# Patient Record
Sex: FEMALE | Race: WHITE | NOT HISPANIC OR LATINO | Employment: OTHER | URBAN - METROPOLITAN AREA
[De-identification: names, ages, dates, MRNs, and addresses within clinical notes are randomized per-mention and may not be internally consistent; named-entity substitution may affect disease eponyms.]

---

## 2017-01-09 ENCOUNTER — CONSULT (OUTPATIENT)
Dept: SLEEP MEDICINE | Facility: HOSPITAL | Age: 72
End: 2017-01-09

## 2017-01-09 ENCOUNTER — TELEPHONE (OUTPATIENT)
Dept: ONCOLOGY | Facility: CLINIC | Age: 72
End: 2017-01-09

## 2017-01-09 VITALS
SYSTOLIC BLOOD PRESSURE: 134 MMHG | WEIGHT: 160.2 LBS | HEIGHT: 63 IN | BODY MASS INDEX: 28.39 KG/M2 | HEART RATE: 58 BPM | OXYGEN SATURATION: 93 % | DIASTOLIC BLOOD PRESSURE: 56 MMHG

## 2017-01-09 DIAGNOSIS — F51.04 PSYCHOPHYSIOLOGICAL INSOMNIA: ICD-10-CM

## 2017-01-09 DIAGNOSIS — Z72.821 INADEQUATE SLEEP HYGIENE: ICD-10-CM

## 2017-01-09 DIAGNOSIS — G47.21 CIRCADIAN RHYTHM SLEEP DISORDER, DELAYED SLEEP PHASE TYPE: Primary | ICD-10-CM

## 2017-01-09 PROCEDURE — 99204 OFFICE O/P NEW MOD 45 MIN: CPT | Performed by: INTERNAL MEDICINE

## 2017-01-09 RX ORDER — POTASSIUM CHLORIDE 750 MG/1
TABLET, FILM COATED, EXTENDED RELEASE ORAL
Qty: 90 TABLET | Refills: 0 | Status: SHIPPED | OUTPATIENT
Start: 2017-01-09 | End: 2017-04-08 | Stop reason: SDUPTHER

## 2017-01-09 NOTE — PATIENT INSTRUCTIONS
Insomnia  Insomnia is a sleep disorder that makes it difficult to fall asleep or to stay asleep. Insomnia can cause tiredness (fatigue), low energy, difficulty concentrating, mood swings, and poor performance at work or school.   There are three different ways to classify insomnia:   · Difficulty falling asleep.  · Difficulty staying asleep.  · Waking up too early in the morning.  Any type of insomnia can be long-term (chronic) or short-term (acute). Both are common. Short-term insomnia usually lasts for three months or less. Chronic insomnia occurs at least three times a week for longer than three months.  CAUSES   Insomnia may be caused by another condition, situation, or substance, such as:  · Anxiety.  · Certain medicines.  · Gastroesophageal reflux disease (GERD) or other gastrointestinal conditions.  · Asthma or other breathing conditions.  · Restless legs syndrome, sleep apnea, or other sleep disorders.  · Chronic pain.  · Menopause. This may include hot flashes.  · Stroke.  · Abuse of alcohol, tobacco, or illegal drugs.  · Depression.  · Caffeine.    · Neurological disorders, such as Alzheimer disease.  · An overactive thyroid (hyperthyroidism).  The cause of insomnia may not be known.  RISK FACTORS  Risk factors for insomnia include:  · Gender. Women are more commonly affected than men.  · Age. Insomnia is more common as you get older.  · Stress. This may involve your professional or personal life.  · Income. Insomnia is more common in people with lower income.  · Lack of exercise.    · Irregular work schedule or night shifts.  · Traveling between different time zones.  SIGNS AND SYMPTOMS  If you have insomnia, trouble falling asleep or trouble staying asleep is the main symptom. This may lead to other symptoms, such as:  · Feeling fatigued.  · Feeling nervous about going to sleep.  · Not feeling rested in the morning.  · Having trouble concentrating.  · Feeling irritable, anxious, or depressed.  TREATMENT    Treatment for insomnia depends on the cause. If your insomnia is caused by an underlying condition, treatment will focus on addressing the condition. Treatment may also include:   · Medicines to help you sleep.  · Counseling or therapy.  · Lifestyle adjustments.  HOME CARE INSTRUCTIONS   · Take medicines only as directed by your health care provider.  · Keep regular sleeping and waking hours. Avoid naps.  · Keep a sleep diary to help you and your health care provider figure out what could be causing your insomnia. Include:      When you sleep.    When you wake up during the night.    How well you sleep.      How rested you feel the next day.    Any side effects of medicines you are taking.    What you eat and drink.    · Make your bedroom a comfortable place where it is easy to fall asleep:    Put up shades or special blackout curtains to block light from outside.    Use a white noise machine to block noise.    Keep the temperature cool.    · Exercise regularly as directed by your health care provider. Avoid exercising right before bedtime.  · Use relaxation techniques to manage stress. Ask your health care provider to suggest some techniques that may work well for you. These may include:    Breathing exercises.    Routines to release muscle tension.    Visualizing peaceful scenes.  · Cut back on alcohol, caffeinated beverages, and cigarettes, especially close to bedtime. These can disrupt your sleep.  · Do not overeat or eat spicy foods right before bedtime. This can lead to digestive discomfort that can make it hard for you to sleep.  · Limit screen use before bedtime. This includes:    Watching TV.    Using your smartphone, tablet, and computer.  · Stick to a routine. This can help you fall asleep faster. Try to do a quiet activity, brush your teeth, and go to bed at the same time each night.  · Get out of bed if you are still awake after 15 minutes of trying to sleep. Keep the lights down, but try reading or  doing a quiet activity. When you feel sleepy, go back to bed.  · Make sure that you drive carefully. Avoid driving if you feel very sleepy.  · Keep all follow-up appointments as directed by your health care provider. This is important.  SEEK MEDICAL CARE IF:   · You are tired throughout the day or have trouble in your daily routine due to sleepiness.  · You continue to have sleep problems or your sleep problems get worse.  SEEK IMMEDIATE MEDICAL CARE IF:   · You have serious thoughts about hurting yourself or someone else.     This information is not intended to replace advice given to you by your health care provider. Make sure you discuss any questions you have with your health care provider.     Document Released: 12/15/2001 Document Revised: 09/07/2016 Document Reviewed: 09/18/2015  Elsevier Interactive Patient Education ©2016 [employer]

## 2017-01-09 NOTE — MR AVS SNAPSHOT
Carol WISE Miranda   1/9/2017 11:00 AM   Consult    Dept Phone:  [phone]   Encounter #:  [id number]    Provider:  Edmund Carrera MD   Department:  [facility] SLEEP MEDICINE                Your Full Care Plan              Where to Get Your Medications      These medications were sent to [facility] [address] [place] [address] - [address] [place] [address] - [phone]  - [phone] [place] [address]     Phone:  [phone]     rivaroxaban 20 MG tablet            Your Updated Medication List          This list is accurate as of: 1/9/17 11:58 AM.  Always use your most recent med list.                Azilsartan Medoxomil 40 MG tablet   Commonly known as:  EDARBI   Take 1 tablet by mouth Daily.       chlorthalidone 25 MG tablet   Commonly known as:  HYGROTON   Take 1/2 tab by mouth daily       GLUCOSAMINE PO       metoprolol succinate XL 25 MG 24 hr tablet   Commonly known as:  TOPROL-XL       metoprolol tartrate 25 MG tablet   Commonly known as:  LOPRESSOR       PAIN RELIEF EXTRA STRENGTH 500 MG tablet   Generic drug:  acetaminophen       * pantoprazole 40 MG EC tablet   Commonly known as:  PROTONIX   Take 1 tablet by mouth daily.       * pantoprazole 40 MG EC tablet   Commonly known as:  PROTONIX   Take 1 tablet by mouth Daily.       potassium chloride 10 MEQ CR tablet   Commonly known as:  K-DUR   TAKE ONE TABLET BY MOUTH ONCE DAILY WITH FOOD       rivaroxaban 20 MG tablet   Commonly known as:  XARELTO   Take 1 tablet by mouth Daily.       simvastatin 20 MG tablet   Commonly known as:  ZOCOR   TAKE ONE TABLET BY MOUTH ONCE DAILY       Vortioxetine HBr 10 MG tablet       * Notice:  This list has 2 medication(s) that are the same as other medications prescribed for you. Read the directions carefully, and ask your doctor or other care provider to review them with you.            You Were Diagnosed With        Codes Comments    Circadian rhythm  sleep disorder, delayed sleep phase type    -  Primary ICD-10-CM: G47.21  ICD-9-CM: 327.31     Inadequate sleep hygiene     ICD-10-CM: Z72.821  ICD-9-CM: 307.49     Psychophysiological insomnia     ICD-10-CM: F51.04  ICD-9-CM: 307.42       Instructions    Insomnia  Insomnia is a sleep disorder that makes it difficult to fall asleep or to stay asleep. Insomnia can cause tiredness (fatigue), low energy, difficulty concentrating, mood swings, and poor performance at work or school.   There are three different ways to classify insomnia:   · Difficulty falling asleep.  · Difficulty staying asleep.  · Waking up too early in the morning.  Any type of insomnia can be long-term (chronic) or short-term (acute). Both are common. Short-term insomnia usually lasts for three months or less. Chronic insomnia occurs at least three times a week for longer than three months.  CAUSES   Insomnia may be caused by another condition, situation, or substance, such as:  · Anxiety.  · Certain medicines.  · Gastroesophageal reflux disease (GERD) or other gastrointestinal conditions.  · Asthma or other breathing conditions.  · Restless legs syndrome, sleep apnea, or other sleep disorders.  · Chronic pain.  · Menopause. This may include hot flashes.  · Stroke.  · Abuse of alcohol, tobacco, or illegal drugs.  · Depression.  · Caffeine.    · Neurological disorders, such as Alzheimer disease.  · An overactive thyroid (hyperthyroidism).  The cause of insomnia may not be known.  RISK FACTORS  Risk factors for insomnia include:  · Gender. Women are more commonly affected than men.  · Age. Insomnia is more common as you get older.  · Stress. This may involve your professional or personal life.  · Income. Insomnia is more common in people with lower income.  · Lack of exercise.    · Irregular work schedule or night shifts.  · Traveling between different time zones.  SIGNS AND SYMPTOMS  If you have insomnia, trouble falling asleep or trouble staying  asleep is the main symptom. This may lead to other symptoms, such as:  · Feeling fatigued.  · Feeling nervous about going to sleep.  · Not feeling rested in the morning.  · Having trouble concentrating.  · Feeling irritable, anxious, or depressed.  TREATMENT   Treatment for insomnia depends on the cause. If your insomnia is caused by an underlying condition, treatment will focus on addressing the condition. Treatment may also include:   · Medicines to help you sleep.  · Counseling or therapy.  · Lifestyle adjustments.  HOME CARE INSTRUCTIONS   · Take medicines only as directed by your health care provider.  · Keep regular sleeping and waking hours. Avoid naps.  · Keep a sleep diary to help you and your health care provider figure out what could be causing your insomnia. Include:      When you sleep.    When you wake up during the night.    How well you sleep.      How rested you feel the next day.    Any side effects of medicines you are taking.    What you eat and drink.    · Make your bedroom a comfortable place where it is easy to fall asleep:    Put up shades or special blackout curtains to block light from outside.    Use a white noise machine to block noise.    Keep the temperature cool.    · Exercise regularly as directed by your health care provider. Avoid exercising right before bedtime.  · Use relaxation techniques to manage stress. Ask your health care provider to suggest some techniques that may work well for you. These may include:    Breathing exercises.    Routines to release muscle tension.    Visualizing peaceful scenes.  · Cut back on alcohol, caffeinated beverages, and cigarettes, especially close to bedtime. These can disrupt your sleep.  · Do not overeat or eat spicy foods right before bedtime. This can lead to digestive discomfort that can make it hard for you to sleep.  · Limit screen use before bedtime. This includes:    Watching TV.    Using your smartphone, tablet, and computer.  · Stick to  a routine. This can help you fall asleep faster. Try to do a quiet activity, brush your teeth, and go to bed at the same time each night.  · Get out of bed if you are still awake after 15 minutes of trying to sleep. Keep the lights down, but try reading or doing a quiet activity. When you feel sleepy, go back to bed.  · Make sure that you drive carefully. Avoid driving if you feel very sleepy.  · Keep all follow-up appointments as directed by your health care provider. This is important.  SEEK MEDICAL CARE IF:   · You are tired throughout the day or have trouble in your daily routine due to sleepiness.  · You continue to have sleep problems or your sleep problems get worse.  SEEK IMMEDIATE MEDICAL CARE IF:   · You have serious thoughts about hurting yourself or someone else.     This information is not intended to replace advice given to you by your health care provider. Make sure you discuss any questions you have with your health care provider.     Document Released: 12/15/2001 Document Revised: 09/07/2016 Document Reviewed: 09/18/2015  [employer] Interactive Patient Education ©2016 [employer]       Patient Instructions History      Upcoming Appointments     Visit Type Date Time Department    CONSULT 1/9/2017 11:00 AM [facility]    FOLLOW UP 1 UNIT 1/16/2017 11:00 AM [facility] [place]    FOLLOW UP 4/6/2017 11:30 AM [facility]    FOLLOW UP 12/14/2017 10:45 AM [facility]      MyChart Signup     Our records indicate that you have declined [facility] MyChart signup. If you would like to sign up for [employer], please email [email or web address] or call [phone] to obtain an activation code.             Other Info from Your Visit           Your Appointments     Jan 16, 2017 11:00 AM EST   FOLLOW UP with Amanda Ashby MD   [facility]  [facility] ([place])    [address]  [facility] [address] [phone]             "Apr 06, 2017 11:30 AM EDT   Follow Up with Edmund Carrera MD   [facility] SLEEP MEDICINE (--)    [address]  [facility] [address] [phone]           Please bring completed new patient packets that were mailed to you prior to follow up as well as bringing a copy of insurnace card and photo ID to visit. Please bring downloadable chips/cards out of machines if you are on PAP therapy.            Dec 14, 2017 10:45 AM EST   Follow Up with Flakito Dill MD   [facility] CARDIOLOGY (--)    [address]  [facility] [address] [phone]           Arrive 15 minutes prior to appointment.              Allergies     Codeine Sulfate      Erythromycin  Other (See Comments)    “tongue swelling”.    Meperidine      Morphine And Related      Penicillins  Other (See Comments)    “swelling” at site.    Sulfa Antibiotics      Sulfadiazine        Reason for Visit     Sleeping Problem           Vital Signs     Blood Pressure Pulse Height Weight Oxygen Saturation Body Mass Index    134/56 58 63\" (160 cm) 160 lb 3.2 oz (72.7 kg) 93% 28.38 kg/m2    Smoking Status                   Never Smoker           Problems and Diagnoses Noted     Sleep disorder    Difficulty falling or staying asleep    Inadequate sleep hygiene            "

## 2017-01-09 NOTE — TELEPHONE ENCOUNTER
----- Message from Mitra Gage sent at 1/9/2017  9:51 AM EST -----  Regarding: [facility]  Contact: [phone]  Patient called and want to know if her Xarelto 20 mg can be refilled for a 90 day refill the price is doubled on a 30 day. Patient only has one pill for today please call into [facility] on [address] she will be seeing Dr. Ashby on 1/16/16.

## 2017-01-09 NOTE — PROGRESS NOTES
"Subjective   Carol Jean is a 71 y.o. female is being seen for consultation today at the request of Flakito Dill MD  For the evaluation of difficulty getting to sleep.  Her primary care physician is Dr. Gaspar    History of Present Illness  Patient apparently experienced bilateral pulmonary emboli roughly 2 years ago and was on ventilator for almost 3 weeks.  She has complained of difficulties getting to sleep since then. She says she tosses and turns a lot.  Says she does better in the past 3 weeks.  She has not been on medications to help her sleep.  She denies being sleepy during the day.  She denies any problems while driving.  She didn't denies any history of loud snoring.  She has been told by her  she snores slightly.  She has not been noted to have apneas.  She denies any reflux symptoms she denies hypnagogic hallucinations or sleep paralysis.  She denies kicking or jerking her legs at night.she has gained roughly 70 pounds in the past year.    She states she often doesn't arise until 10 or 11 AM she'll didn't walk her dog for a half mile she'll have breakfast and then may repeat for couple hours and then have lunch around 2 PM.  She has a part-time job however 3 days a week and will be at work by 8:30 when she returns in the afternoon she akes the dog fell she begins fixing dinner which she has about 6 PM.  She walks the dog for another half mile after dinner and then will watch TV until 11.  She says that often takes her 2-3 hours to go to sleep.  She does not arise until 10 or 11 then she gets 8 hours of sleep and feels rested.    She's had a history of hypertension for roughly 7 years.  She has history of asthma.  She denies any history of diabetes or coronary artery disease.    Past medical history surgeries had tonsillectomy said  and she had breast reduction surgery.    Allergies she has multiple drug allergies to penicillin \"mycins\" codeine sulfa and Demerol " "morphine    Habits: Smoking: Without    Alcohol: She has maybe one drink every 2 weeks    Caffeine: She has 2 cups of coffee each day and may have one caffeinated cola    Family history is positive for heart disease hypertension stroke COPD and thyroid problems  The following portions of the patient's history were reviewed and updated as appropriate: allergies, current medications, past family history, past medical history, past social history, past surgical history and problem list.    Review of Systems   Constitutional: Negative.    HENT: Negative.    Eyes: Negative.    Respiratory: Negative.    Cardiovascular: Negative.    Gastrointestinal: Negative.    Endocrine: Negative.    Genitourinary: Negative.    Musculoskeletal: Positive for arthralgias, gait problem, joint swelling and myalgias.   Skin: Negative.    Allergic/Immunologic: Negative.    Hematological: Negative.    Psychiatric/Behavioral: Positive for dysphoric mood.     Wainwright scores 3/24  Objective    Visit Vitals   • /56   • Pulse 58   • Ht 63\" (160 cm)   • Wt 160 lb 3.2 oz (72.7 kg)   • SpO2 93%   • BMI 28.38 kg/m2       Physical Exam   Constitutional: She is oriented to person, place, and time. She appears well-developed and well-nourished.   's overweight.   HENT:   Head: Normocephalic and atraumatic.   She has nasal airway narrowing and Mallampati class II anatomy   Eyes: EOM are normal. Pupils are equal, round, and reactive to light.   Neck: Normal range of motion. Neck supple.   Cardiovascular: Normal rate, regular rhythm and normal heart sounds.    Pulmonary/Chest: Effort normal and breath sounds normal.   Abdominal: Soft. Bowel sounds are normal.   Musculoskeletal: Normal range of motion. She exhibits no edema.   Neurological: She is alert and oriented to person, place, and time.   Skin: Skin is warm and dry.   Psychiatric: She has a normal mood and affect. Her behavior is normal.         Assessment/Plan    was seen today for sleeping " problem.    Diagnoses and all orders for this visit:    Circadian rhythm sleep disorder, delayed sleep phase type    Inadequate sleep hygiene    Psychophysiological insomnia    patient presents with a history as noted above.  She apparently has some minimal snoring history.  Her biggest problems appear to be delayed sleep phase syndrome and also inadequate sleep hygiene.  She switches her bedtime and a rise time throughout the week and then has difficulty on some nights falling asleep.  We have discussed measures to improve sleep hygiene including fixed bedtime and rise time.  We have discussed regular exercise although she's doing fairly well by her history with the multiple walks with her dog.  She needs to get light exposure earlier in the day to help set her circadian rhythm.  She may well also has some psychophysiologic insomnia which is certainly understandable given her prolonged ICU admission 2 years ago.  She is not particularly interested in medications to help her sleep and I do not think she really needs it at this time.  I have given her information on the [facility] online cognitive behavioral therapy course for insomnia.  She is to consider if she wishes to pursue that.  She is to return then in 3 months we'll reassess situation.  She is contact us earlier symptoms worsen.    Edmund Carrera MD [facility]  Sleep Medicine  Pulmonary and Critical Care Medicine

## 2017-01-16 ENCOUNTER — OFFICE VISIT (OUTPATIENT)
Dept: ONCOLOGY | Facility: CLINIC | Age: 72
End: 2017-01-16

## 2017-01-16 ENCOUNTER — LAB (OUTPATIENT)
Dept: LAB | Facility: HOSPITAL | Age: 72
End: 2017-01-16

## 2017-01-16 VITALS
HEART RATE: 66 BPM | DIASTOLIC BLOOD PRESSURE: 55 MMHG | HEIGHT: 63 IN | BODY MASS INDEX: 27.82 KG/M2 | RESPIRATION RATE: 15 BRPM | WEIGHT: 157 LBS | SYSTOLIC BLOOD PRESSURE: 120 MMHG | TEMPERATURE: 98.4 F

## 2017-01-16 DIAGNOSIS — I26.99 OTHER ACUTE PULMONARY EMBOLISM WITHOUT ACUTE COR PULMONALE (HCC): Primary | ICD-10-CM

## 2017-01-16 DIAGNOSIS — Z86.711 HISTORY OF PULMONARY EMBOLISM: ICD-10-CM

## 2017-01-16 LAB
ERYTHROCYTE [DISTWIDTH] IN BLOOD BY AUTOMATED COUNT: 13 % (ref 11.3–14.5)
HCT VFR BLD AUTO: 42 % (ref 34.5–44)
HGB BLD-MCNC: 13.9 G/DL (ref 11.5–15.5)
LYMPHOCYTES # BLD AUTO: 1.7 10*3/MM3 (ref 0.6–4.8)
LYMPHOCYTES NFR BLD AUTO: 27.7 % (ref 24–44)
MCH RBC QN AUTO: 30.4 PG (ref 27–31)
MCHC RBC AUTO-ENTMCNC: 33.1 G/DL (ref 32–36)
MCV RBC AUTO: 91.6 FL (ref 80–99)
MONOCYTES # BLD AUTO: 0.1 10*3/MM3 (ref 0–1)
MONOCYTES NFR BLD AUTO: 2.4 % (ref 0–12)
NEUTROPHILS # BLD AUTO: 4.3 10*3/MM3 (ref 1.5–8.3)
NEUTROPHILS NFR BLD AUTO: 69.9 % (ref 41–71)
PLATELET # BLD AUTO: 325 10*3/MM3 (ref 150–450)
PMV BLD AUTO: 7.6 FL (ref 6–12)
RBC # BLD AUTO: 4.58 10*6/MM3 (ref 3.89–5.14)
WBC NRBC COR # BLD: 6.1 10*3/MM3 (ref 3.5–10.8)

## 2017-01-16 PROCEDURE — 99214 OFFICE O/P EST MOD 30 MIN: CPT | Performed by: INTERNAL MEDICINE

## 2017-01-16 PROCEDURE — 85025 COMPLETE CBC W/AUTO DIFF WBC: CPT

## 2017-01-16 PROCEDURE — 36415 COLL VENOUS BLD VENIPUNCTURE: CPT

## 2017-01-16 NOTE — PROGRESS NOTES
DATE OF VISIT: 1/16/2017    REASON FOR VISIT:   1. DVT while on therapeutic Coumadin 11/23/2012.   2. Pulmonary embolism 09/30/2012.     HISTORY OF PRESENT ILLNESS: The patient is a very pleasant 67-year-old   female with past medical history significant for massive pulmonary  embolism September 2012 requiring cardiac resuscitation. The patient was placed  on chronic anticoagulation with Coumadin since, however, she presented with  acute DVT of the right lower extremity while on Coumadin 11/23/2012. The  patient was switched to Lovenox 100 micrograms subcu daily since. The patient  was switched from Lovenox to Xarelto 20 mg p.o. daily on 03/15/2013 secondary  to this would be more convenient to the patient in avoiding the daily  injections. The patient is here today in scheduled follow-up visit.     SUBJECTIVE: The patient has been doing fairly well. She had some left knee  pain, improved after starting Celebrex as well as steroid shots. She denied any  bleedings. She denied any skin bruises. She denied any fever or chills. She  denied any night sweats.     REVIEW OF SYSTEMS: The other 9 systems reviewed by me and negative except as  mentioned in HPI.     PAST MEDICAL HISTORY/SOCIAL HISTORY/FAMILY HISTORY: Unchanged from my prior  documentation done on 11/24/2012.       Current Outpatient Prescriptions:   •  acetaminophen (PAIN RELIEF EXTRA STRENGTH) 500 MG tablet, Take 500 mg by mouth as needed for mild pain (1-3)., Disp: , Rfl:   •  Azilsartan Medoxomil (EDARBI) 40 MG tablet, Take 1 tablet by mouth Daily., Disp: 30 tablet, Rfl: 3  •  chlorthalidone (HYGROTEN) 25 MG tablet, Take 1/2 tab by mouth daily, Disp: 45 tablet, Rfl: 5  •  Glucosamine HCl (GLUCOSAMINE PO), Take 1 dose by mouth daily. As directed, Disp: , Rfl:   •  metoprolol succinate XL (TOPROL-XL) 25 MG 24 hr tablet, Take 25 mg by mouth 2 (two) times a day., Disp: , Rfl:   •  metoprolol tartrate (LOPRESSOR) 25 MG tablet, , Disp: , Rfl:   •   "pantoprazole (PROTONIX) 40 MG EC tablet, Take 1 tablet by mouth daily., Disp: 30 tablet, Rfl: 3  •  pantoprazole (PROTONIX) 40 MG EC tablet, Take 1 tablet by mouth Daily., Disp: 90 tablet, Rfl: 1  •  potassium chloride (K-DUR) 10 MEQ CR tablet, TAKE ONE TABLET BY MOUTH ONCE DAILY WITH FOOD, Disp: 90 tablet, Rfl: 0  •  rivaroxaban (XARELTO) 20 MG tablet, Take 1 tablet by mouth Daily., Disp: 90 tablet, Rfl: 5  •  simvastatin (ZOCOR) 20 MG tablet, TAKE ONE TABLET BY MOUTH ONCE DAILY, Disp: 90 tablet, Rfl: 0  •  Vortioxetine HBr 10 MG tablet, Take 10 mg by mouth. As directed, Disp: , Rfl:     PHYSICAL EXAMINATION:   Visit Vitals   • /55   • Pulse 66   • Temp 98.4 °F (36.9 °C) (Temporal Artery )   • Resp 15   • Ht 63\" (160 cm)   • Wt 157 lb (71.2 kg)   • BMI 27.81 kg/m2     ECOG1  GENERAL: Age appropriate. No acute distress.   HEENT: Head atraumatic, normocephalic.   NECK: Supple. No JVD. No lymphadenopathy.   LUNGS: Clear to auscultation bilaterally. No wheezing. No rhonchi.   HEART: Regular rate and rhythm. S1, S2, no murmurs.   ABDOMEN: Soft, nontender, nondistended. Bowel sounds positive. No  hepatosplenomegaly.   EXTREMITIES: No clubbing, cyanosis, or edema.   SKIN: No rashes. No purpura.   NEUROLOGIC: Awake and oriented x3. Strength 5 out of 5 in all muscle groups.     No visits with results within 2 Week(s) from this visit.  Latest known visit with results is:    Lab on 07/18/2016   Component Date Value Ref Range Status   • Glucose 07/18/2016 97  70 - 100 mg/dL Final   • BUN 07/18/2016 27* 9 - 23 mg/dL Final   • Creatinine 07/18/2016 1.40* 0.60 - 1.30 mg/dL Final   • Sodium 07/18/2016 134  132 - 146 mmol/L Final   • Potassium 07/18/2016 4.1  3.5 - 5.5 mmol/L Final   • Chloride 07/18/2016 95* 99 - 109 mmol/L Final   • CO2 07/18/2016 31.0  20.0 - 31.0 mmol/L Final   • Calcium 07/18/2016 9.6  8.7 - 10.4 mg/dL Final   • Total Protein 07/18/2016 7.1  5.7 - 8.2 g/dL Final   • Albumin 07/18/2016 4.40  3.20 - 4.80 " g/dL Final   • ALT (SGPT) 07/18/2016 15  7 - 40 U/L Final   • AST (SGOT) 07/18/2016 22  0 - 33 U/L Final   • Alkaline Phosphatase 07/18/2016 67  25 - 100 U/L Final   • Total Bilirubin 07/18/2016 1.1  0.3 - 1.2 mg/dL Final   • eGFR Non African Amer 07/18/2016 37* >60 mL/min/1.73 Final   • Globulin 07/18/2016 2.7  gm/dL Final   • A/G Ratio 07/18/2016 1.6  g/dL Final   • BUN/Creatinine Ratio 07/18/2016 19.3  7.0 - 25.0 Final   • Anion Gap 07/18/2016 8.0  3.0 - 11.0 mmol/L Final   • WBC 07/18/2016 7.20  3.50 - 10.80 10*3/mm3 Final   • RBC 07/18/2016 4.73  3.89 - 5.14 10*6/mm3 Final   • Hemoglobin 07/18/2016 13.9  11.5 - 15.5 g/dL Final   • Hematocrit 07/18/2016 43.1  34.5 - 44.0 % Final   • RDW 07/18/2016 13.0  11.3 - 14.5 % Final   • MCV 07/18/2016 91.2  80.0 - 99.0 fL Final   • MCH 07/18/2016 29.4  27.0 - 31.0 pg Final   • MCHC 07/18/2016 32.2  32.0 - 36.0 g/dL Final   • MPV 07/18/2016 6.9  6.0 - 12.0 fL Final   • Platelets 07/18/2016 315  150 - 450 10*3/mm3 Final   • Neutrophil % 07/18/2016 60.4  41.0 - 71.0 % Final   • Lymphocyte % 07/18/2016 34.9  24.0 - 44.0 % Final   • Monocyte % 07/18/2016 4.7  0.0 - 12.0 % Final   • Neutrophils, Absolute 07/18/2016 4.30  1.50 - 8.30 10*3/mm3 Final   • Lymphocytes, Absolute 07/18/2016 2.50  0.60 - 4.80 10*3/mm3 Final   • Monocytes, Absolute 07/18/2016 0.30  0.00 - 1.00 10*3/mm3 Final        ASSESSMENT: The patient is a pleasant 67-year-old  female with DVT/PE.    PROBLEM LIST:  1. Acute right lower extremity DVT while on therapeutic Coumadin 11/23/2012.   2. Pulmonary embolism with cardiac arrest 09/30/2012.   3.  Hypertension  4.  Hypercholesterolemia  5.  Heartburn  6.  Arthritis    PLAN:   1. We will continue Xarelto 20 mg p.o. daily indefinitely.  I will go ahead and refill it today.  I'll try to see if patient might qualify for copayment assistance.  2. The patient will come back to see me in 12 months with repeat CBC and CMP.   3. The patient was advised to wear  comprssion stackings if she is going to   take a long road trip.   4.  We'll continue Lopressor for hypertension  5.  We'll continue Zocor 20 g daily for hypercholesterolemia  6. We'll continue Protonix 40 mg daily for heartburn    Amanda Ashby MD  1/16/2017

## 2017-01-16 NOTE — LETTER
January 16, 2017     Aristides Gaspar MD  [address]  [address]  [place] [address]    Patient: Carol Jean   YOB: 1945   Date of Visit: 1/16/2017       Dear Dr. Hubert MD:    Carol Jean was in my office today. Below is a copy of my note.    If you have questions, please do not hesitate to call me. I look forward to following  along with you.         Sincerely,        Amanda Ashby MD        CC: No Recipients    DATE OF VISIT: 1/16/2017    REASON FOR VISIT:   1. DVT while on therapeutic Coumadin 11/23/2012.   2. Pulmonary embolism 09/30/2012.     HISTORY OF PRESENT ILLNESS: The patient is a very pleasant 67-year-old   female with past medical history significant for massive pulmonary  embolism September 2012 requiring cardiac resuscitation. The patient was placed  on chronic anticoagulation with Coumadin since, however, she presented with  acute DVT of the right lower extremity while on Coumadin 11/23/2012. The  patient was switched to Lovenox 100 micrograms subcu daily since. The patient  was switched from Lovenox to Xarelto 20 mg p.o. daily on 03/15/2013 secondary  to this would be more convenient to the patient in avoiding the daily  injections. The patient is here today in scheduled follow-up visit.     SUBJECTIVE: The patient has been doing fairly well. She had some left knee  pain, improved after starting Celebrex as well as steroid shots. She denied any  bleedings. She denied any skin bruises. She denied any fever or chills. She  denied any night sweats.     REVIEW OF SYSTEMS: The other 9 systems reviewed by me and negative except as  mentioned in HPI.     PAST MEDICAL HISTORY/SOCIAL HISTORY/FAMILY HISTORY: Unchanged from my prior  documentation done on 11/24/2012.       Current Outpatient Prescriptions:   •  acetaminophen (PAIN RELIEF EXTRA STRENGTH) 500 MG tablet, Take 500 mg by mouth as needed for mild pain (1-3)., Disp: , Rfl:   •   "Azilsartan Medoxomil (EDARBI) 40 MG tablet, Take 1 tablet by mouth Daily., Disp: 30 tablet, Rfl: 3  •  chlorthalidone (HYGROTEN) 25 MG tablet, Take 1/2 tab by mouth daily, Disp: 45 tablet, Rfl: 5  •  Glucosamine HCl (GLUCOSAMINE PO), Take 1 dose by mouth daily. As directed, Disp: , Rfl:   •  metoprolol succinate XL (TOPROL-XL) 25 MG 24 hr tablet, Take 25 mg by mouth 2 (two) times a day., Disp: , Rfl:   •  metoprolol tartrate (LOPRESSOR) 25 MG tablet, , Disp: , Rfl:   •  pantoprazole (PROTONIX) 40 MG EC tablet, Take 1 tablet by mouth daily., Disp: 30 tablet, Rfl: 3  •  pantoprazole (PROTONIX) 40 MG EC tablet, Take 1 tablet by mouth Daily., Disp: 90 tablet, Rfl: 1  •  potassium chloride (K-DUR) 10 MEQ CR tablet, TAKE ONE TABLET BY MOUTH ONCE DAILY WITH FOOD, Disp: 90 tablet, Rfl: 0  •  rivaroxaban (XARELTO) 20 MG tablet, Take 1 tablet by mouth Daily., Disp: 90 tablet, Rfl: 5  •  simvastatin (ZOCOR) 20 MG tablet, TAKE ONE TABLET BY MOUTH ONCE DAILY, Disp: 90 tablet, Rfl: 0  •  Vortioxetine HBr 10 MG tablet, Take 10 mg by mouth. As directed, Disp: , Rfl:     PHYSICAL EXAMINATION:   Visit Vitals   • /55   • Pulse 66   • Temp 98.4 °F (36.9 °C) (Temporal Artery )   • Resp 15   • Ht 63\" (160 cm)   • Wt 157 lb (71.2 kg)   • BMI 27.81 kg/m2     ECOG1  GENERAL: Age appropriate. No acute distress.   HEENT: Head atraumatic, normocephalic.   NECK: Supple. No JVD. No lymphadenopathy.   LUNGS: Clear to auscultation bilaterally. No wheezing. No rhonchi.   HEART: Regular rate and rhythm. S1, S2, no murmurs.   ABDOMEN: Soft, nontender, nondistended. Bowel sounds positive. No  hepatosplenomegaly.   EXTREMITIES: No clubbing, cyanosis, or edema.   SKIN: No rashes. No purpura.   NEUROLOGIC: Awake and oriented x3. Strength 5 out of 5 in all muscle groups.     No visits with results within 2 Week(s) from this visit.  Latest known visit with results is:    Lab on 07/18/2016   Component Date Value Ref Range Status   • Glucose 07/18/2016 " 97  70 - 100 mg/dL Final   • BUN 07/18/2016 27* 9 - 23 mg/dL Final   • Creatinine 07/18/2016 1.40* 0.60 - 1.30 mg/dL Final   • Sodium 07/18/2016 134  132 - 146 mmol/L Final   • Potassium 07/18/2016 4.1  3.5 - 5.5 mmol/L Final   • Chloride 07/18/2016 95* 99 - 109 mmol/L Final   • CO2 07/18/2016 31.0  20.0 - 31.0 mmol/L Final   • Calcium 07/18/2016 9.6  8.7 - 10.4 mg/dL Final   • Total Protein 07/18/2016 7.1  5.7 - 8.2 g/dL Final   • Albumin 07/18/2016 4.40  3.20 - 4.80 g/dL Final   • ALT (SGPT) 07/18/2016 15  7 - 40 U/L Final   • AST (SGOT) 07/18/2016 22  0 - 33 U/L Final   • Alkaline Phosphatase 07/18/2016 67  25 - 100 U/L Final   • Total Bilirubin 07/18/2016 1.1  0.3 - 1.2 mg/dL Final   • eGFR Non African Amer 07/18/2016 37* >60 mL/min/1.73 Final   • Globulin 07/18/2016 2.7  gm/dL Final   • A/G Ratio 07/18/2016 1.6  g/dL Final   • BUN/Creatinine Ratio 07/18/2016 19.3  7.0 - 25.0 Final   • Anion Gap 07/18/2016 8.0  3.0 - 11.0 mmol/L Final   • WBC 07/18/2016 7.20  3.50 - 10.80 10*3/mm3 Final   • RBC 07/18/2016 4.73  3.89 - 5.14 10*6/mm3 Final   • Hemoglobin 07/18/2016 13.9  11.5 - 15.5 g/dL Final   • Hematocrit 07/18/2016 43.1  34.5 - 44.0 % Final   • RDW 07/18/2016 13.0  11.3 - 14.5 % Final   • MCV 07/18/2016 91.2  80.0 - 99.0 fL Final   • MCH 07/18/2016 29.4  27.0 - 31.0 pg Final   • MCHC 07/18/2016 32.2  32.0 - 36.0 g/dL Final   • MPV 07/18/2016 6.9  6.0 - 12.0 fL Final   • Platelets 07/18/2016 315  150 - 450 10*3/mm3 Final   • Neutrophil % 07/18/2016 60.4  41.0 - 71.0 % Final   • Lymphocyte % 07/18/2016 34.9  24.0 - 44.0 % Final   • Monocyte % 07/18/2016 4.7  0.0 - 12.0 % Final   • Neutrophils, Absolute 07/18/2016 4.30  1.50 - 8.30 10*3/mm3 Final   • Lymphocytes, Absolute 07/18/2016 2.50  0.60 - 4.80 10*3/mm3 Final   • Monocytes, Absolute 07/18/2016 0.30  0.00 - 1.00 10*3/mm3 Final        ASSESSMENT: The patient is a pleasant 67-year-old  female with DVT/PE.    PROBLEM LIST:  1. Acute right lower  extremity DVT while on therapeutic Coumadin 11/23/2012.   2. Pulmonary embolism with cardiac arrest 09/30/2012.   3.  Hypertension  4.  Hypercholesterolemia  5.  Heartburn  6.  Arthritis    PLAN:   1. We will continue Xarelto 20 mg p.o. daily indefinitely.   2. The patient will come back to see me in 12 months with repeat CBC and CMP.   3. The patient was advised to wear comprssion stackings if she is going to   take a long road trip.   4.  We'll continue Lopressor for hypertension  5.  We'll continue Zocor 20 g daily for hypercholesterolemia  6. We'll continue Protonix 40 mg daily for heartburn    Amanda Ashby MD  1/16/2017

## 2017-01-16 NOTE — MR AVS SNAPSHOT
Carol Learycornelio   1/16/2017 11:00 AM   Office Visit    Dept Phone:  [phone]   Encounter #:  [id number]    Provider:  Amanda Ashby MD   Department:  [facility] HEMATOLOGY  AND ONCOLOGY                Your Full Care Plan              Your Updated Medication List          This list is accurate as of: 1/16/17 11:33 AM.  Always use your most recent med list.                Azilsartan Medoxomil 40 MG tablet   Commonly known as:  EDARBI   Take 1 tablet by mouth Daily.       chlorthalidone 25 MG tablet   Commonly known as:  HYGROTON   Take 1/2 tab by mouth daily       GLUCOSAMINE PO       metoprolol succinate XL 25 MG 24 hr tablet   Commonly known as:  TOPROL-XL       metoprolol tartrate 25 MG tablet   Commonly known as:  LOPRESSOR       PAIN RELIEF EXTRA STRENGTH 500 MG tablet   Generic drug:  acetaminophen       * pantoprazole 40 MG EC tablet   Commonly known as:  PROTONIX   Take 1 tablet by mouth daily.       * pantoprazole 40 MG EC tablet   Commonly known as:  PROTONIX   Take 1 tablet by mouth Daily.       potassium chloride 10 MEQ CR tablet   Commonly known as:  K-DUR   TAKE ONE TABLET BY MOUTH ONCE DAILY WITH FOOD       rivaroxaban 20 MG tablet   Commonly known as:  XARELTO   Take 1 tablet by mouth Daily.       simvastatin 20 MG tablet   Commonly known as:  ZOCOR   TAKE ONE TABLET BY MOUTH ONCE DAILY       Vortioxetine HBr 10 MG tablet       * Notice:  This list has 2 medication(s) that are the same as other medications prescribed for you. Read the directions carefully, and ask your doctor or other care provider to review them with you.            Instructions     None    Patient Instructions History      MyChart Signup     Our records indicate that you have declined [facility] sign[facility]. If you would like to sign up for [facility], please email [email or web address] or call [phone] to obtain an activation code.             Other Info from Your Visit  "          Your appointments     Date & Time Provider Appointment Department    Apr 06, 2017 11:30 AM CAYLAT Edmund Carrera MD Follow Up [facility] SLEEP MEDICINE    Please bring completed new patient packets that were mailed to you prior to follow up as well as bringing a copy of insurnace card and photo ID to visit. Please bring downloadable chips/cards out of machines if you are on PAP therapy.    Dec 14, 2017 10:45 AM BRADLEY Dill MD Follow Up [facility] CARDIOLOGY    Arrive 15 minutes prior to appointment.    Jan 16, 2018 10:30 AM BRADLEY Ashby MD FOLLOW UP [facility] HEMATOLOGY  AND ONCOLOGY        [facility] HEMATOLOGY  AND ONCOLOGY  [place]  [address]  [place] [id number] [phone] [facility] CARDIOLOGY  [address]  [place] [id number]  [phone] [facility] SLEEP MEDICINE  [address]  [place] [facility] [id number] [phone]              Vital Signs     Blood Pressure Pulse Temperature Respirations Height Weight    120/55 66 98.4 °F (36.9 °C) (Temporal Artery ) 15 63\" (160 cm) 157 lb (71.2 kg)    Body Mass Index Smoking Status                27.81 kg/m2 Never Smoker          Problems and Diagnoses Noted     Blood clot to lung        "

## 2017-01-21 ENCOUNTER — RESULTS ENCOUNTER (OUTPATIENT)
Dept: ONCOLOGY | Facility: CLINIC | Age: 72
End: 2017-01-21

## 2017-01-21 DIAGNOSIS — I26.99 OTHER ACUTE PULMONARY EMBOLISM WITHOUT ACUTE COR PULMONALE (HCC): ICD-10-CM

## 2017-02-27 PROBLEM — E78.5 HYPERLIPIDEMIA: Status: ACTIVE | Noted: 2017-02-27

## 2017-02-28 ENCOUNTER — OFFICE VISIT (OUTPATIENT)
Dept: FAMILY MEDICINE CLINIC | Facility: CLINIC | Age: 72
End: 2017-02-28

## 2017-02-28 VITALS
TEMPERATURE: 97.5 F | WEIGHT: 160 LBS | HEART RATE: 76 BPM | BODY MASS INDEX: 28.34 KG/M2 | RESPIRATION RATE: 16 BRPM | SYSTOLIC BLOOD PRESSURE: 116 MMHG | DIASTOLIC BLOOD PRESSURE: 70 MMHG

## 2017-02-28 DIAGNOSIS — E78.5 HYPERLIPIDEMIA, UNSPECIFIED HYPERLIPIDEMIA TYPE: ICD-10-CM

## 2017-02-28 DIAGNOSIS — I10 ESSENTIAL HYPERTENSION: Primary | ICD-10-CM

## 2017-02-28 LAB
ALBUMIN SERPL-MCNC: 4.3 G/DL (ref 3.2–4.8)
ALBUMIN/GLOB SERPL: 1.9 G/DL (ref 1.5–2.5)
ALP SERPL-CCNC: 65 U/L (ref 25–100)
ALT SERPL W P-5'-P-CCNC: 13 U/L (ref 7–40)
ANION GAP SERPL CALCULATED.3IONS-SCNC: 7 MMOL/L (ref 3–11)
ARTICHOKE IGE QN: 124 MG/DL (ref 0–130)
AST SERPL-CCNC: 20 U/L (ref 0–33)
BILIRUB SERPL-MCNC: 1.1 MG/DL (ref 0.3–1.2)
BUN BLD-MCNC: 20 MG/DL (ref 9–23)
BUN/CREAT SERPL: 16.7 (ref 7–25)
CALCIUM SPEC-SCNC: 10.6 MG/DL (ref 8.7–10.4)
CHLORIDE SERPL-SCNC: 103 MMOL/L (ref 99–109)
CHOLEST SERPL-MCNC: 205 MG/DL (ref 0–200)
CO2 SERPL-SCNC: 30 MMOL/L (ref 20–31)
CREAT BLD-MCNC: 1.2 MG/DL (ref 0.6–1.3)
GFR SERPL CREATININE-BSD FRML MDRD: 44 ML/MIN/1.73
GLOBULIN UR ELPH-MCNC: 2.3 GM/DL
GLUCOSE BLD-MCNC: 91 MG/DL (ref 70–100)
HDLC SERPL-MCNC: 77 MG/DL (ref 40–60)
POTASSIUM BLD-SCNC: 4.8 MMOL/L (ref 3.5–5.5)
PROT SERPL-MCNC: 6.6 G/DL (ref 5.7–8.2)
SODIUM BLD-SCNC: 140 MMOL/L (ref 132–146)
TRIGL SERPL-MCNC: 138 MG/DL (ref 0–150)

## 2017-02-28 PROCEDURE — 80053 COMPREHEN METABOLIC PANEL: CPT | Performed by: FAMILY MEDICINE

## 2017-02-28 PROCEDURE — 99213 OFFICE O/P EST LOW 20 MIN: CPT | Performed by: FAMILY MEDICINE

## 2017-02-28 PROCEDURE — 36415 COLL VENOUS BLD VENIPUNCTURE: CPT | Performed by: FAMILY MEDICINE

## 2017-02-28 PROCEDURE — 80061 LIPID PANEL: CPT | Performed by: FAMILY MEDICINE

## 2017-02-28 RX ORDER — SIMVASTATIN 20 MG
20 TABLET ORAL NIGHTLY
Qty: 90 TABLET | Refills: 3 | Status: SHIPPED | OUTPATIENT
Start: 2017-02-28 | End: 2017-12-14 | Stop reason: SDUPTHER

## 2017-02-28 NOTE — PROGRESS NOTES
Subjective   Carol Jean is a 71 y.o. female.     History of Present Illness   Sleeps days and up doing house activities during days. No swelling. No chest discomfort.   Saw cardiology last month with a good report.  Lab studies satisfactory range.  Evaluated by sleep clinic with no abnormal findings, stays asleep fine.   No headaches or heartburn. Weight steady.  Does not exercise.  Did some sitting with elderly woman but she .   The following portions of the patient's history were reviewed and updated as appropriate: allergies, current medications, past family history, past medical history, past social history, past surgical history and problem list.    Review of Systems   Constitutional: Negative.    HENT: Negative.    Eyes: Negative.    Respiratory: Negative.    Cardiovascular: Negative.    Endocrine: Negative.    Musculoskeletal: Negative.    Skin: Negative.    Neurological: Negative for dizziness, speech difficulty and weakness.       Objective   Physical Exam   Constitutional: She appears well-developed. No distress.   Neck: Normal range of motion.   Pulmonary/Chest: Effort normal.   Musculoskeletal: She exhibits no edema.   Skin: Skin is dry.   Psychiatric: She has a normal mood and affect.   Vitals reviewed.      Assessment/Plan    was seen today for med refill.    Diagnoses and all orders for this visit:    Essential hypertension  -     Comprehensive Metabolic Panel    Hyperlipidemia, unspecified hyperlipidemia type  -     Lipid Panel    Other orders  -     simvastatin (ZOCOR) 20 MG tablet; Take 1 tablet by mouth Every Night.

## 2017-04-10 ENCOUNTER — TELEPHONE (OUTPATIENT)
Dept: FAMILY MEDICINE CLINIC | Facility: CLINIC | Age: 72
End: 2017-04-10

## 2017-04-10 RX ORDER — POTASSIUM CHLORIDE 750 MG/1
TABLET, FILM COATED, EXTENDED RELEASE ORAL
Qty: 90 TABLET | Refills: 0 | Status: SHIPPED | OUTPATIENT
Start: 2017-04-10 | End: 2017-07-09 | Stop reason: SDUPTHER

## 2017-04-10 RX ORDER — PANTOPRAZOLE SODIUM 40 MG/1
TABLET, DELAYED RELEASE ORAL
Qty: 90 TABLET | Refills: 0 | Status: SHIPPED | OUTPATIENT
Start: 2017-04-10 | End: 2017-06-02

## 2017-04-10 NOTE — TELEPHONE ENCOUNTER
----- Message from Marlyn Dill sent at 4/10/2017 10:20 AM EDT -----  Regarding: REFILL  Contact: [phone]  PLEASE SEND REFILLS OF pantoprazole (PROTONIX) 40 MG EC tablet AND metoprolol tartrate (LOPRESSOR) 25 MG tablet    [facility]    Prescriptions have already been sent into pharmacy.  BBmike, CMA

## 2017-06-02 ENCOUNTER — OFFICE VISIT (OUTPATIENT)
Dept: FAMILY MEDICINE CLINIC | Facility: CLINIC | Age: 72
End: 2017-06-02

## 2017-06-02 ENCOUNTER — HOSPITAL ENCOUNTER (OUTPATIENT)
Dept: GENERAL RADIOLOGY | Facility: HOSPITAL | Age: 72
Discharge: HOME | End: 2017-06-02
Admitting: FAMILY MEDICINE

## 2017-06-02 VITALS
TEMPERATURE: 98.4 F | BODY MASS INDEX: 28.7 KG/M2 | WEIGHT: 162 LBS | SYSTOLIC BLOOD PRESSURE: 100 MMHG | DIASTOLIC BLOOD PRESSURE: 60 MMHG | HEART RATE: 76 BPM | RESPIRATION RATE: 16 BRPM

## 2017-06-02 DIAGNOSIS — M48.061 SPINAL STENOSIS OF LUMBAR REGION: Primary | ICD-10-CM

## 2017-06-02 PROCEDURE — 73502 X-RAY EXAM HIP UNI 2-3 VIEWS: CPT

## 2017-06-02 PROCEDURE — 72110 X-RAY EXAM L-2 SPINE 4/>VWS: CPT

## 2017-06-02 PROCEDURE — 99213 OFFICE O/P EST LOW 20 MIN: CPT | Performed by: FAMILY MEDICINE

## 2017-06-02 RX ORDER — MELOXICAM 7.5 MG/1
7.5 TABLET ORAL DAILY
Qty: 30 TABLET | Refills: 0 | Status: SHIPPED | OUTPATIENT
Start: 2017-06-02 | End: 2017-06-30 | Stop reason: ALTCHOICE

## 2017-06-02 NOTE — PROGRESS NOTES
Subjective   Carol Jean is a 71 y.o. female.     History of Present Illness   Two weeks of left hip discomfort not helped by Tylenol. No pain to lay down. Steps have started bothering her. Easier to walk using shopping cart.   Past pulmonary embolus on Xarelto 20 mg  Stopped Trintellix, had difficulty sleeping.  Stopped taking naps. Setter with elderly ladies.   The following portions of the patient's history were reviewed and updated as appropriate: allergies, current medications, past family history, past medical history, past social history, past surgical history and problem list.    Review of Systems   Constitutional: Negative.    HENT: Negative.    Respiratory: Negative.    Cardiovascular: Negative.    Musculoskeletal: Positive for arthralgias.   Skin: Negative.        Objective   Physical Exam   Constitutional: She appears well-developed and well-nourished. No distress.   Neck: Neck supple.   Pulmonary/Chest: Effort normal.   Musculoskeletal: She exhibits no edema.        Lumbar back: Normal.   Negative leg lift bilaterally.  Normal vibratory sensation.   Neurological: No sensory deficit.   Reflex Scores:       Patellar reflexes are 2+ on the right side and 2+ on the left side.  Vitals reviewed.      Assessment/Plan    was seen today for hip pain.    Diagnoses and all orders for this visit:    Spinal stenosis of lumbar region  -     meloxicam (MOBIC) 7.5 MG tablet; Take 1 tablet by mouth Daily.  -     XR Spine Lumbar 4+ View  -     XR Hip With or Without Pelvis 2 - 3 View Left

## 2017-06-30 ENCOUNTER — OFFICE VISIT (OUTPATIENT)
Dept: FAMILY MEDICINE CLINIC | Facility: CLINIC | Age: 72
End: 2017-06-30

## 2017-06-30 VITALS
RESPIRATION RATE: 16 BRPM | HEART RATE: 80 BPM | SYSTOLIC BLOOD PRESSURE: 110 MMHG | DIASTOLIC BLOOD PRESSURE: 70 MMHG | TEMPERATURE: 98.3 F | BODY MASS INDEX: 29.58 KG/M2 | WEIGHT: 167 LBS

## 2017-06-30 DIAGNOSIS — R45.0 NERVOUS: ICD-10-CM

## 2017-06-30 DIAGNOSIS — M48.061 SPINAL STENOSIS OF LUMBAR REGION: Primary | ICD-10-CM

## 2017-06-30 DIAGNOSIS — I10 ESSENTIAL HYPERTENSION: ICD-10-CM

## 2017-06-30 PROCEDURE — 99213 OFFICE O/P EST LOW 20 MIN: CPT | Performed by: FAMILY MEDICINE

## 2017-07-10 RX ORDER — POTASSIUM CHLORIDE 750 MG/1
TABLET, FILM COATED, EXTENDED RELEASE ORAL
Qty: 90 TABLET | Refills: 0 | Status: SHIPPED | OUTPATIENT
Start: 2017-07-10 | End: 2017-10-02 | Stop reason: SDUPTHER

## 2017-07-10 RX ORDER — PANTOPRAZOLE SODIUM 40 MG/1
TABLET, DELAYED RELEASE ORAL
Qty: 90 TABLET | Refills: 0 | Status: SHIPPED | OUTPATIENT
Start: 2017-07-10 | End: 2017-08-28 | Stop reason: SDUPTHER

## 2017-07-27 ENCOUNTER — OFFICE VISIT (OUTPATIENT)
Dept: FAMILY MEDICINE CLINIC | Facility: CLINIC | Age: 72
End: 2017-07-27

## 2017-07-27 VITALS
BODY MASS INDEX: 29.23 KG/M2 | OXYGEN SATURATION: 98 % | RESPIRATION RATE: 16 BRPM | HEIGHT: 63 IN | SYSTOLIC BLOOD PRESSURE: 118 MMHG | HEART RATE: 69 BPM | DIASTOLIC BLOOD PRESSURE: 70 MMHG | WEIGHT: 165 LBS

## 2017-07-27 DIAGNOSIS — I10 ESSENTIAL HYPERTENSION: Primary | ICD-10-CM

## 2017-07-27 DIAGNOSIS — R45.0 NERVOUS: ICD-10-CM

## 2017-07-27 PROCEDURE — 99213 OFFICE O/P EST LOW 20 MIN: CPT | Performed by: FAMILY MEDICINE

## 2017-07-27 NOTE — PROGRESS NOTES
Subjective   Carol Jean is a 71 y.o. female.     History of Present Illness   PHYSICAL therapy has helped back, walking better.  Right knee discomfort using stationary bike.  No night pain. No leg swelling, no hip or ankle pain.   Nerves improved taking Trentillix 5 mg.   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   The following portions of the patient's history were reviewed and updated as appropriate: allergies, current medications, past family history, past medical history, past social history, past surgical history and problem list.    Review of Systems   Constitutional: Negative.    Respiratory: Negative.    Cardiovascular: Negative.    Musculoskeletal: Positive for back pain.   Skin: Negative.        Objective   Physical Exam   Constitutional: She appears well-developed.   Pulmonary/Chest: Effort normal.   Musculoskeletal: She exhibits no edema.        Right knee: Tenderness (diffuse popliteal, no cyst ) found.   No calf swelling ot tenderness.   Neurological: She is alert.   Vitals reviewed.      Assessment/Plan    was seen today for back pain.    Diagnoses and all orders for this visit:    Essential hypertension    Nervous      Continue the current medications. (Samples given)  Hold the bicycle use next 4 weeks.

## 2017-08-28 ENCOUNTER — OFFICE VISIT (OUTPATIENT)
Dept: FAMILY MEDICINE CLINIC | Facility: CLINIC | Age: 72
End: 2017-08-28

## 2017-08-28 VITALS
SYSTOLIC BLOOD PRESSURE: 100 MMHG | BODY MASS INDEX: 28.7 KG/M2 | WEIGHT: 162 LBS | TEMPERATURE: 97.5 F | HEART RATE: 84 BPM | DIASTOLIC BLOOD PRESSURE: 70 MMHG | RESPIRATION RATE: 16 BRPM

## 2017-08-28 DIAGNOSIS — I10 ESSENTIAL HYPERTENSION: Primary | ICD-10-CM

## 2017-08-28 PROCEDURE — 99213 OFFICE O/P EST LOW 20 MIN: CPT | Performed by: FAMILY MEDICINE

## 2017-08-28 NOTE — PROGRESS NOTES
Subjective   Carol Jean is a 71 y.o. female.     History of Present Illness   Weight loss using Evans plan, down 6 lb at home. Sleeping well. Blood pressure staying well managed with Edarbi.  Finished physical therapy and improved back discomfort.  right knee pops with walking injected by orthopedic.  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  The following portions of the patient's history were reviewed and updated as appropriate: allergies, current medications, past family history, past medical history, past social history, past surgical history and problem list.    Review of Systems   Constitutional: Negative.    Respiratory: Negative.    Cardiovascular: Negative.    Musculoskeletal: Positive for arthralgias.       Objective   Physical Exam   Constitutional: She appears well-developed. No distress.   Neck: Neck supple.   Pulmonary/Chest: Effort normal.   Musculoskeletal: She exhibits no edema.        Right knee: No tenderness found.   Right knee grinds to extend.    Skin: Skin is warm.   Vitals reviewed.      Assessment/Plan    was seen today for follow-up.    Diagnoses and all orders for this visit:    Essential hypertension        Consider decreasing BP medication if continued success with weight loss.   Continue same medication.

## 2017-10-02 ENCOUNTER — TELEPHONE (OUTPATIENT)
Dept: FAMILY MEDICINE CLINIC | Facility: CLINIC | Age: 72
End: 2017-10-02

## 2017-10-02 RX ORDER — POTASSIUM CHLORIDE 750 MG/1
10 TABLET, FILM COATED, EXTENDED RELEASE ORAL DAILY
Qty: 90 TABLET | Refills: 0 | Status: SHIPPED | OUTPATIENT
Start: 2017-10-02 | End: 2018-01-02 | Stop reason: SDUPTHER

## 2017-10-10 ENCOUNTER — OFFICE VISIT (OUTPATIENT)
Dept: FAMILY MEDICINE CLINIC | Facility: CLINIC | Age: 72
End: 2017-10-10

## 2017-10-10 VITALS
TEMPERATURE: 98.2 F | BODY MASS INDEX: 28.34 KG/M2 | WEIGHT: 160 LBS | HEART RATE: 76 BPM | DIASTOLIC BLOOD PRESSURE: 58 MMHG | SYSTOLIC BLOOD PRESSURE: 90 MMHG | RESPIRATION RATE: 16 BRPM

## 2017-10-10 DIAGNOSIS — F41.9 ANXIETY: ICD-10-CM

## 2017-10-10 DIAGNOSIS — R30.0 DYSURIA: Primary | ICD-10-CM

## 2017-10-10 DIAGNOSIS — I10 ESSENTIAL HYPERTENSION: ICD-10-CM

## 2017-10-10 DIAGNOSIS — K21.9 GASTROESOPHAGEAL REFLUX DISEASE, ESOPHAGITIS PRESENCE NOT SPECIFIED: ICD-10-CM

## 2017-10-10 LAB
BILIRUB BLD-MCNC: NEGATIVE MG/DL
CLARITY, POC: CLEAR
COLOR UR: YELLOW
GLUCOSE UR STRIP-MCNC: NEGATIVE MG/DL
KETONES UR QL: NEGATIVE
LEUKOCYTE EST, POC: ABNORMAL
NITRITE UR-MCNC: NEGATIVE MG/ML
PH UR: 5 [PH] (ref 5–8)
PROT UR STRIP-MCNC: ABNORMAL MG/DL
RBC # UR STRIP: NEGATIVE /UL
SP GR UR: 1.02 (ref 1–1.03)
UROBILINOGEN UR QL: NORMAL

## 2017-10-10 PROCEDURE — 99213 OFFICE O/P EST LOW 20 MIN: CPT | Performed by: FAMILY MEDICINE

## 2017-10-10 PROCEDURE — 81003 URINALYSIS AUTO W/O SCOPE: CPT | Performed by: FAMILY MEDICINE

## 2017-10-10 RX ORDER — NITROFURANTOIN MACROCRYSTALS 100 MG/1
100 CAPSULE ORAL 2 TIMES DAILY
Qty: 20 CAPSULE | Refills: 0 | Status: SHIPPED | OUTPATIENT
Start: 2017-10-10 | End: 2017-12-14

## 2017-10-10 RX ORDER — PANTOPRAZOLE SODIUM 40 MG/1
40 TABLET, DELAYED RELEASE ORAL DAILY
Qty: 30 TABLET | Refills: 3 | Status: SHIPPED | OUTPATIENT
Start: 2017-10-10 | End: 2017-10-24 | Stop reason: SDUPTHER

## 2017-10-10 NOTE — PROGRESS NOTES
Subjective   Carol Jean is a 71 y.o. female.     History of Present Illness   Left buttock and groin pain. Had been doing well physical therapy. No discomfort to lay. Pain with walking.  No improvement with Celebrex. No cough or dysuria. Easily irritable.   Past DVT has IVC filter. Tried using ice that seemed to help.  The following portions of the patient's history were reviewed and updated as appropriate: allergies, current medications, past family history, past medical history, past social history, past surgical history and problem list.    Review of Systems   Constitutional: Negative.    HENT: Negative.    Respiratory: Negative.    Cardiovascular: Negative.    Gastrointestinal: Negative.    Musculoskeletal: Positive for arthralgias and back pain.   Skin: Negative.        Objective   Physical Exam   Constitutional: She appears well-developed.   Pulmonary/Chest: Effort normal.   Musculoskeletal:        Thoracic back: Normal.        Lumbar back: Normal.   Leg lift negative both sides.    Neurological: She is alert.   Vitals reviewed.      Assessment/Plan    was seen today for back pain.    Diagnoses and all orders for this visit:    Dysuria  -     POCT urinalysis dipstick, automated  -     nitrofurantoin (MACRODANTIN) 100 MG capsule; Take 1 capsule by mouth 2 (Two) Times a Day.    Gastroesophageal reflux disease, esophagitis presence not specified  -     pantoprazole (PROTONIX) 40 MG EC tablet; Take 1 tablet by mouth Daily.    Essential hypertension  -     metoprolol tartrate (LOPRESSOR) 25 MG tablet; Take 1 tablet by mouth 2 (Two) Times a Day.    Anxiety  -     Vortioxetine HBr (TRINTELLIX) 10 MG tablet; Take 10 mg by mouth Daily.

## 2017-10-17 RX ORDER — CHLORTHALIDONE 25 MG/1
TABLET ORAL
Qty: 45 TABLET | Refills: 5 | Status: SHIPPED | OUTPATIENT
Start: 2017-10-17 | End: 2018-11-07 | Stop reason: SDUPTHER

## 2017-10-24 ENCOUNTER — TELEPHONE (OUTPATIENT)
Dept: FAMILY MEDICINE CLINIC | Facility: CLINIC | Age: 72
End: 2017-10-24

## 2017-10-24 DIAGNOSIS — K21.9 GASTROESOPHAGEAL REFLUX DISEASE, ESOPHAGITIS PRESENCE NOT SPECIFIED: ICD-10-CM

## 2017-10-24 RX ORDER — PANTOPRAZOLE SODIUM 40 MG/1
40 TABLET, DELAYED RELEASE ORAL DAILY
Qty: 30 TABLET | Refills: 3 | Status: SHIPPED | OUTPATIENT
Start: 2017-10-24 | End: 2018-03-05 | Stop reason: SDUPTHER

## 2017-10-24 NOTE — TELEPHONE ENCOUNTER
----- Message from Kirstie Tyler sent at 10/24/2017  2:59 PM EDT -----  Regarding: wanting samples  Contact: [phone]  Patient wanting samples of xarelto.  panpoprazole needs to be called out.  [facility]      Samples of Xarelto and Trintellix have been left up front for pt to .  Rx for pantoprazole has been sent in.  BBmike, BILLY

## 2017-11-27 ENCOUNTER — TELEPHONE (OUTPATIENT)
Dept: ONCOLOGY | Facility: CLINIC | Age: 72
End: 2017-11-27

## 2017-11-27 NOTE — TELEPHONE ENCOUNTER
----- Message from Mitra BILLIE Gage sent at 11/27/2017 10:00 AM EST -----  Regarding: VIOLA-WHEN TO STOP BLOOD THINNERS  Contact: [phone]  Patient is having a colonoscopy Thursday and needs to know when to stop Xarelto? Also wanted Dr. Ashby know she fell a week ago has a bruise on her knee, but swelling is gone.

## 2017-11-27 NOTE — TELEPHONE ENCOUNTER
Patient advised she can take Xarelto as usual today, but to stop it tomorrow and Wednesday for scope on Thursday, can resume it on Friday.

## 2017-11-30 ENCOUNTER — OUTSIDE FACILITY SERVICE (OUTPATIENT)
Dept: GASTROENTEROLOGY | Facility: CLINIC | Age: 72
End: 2017-11-30

## 2017-11-30 PROCEDURE — G0105 COLORECTAL SCRN; HI RISK IND: HCPCS | Performed by: INTERNAL MEDICINE

## 2017-12-14 ENCOUNTER — OFFICE VISIT (OUTPATIENT)
Dept: CARDIOLOGY | Facility: CLINIC | Age: 72
End: 2017-12-14

## 2017-12-14 VITALS
HEART RATE: 62 BPM | BODY MASS INDEX: 28 KG/M2 | WEIGHT: 158 LBS | SYSTOLIC BLOOD PRESSURE: 114 MMHG | HEIGHT: 63 IN | DIASTOLIC BLOOD PRESSURE: 54 MMHG

## 2017-12-14 DIAGNOSIS — Z86.711 HISTORY OF PULMONARY EMBOLISM: ICD-10-CM

## 2017-12-14 DIAGNOSIS — I10 ESSENTIAL HYPERTENSION: Primary | ICD-10-CM

## 2017-12-14 DIAGNOSIS — E78.5 DYSLIPIDEMIA: ICD-10-CM

## 2017-12-14 PROCEDURE — 99213 OFFICE O/P EST LOW 20 MIN: CPT | Performed by: INTERNAL MEDICINE

## 2017-12-14 RX ORDER — SIMVASTATIN 40 MG
40 TABLET ORAL NIGHTLY
Qty: 90 TABLET | Refills: 3 | Status: SHIPPED | OUTPATIENT
Start: 2017-12-14 | End: 2018-12-18

## 2017-12-14 RX ORDER — VITAMIN E 268 MG
400 CAPSULE ORAL DAILY
COMMUNITY
End: 2019-12-05

## 2017-12-14 RX ORDER — ASCORBIC ACID 500 MG
1000 TABLET ORAL DAILY
COMMUNITY

## 2017-12-14 NOTE — PROGRESS NOTES
OFFICE FOLLOW UP     Date of Encounter:2017     Name: Carol Jean  : 1945  Address: [address] [place] [address]  Phone: [phone]    PCP: Aristides Gaspar MD  [address]  [place] [address]    Carol Jean is a 72 y.o. female.      Chief Complaint: Follow up of pulmonary embolism, HTN    Problem List:   1. Acute pulmonary embolism, bilateral, 2012.  a. Initiation of CPR per family.  b. EMS transport to [facility] Emergency Room.  c. Restoration of rhythm and blood pressure at [facility] Emergency Room.  d. “Shock” manifest by hypotension and multiorgan failure:  Transient hepatic dysfunction, resolved.  Transient nonoliguric ATN, resolved.  Transient metabolic acidosis, resolved.  Pressor support.  e. Bilateral EKOS catheter placement/treatment:  Vena cava filter placed.  f. “Normal coronary arteries” by angiography, 2012.  g. Recurrent DVT of RLE, 2012:  Hematology workup with subsequent Xarelto dosing.  2. Hypertension.  3. Upper gastrointestinal bleeding/gastritis related to lytic therapy and heparins, resolved:  a. “Hemorrhagic gastritis.”  4. Right “groin” bleeding, 10/12/2012.  a. Probably “venous” related to heparins.  b. Nonsurgical treatment, resolved.  5. History of nephrolithiasis, inactive.  6. Methicillin-resistant Staphylococcus aureus - tracheobronchitis, resolved.  7. Status post operations, remote.  a. Nephrolithiasis with lithotripsy.  b. Bilateral breast reduction.  c.  section x2.    Allergies   Allergen Reactions   • Codeine Sulfate    • Erythromycin Other (See Comments)     “tongue swelling”.   • Meperidine    • Morphine And Related    • Penicillins Other (See Comments)     “swelling” at site.   • Sulfa Antibiotics    • Sulfadiazine        Current Medications:  •  Azilsartan Medoxomil 40 MG by mouth Daily.  •  chlorthalidone 25 MG -ONE-HALF TABLET BY MOUTH ONCE  "DAILY  •  Cholecalciferol 5000 units by mouth Daily.  •  Glucosamine HCl by mouth daily.   •  metoprolol tartrate 25 MG by mouth 2 (Two) Times a Day.  •  Pantoprazole 40 MG EC by mouth Daily.  •  potassium chloride 10 MEQ CR by mouth Daily.  •  rivaroxaban 20 MG by mouth Daily  •  simvastatin 20 MG by mouth Every Night.  •  vitamin C 1,000 mg by mouth Daily.  •  vitamin E 400 Units by mouth Daily.  •  Vortioxetine HBr 10 MG by mouth Daily.    History of Present Illness:   The patient returns for scheduled follow-up.  She just returned from a trip to [facility] and had a minor knee injury on that trip, now resolved.  She has had no angina or heart failure.  She has had no symptoms suggesting recurrent DVT or complications of her IVC filter.  She remains active and completely asymptomatic.  Medications and problem list are as noted above.             The following portions of the patient's history were reviewed and updated as appropriate: allergies, current medications and problem list.    HPI: Pertinent positives as listed in the HPI.  All other systems reviewed and negative.    Objective:    Vitals:    12/14/17 1052 12/14/17 1056   BP: 119/73 114/54   BP Location: Left arm Left arm   Patient Position: Sitting Standing   Pulse: 59 62   Weight: 71.7 kg (158 lb)    Height: 160 cm (63\")        Physical Exam:  GENERAL: Alert, cooperative, in no acute distress.   HEENT: Fundoscopic deferred, otherwise unremarkable.  NECK: No Jugular venous distention, adenopathy, or thyromegaly noted.   HEART: Regular rhythm, normal rate, and no murmurs, gallops, or rubs.   LUNGS: Clear to auscultation bilaterally. No wheezing, rales or ronchi.  ABDOMEN: Flat without evidence of organomegaly, masses, or tenderness.  NEUROLOGIC: No focal abnormalities involving strength or sensation are noted.   EXTREMITIES: No clubbing, cyanosis, or edema noted.     Diagnostic Data:    Lab Results   Component Value Date    GLUCOSE 91 02/28/2017    BUN " 20 02/28/2017    CREATININE 1.20 02/28/2017    EGFRIFNONA 44 (L) 02/28/2017    BCR 16.7 02/28/2017    K 4.8 02/28/2017    CO2 30.0 02/28/2017    CALCIUM 10.6 (H) 02/28/2017    ALBUMIN 4.30 02/28/2017    LABIL2 1.9 02/28/2017    AST 20 02/28/2017    ALT 13 02/28/2017     Lab Results   Component Value Date    CHOL 205 (H) 02/28/2017    TRIG 138 02/28/2017    HDL 77 (H) 02/28/2017    LDLDIRECT 124 02/28/2017       Procedures      Assessment and Plan: I think that the patient is doing quite well.  She needs Xarelto for life and we discussed this again.  Her LDL cholesterol of 124 is noted and I have taken the liberty of increasing her simvastatin from 20-40 mg by mouth daily at bedtime to target an LDL of less than 100.  I will otherwise do no further studies (other than to check a  lipid panel in about 2 months) or make further recommendations.  She worse return to see me in one year, sooner as needed.    I, Flakito Dill MD, personally performed the services described as documented by the above named individual. I have made any necessary edits and it is both accurate and complete 12/14/2017  11:53 AM        Scribed for Flakito Dill MD by Marlyn Graham RN. 12/14/2017 11:34 AM.        EMR Dragon/Transcription Disclaimer:  Much of this encounter note is an electronic transcription/translation of spoken language to printed text.  The electronic translation of spoken language may permit erroneous, or at times, nonsensical words or phrases to be inadvertently transcribed.  Although I have reviewed the note for such errors, some may still exist.

## 2017-12-22 ENCOUNTER — TELEPHONE (OUTPATIENT)
Dept: FAMILY MEDICINE CLINIC | Facility: CLINIC | Age: 72
End: 2017-12-22

## 2017-12-22 RX ORDER — AZITHROMYCIN 250 MG/1
TABLET, FILM COATED ORAL
Qty: 6 TABLET | Refills: 0 | Status: SHIPPED | OUTPATIENT
Start: 2017-12-22 | End: 2018-01-16

## 2017-12-22 NOTE — TELEPHONE ENCOUNTER
----- Message from Joaquín Brady Rep sent at 12/22/2017  1:40 PM EST -----  Regarding: MED REQUEST  Contact: [phone]  PT HAS BEEN AROUND PEOPLE WHO HAVE STREP THROAT AND NOW HAS A SCRATCHY THROAT AND WOULD LIKE A ZPAK SEND IN TO THE PHARMACY.

## 2018-01-03 RX ORDER — POTASSIUM CHLORIDE 750 MG/1
TABLET, FILM COATED, EXTENDED RELEASE ORAL
Qty: 90 TABLET | Refills: 1 | Status: SHIPPED | OUTPATIENT
Start: 2018-01-03 | End: 2018-06-25 | Stop reason: SDUPTHER

## 2018-01-15 DIAGNOSIS — Z86.711 HISTORY OF PULMONARY EMBOLISM: Primary | ICD-10-CM

## 2018-01-16 ENCOUNTER — LAB (OUTPATIENT)
Dept: LAB | Facility: HOSPITAL | Age: 73
End: 2018-01-16

## 2018-01-16 ENCOUNTER — OFFICE VISIT (OUTPATIENT)
Dept: ONCOLOGY | Facility: CLINIC | Age: 73
End: 2018-01-16

## 2018-01-16 VITALS
WEIGHT: 161 LBS | HEIGHT: 63 IN | HEART RATE: 60 BPM | DIASTOLIC BLOOD PRESSURE: 58 MMHG | TEMPERATURE: 97.9 F | BODY MASS INDEX: 28.53 KG/M2 | SYSTOLIC BLOOD PRESSURE: 118 MMHG | RESPIRATION RATE: 16 BRPM

## 2018-01-16 DIAGNOSIS — Z86.711 HISTORY OF PULMONARY EMBOLISM: Primary | ICD-10-CM

## 2018-01-16 DIAGNOSIS — Z86.711 HISTORY OF PULMONARY EMBOLISM: ICD-10-CM

## 2018-01-16 LAB
ALBUMIN SERPL-MCNC: 4.2 G/DL (ref 3.2–4.8)
ALBUMIN/GLOB SERPL: 1.8 G/DL (ref 1.5–2.5)
ALP SERPL-CCNC: 71 U/L (ref 25–100)
ALT SERPL W P-5'-P-CCNC: 16 U/L (ref 7–40)
ANION GAP SERPL CALCULATED.3IONS-SCNC: 7 MMOL/L (ref 3–11)
AST SERPL-CCNC: 21 U/L (ref 0–33)
BILIRUB SERPL-MCNC: 0.9 MG/DL (ref 0.3–1.2)
BUN BLD-MCNC: 25 MG/DL (ref 9–23)
BUN/CREAT SERPL: 20.8 (ref 7–25)
CALCIUM SPEC-SCNC: 9 MG/DL (ref 8.7–10.4)
CHLORIDE SERPL-SCNC: 104 MMOL/L (ref 99–109)
CO2 SERPL-SCNC: 30 MMOL/L (ref 20–31)
CREAT BLD-MCNC: 1.2 MG/DL (ref 0.6–1.3)
ERYTHROCYTE [DISTWIDTH] IN BLOOD BY AUTOMATED COUNT: 12.8 % (ref 11.3–14.5)
GFR SERPL CREATININE-BSD FRML MDRD: 44 ML/MIN/1.73
GLOBULIN UR ELPH-MCNC: 2.4 GM/DL
GLUCOSE BLD-MCNC: 113 MG/DL (ref 70–100)
HCT VFR BLD AUTO: 40.8 % (ref 34.5–44)
HGB BLD-MCNC: 13.1 G/DL (ref 11.5–15.5)
LYMPHOCYTES # BLD AUTO: 1.8 10*3/MM3 (ref 0.6–4.8)
LYMPHOCYTES NFR BLD AUTO: 29.9 % (ref 24–44)
MCH RBC QN AUTO: 29.7 PG (ref 27–31)
MCHC RBC AUTO-ENTMCNC: 32 G/DL (ref 32–36)
MCV RBC AUTO: 92.8 FL (ref 80–99)
MONOCYTES # BLD AUTO: 0.5 10*3/MM3 (ref 0–1)
MONOCYTES NFR BLD AUTO: 8.7 % (ref 0–12)
NEUTROPHILS # BLD AUTO: 3.7 10*3/MM3 (ref 1.5–8.3)
NEUTROPHILS NFR BLD AUTO: 61.4 % (ref 41–71)
PLATELET # BLD AUTO: 300 10*3/MM3 (ref 150–450)
PMV BLD AUTO: 7.1 FL (ref 6–12)
POTASSIUM BLD-SCNC: 4.1 MMOL/L (ref 3.5–5.5)
PROT SERPL-MCNC: 6.6 G/DL (ref 5.7–8.2)
RBC # BLD AUTO: 4.39 10*6/MM3 (ref 3.89–5.14)
SODIUM BLD-SCNC: 141 MMOL/L (ref 132–146)
WBC NRBC COR # BLD: 6 10*3/MM3 (ref 3.5–10.8)

## 2018-01-16 PROCEDURE — 36415 COLL VENOUS BLD VENIPUNCTURE: CPT

## 2018-01-16 PROCEDURE — 85025 COMPLETE CBC W/AUTO DIFF WBC: CPT

## 2018-01-16 PROCEDURE — 99214 OFFICE O/P EST MOD 30 MIN: CPT | Performed by: INTERNAL MEDICINE

## 2018-01-16 PROCEDURE — 80053 COMPREHEN METABOLIC PANEL: CPT

## 2018-01-16 NOTE — PROGRESS NOTES
DATE OF VISIT: 1/16/2018    REASON FOR VISIT:   1. DVT while on therapeutic Coumadin 11/23/2012.   2. Pulmonary embolism 09/30/2012.     HISTORY OF PRESENT ILLNESS: The patient is a very pleasant 67-year-old   female with past medical history significant for massive pulmonary  embolism September 2012 requiring cardiac resuscitation. The patient was placed  on chronic anticoagulation with Coumadin since, however, she presented with  acute DVT of the right lower extremity while on Coumadin 11/23/2012. The  patient was switched to Lovenox 100 micrograms subcu daily since. The patient  was switched from Lovenox to Xarelto 20 mg p.o. daily on 03/15/2013 secondary  to this would be more convenient to the patient in avoiding the daily  injections. The patient is here today in scheduled follow-up visit.     SUBJECTIVE: The patient has been doing fairly well. She has some left knee  pain, improves with Celebrex. She denied any bleedings. She denied any skin bruises. She denied any fever or chills. She  denied any night sweats.     REVIEW OF SYSTEMS: The other 9 systems reviewed by me and negative except as  mentioned in HPI.     PAST MEDICAL HISTORY/SOCIAL HISTORY/FAMILY HISTORY: Unchanged from my prior  documentation done on 11/24/2012.       Current Outpatient Prescriptions:   •  acetaminophen (PAIN RELIEF EXTRA STRENGTH) 500 MG tablet, Take 500 mg by mouth as needed for mild pain (1-3)., Disp: , Rfl:   •  Azilsartan Medoxomil (EDARBI) 40 MG tablet, Take 1 tablet by mouth Daily., Disp: 30 tablet, Rfl: 0  •  azithromycin (ZITHROMAX) 250 MG tablet, Take 2 tablets the first day, then 1 tablet daily for 4 days., Disp: 6 tablet, Rfl: 0  •  chlorthalidone (HYGROTON) 25 MG tablet, TAKE ONE-HALF TABLET BY MOUTH ONCE DAILY, Disp: 45 tablet, Rfl: 5  •  Cholecalciferol (VITAMIN D-3) 5000 units tablet, Take  by mouth Daily., Disp: , Rfl:   •  Glucosamine HCl (GLUCOSAMINE PO), Take 1 dose by mouth daily. As directed, Disp: ,  "Rfl:   •  metoprolol tartrate (LOPRESSOR) 25 MG tablet, Take 1 tablet by mouth 2 (Two) Times a Day., Disp: 180 tablet, Rfl: 2  •  pantoprazole (PROTONIX) 40 MG EC tablet, Take 1 tablet by mouth Daily., Disp: 30 tablet, Rfl: 3  •  potassium chloride (K-DUR) 10 MEQ CR tablet, TAKE ONE TABLET BY MOUTH ONCE DAILY, Disp: 90 tablet, Rfl: 1  •  rivaroxaban (XARELTO) 20 MG tablet, Take 1 tablet by mouth Daily., Disp: 90 tablet, Rfl: 5  •  simvastatin (ZOCOR) 40 MG tablet, Take 1 tablet by mouth Every Night., Disp: 90 tablet, Rfl: 3  •  vitamin C (ASCORBIC ACID) 500 MG tablet, Take 1,000 mg by mouth Daily., Disp: , Rfl:   •  vitamin E 400 UNIT capsule, Take 400 Units by mouth Daily., Disp: , Rfl:   •  Vortioxetine HBr (TRINTELLIX) 10 MG tablet, Take 10 mg by mouth Daily., Disp: 30 tablet, Rfl: 0    PHYSICAL EXAMINATION:   /58  Pulse 60  Temp 97.9 °F (36.6 °C)  Resp 16  Ht 160 cm (63\")  Wt 73 kg (161 lb)  BMI 28.52 kg/m2  ECOG1  GENERAL: Age appropriate. No acute distress.   HEENT: Head atraumatic, normocephalic.   NECK: Supple. No JVD. No lymphadenopathy.   LUNGS: Clear to auscultation bilaterally. No wheezing. No rhonchi.   HEART: Regular rate and rhythm. S1, S2, no murmurs.   ABDOMEN: Soft, nontender, nondistended. Bowel sounds positive. No  hepatosplenomegaly.   EXTREMITIES: No clubbing, cyanosis, or edema.   SKIN: No rashes. No purpura.   NEUROLOGIC: Awake and oriented x3. Strength 5 out of 5 in all muscle groups.     Lab on 01/16/2018   Component Date Value Ref Range Status   • WBC 01/16/2018 6.00  3.50 - 10.80 10*3/mm3 Final   • RBC 01/16/2018 4.39  3.89 - 5.14 10*6/mm3 Final   • Hemoglobin 01/16/2018 13.1  11.5 - 15.5 g/dL Final   • Hematocrit 01/16/2018 40.8  34.5 - 44.0 % Final   • RDW 01/16/2018 12.8  11.3 - 14.5 % Final   • MCV 01/16/2018 92.8  80.0 - 99.0 fL Final   • MCH 01/16/2018 29.7  27.0 - 31.0 pg Final   • MCHC 01/16/2018 32.0  32.0 - 36.0 g/dL Final   • MPV 01/16/2018 7.1  6.0 - 12.0 fL Final "   • Platelets 01/16/2018 300  150 - 450 10*3/mm3 Final   • Neutrophil % 01/16/2018 61.4  41.0 - 71.0 % Final   • Lymphocyte % 01/16/2018 29.9  24.0 - 44.0 % Final   • Monocyte % 01/16/2018 8.7  0.0 - 12.0 % Final   • Neutrophils, Absolute 01/16/2018 3.70  1.50 - 8.30 10*3/mm3 Final   • Lymphocytes, Absolute 01/16/2018 1.80  0.60 - 4.80 10*3/mm3 Final   • Monocytes, Absolute 01/16/2018 0.50  0.00 - 1.00 10*3/mm3 Final        ASSESSMENT: The patient is a pleasant 67-year-old  female with DVT/PE.    PROBLEM LIST:  1. Acute right lower extremity DVT while on therapeutic Coumadin 11/23/2012.   2. Pulmonary embolism with cardiac arrest 09/30/2012.   3.  Hypertension  4.  Hypercholesterolemia  5.  Heartburn  6.  Arthritis    PLAN:   1. We will continue Xarelto 20 mg p.o. daily indefinitely.  I will go ahead and refill it today.  I'll try to see if patient might qualify for copayment assistance, Currently she is paying $400 for 90 days.  2. The patient will come back to see me in 12 months with repeat CBC and CMP.   3. The patient was advised to wear comprssion stackings if she is going to   take a long road trip.   4.  We'll continue Lopressor for hypertension  5.  We'll continue Zocor 20 g daily for hypercholesterolemia  6. We'll continue Protonix 40 mg daily for heartburn  7.  I did go over the CBC results with the patient reassured her that her blood counts are perfectly normal.    Amanda Ashby MD  1/16/2018

## 2018-01-19 ENCOUNTER — TELEPHONE (OUTPATIENT)
Dept: FAMILY MEDICINE CLINIC | Facility: CLINIC | Age: 73
End: 2018-01-19

## 2018-01-19 NOTE — TELEPHONE ENCOUNTER
----- Message from Amber Hernandez sent at 1/18/2018  4:23 PM EST -----  Contact: [phone]   Pt is asking for some samples of Edarbi 40 mg. Please call    Samples were left up front for pt to .  BBmike, CMA

## 2018-01-29 ENCOUNTER — TELEPHONE (OUTPATIENT)
Dept: FAMILY MEDICINE CLINIC | Facility: CLINIC | Age: 73
End: 2018-01-29

## 2018-01-29 NOTE — TELEPHONE ENCOUNTER
----- Message from Joaquín Brady Rep sent at 1/29/2018 12:09 PM EST -----  Regarding: SAMPLES  Contact: [phone]  PT NEEDS SAMPLES OF Azilsartan Medoxomil (EDARBI) 40 MG tablet AND rivaroxaban (XARELTO) 20 MG tablet. POLLY PT WHEN READY TO .    Samples left up front for pt to .  BBmike, CMA

## 2018-02-19 ENCOUNTER — TELEPHONE (OUTPATIENT)
Dept: FAMILY MEDICINE CLINIC | Facility: CLINIC | Age: 73
End: 2018-02-19

## 2018-02-19 NOTE — TELEPHONE ENCOUNTER
----- Message from Joaquín Brady Rep sent at 2/19/2018 10:05 AM EST -----  Regarding: SAMPLE REQUEST  Contact: [phone]  PT WOULD LIKE SAMPLES OF TRINTELLIX 10 MG. CALL PT WHEN READY TO .      Samples left up front for pt to .  BBmike, CMA

## 2018-03-05 DIAGNOSIS — K21.9 GASTROESOPHAGEAL REFLUX DISEASE, ESOPHAGITIS PRESENCE NOT SPECIFIED: ICD-10-CM

## 2018-03-05 RX ORDER — PANTOPRAZOLE SODIUM 40 MG/1
40 TABLET, DELAYED RELEASE ORAL DAILY
Qty: 30 TABLET | Refills: 3 | Status: SHIPPED | OUTPATIENT
Start: 2018-03-05 | End: 2018-12-18

## 2018-03-05 NOTE — TELEPHONE ENCOUNTER
----- Message from Xiomara Thomas sent at 3/5/2018 10:26 AM EST -----  Regarding: NEEDS SAMPLES  Contact: [phone]  NEEDS SAMPLES OF XARELTO 20 MG

## 2018-04-10 ENCOUNTER — TELEPHONE (OUTPATIENT)
Dept: FAMILY MEDICINE CLINIC | Facility: CLINIC | Age: 73
End: 2018-04-10

## 2018-04-10 NOTE — TELEPHONE ENCOUNTER
----- Message from Joaquín Brady sent at 4/10/2018  9:20 AM EDT -----  Regarding: SAMPLES  Contact: [phone]  PT WOULD LIKE SOME SAMPLES OF  Vortioxetine HBr (TRINTELLIX) 10 MG tablet  Azilsartan Medoxomil (EDARBI) 40 MG tablet    PLEASE CALL WHEN READY TO     Samples left up front for pt to . Erick, CMA

## 2018-05-07 ENCOUNTER — TELEPHONE (OUTPATIENT)
Dept: FAMILY MEDICINE CLINIC | Facility: CLINIC | Age: 73
End: 2018-05-07

## 2018-05-07 NOTE — TELEPHONE ENCOUNTER
----- Message from Sergei Arriaza sent at 5/4/2018 11:52 AM EDT -----  Regarding: NEEDS SAMPLES  Contact: [phone]  PATIENT CALLED IN TO RQST SAMPLES OF XARELTO 20 MG AND ADARBY(?) 40 Mg.  THANKS,  SERGEI      Samples of xarelto 20 mg and edarblycor 40/25 mg left up front for pt to .  BBailey, CMA

## 2018-05-17 ENCOUNTER — TELEPHONE (OUTPATIENT)
Dept: FAMILY MEDICINE CLINIC | Facility: CLINIC | Age: 73
End: 2018-05-17

## 2018-05-17 NOTE — TELEPHONE ENCOUNTER
----- Message from Cassie Radford sent at 5/16/2018  1:38 PM EDT -----  Regarding: SAMPLES  Contact: [phone]  PT WOULD LIKE TO KNOW IF SHE CAN P/U SAMPLES OF XARELTO    No samples available, sent in rx.  BBmike, [facility]

## 2018-05-17 NOTE — TELEPHONE ENCOUNTER
----- Message from Joaquín Brady Rep sent at 5/17/2018  2:51 PM EDT -----  Regarding: RESEND RX  Contact: [phone]  PT WOULD LIKE TO KNOW IF SHE CAN GET A 90 DAY SUPPLY OF THE rivaroxaban (XARELTO) 20 MG tablet. THE LAST REFILL WAS ON FOR 30 DAYS AND IT IS LESS EXPENSIVE TO GET A 90 DAY.    [facility] [address]    Resent rx for 90 day supply.  BBmike, CMA

## 2018-06-04 ENCOUNTER — TELEPHONE (OUTPATIENT)
Dept: FAMILY MEDICINE CLINIC | Facility: CLINIC | Age: 73
End: 2018-06-04

## 2018-06-04 NOTE — TELEPHONE ENCOUNTER
----- Message from Emily Mehta MA sent at 6/4/2018 12:54 PM EDT -----      ----- Message -----  From: Andie Rae  Sent: 6/4/2018  12:51 PM  To: [facility]    PATIENT WAS WONDER IF WE HAD SAMPLES OF Azilsartan Medoxomil (EDARBI) 40 MG tablet AND Vortioxetine HBr (TRINTELLIX) 10 MG tablet.     PLEASE ADVISE.     Samples left up for pt to .  BBmike, CMA

## 2018-06-13 ENCOUNTER — TELEPHONE (OUTPATIENT)
Dept: FAMILY MEDICINE CLINIC | Facility: CLINIC | Age: 73
End: 2018-06-13

## 2018-06-13 NOTE — TELEPHONE ENCOUNTER
----- Message from Mary Candelario MA sent at 6/13/2018 10:00 AM EDT -----  Regarding: MEDICATION SAMPLES  Contact: [phone]  PT WOULD LIKE SAMPLES OF XARELTO 20 MG.  PLEASE ADVISE?    Samples left up front for pt to .  BBBILLY mckeon

## 2018-06-25 RX ORDER — POTASSIUM CHLORIDE 750 MG/1
TABLET, FILM COATED, EXTENDED RELEASE ORAL
Qty: 90 TABLET | Refills: 1 | Status: SHIPPED | OUTPATIENT
Start: 2018-06-25 | End: 2018-12-26 | Stop reason: SDUPTHER

## 2018-07-07 ENCOUNTER — APPOINTMENT (OUTPATIENT)
Dept: GENERAL RADIOLOGY | Facility: HOSPITAL | Age: 73
End: 2018-07-07

## 2018-07-07 ENCOUNTER — HOSPITAL ENCOUNTER (EMERGENCY)
Facility: HOSPITAL | Age: 73
Discharge: HOME | End: 2018-07-07
Attending: EMERGENCY MEDICINE | Admitting: EMERGENCY MEDICINE

## 2018-07-07 VITALS
SYSTOLIC BLOOD PRESSURE: 120 MMHG | BODY MASS INDEX: 28.35 KG/M2 | TEMPERATURE: 98.6 F | DIASTOLIC BLOOD PRESSURE: 71 MMHG | WEIGHT: 160 LBS | OXYGEN SATURATION: 99 % | HEIGHT: 63 IN | HEART RATE: 57 BPM | RESPIRATION RATE: 16 BRPM

## 2018-07-07 DIAGNOSIS — S90.32XA CONTUSION OF LEFT FOOT, INITIAL ENCOUNTER: Primary | ICD-10-CM

## 2018-07-07 DIAGNOSIS — M25.552 LEFT HIP PAIN: ICD-10-CM

## 2018-07-07 PROCEDURE — 73630 X-RAY EXAM OF FOOT: CPT

## 2018-07-07 PROCEDURE — 73502 X-RAY EXAM HIP UNI 2-3 VIEWS: CPT

## 2018-07-07 PROCEDURE — 73110 X-RAY EXAM OF WRIST: CPT

## 2018-07-07 PROCEDURE — 99283 EMERGENCY DEPT VISIT LOW MDM: CPT

## 2018-07-07 NOTE — ED PROVIDER NOTES
Subjective   Carol Jean is a 72 y.o.female who presents to the ED with her  by personal vehicle with c/o injuries sustained from s/p fall that occurred just PTA. She was walking her dog when she tripped over the dog causing her to fall. She fell onto her left knee and left ankle. She c/o left foot pain within the arch of her foot and left knee pain. She was ambulatory after the fall. She has a h/o recurring DVTs and is on Xarelto.        History provided by:  Patient  Fall   Mechanism of injury: fall    Injury location:  Leg and foot  Leg injury location:  L foot and L knee  Foot injury location:  L foot  Incident location:  Home  Time since incident:  1 hour  Arrived directly from scene: yes    Fall:     Fall occurred:  Tripped    Point of impact:  Knees and feet  Protective equipment: none    Suspicion of alcohol use: no    Suspicion of drug use: no    Tetanus status:  Unknown  Prior to arrival data:     Bystander interventions:  None    Patient ambulatory at scene: yes      Blood loss:  None    Responsiveness at scene:  Alert    Orientation at scene:  Person, place, situation and time    Loss of consciousness: no      Amnesic to event: no      Airway interventions:  None    Breathing interventions:  None    IV access status:  None    IO access:  None    Fluids administered:  None    Cardiac interventions:  None    Medications administered:  None    Immobilization:  None  Risk factors: anticoagulation therapy        Review of Systems   Musculoskeletal: Positive for arthralgias.   All other systems reviewed and are negative.      Past Medical History:   Diagnosis Date   • Acute deep vein thrombosis of lower extremity (CMS/HCC)    • Acute pulmonary embolism (CMS/HCC) 11/22/2016    Bilateral, 09/30/2012 Initiation of CPR per family. EMS transport to [facility] Emergency Room. Restoration of rhythm and blood pressure at [facility] Emergency Room. “Shock” manifest by hypotension  and multiorgan failure:  Transient hepatic dysfunction, resolved.  Transient nonoliguric ATN, resolved.  Transient metabolic acidosis, resolved.  Pressor support. Bilateral    • Arthritis    • Bladder problem    • Blood clotting disorder (CMS/HCC)    • Bone pain    • Colitis    • Cough    • DVT (deep venous thrombosis) (CMS/HCC)    • Easy bruising    • H/O bone density study 2012   • H/O colonoscopy 2012   • H/O mammogram 2012   • HBP (high blood pressure)    • History of blood transfusion    • Hoarseness    • Hypertension 2016   • Joint pain    • Kidney stones    • Nephrolithiasis    • Numbness and tingling     knee   • Obesity    • Pulmonary embolism (CMS/HCC)    • Synovial cyst of popliteal space    • Upper gastrointestinal bleeding     gastritis related to lytic therapy and heparins, resolved: a. “Hemorrhagic gastritis.”   • Vitamin B12 deficiency        Allergies   Allergen Reactions   • Codeine Sulfate    • Erythromycin Other (See Comments)     “tongue swelling”.   • Meperidine    • Morphine And Related    • Penicillins Other (See Comments)     “swelling” at site.   • Sulfa Antibiotics    • Sulfadiazine        Past Surgical History:   Procedure Laterality Date   • BILATERAL BREAST REDUCTION     •  SECTION     •  SECTION     • COLONOSCOPY     • KIDNEY STONE SURGERY      Nephrolithiasis with lithotripsy.   • REDUCTION MAMMAPLASTY Bilateral    • TONSILLECTOMY         Family History   Problem Relation Age of Onset   • Stroke Mother    • Hypertension Mother    • Heart attack Father    • COPD Father    • Thyroid disease Father    • Stroke Other    • Coronary artery disease Other    • Breast cancer Other    • COPD Other    • Stroke Other    • Breast cancer Other    • COPD Other        Social History     Social History   • Marital status:      Social History Main Topics   • Smoking status: Never Smoker   • Smokeless tobacco: Never Used   • Alcohol use Yes      Comment:  1 per week for 30 years   • Drug use: No   • Sexual activity: Defer     Other Topics Concern   • Not on file         Objective   Physical Exam   Constitutional: She is oriented to person, place, and time. She appears well-developed and well-nourished. No distress.   HENT:   Head: Normocephalic and atraumatic.   Right Ear: External ear normal.   Left Ear: External ear normal.   Nose: Nose normal.   Eyes: Conjunctivae are normal. No scleral icterus.   Neck: Normal range of motion. Neck supple.   Cardiovascular: Normal rate, regular rhythm and normal heart sounds.    No murmur heard.  Pulmonary/Chest: Effort normal and breath sounds normal. No respiratory distress. She has no wheezes. She has no rales.   Abdominal: Soft. Bowel sounds are normal. She exhibits no distension. There is no tenderness.   Musculoskeletal: Normal range of motion. She exhibits tenderness. She exhibits no edema.   Tenderness to the left foot overlying the 5th metatarsal.   Tenderness to the left knee.  Painless ROM of the left knee and hip.   No hip tenderness.    Neurological: She is alert and oriented to person, place, and time.   Skin: Skin is warm. She is not diaphoretic.   Psychiatric: She has a normal mood and affect. Her behavior is normal.   Nursing note and vitals reviewed.      Procedures         ED Course     XRs negative. Able to bear weight. Patient stable on serial rechecks.  Discussed findings, concerns, plan of care, expected course, reasons to return and followup.                  MDM  Number of Diagnoses or Management Options  Contusion of left foot, initial encounter:   Left hip pain:      Amount and/or Complexity of Data Reviewed  Tests in the radiology section of CPT®: ordered and reviewed  Independent visualization of images, tracings, or specimens: yes        Final diagnoses:   Contusion of left foot, initial encounter   Left hip pain       Documentation assistance provided by drake Tate.  Information recorded  by the scribe was done at my direction and has been verified and validated by me.     Rivera Tate  07/07/18 1817       Cameron Fraga MD  07/07/18 2017

## 2018-07-09 ENCOUNTER — TELEPHONE (OUTPATIENT)
Dept: FAMILY MEDICINE CLINIC | Facility: CLINIC | Age: 73
End: 2018-07-09

## 2018-07-09 ENCOUNTER — EPISODE CHANGES (OUTPATIENT)
Dept: CASE MANAGEMENT | Facility: OTHER | Age: 73
End: 2018-07-09

## 2018-07-09 RX ORDER — SIMVASTATIN 20 MG
TABLET ORAL
Qty: 90 TABLET | Refills: 3 | Status: SHIPPED | OUTPATIENT
Start: 2018-07-09 | End: 2018-12-18 | Stop reason: SDUPTHER

## 2018-07-09 NOTE — TELEPHONE ENCOUNTER
----- Message from Beba Miguel sent at 7/9/2018 10:29 AM EDT -----  Regarding: PLEASE CALL  Contact: [phone]  PT IS NEEDING SAMPLES OF Azilsartan Medoxomil (EDARBI) 40 MG tablet, rivaroxaban (XARELTO) 20 MG tablet, AND Vortioxetine HBr (TRINTELLIX) 10 MG tablet.    Samples of Edarbi 40 mg and trintellix 10 mg have been left up front for pt to .  No samples of Xarelto are available, rx has been sent in.  Erick, CMA

## 2018-07-10 DIAGNOSIS — I10 ESSENTIAL HYPERTENSION: ICD-10-CM

## 2018-07-12 ENCOUNTER — PATIENT OUTREACH (OUTPATIENT)
Dept: CASE MANAGEMENT | Facility: OTHER | Age: 73
End: 2018-07-12

## 2018-07-12 NOTE — OUTREACH NOTE
Care Management Plan 7/12/2018   Lifestyle Goals Routine follow-up with doctor(s);Eat a healthy diet   Barriers Other (See Comment)   Barriers Recovering from L ankle contusion since 7/7/18.   Self Management Increase Physical Activities   Annual Wellness Visit:  Patient Will Schedule   Annual Wellness Visit:  Declined assistance, pt will call.    Care Gaps Addressed Pneumonia Vaccine;Other (See Comment)   Care Gaps Addressed Pt said she had the pneumonia vaccine in 2012. Discussed Zoster vaccine.   Specific Disease Process Teaching Hypertension   Does patient have depression diagnosis? No   Ed Visits past 12 months: 1   Ed Visits past 12 months: 7/7/18   Medication Adherence Medications understood   Health Literacy Excellent     The main concerns and/or symptoms the patient would like to address are: See above notes.  Pt states the left ankle swelling has resolved and did not have any bruising.  She has gradually resumed her activities and able to walk on the left foot.     Education/instruction provided by Care Coordinator:  Explained care advising and available for any assistance. Addressed Annual Wellness Visit and Gaps in Care.     Follow Up Outreach Due:  As needed.     Will Dill RN

## 2018-08-02 ENCOUNTER — TELEPHONE (OUTPATIENT)
Dept: FAMILY MEDICINE CLINIC | Facility: CLINIC | Age: 73
End: 2018-08-02

## 2018-08-02 NOTE — TELEPHONE ENCOUNTER
----- Message from Joaquín Brady sent at 8/1/2018  2:26 PM EDT -----  Regarding: SAMPLE REQUEST  Contact: [phone]  PT IS REQUESTING SAMPLES OF Azilsartan Medoxomil (EDARBI) 40 MG tablet. PLEASE CALL HER AND LET HER KNOW WHEN READY YO .      There are no samples available of the Edarbi 40 mg but we do have Edarbi 80 mg.  Left samples of those up front for pt to , with the directions to cut them in half.  BBailey, CMA

## 2018-08-31 ENCOUNTER — OFFICE VISIT (OUTPATIENT)
Dept: FAMILY MEDICINE CLINIC | Facility: CLINIC | Age: 73
End: 2018-08-31

## 2018-08-31 VITALS
WEIGHT: 159 LBS | HEART RATE: 62 BPM | HEIGHT: 63 IN | TEMPERATURE: 98 F | SYSTOLIC BLOOD PRESSURE: 100 MMHG | DIASTOLIC BLOOD PRESSURE: 70 MMHG | OXYGEN SATURATION: 95 % | BODY MASS INDEX: 28.17 KG/M2 | RESPIRATION RATE: 16 BRPM

## 2018-08-31 DIAGNOSIS — Z01.818 PREOP EXAMINATION: Primary | ICD-10-CM

## 2018-08-31 PROCEDURE — 85025 COMPLETE CBC W/AUTO DIFF WBC: CPT | Performed by: FAMILY MEDICINE

## 2018-08-31 PROCEDURE — 80048 BASIC METABOLIC PNL TOTAL CA: CPT | Performed by: FAMILY MEDICINE

## 2018-08-31 PROCEDURE — 99214 OFFICE O/P EST MOD 30 MIN: CPT | Performed by: FAMILY MEDICINE

## 2018-08-31 PROCEDURE — 36415 COLL VENOUS BLD VENIPUNCTURE: CPT | Performed by: FAMILY MEDICINE

## 2018-09-04 ENCOUNTER — TELEPHONE (OUTPATIENT)
Dept: ONCOLOGY | Facility: CLINIC | Age: 73
End: 2018-09-04

## 2018-09-04 NOTE — TELEPHONE ENCOUNTER
----- Message from Mitra Gage sent at 9/4/2018 11:00 AM EDT -----  Regarding: BADIN-BLOOD THINNERS  Contact: [phone]  Patient is having cataract surgery on 9/26/18 and Dr. Worthington wants her to due to Lovenox injections 3 days before 100 mg she takes Xarelto know does she need to stop this with the injections? Also the day surgery should she take the Xarelto? Please call.

## 2018-09-30 DIAGNOSIS — K21.9 GASTROESOPHAGEAL REFLUX DISEASE, ESOPHAGITIS PRESENCE NOT SPECIFIED: ICD-10-CM

## 2018-10-01 RX ORDER — PANTOPRAZOLE SODIUM 40 MG/1
TABLET, DELAYED RELEASE ORAL
Qty: 30 TABLET | Refills: 3 | Status: SHIPPED | OUTPATIENT
Start: 2018-10-01 | End: 2019-02-05 | Stop reason: SDUPTHER

## 2018-10-02 ENCOUNTER — TELEPHONE (OUTPATIENT)
Dept: FAMILY MEDICINE CLINIC | Facility: CLINIC | Age: 73
End: 2018-10-02

## 2018-10-02 RX ORDER — DILTIAZEM HYDROCHLORIDE 240 MG/1
240 CAPSULE, COATED, EXTENDED RELEASE ORAL DAILY
Qty: 30 CAPSULE | Refills: 6 | Status: SHIPPED | OUTPATIENT
Start: 2018-10-02 | End: 2019-04-29 | Stop reason: SDUPTHER

## 2018-10-02 NOTE — TELEPHONE ENCOUNTER
----- Message from Ambre Hernandez sent at 10/1/2018  9:54 AM EDT -----  Contact: [phone]  Pt is asking for samples of Edrbi 40 mg and Trinilix she believes it is 20mg.      Per Dr. Gaspar rx sent in for Diltiazem 240 mg one daily, pt notified.  BBmike, CMA

## 2018-10-09 ENCOUNTER — TELEPHONE (OUTPATIENT)
Dept: FAMILY MEDICINE CLINIC | Facility: CLINIC | Age: 73
End: 2018-10-09

## 2018-10-09 NOTE — TELEPHONE ENCOUNTER
----- Message from Joaquín Cruz sent at 10/8/2018  4:34 PM EDT -----  Regarding: XARELTO SAMPLES  Contact: [phone]  PATIENT WOULD LIKE 20 MG XARELTO SAMPLES IF YOU HAVE ANY. THANKS    Samples left up front for pt to .  Erick, CMA

## 2018-10-15 ENCOUNTER — OFFICE VISIT (OUTPATIENT)
Dept: FAMILY MEDICINE CLINIC | Facility: CLINIC | Age: 73
End: 2018-10-15

## 2018-10-15 VITALS
TEMPERATURE: 98.2 F | WEIGHT: 165 LBS | RESPIRATION RATE: 16 BRPM | SYSTOLIC BLOOD PRESSURE: 90 MMHG | HEART RATE: 51 BPM | OXYGEN SATURATION: 97 % | BODY MASS INDEX: 29.23 KG/M2 | HEIGHT: 63 IN | DIASTOLIC BLOOD PRESSURE: 60 MMHG

## 2018-10-15 DIAGNOSIS — Z01.818 PREOP EXAMINATION: Primary | ICD-10-CM

## 2018-10-15 PROCEDURE — 99212 OFFICE O/P EST SF 10 MIN: CPT | Performed by: FAMILY MEDICINE

## 2018-10-15 NOTE — PROGRESS NOTES
"Subjective   Carol Jean is a 72 y.o. female who presents to the office today for a preoperative consultation at the request of surgeon Dr Ballard who plans on performing right eye cateract surgery on October 17. This consultation is requested for the specific conditions prompting preoperative evaluation (i.e. because of potential affect on operative risk): anticoagulation. Planned anesthesia: local. The patient has the following known anesthesia issues: none. Patients bleeding risk: no recent abnormal bleeding. Patient does not have objections to receiving blood products if needed.    The following portions of the patient's history were reviewed and updated as appropriate: allergies, current medications, past family history, past medical history, past social history, past surgical history and problem list.    Review of Systems  A comprehensive review of systems was negative.    Objective   BP 90/60   Pulse 51   Temp 98.2 °F (36.8 °C)   Resp 16   Ht 160 cm (63\")   Wt 74.8 kg (165 lb)   SpO2 97%   BMI 29.23 kg/m²   Physical Exam   Constitutional: She appears healthy. No distress.   HENT:   Mouth/Throat: Dentition is normal. No dental caries. Oropharynx is clear.   Eyes: Pupils are equal, round, and reactive to light.   Neck: Thyroid normal. Neck supple. No neck adenopathy.   Cardiovascular: Regular rhythm and normal heart sounds.    Pulmonary/Chest: Effort normal and breath sounds normal.   Abdominal: Soft. She exhibits no distension.   Musculoskeletal: She exhibits no edema or deformity.   Neurological: She is alert. Gait normal.   Skin: Skin is warm and dry.     Predictors of intubation difficulty:   Morbid obesity? no   Anatomically abnormal facies? no   Prominent incisors? no   Receding mandible? no   Dentition: No chipped, loose, or missing teeth.    Lab Review   Office Visit on 08/31/2018   Component Date Value   • Glucose 08/31/2018 96    • BUN 08/31/2018 31*   • Creatinine 08/31/2018 1.36*   • " Sodium 08/31/2018 141    • Potassium 08/31/2018 4.2    • Chloride 08/31/2018 106    • CO2 08/31/2018 30.0    • Calcium 08/31/2018 9.4    • eGFR Non African Amer 08/31/2018 38*   • BUN/Creatinine Ratio 08/31/2018 22.8    • Anion Gap 08/31/2018 5.0    • WBC 08/31/2018 9.43    • RBC 08/31/2018 4.62    • Hemoglobin 08/31/2018 13.9    • Hematocrit 08/31/2018 43.5    • MCV 08/31/2018 94.2    • MCH 08/31/2018 30.1    • MCHC 08/31/2018 32.0    • RDW 08/31/2018 13.1    • RDW-SD 08/31/2018 45.4    • MPV 08/31/2018 10.4    • Platelets 08/31/2018 306    • Neutrophil % 08/31/2018 63.9    • Lymphocyte % 08/31/2018 23.8*   • Monocyte % 08/31/2018 9.1    • Eosinophil % 08/31/2018 2.9    • Basophil % 08/31/2018 0.3    • Immature Grans % 08/31/2018 0.2    • Neutrophils, Absolute 08/31/2018 6.03    • Lymphocytes, Absolute 08/31/2018 2.24    • Monocytes, Absolute 08/31/2018 0.86    • Eosinophils, Absolute 08/31/2018 0.27    • Basophils, Absolute 08/31/2018 0.03    • Immature Grans, Absolute 08/31/2018 0.02        Assessment/Plan   72 y.o. female with planned surgery as above.    Known risk factors for perioperative complications: None    Difficulty with intubation is not anticipated.    Cardiac Risk Estimation: 17% risk over five years [facility] Risk Profile    Current medications which may produce withdrawal symptoms if withheld perioperatively: hold Xarelto     1. Preoperative workup as follows none.  2. Change in medication regimen before surgery: none, continue medication regimen including morning of surgery, with sip of water.  3. Prophylaxis for cardiac events with perioperative beta-blockers: not indicated.      Cleared medically for eye surgery and anesthesia.

## 2018-10-24 ENCOUNTER — EPISODE CHANGES (OUTPATIENT)
Dept: CASE MANAGEMENT | Facility: OTHER | Age: 73
End: 2018-10-24

## 2018-11-07 RX ORDER — CHLORTHALIDONE 25 MG/1
TABLET ORAL
Qty: 45 TABLET | Refills: 3 | Status: SHIPPED | OUTPATIENT
Start: 2018-11-07 | End: 2018-12-18 | Stop reason: SDUPTHER

## 2018-12-18 ENCOUNTER — TELEPHONE (OUTPATIENT)
Dept: FAMILY MEDICINE CLINIC | Facility: CLINIC | Age: 73
End: 2018-12-18

## 2018-12-18 ENCOUNTER — OFFICE VISIT (OUTPATIENT)
Dept: CARDIOLOGY | Facility: CLINIC | Age: 73
End: 2018-12-18

## 2018-12-18 VITALS
HEART RATE: 66 BPM | HEIGHT: 63 IN | DIASTOLIC BLOOD PRESSURE: 63 MMHG | BODY MASS INDEX: 28.7 KG/M2 | WEIGHT: 162 LBS | SYSTOLIC BLOOD PRESSURE: 125 MMHG

## 2018-12-18 DIAGNOSIS — Z86.711 HISTORY OF PULMONARY EMBOLISM: Primary | ICD-10-CM

## 2018-12-18 DIAGNOSIS — E78.2 MIXED HYPERLIPIDEMIA: ICD-10-CM

## 2018-12-18 DIAGNOSIS — I10 ESSENTIAL HYPERTENSION: ICD-10-CM

## 2018-12-18 RX ORDER — SIMVASTATIN 40 MG
40 TABLET ORAL NIGHTLY
Qty: 90 TABLET | Refills: 3 | Status: SHIPPED | OUTPATIENT
Start: 2018-12-18 | End: 2019-07-23 | Stop reason: SDUPTHER

## 2018-12-18 RX ORDER — CHLORTHALIDONE 25 MG/1
12.5 TABLET ORAL DAILY
Qty: 45 TABLET | Refills: 3 | Status: SHIPPED | OUTPATIENT
Start: 2018-12-18 | End: 2019-07-23 | Stop reason: SDUPTHER

## 2018-12-18 NOTE — PROGRESS NOTES
OFFICE FOLLOW UP     Date of Encounter:2018     Name: Carol Jean  : 1945  Address: [address] [place] [address]  Home Phone: [phone]    PCP: Aristides Gaspar MD  [address]  [address]  [facility] [address]    Carol Jean is a 73 y.o. female.    Chief Complaint: Follow up of history of pulmonary embolism, HTN    Problem List:   1. Acute pulmonary embolism, bilateral, 2012.  a. Initiation of CPR per family.  b. EMS transport to [facility] Emergency Room.  c. Restoration of rhythm and blood pressure at [facility] Emergency Room.  d. “Shock” manifest by hypotension and multiorgan failure:  Transient hepatic dysfunction, resolved.  Transient nonoliguric ATN, resolved.  Transient metabolic acidosis, resolved.  Pressor support.  e. Bilateral EKOS catheter placement/treatment:  Vena cava filter placed.  f. “Normal coronary arteries” by angiography, 2012.  g. Recurrent DVT of RLE, 2012:  Hematology workup with subsequent Xarelto dosing (followed by Dr. Ashby)  2. Hypertension.  3. Upper gastrointestinal bleeding/gastritis related to lytic therapy and heparins, resolved:  a. “Hemorrhagic gastritis.”  4. Right “groin” bleeding, 10/12/2012.  a. Probably “venous” related to heparins.  b. Nonsurgical treatment, resolved.  5. History of nephrolithiasis, inactive.  6. Methicillin-resistant Staphylococcus aureus - tracheobronchitis, resolved.  7. Status post operations, remote.  a. Nephrolithiasis with lithotripsy.  b. Bilateral breast reduction.  c.  section x2.  Allergies:  Allergies   Allergen Reactions   • Codeine Sulfate    • Erythromycin Other (See Comments)     “tongue swelling”.   • Meperidine    • Morphine And Related    • Penicillins Other (See Comments)     “swelling” at site.   • Sulfa Antibiotics    • Sulfadiazine      Current Medications:  •  chlorthalidone 25 MG, TAKE ONE-HALF TABLET BY MOUTH  "ONCE DAILY  •  Cholecalciferol 5000 units by mouth Daily  •  diltiaZEM  MG by mouth Daily  •  Glucosamine HCl, Take 2 tablets by mouth Daily. As directed     •  metoprolol tartrate 25 MG BY MOUTH TWICE DAILY  •  pantoprazole 40 MG EC BY MOUTH ONCE DAILY  •  potassium chloride 10 MEQ CR BY MOUTH ONCE DAILY  •  rivaroxaban 20 MG by mouth Daily  •  simvastatin 20 MG BY MOUTH VERY NIGHT  •  vitamin C 1,000 mg by mouth Daily.  •  vitamin E 400 Units by mouth Daily.   •  Vortioxetine HBr by mouth Daily.    History of Present Illness:           Mrs. Satterly returns today for yearly follow up. Since her last visit she has remained asymptomatic. She follows with Dr. Ashby and denies recurrent DVT. She is maintained on Xarelto. She reports her Edarbi was switched to Diltiazem in October due to cost reasons, and reports blood pressure has been controlled.     The following portions of the patient's history were reviewed and updated as appropriate: allergies, current medications and problem list.    ROS: Pertinent positives as listed in the HPI.  All other systems reviewed and negative.    Objective:  Vitals:    12/18/18 1313 12/18/18 1314   BP: 115/65 125/63   BP Location: Right arm Right arm   Patient Position: Sitting Standing   Pulse: 61 66   Weight: 73.5 kg (162 lb)    Height: 160 cm (63\")        Physical Exam:  GENERAL: Alert, cooperative, in no acute distress.   HEENT: Normocephalic, no adenopathy, no jugular venous distention  HEART: No discrete PMI is noted. Regular rhythm, normal rate, and no murmurs, gallops, or rubs.   LUNGS: Clear to auscultation bilaterally. No wheezing, rales or ronchi.  ABDOMEN: Soft, bowel sounds present, non-tender   NEUROLOGIC: No focal abnormalities involving strength or sensation are noted.   EXTREMITIES: No clubbing, cyanosis, or edema noted.     Diagnostic Data:   Lab Results   Component Value Date    GLUCOSE 96 08/31/2018    CALCIUM 9.4 08/31/2018     08/31/2018    K 4.2 " 08/31/2018    CO2 30.0 08/31/2018     08/31/2018    BUN 31 (H) 08/31/2018    CREATININE 1.36 (H) 08/31/2018    EGFRIFNONA 38 (L) 08/31/2018    BCR 22.8 08/31/2018    ANIONGAP 5.0 08/31/2018     Lab Results   Component Value Date    WBC 9.43 08/31/2018    HGB 13.9 08/31/2018    HCT 43.5 08/31/2018    MCV 94.2 08/31/2018     08/31/2018     Procedures    Assessment and Plan:     1. History of PE and DVT: She is maintained on lifelong Xarelto and has not had recurrent evidence of DVT or PE. She is followed by Dr. Ashyb.   2. Blood pressure is within current guidelines.   3. We will go ahead and increase Simvastatin to 40mg due to LDL of 124 on last lipid panel, and re-check a lipid panel in 6 weeks.    4. Follow up in 1 year or sooner as needed.     Scribed for Flakito Dill MD by Elba Padilla PA-C. 12/18/2018 1:12 PM.    I, Flakito Dill MD, [facility], [facility], personally performed the services described in this documentation as scribed by the above named individual in my presence, and it is both accurate and complete. At 1:59 PM on 12/18/2018      EMR Dragon/Transcription Disclaimer:  Much of this encounter note is an electronic transcription/translation of spoken language to printed text.  The electronic translation of spoken language may permit erroneous, or at times, nonsensical words or phrases to be inadvertently transcribed.  Although I have reviewed the note for such errors, some may still exist.

## 2018-12-18 NOTE — TELEPHONE ENCOUNTER
----- Message from Joaquín Cruz sent at 12/18/2018 12:02 PM EST -----  Regarding: SAMPLES  Contact: [phone]  PT WOULD LIKE SAMPLES FOR  HITRINOLEX 10 MG  XARELTO 20 MG  PLEASE CALL WHEN COMPLETE    Samples of trintellix 10 mg left up front for pt to , no xarelto samples available.  BBmike, CMA

## 2018-12-27 RX ORDER — POTASSIUM CHLORIDE 750 MG/1
TABLET, FILM COATED, EXTENDED RELEASE ORAL
Qty: 90 TABLET | Refills: 1 | Status: SHIPPED | OUTPATIENT
Start: 2018-12-27 | End: 2019-07-02 | Stop reason: SDUPTHER

## 2019-01-17 DIAGNOSIS — Z86.711 HISTORY OF PULMONARY EMBOLISM: Primary | ICD-10-CM

## 2019-01-18 ENCOUNTER — OFFICE VISIT (OUTPATIENT)
Dept: ONCOLOGY | Facility: CLINIC | Age: 74
End: 2019-01-18

## 2019-01-18 ENCOUNTER — LAB (OUTPATIENT)
Dept: LAB | Facility: HOSPITAL | Age: 74
End: 2019-01-18

## 2019-01-18 VITALS
HEART RATE: 71 BPM | BODY MASS INDEX: 29.06 KG/M2 | OXYGEN SATURATION: 94 % | SYSTOLIC BLOOD PRESSURE: 127 MMHG | DIASTOLIC BLOOD PRESSURE: 58 MMHG | HEIGHT: 63 IN | WEIGHT: 164 LBS | RESPIRATION RATE: 12 BRPM | TEMPERATURE: 97.4 F

## 2019-01-18 DIAGNOSIS — I82.4Y9 DEEP VEIN THROMBOSIS (DVT) OF PROXIMAL LOWER EXTREMITY, UNSPECIFIED CHRONICITY, UNSPECIFIED LATERALITY (HCC): ICD-10-CM

## 2019-01-18 DIAGNOSIS — Z86.711 HISTORY OF PULMONARY EMBOLISM: ICD-10-CM

## 2019-01-18 DIAGNOSIS — Z86.711 HISTORY OF PULMONARY EMBOLISM: Primary | ICD-10-CM

## 2019-01-18 LAB
ALBUMIN SERPL-MCNC: 4.26 G/DL (ref 3.2–4.8)
ALBUMIN/GLOB SERPL: 2.1 G/DL (ref 1.5–2.5)
ALP SERPL-CCNC: 89 U/L (ref 25–100)
ALT SERPL W P-5'-P-CCNC: 17 U/L (ref 7–40)
ANION GAP SERPL CALCULATED.3IONS-SCNC: 8 MMOL/L (ref 3–11)
AST SERPL-CCNC: 21 U/L (ref 0–33)
BILIRUB SERPL-MCNC: 0.9 MG/DL (ref 0.3–1.2)
BUN BLD-MCNC: 24 MG/DL (ref 9–23)
BUN/CREAT SERPL: 18.2 (ref 7–25)
CALCIUM SPEC-SCNC: 9 MG/DL (ref 8.7–10.4)
CHLORIDE SERPL-SCNC: 103 MMOL/L (ref 99–109)
CO2 SERPL-SCNC: 28 MMOL/L (ref 20–31)
CREAT BLD-MCNC: 1.32 MG/DL (ref 0.6–1.3)
ERYTHROCYTE [DISTWIDTH] IN BLOOD BY AUTOMATED COUNT: 12.6 % (ref 11.3–14.5)
GFR SERPL CREATININE-BSD FRML MDRD: 39 ML/MIN/1.73
GLOBULIN UR ELPH-MCNC: 2 GM/DL
GLUCOSE BLD-MCNC: 145 MG/DL (ref 70–100)
HCT VFR BLD AUTO: 40.1 % (ref 34.5–44)
HGB BLD-MCNC: 13.8 G/DL (ref 11.5–15.5)
LYMPHOCYTES # BLD AUTO: 1.9 10*3/MM3 (ref 0.6–4.8)
LYMPHOCYTES NFR BLD AUTO: 30.9 % (ref 24–44)
MCH RBC QN AUTO: 31.5 PG (ref 27–31)
MCHC RBC AUTO-ENTMCNC: 34.4 G/DL (ref 32–36)
MCV RBC AUTO: 91.6 FL (ref 80–99)
MONOCYTES # BLD AUTO: 0.4 10*3/MM3 (ref 0–1)
MONOCYTES NFR BLD AUTO: 6.2 % (ref 0–12)
NEUTROPHILS # BLD AUTO: 4 10*3/MM3 (ref 1.5–8.3)
NEUTROPHILS NFR BLD AUTO: 62.9 % (ref 41–71)
PLATELET # BLD AUTO: 302 10*3/MM3 (ref 150–450)
PMV BLD AUTO: 7.4 FL (ref 6–12)
POTASSIUM BLD-SCNC: 4.4 MMOL/L (ref 3.5–5.5)
PROT SERPL-MCNC: 6.3 G/DL (ref 5.7–8.2)
RBC # BLD AUTO: 4.38 10*6/MM3 (ref 3.89–5.14)
SODIUM BLD-SCNC: 139 MMOL/L (ref 132–146)
WBC NRBC COR # BLD: 6.3 10*3/MM3 (ref 3.5–10.8)

## 2019-01-18 PROCEDURE — 85025 COMPLETE CBC W/AUTO DIFF WBC: CPT

## 2019-01-18 PROCEDURE — 36415 COLL VENOUS BLD VENIPUNCTURE: CPT

## 2019-01-18 PROCEDURE — 99213 OFFICE O/P EST LOW 20 MIN: CPT | Performed by: NURSE PRACTITIONER

## 2019-01-18 PROCEDURE — 80053 COMPREHEN METABOLIC PANEL: CPT

## 2019-01-18 NOTE — PROGRESS NOTES
DATE OF VISIT: 1/18/2019    REASON FOR VISIT:   1. DVT while on therapeutic Coumadin 11/23/2012.   2. Pulmonary embolism 09/30/2012.     HISTORY OF PRESENT ILLNESS: The patient is a very pleasant 73 y.o.   female with past medical history significant for massive pulmonary  embolism September 2012 requiring cardiac resuscitation. The patient was placed  on chronic anticoagulation with Coumadin since, however, she presented with  acute DVT of the right lower extremity while on Coumadin 11/23/2012. The  patient was switched to Lovenox 100 micrograms subcu daily since. The patient  was switched from Lovenox to Xarelto 20 mg p.o. daily on 03/15/2013 secondary  to this would be more convenient to the patient in avoiding the daily  injections. The patient is here today in scheduled follow-up visit.     SUBJECTIVE: The patient is here today with her . She has been doing fairly well. She complains of some weight gain, but otherwise feels good. She had her cataracts removed last fall without complications. Her vision has been great since her procedure. She has had no issues with bleeding. She has been taking her Xarelto as prescribed. She denies swelling in her legs, chest pain, palpitations, or worsening shortness of breath. She is still followed by Dr. Dill for cardiology care.     REVIEW OF SYSTEMS: The other 9 systems reviewed by me and negative except as  mentioned in HPI.     PAST MEDICAL HISTORY/SOCIAL HISTORY/FAMILY HISTORY: Unchanged from my prior  documentation done on 11/24/2012.       Current Outpatient Medications:   •  chlorthalidone (HYGROTON) 25 MG tablet, Take 0.5 tablets by mouth Daily., Disp: 45 tablet, Rfl: 3  •  Cholecalciferol (VITAMIN D-3) 5000 units tablet, Take  by mouth Daily., Disp: , Rfl:   •  diltiaZEM CD (CARTIA XT) 240 MG 24 hr capsule, Take 1 capsule by mouth Daily., Disp: 30 capsule, Rfl: 6  •  Glucosamine HCl (GLUCOSAMINE PO), Take 2 tablets by mouth Daily. As directed   ,  "Disp: , Rfl:   •  metoprolol tartrate (LOPRESSOR) 25 MG tablet, Take 1 tablet by mouth 2 (Two) Times a Day., Disp: 180 tablet, Rfl: 3  •  pantoprazole (PROTONIX) 40 MG EC tablet, TAKE ONE TABLET BY MOUTH ONCE DAILY, Disp: 30 tablet, Rfl: 3  •  potassium chloride (K-DUR) 10 MEQ CR tablet, TAKE 1 TABLET BY MOUTH ONCE DAILY, Disp: 90 tablet, Rfl: 1  •  rivaroxaban (XARELTO) 20 MG tablet, Take 1 tablet by mouth Daily., Disp: 90 tablet, Rfl: 1  •  simvastatin (ZOCOR) 40 MG tablet, Take 1 tablet by mouth Every Night., Disp: 90 tablet, Rfl: 3  •  vitamin C (ASCORBIC ACID) 500 MG tablet, Take 1,000 mg by mouth Daily., Disp: , Rfl:   •  vitamin E 400 UNIT capsule, Take 400 Units by mouth Daily., Disp: , Rfl:   •  Vortioxetine HBr (TRINTELLIX) 10 MG tablet, Take  by mouth Daily., Disp: , Rfl:     PHYSICAL EXAMINATION:   /58   Pulse 71   Temp 97.4 °F (36.3 °C) (Temporal)   Resp 12   Ht 160 cm (63\")   Wt 74.4 kg (164 lb)   SpO2 94%   BMI 29.05 kg/m²     ECOG1  GENERAL: Age appropriate. No acute distress.   HEENT: Head atraumatic, normocephalic.   NECK: Supple. No JVD. No lymphadenopathy.   LUNGS: Clear to auscultation bilaterally. No wheezing. No rhonchi.   HEART: Regular rate and rhythm. S1, S2, no murmurs.   ABDOMEN: Soft, nontender, nondistended. Bowel sounds positive. No  hepatosplenomegaly.   EXTREMITIES: No clubbing, cyanosis, or edema.   SKIN: No rashes. No purpura.   NEUROLOGIC: Awake and oriented x3. Strength 5 out of 5 in all muscle groups.     Lab on 01/18/2019   Component Date Value Ref Range Status   • WBC 01/18/2019 6.30  3.50 - 10.80 10*3/mm3 Final   • RBC 01/18/2019 4.38  3.89 - 5.14 10*6/mm3 Final   • Hemoglobin 01/18/2019 13.8  11.5 - 15.5 g/dL Final   • Hematocrit 01/18/2019 40.1  34.5 - 44.0 % Final   • RDW 01/18/2019 12.6  11.3 - 14.5 % Final   • MCV 01/18/2019 91.6  80.0 - 99.0 fL Final   • MCH 01/18/2019 31.5* 27.0 - 31.0 pg Final   • MCHC 01/18/2019 34.4  32.0 - 36.0 g/dL Final   • MPV " 01/18/2019 7.4  6.0 - 12.0 fL Final   • Platelets 01/18/2019 302  150 - 450 10*3/mm3 Final   • Neutrophil % 01/18/2019 62.9  41.0 - 71.0 % Final   • Lymphocyte % 01/18/2019 30.9  24.0 - 44.0 % Final   • Monocyte % 01/18/2019 6.2  0.0 - 12.0 % Final   • Neutrophils, Absolute 01/18/2019 4.00  1.50 - 8.30 10*3/mm3 Final   • Lymphocytes, Absolute 01/18/2019 1.90  0.60 - 4.80 10*3/mm3 Final   • Monocytes, Absolute 01/18/2019 0.40  0.00 - 1.00 10*3/mm3 Final        ASSESSMENT: The patient is a pleasant 73 y.o. female with DVT/PE.    PROBLEM LIST:  1. Acute right lower extremity DVT while on therapeutic Coumadin 11/23/2012.   2. Pulmonary embolism with cardiac arrest 09/30/2012.   3.  Hypertension  4.  Hypercholesterolemia  5.  Heartburn  6.  Arthritis    PLAN:   1. We will continue Xarelto 20 mg p.o. daily indefinitely.  She did not need refills today, but will call when she does. The patient is also getting samples from her primary care physician.   2. I reviewed the blood work results from today. I told her that her CBC is normal. We will follow up on the CMP results and notify her of any significant findings.   3. We will see the patient back in 12 months with repeat labs including CBC and CMP.   4. She will continue Lopressor for hypertension, as well as follow up with Dr. Dill yearly for cardiology care.   5.  We'll continue Zocor 20 g daily for hypercholesterolemia  6. We'll continue Protonix 40 mg daily for heartburn    Cait Page, APRN  1/18/2019

## 2019-02-01 DIAGNOSIS — J06.9 ACUTE URI: Primary | ICD-10-CM

## 2019-02-01 RX ORDER — AZITHROMYCIN 250 MG/1
TABLET, FILM COATED ORAL
Qty: 6 TABLET | Refills: 0 | Status: SHIPPED | OUTPATIENT
Start: 2019-02-01 | End: 2019-02-22

## 2019-02-05 DIAGNOSIS — K21.9 GASTROESOPHAGEAL REFLUX DISEASE, ESOPHAGITIS PRESENCE NOT SPECIFIED: ICD-10-CM

## 2019-02-06 RX ORDER — PANTOPRAZOLE SODIUM 40 MG/1
40 TABLET, DELAYED RELEASE ORAL DAILY
Qty: 30 TABLET | Refills: 1 | Status: SHIPPED | OUTPATIENT
Start: 2019-02-06 | End: 2019-04-03 | Stop reason: SDUPTHER

## 2019-02-22 ENCOUNTER — OFFICE VISIT (OUTPATIENT)
Dept: FAMILY MEDICINE CLINIC | Facility: CLINIC | Age: 74
End: 2019-02-22

## 2019-02-22 VITALS
HEART RATE: 74 BPM | WEIGHT: 165 LBS | OXYGEN SATURATION: 98 % | DIASTOLIC BLOOD PRESSURE: 82 MMHG | SYSTOLIC BLOOD PRESSURE: 126 MMHG | HEIGHT: 63 IN | RESPIRATION RATE: 16 BRPM | BODY MASS INDEX: 29.23 KG/M2

## 2019-02-22 DIAGNOSIS — E53.8 B12 DEFICIENCY: ICD-10-CM

## 2019-02-22 DIAGNOSIS — F51.01 PRIMARY INSOMNIA: ICD-10-CM

## 2019-02-22 DIAGNOSIS — I10 ESSENTIAL HYPERTENSION: Primary | ICD-10-CM

## 2019-02-22 PROBLEM — N20.0 CALCULUS OF KIDNEY: Status: ACTIVE | Noted: 2019-02-22

## 2019-02-22 PROBLEM — I82.409 ACUTE DEEP VEIN THROMBOSIS OF LOWER EXTREMITY: Status: ACTIVE | Noted: 2019-02-22

## 2019-02-22 PROCEDURE — 99214 OFFICE O/P EST MOD 30 MIN: CPT | Performed by: FAMILY MEDICINE

## 2019-02-22 PROCEDURE — 96372 THER/PROPH/DIAG INJ SC/IM: CPT | Performed by: FAMILY MEDICINE

## 2019-02-22 RX ORDER — TRAZODONE HYDROCHLORIDE 50 MG/1
50 TABLET ORAL NIGHTLY
Qty: 90 TABLET | Refills: 1 | Status: SHIPPED | OUTPATIENT
Start: 2019-02-22 | End: 2019-07-23

## 2019-02-22 RX ORDER — CYANOCOBALAMIN 1000 UG/ML
1000 INJECTION, SOLUTION INTRAMUSCULAR; SUBCUTANEOUS
Status: DISCONTINUED | OUTPATIENT
Start: 2019-02-22 | End: 2019-11-06

## 2019-02-22 RX ADMIN — CYANOCOBALAMIN 1000 MCG: 1000 INJECTION, SOLUTION INTRAMUSCULAR; SUBCUTANEOUS at 12:05

## 2019-02-22 NOTE — PROGRESS NOTES
Subjective   Carol Jean is a 73 y.o. female.   Pt getting ready to go on a month long trip.  Has had some sleep disturbance. Has tried Trintellix and took herself off.  Hypertension   This is a chronic problem. The current episode started more than 1 year ago. The problem is unchanged. The problem is controlled. Pertinent negatives include no anxiety, blurred vision, chest pain, malaise/fatigue, palpitations, peripheral edema or shortness of breath. There are no associated agents to hypertension. Risk factors for coronary artery disease include family history, dyslipidemia and post-menopausal state. Past treatments include calcium channel blockers, beta blockers and diuretics. Current antihypertension treatment includes calcium channel blockers, beta blockers and diuretics. The current treatment provides significant improvement. There are no compliance problems.    Continues following with Cardiology  Continues following with Hematology for recurrent blood clots/pulm embolism. Continues on Xarelto.    The following portions of the patient's history were reviewed and updated as appropriate: allergies, current medications, past family history, past medical history, past social history, past surgical history and problem list.    Review of Systems   Constitutional: Negative.  Negative for activity change, fatigue, fever, malaise/fatigue, unexpected weight gain and unexpected weight loss.   HENT: Negative.  Negative for congestion, sneezing and sore throat.    Eyes: Negative.  Negative for blurred vision, double vision and visual disturbance.   Respiratory: Negative.  Negative for cough, chest tightness, shortness of breath and wheezing.    Cardiovascular: Negative.  Negative for chest pain, palpitations and leg swelling.   Gastrointestinal: Negative.  Negative for abdominal distention, abdominal pain, blood in stool, constipation, diarrhea and nausea.   Endocrine: Negative.  Negative for cold intolerance and heat  intolerance.   Genitourinary: Negative.  Negative for urinary incontinence, dysuria, frequency and urgency.   Musculoskeletal: Negative.  Negative for arthralgias and myalgias.   Skin: Negative.  Negative for rash.   Allergic/Immunologic: Negative.    Neurological: Negative.  Negative for dizziness, syncope, numbness and memory problem.   Hematological: Negative.  Negative for adenopathy.   Psychiatric/Behavioral: Negative.  Negative for suicidal ideas and depressed mood. The patient is not nervous/anxious.    All other systems reviewed and are negative.      Objective   Physical Exam   Constitutional: She is oriented to person, place, and time. She appears well-developed and well-nourished.   HENT:   Head: Normocephalic.   Right Ear: External ear normal.   Left Ear: External ear normal.   Nose: Nose normal.   Mouth/Throat: Oropharynx is clear and moist. No oropharyngeal exudate.   Eyes: Conjunctivae and EOM are normal. Pupils are equal, round, and reactive to light.   Neck: Normal range of motion. Neck supple. No thyromegaly present.   Cardiovascular: Normal rate, regular rhythm, normal heart sounds and intact distal pulses.   No murmur heard.  Pulmonary/Chest: Effort normal and breath sounds normal. No respiratory distress. She exhibits no tenderness.   Abdominal: Soft. Bowel sounds are normal. She exhibits no distension and no mass. There is no tenderness. There is no rebound and no guarding.   Musculoskeletal: Normal range of motion.   Lymphadenopathy:     She has no cervical adenopathy.   Neurological: She is alert and oriented to person, place, and time. She has normal reflexes. She displays normal reflexes. She exhibits normal muscle tone. Coordination normal.   Skin: Skin is warm and dry. No rash noted. She is not diaphoretic. No erythema.   Psychiatric: She has a normal mood and affect. Her behavior is normal. Judgment and thought content normal.   Nursing note and vitals reviewed.        Assessment/Plan     was seen today for hypertension and med refill.    Diagnoses and all orders for this visit:    Essential hypertension    Primary insomnia    B12 deficiency  -     cyanocobalamin injection 1,000 mcg; Inject 1 mL into the appropriate muscle as directed by prescriber Every 28 (Twenty-Eight) Days.    Other orders  -     traZODone (DESYREL) 50 MG tablet; Take 1 tablet by mouth Every Night.

## 2019-03-27 ENCOUNTER — TELEPHONE (OUTPATIENT)
Dept: FAMILY MEDICINE CLINIC | Facility: CLINIC | Age: 74
End: 2019-03-27

## 2019-03-27 NOTE — TELEPHONE ENCOUNTER
Contacted pt to let her know she can come  samples of 20mg xarelto at  per Dr. Marrero.    ----- Message from Beba Miguel sent at 3/27/2019  2:43 PM EDT -----  Regarding: please call   Contact: [phone]  PT IS WONDERING IF WE HAVE ANY SAMPLES OF XARELTO

## 2019-04-03 DIAGNOSIS — K21.9 GASTROESOPHAGEAL REFLUX DISEASE, ESOPHAGITIS PRESENCE NOT SPECIFIED: ICD-10-CM

## 2019-04-03 RX ORDER — PANTOPRAZOLE SODIUM 40 MG/1
40 TABLET, DELAYED RELEASE ORAL DAILY
Qty: 30 TABLET | Refills: 0 | Status: SHIPPED | OUTPATIENT
Start: 2019-04-03 | End: 2019-04-22 | Stop reason: SDUPTHER

## 2019-04-03 NOTE — TELEPHONE ENCOUNTER
Contacted pt to let her know we dont have any trintellix.  ----- Message from Emily Michael sent at 4/3/2019  9:26 AM EDT -----  Regarding: SAMPLE REQUEST  Contact: [phone]  Patient called and said Dr Gaspar used to give her samples of Trintellix and wanted to know if Dr Marrero could give her some.  Please call and advise @ [phone].  Thank you.

## 2019-04-22 DIAGNOSIS — K21.9 GASTROESOPHAGEAL REFLUX DISEASE, ESOPHAGITIS PRESENCE NOT SPECIFIED: ICD-10-CM

## 2019-04-23 RX ORDER — PANTOPRAZOLE SODIUM 40 MG/1
TABLET, DELAYED RELEASE ORAL
Qty: 30 TABLET | Refills: 0 | Status: SHIPPED | OUTPATIENT
Start: 2019-04-23 | End: 2019-04-29 | Stop reason: SDUPTHER

## 2019-04-24 ENCOUNTER — TELEPHONE (OUTPATIENT)
Dept: FAMILY MEDICINE CLINIC | Facility: CLINIC | Age: 74
End: 2019-04-24

## 2019-04-24 NOTE — TELEPHONE ENCOUNTER
Contacted pt and advised I would leave 20mg xarelto samples at ; pt voiced understanding.    ----- Message from Joaquín Alex sent at 4/24/2019  1:47 PM EDT -----  Regarding: samples xarelto  Contact: [phone]  Does dr mcwilliams have samples of xarelto to give to above pt, please let her know

## 2019-04-29 ENCOUNTER — TELEPHONE (OUTPATIENT)
Dept: FAMILY MEDICINE CLINIC | Facility: CLINIC | Age: 74
End: 2019-04-29

## 2019-04-29 DIAGNOSIS — K21.9 GASTROESOPHAGEAL REFLUX DISEASE, ESOPHAGITIS PRESENCE NOT SPECIFIED: ICD-10-CM

## 2019-04-29 RX ORDER — PANTOPRAZOLE SODIUM 40 MG/1
40 TABLET, DELAYED RELEASE ORAL DAILY
Qty: 30 TABLET | Refills: 0 | Status: SHIPPED | OUTPATIENT
Start: 2019-04-29 | End: 2019-07-09 | Stop reason: SDUPTHER

## 2019-04-29 RX ORDER — DILTIAZEM HYDROCHLORIDE 240 MG/1
240 CAPSULE, COATED, EXTENDED RELEASE ORAL DAILY
Qty: 30 CAPSULE | Refills: 6 | Status: SHIPPED | OUTPATIENT
Start: 2019-04-29 | End: 2019-07-23 | Stop reason: SDUPTHER

## 2019-04-29 NOTE — TELEPHONE ENCOUNTER
Contacted pt and advised Janes sent in refill earlier today for pantoprazole. Pt voiced understanding.    ----- Message from Joaquín Alex sent at 4/29/2019  1:38 PM EDT -----  Regarding: MED REFILL PANTOPRAZOLE  Contact: [phone]  PT GOING ON TRIP AND NEEDS THIS REFILLED PLEASE LET HER KNOW.

## 2019-04-29 NOTE — TELEPHONE ENCOUNTER
Contacted pt and advised we just have 1 bottle of xarelto left and it will be waiting for her at .    ----- Message from Beba Miguel sent at 4/29/2019  8:11 AM EDT -----  Regarding: SAMPLES  Contact: [phone]  PT IS [place], AND WANTS TO KNOW IF WE HAVE A COUPLE WEEKS WORTH OF SAMPLES OF XARELTO.

## 2019-06-28 RX ORDER — POTASSIUM CHLORIDE 750 MG/1
10 TABLET, FILM COATED, EXTENDED RELEASE ORAL DAILY
Qty: 90 TABLET | Refills: 1 | OUTPATIENT
Start: 2019-06-28

## 2019-07-02 RX ORDER — POTASSIUM CHLORIDE 750 MG/1
10 TABLET, FILM COATED, EXTENDED RELEASE ORAL DAILY
Qty: 90 TABLET | Refills: 1 | Status: SHIPPED | OUTPATIENT
Start: 2019-07-02 | End: 2019-07-02 | Stop reason: SDUPTHER

## 2019-07-02 RX ORDER — POTASSIUM CHLORIDE 750 MG/1
10 TABLET, FILM COATED, EXTENDED RELEASE ORAL DAILY
Qty: 30 TABLET | Refills: 3 | Status: SHIPPED | OUTPATIENT
Start: 2019-07-02 | End: 2019-12-24

## 2019-07-02 NOTE — TELEPHONE ENCOUNTER
Pt states she is out of potassium chloride and is wanting enough to get through to her appt with oj on 7/23.

## 2019-07-09 DIAGNOSIS — K21.9 GASTROESOPHAGEAL REFLUX DISEASE, ESOPHAGITIS PRESENCE NOT SPECIFIED: ICD-10-CM

## 2019-07-09 RX ORDER — PANTOPRAZOLE SODIUM 40 MG/1
40 TABLET, DELAYED RELEASE ORAL DAILY
Qty: 30 TABLET | Refills: 0 | Status: SHIPPED | OUTPATIENT
Start: 2019-07-09 | End: 2019-07-30 | Stop reason: SDUPTHER

## 2019-07-23 ENCOUNTER — OFFICE VISIT (OUTPATIENT)
Dept: FAMILY MEDICINE CLINIC | Facility: CLINIC | Age: 74
End: 2019-07-23

## 2019-07-23 VITALS
RESPIRATION RATE: 18 BRPM | OXYGEN SATURATION: 99 % | TEMPERATURE: 98.4 F | HEIGHT: 63 IN | BODY MASS INDEX: 27.29 KG/M2 | DIASTOLIC BLOOD PRESSURE: 80 MMHG | SYSTOLIC BLOOD PRESSURE: 128 MMHG | WEIGHT: 154 LBS | HEART RATE: 75 BPM

## 2019-07-23 DIAGNOSIS — Z12.31 ENCOUNTER FOR SCREENING MAMMOGRAM FOR BREAST CANCER: ICD-10-CM

## 2019-07-23 DIAGNOSIS — E78.2 MIXED HYPERLIPIDEMIA: ICD-10-CM

## 2019-07-23 DIAGNOSIS — F41.9 ANXIETY: ICD-10-CM

## 2019-07-23 DIAGNOSIS — I10 ESSENTIAL HYPERTENSION: ICD-10-CM

## 2019-07-23 DIAGNOSIS — Z00.00 MEDICARE ANNUAL WELLNESS VISIT, SUBSEQUENT: Primary | ICD-10-CM

## 2019-07-23 DIAGNOSIS — N28.9 RENAL INSUFFICIENCY: ICD-10-CM

## 2019-07-23 LAB
ALBUMIN SERPL-MCNC: 4.4 G/DL (ref 3.5–5.2)
ALBUMIN/GLOB SERPL: 1.8 G/DL
ALP SERPL-CCNC: 81 U/L (ref 39–117)
ALT SERPL W P-5'-P-CCNC: 9 U/L (ref 1–33)
ANION GAP SERPL CALCULATED.3IONS-SCNC: 11.1 MMOL/L (ref 5–15)
AST SERPL-CCNC: 15 U/L (ref 1–32)
BASOPHILS # BLD AUTO: 0.04 10*3/MM3 (ref 0–0.2)
BASOPHILS NFR BLD AUTO: 0.6 % (ref 0–1.5)
BILIRUB SERPL-MCNC: 0.7 MG/DL (ref 0.2–1.2)
BUN BLD-MCNC: 28 MG/DL (ref 8–23)
BUN/CREAT SERPL: 22.6 (ref 7–25)
CALCIUM SPEC-SCNC: 9.4 MG/DL (ref 8.6–10.5)
CHLORIDE SERPL-SCNC: 99 MMOL/L (ref 98–107)
CHOLEST SERPL-MCNC: 154 MG/DL (ref 0–200)
CO2 SERPL-SCNC: 30.9 MMOL/L (ref 22–29)
CREAT BLD-MCNC: 1.24 MG/DL (ref 0.57–1)
DEPRECATED RDW RBC AUTO: 45.5 FL (ref 37–54)
EOSINOPHIL # BLD AUTO: 0.21 10*3/MM3 (ref 0–0.4)
EOSINOPHIL NFR BLD AUTO: 3.1 % (ref 0.3–6.2)
ERYTHROCYTE [DISTWIDTH] IN BLOOD BY AUTOMATED COUNT: 13.2 % (ref 12.3–15.4)
GFR SERPL CREATININE-BSD FRML MDRD: 42 ML/MIN/1.73
GLOBULIN UR ELPH-MCNC: 2.5 GM/DL
GLUCOSE BLD-MCNC: 95 MG/DL (ref 65–99)
HCT VFR BLD AUTO: 45.5 % (ref 34–46.6)
HDLC SERPL-MCNC: 68 MG/DL (ref 40–60)
HGB BLD-MCNC: 14.7 G/DL (ref 12–15.9)
IMM GRANULOCYTES # BLD AUTO: 0.01 10*3/MM3 (ref 0–0.05)
IMM GRANULOCYTES NFR BLD AUTO: 0.1 % (ref 0–0.5)
LDLC SERPL CALC-MCNC: 74 MG/DL (ref 0–100)
LDLC/HDLC SERPL: 1.09 {RATIO}
LYMPHOCYTES # BLD AUTO: 2.1 10*3/MM3 (ref 0.7–3.1)
LYMPHOCYTES NFR BLD AUTO: 31 % (ref 19.6–45.3)
MCH RBC QN AUTO: 29.8 PG (ref 26.6–33)
MCHC RBC AUTO-ENTMCNC: 32.3 G/DL (ref 31.5–35.7)
MCV RBC AUTO: 92.3 FL (ref 79–97)
MONOCYTES # BLD AUTO: 0.68 10*3/MM3 (ref 0.1–0.9)
MONOCYTES NFR BLD AUTO: 10 % (ref 5–12)
NEUTROPHILS # BLD AUTO: 3.74 10*3/MM3 (ref 1.7–7)
NEUTROPHILS NFR BLD AUTO: 55.2 % (ref 42.7–76)
NRBC BLD AUTO-RTO: 0 /100 WBC (ref 0–0.2)
PLATELET # BLD AUTO: 331 10*3/MM3 (ref 140–450)
PMV BLD AUTO: 10 FL (ref 6–12)
POTASSIUM BLD-SCNC: 3.7 MMOL/L (ref 3.5–5.2)
PROT SERPL-MCNC: 6.9 G/DL (ref 6–8.5)
RBC # BLD AUTO: 4.93 10*6/MM3 (ref 3.77–5.28)
SODIUM BLD-SCNC: 141 MMOL/L (ref 136–145)
TRIGL SERPL-MCNC: 60 MG/DL (ref 0–150)
VLDLC SERPL-MCNC: 12 MG/DL (ref 5–40)
WBC NRBC COR # BLD: 6.78 10*3/MM3 (ref 3.4–10.8)

## 2019-07-23 PROCEDURE — 99213 OFFICE O/P EST LOW 20 MIN: CPT | Performed by: FAMILY MEDICINE

## 2019-07-23 PROCEDURE — 85025 COMPLETE CBC W/AUTO DIFF WBC: CPT | Performed by: FAMILY MEDICINE

## 2019-07-23 PROCEDURE — 80053 COMPREHEN METABOLIC PANEL: CPT | Performed by: FAMILY MEDICINE

## 2019-07-23 PROCEDURE — G0439 PPPS, SUBSEQ VISIT: HCPCS | Performed by: FAMILY MEDICINE

## 2019-07-23 PROCEDURE — 80061 LIPID PANEL: CPT | Performed by: FAMILY MEDICINE

## 2019-07-23 PROCEDURE — 36415 COLL VENOUS BLD VENIPUNCTURE: CPT | Performed by: FAMILY MEDICINE

## 2019-07-23 RX ORDER — DILTIAZEM HYDROCHLORIDE 240 MG/1
240 CAPSULE, COATED, EXTENDED RELEASE ORAL DAILY
Qty: 30 CAPSULE | Refills: 6
Start: 2019-07-23 | End: 2019-10-24 | Stop reason: SDUPTHER

## 2019-07-23 RX ORDER — SIMVASTATIN 40 MG
40 TABLET ORAL NIGHTLY
Qty: 90 TABLET | Refills: 3
Start: 2019-07-23 | End: 2019-12-30

## 2019-07-23 RX ORDER — CITALOPRAM 20 MG/1
TABLET ORAL
Qty: 30 TABLET | Refills: 0 | Status: SHIPPED | OUTPATIENT
Start: 2019-07-23 | End: 2019-09-09 | Stop reason: SDUPTHER

## 2019-07-23 RX ORDER — CHLORTHALIDONE 25 MG/1
12.5 TABLET ORAL DAILY
Qty: 45 TABLET | Refills: 3
Start: 2019-07-23 | End: 2020-01-17

## 2019-07-23 NOTE — PROGRESS NOTES
Subsequent Medicare Wellness Visit   The ABC's of the Annual Wellness Visit    Chief Complaint   Patient presents with   • Medicare Wellness-subsequent       HPI:  Carol Jean, JOSE CRUZ-1945, is a 73 y.o. female who presents for a Subsequent Medicare Wellness Visit.  Would like to be back on something for depression. Has irritability and impatience. Has some difficulty sleeping. Has worsened over the past few months.  Needs rf of meds.  Recent Hospitalizations:  No hospitalization(s) within the last year..    Current Medical Providers:  Patient Care Team:  Taty Marrero DO as PCP - General (Family Medicine)  Valentin Ballard MD as PCP - Claims Attributed   Cardiology-Fuentes Ashby-Oncology    Health Habits and Functional and Cognitive Screening and Depression Screening:  Functional & Cognitive Status 2019   Do you have difficulty preparing food and eating? No   Do you have difficulty bathing yourself, getting dressed or grooming yourself? No   Do you have difficulty using the toilet? No   Do you have difficulty moving around from place to place? No   Do you have trouble with steps or getting out of a bed or a chair? No   Current Diet Well Balanced Diet   Dental Exam Up to date   Eye Exam Up to date   Exercise (times per week) 5 times per week   Current Exercise Activities Include Walking   Do you need help using the phone?  No   Are you deaf or do you have serious difficulty hearing?  No   Do you need help with transportation? No   Do you need help shopping? No   Do you need help preparing meals?  No   Do you need help with housework?  No   Do you need help with laundry? No   Do you need help taking your medications? No   Do you need help managing money? No   Do you ever drive or ride in a car without wearing a seat belt? No   Have you felt unusual stress, anger or loneliness in the last month? No   Who do you live with? Spouse   If you need help, do you have trouble finding someone available to  you? No   Have you been bothered in the last four weeks by sexual problems? No   Do you have difficulty concentrating, remembering or making decisions? No       Compared to one year ago, the patient feels her physical health is the same and her mental health is the same.    Depression Screen:  PHQ-2/PHQ-9 Depression Screening 7/23/2019   Little interest or pleasure in doing things 1   Feeling down, depressed, or hopeless 1   Trouble falling or staying asleep, or sleeping too much 1   Feeling tired or having little energy 0   Poor appetite or overeating 0   Feeling bad about yourself - or that you are a failure or have let yourself or your family down 0   Trouble concentrating on things, such as reading the newspaper or watching television 0   Moving or speaking so slowly that other people could have noticed. Or the opposite - being so fidgety or restless that you have been moving around a lot more than usual 0   Thoughts that you would be better off dead, or of hurting yourself in some way 0   Total Score 3   If you checked off any problems, how difficult have these problems made it for you to do your work, take care of things at home, or get along with other people? Somewhat difficult         Past Medical/Family/Social History:  The following portions of the patient's history were reviewed and updated as appropriate: allergies, current medications, past family history, past medical history, past social history, past surgical history and problem list.    Allergies   Allergen Reactions   • Codeine Sulfate    • Erythromycin Other (See Comments)     “tongue swelling”.   • Meperidine    • Morphine And Related    • Penicillins Other (See Comments)     “swelling” at site.   • Sulfa Antibiotics    • Sulfadiazine          Current Outpatient Medications:   •  chlorthalidone (HYGROTON) 25 MG tablet, Take 0.5 tablets by mouth Daily., Disp: 45 tablet, Rfl: 3  •  Cholecalciferol (VITAMIN D-3) 5000 units tablet, Take  by mouth  Daily., Disp: , Rfl:   •  diltiaZEM CD (CARTIA XT) 240 MG 24 hr capsule, Take 1 capsule by mouth Daily., Disp: 30 capsule, Rfl: 6  •  Glucosamine HCl (GLUCOSAMINE PO), Take 2 tablets by mouth Daily. As directed   , Disp: , Rfl:   •  metoprolol tartrate (LOPRESSOR) 25 MG tablet, Take 1 tablet by mouth 2 (Two) Times a Day., Disp: 180 tablet, Rfl: 3  •  pantoprazole (PROTONIX) 40 MG EC tablet, Take 1 tablet by mouth Daily., Disp: 30 tablet, Rfl: 0  •  potassium chloride (K-DUR) 10 MEQ CR tablet, Take 1 tablet by mouth Daily., Disp: 30 tablet, Rfl: 3  •  rivaroxaban (XARELTO) 20 MG tablet, Take 1 tablet by mouth Daily., Disp: 90 tablet, Rfl: 1  •  simvastatin (ZOCOR) 40 MG tablet, Take 1 tablet by mouth Every Night., Disp: 90 tablet, Rfl: 3  •  vitamin C (ASCORBIC ACID) 500 MG tablet, Take 1,000 mg by mouth Daily., Disp: , Rfl:   •  vitamin E 400 UNIT capsule, Take 400 Units by mouth Daily., Disp: , Rfl:   •  citalopram (CELEXA) 20 MG tablet, Take 1/2 tab qd for 3 weeks then increase to 1 tab qd, Disp: 30 tablet, Rfl: 0    Current Facility-Administered Medications:   •  cyanocobalamin injection 1,000 mcg, 1,000 mcg, Intramuscular, Q28 Days, Taty Marrero DO, 1,000 mcg at 02/22/19 1205    Aspirin use counseling: Does not need ASA (and currently is not on it)    Current medication list contains no high risk medications.  No harmful drug interactions have been identified.     Family History   Problem Relation Age of Onset   • Stroke Mother    • Hypertension Mother    • Heart attack Father    • COPD Father    • Thyroid disease Father    • Stroke Other    • Coronary artery disease Other    • Breast cancer Other    • COPD Other    • Stroke Other    • Breast cancer Other    • COPD Other        Social History     Tobacco Use   • Smoking status: Never Smoker   • Smokeless tobacco: Never Used   Substance Use Topics   • Alcohol use: Yes     Comment: 1 per week for 30 years       Past Surgical History:   Procedure Laterality Date    • BILATERAL BREAST REDUCTION     • CATARACT EXTRACTION, BILATERAL     •  SECTION     •  SECTION     • COLONOSCOPY     • KIDNEY STONE SURGERY      Nephrolithiasis with lithotripsy.   • REDUCTION MAMMAPLASTY Bilateral    • TONSILLECTOMY         Patient Active Problem List   Diagnosis   • History of pulmonary embolism   • Hypertension   • Acute pulmonary embolism (CMS/HCC)   • DVT (deep venous thrombosis) (CMS/HCC)   • Circadian rhythm sleep disorder, delayed sleep phase type   • Psychophysiological insomnia   • Hyperlipidemia   • Calculus of kidney   • Cobalamin deficiency       Review of Systems   Constitutional: Negative.  Negative for activity change, fatigue, fever and unexpected weight change.   HENT: Negative.  Negative for congestion, sneezing and sore throat.    Eyes: Negative.  Negative for visual disturbance.   Respiratory: Negative.  Negative for cough, chest tightness, shortness of breath and wheezing.    Cardiovascular: Negative.  Negative for chest pain, palpitations and leg swelling.   Gastrointestinal: Negative.  Negative for abdominal distention, abdominal pain, blood in stool, constipation, diarrhea and nausea.   Endocrine: Negative.  Negative for cold intolerance and heat intolerance.   Genitourinary: Negative.  Negative for dysuria, frequency and urgency.   Musculoskeletal: Negative.  Negative for arthralgias and myalgias.   Skin: Negative.  Negative for rash.   Allergic/Immunologic: Negative.    Neurological: Negative.  Negative for dizziness, syncope and numbness.   Hematological: Negative.  Negative for adenopathy.   Psychiatric/Behavioral: Negative.  Negative for suicidal ideas. The patient is not nervous/anxious.        Objective     Vitals:    19 1001   BP: 128/80   BP Location: Left arm   Patient Position: Sitting   Cuff Size: Adult   Pulse: 75   Resp: 18   Temp: 98.4 °F (36.9 °C)   TempSrc: Temporal   SpO2: 99%   Weight: 69.9 kg (154 lb)   Height: 160 cm  "(63\")   PainSc: 0-No pain       Patient's Body mass index is 27.28 kg/m². BMI is above normal parameters. Recommendations include: nutrition counseling.      Hearing Screening Comments: Normal hearing  Vision Screening Comments: Per ophth    The patient has no evidence of cognitve impairment.     Physical Exam   Constitutional: She is oriented to person, place, and time. She appears well-developed and well-nourished. No distress.   HENT:   Right Ear: External ear normal.   Left Ear: External ear normal.   Nose: Nose normal.   Mouth/Throat: Oropharynx is clear and moist.   Eyes: Conjunctivae and EOM are normal. Pupils are equal, round, and reactive to light.   Neck: Normal range of motion. Neck supple. No thyromegaly present.   Cardiovascular: Normal rate, regular rhythm and normal heart sounds.   No murmur heard.  Pulmonary/Chest: Effort normal and breath sounds normal. No respiratory distress. She has no wheezes.   Abdominal: Soft. Bowel sounds are normal. She exhibits no distension and no mass. There is no tenderness. There is no rebound and no guarding. No hernia.   Musculoskeletal: Normal range of motion. She exhibits no edema or tenderness.   Lymphadenopathy:     She has no cervical adenopathy.   Neurological: She is alert and oriented to person, place, and time. She has normal reflexes.   Skin: Skin is warm and dry. No rash noted. She is not diaphoretic. No erythema. No pallor.   Psychiatric: She has a normal mood and affect. Her behavior is normal. Judgment and thought content normal.   Nursing note and vitals reviewed.      Recent Lab Results:     Lab Results   Component Value Date    CHOL 205 (H) 02/28/2017    TRIG 138 02/28/2017    HDL 77 (H) 02/28/2017       Assessment/Plan   Age-appropriate Screening Schedule:  Refer to the list below for future screening recommendations based on patient's age, sex and/or medical conditions.      Health Maintenance   Topic Date Due   • TDAP/TD VACCINES (1 - Tdap) " 10/22/1964   • ZOSTER VACCINE (1 of 2) 10/22/1995   • PNEUMOCOCCAL VACCINES (65+ LOW/MEDIUM RISK) (1 of 2 - PCV13) 10/22/2010   • MAMMOGRAM  06/15/2016   • INFLUENZA VACCINE  08/01/2019   • LIPID PANEL  12/01/2019   • COLONOSCOPY  11/30/2027       Medicare Risks and Personalized Health Plan:  Breast Cancer/Mammogram Screening  Cardiovascular risk  Dementia/Memory   Depression/Dysphoria  Diabetic Lab Screening   Fall Risk  Obesity/Overweight   Polypharmacy      CMS-Preventive Services Quick Reference  Medicare Preventive Services Addressed:  Annual Wellness Visit (AWV)  Diabetes Screening-Lab Order for either glucose quantitative blood (except reagent strip), glucose;post glucose dose(includes glucose), or glucose tolerance test-3 specimens(includes glucose)  Screening Mammography     Advance Care Planning:  Patient has an advance directive - a copy has not been provided. Have asked the patient to send this to us to add to record    Diagnoses and all orders for this visit:    1. Medicare annual wellness visit, subsequent (Primary)    2. Anxiety  -     citalopram (CELEXA) 20 MG tablet; Take 1/2 tab qd for 3 weeks then increase to 1 tab qd  Dispense: 30 tablet; Refill: 0    3. Essential hypertension  -     CBC & Differential  -     chlorthalidone (HYGROTON) 25 MG tablet; Take 0.5 tablets by mouth Daily.  Dispense: 45 tablet; Refill: 3  -     diltiaZEM CD (CARTIA XT) 240 MG 24 hr capsule; Take 1 capsule by mouth Daily.  Dispense: 30 capsule; Refill: 6  -     metoprolol tartrate (LOPRESSOR) 25 MG tablet; Take 1 tablet by mouth 2 (Two) Times a Day.  Dispense: 180 tablet; Refill: 3  -     CBC Auto Differential    4. Renal insufficiency  -     Comprehensive Metabolic Panel    5. Mixed hyperlipidemia  -     simvastatin (ZOCOR) 40 MG tablet; Take 1 tablet by mouth Every Night.  Dispense: 90 tablet; Refill: 3  -     Lipid Panel    6. Encounter for screening mammogram for breast cancer  -     Mammo Screening Digital  Tomosynthesis Bilateral With CAD; Future        An After Visit Summary and PPPS with all of these plans were given to the patient.    Pt will start Celexa. She will f/u in 2 -3 months or sooner if symptoms worsen or persist.  Follow Up:  Return in about 3 months (around 10/23/2019).

## 2019-07-24 DIAGNOSIS — N28.9 RENAL INSUFFICIENCY: Primary | ICD-10-CM

## 2019-07-30 DIAGNOSIS — K21.9 GASTROESOPHAGEAL REFLUX DISEASE, ESOPHAGITIS PRESENCE NOT SPECIFIED: ICD-10-CM

## 2019-07-30 RX ORDER — PANTOPRAZOLE SODIUM 40 MG/1
TABLET, DELAYED RELEASE ORAL
Qty: 30 TABLET | Refills: 0 | Status: SHIPPED | OUTPATIENT
Start: 2019-07-30 | End: 2019-08-26 | Stop reason: SDUPTHER

## 2019-08-19 ENCOUNTER — LAB (OUTPATIENT)
Dept: LAB | Facility: HOSPITAL | Age: 74
End: 2019-08-19

## 2019-08-19 ENCOUNTER — TRANSCRIBE ORDERS (OUTPATIENT)
Dept: LAB | Facility: HOSPITAL | Age: 74
End: 2019-08-19

## 2019-08-19 DIAGNOSIS — N39.0 URINARY TRACT INFECTION WITHOUT HEMATURIA, SITE UNSPECIFIED: Primary | ICD-10-CM

## 2019-08-19 DIAGNOSIS — N39.0 URINARY TRACT INFECTION WITHOUT HEMATURIA, SITE UNSPECIFIED: ICD-10-CM

## 2019-08-19 PROCEDURE — 87086 URINE CULTURE/COLONY COUNT: CPT

## 2019-08-20 LAB — BACTERIA SPEC AEROBE CULT: NORMAL

## 2019-08-23 ENCOUNTER — TRANSCRIBE ORDERS (OUTPATIENT)
Dept: ADMINISTRATIVE | Facility: HOSPITAL | Age: 74
End: 2019-08-23

## 2019-08-23 DIAGNOSIS — N18.30 CHRONIC KIDNEY DISEASE, STAGE III (MODERATE) (HCC): Primary | ICD-10-CM

## 2019-08-26 DIAGNOSIS — K21.9 GASTROESOPHAGEAL REFLUX DISEASE, ESOPHAGITIS PRESENCE NOT SPECIFIED: ICD-10-CM

## 2019-08-26 RX ORDER — PANTOPRAZOLE SODIUM 40 MG/1
TABLET, DELAYED RELEASE ORAL
Qty: 30 TABLET | Refills: 0 | Status: SHIPPED | OUTPATIENT
Start: 2019-08-26 | End: 2019-09-28 | Stop reason: SDUPTHER

## 2019-09-06 ENCOUNTER — HOSPITAL ENCOUNTER (OUTPATIENT)
Dept: ULTRASOUND IMAGING | Facility: HOSPITAL | Age: 74
Discharge: HOME | End: 2019-09-06
Admitting: INTERNAL MEDICINE

## 2019-09-06 DIAGNOSIS — N18.30 CHRONIC KIDNEY DISEASE, STAGE III (MODERATE) (HCC): ICD-10-CM

## 2019-09-06 PROCEDURE — 76775 US EXAM ABDO BACK WALL LIM: CPT

## 2019-09-09 DIAGNOSIS — F41.9 ANXIETY: ICD-10-CM

## 2019-09-10 RX ORDER — CITALOPRAM 20 MG/1
TABLET ORAL
Qty: 30 TABLET | Refills: 0 | Status: SHIPPED | OUTPATIENT
Start: 2019-09-10 | End: 2019-11-17 | Stop reason: SDUPTHER

## 2019-09-17 ENCOUNTER — HOSPITAL ENCOUNTER (OUTPATIENT)
Dept: MAMMOGRAPHY | Facility: HOSPITAL | Age: 74
Discharge: HOME | End: 2019-09-17
Admitting: FAMILY MEDICINE

## 2019-09-17 ENCOUNTER — APPOINTMENT (OUTPATIENT)
Dept: OTHER | Facility: HOSPITAL | Age: 74
End: 2019-09-17

## 2019-09-17 DIAGNOSIS — Z12.31 ENCOUNTER FOR SCREENING MAMMOGRAM FOR BREAST CANCER: ICD-10-CM

## 2019-09-17 PROCEDURE — 77063 BREAST TOMOSYNTHESIS BI: CPT | Performed by: RADIOLOGY

## 2019-09-17 PROCEDURE — 77067 SCR MAMMO BI INCL CAD: CPT | Performed by: RADIOLOGY

## 2019-09-17 PROCEDURE — 77063 BREAST TOMOSYNTHESIS BI: CPT

## 2019-09-17 PROCEDURE — 77067 SCR MAMMO BI INCL CAD: CPT

## 2019-09-26 DIAGNOSIS — K21.9 GASTROESOPHAGEAL REFLUX DISEASE, ESOPHAGITIS PRESENCE NOT SPECIFIED: ICD-10-CM

## 2019-09-28 RX ORDER — PANTOPRAZOLE SODIUM 40 MG/1
TABLET, DELAYED RELEASE ORAL
Qty: 30 TABLET | Refills: 0 | Status: SHIPPED | OUTPATIENT
Start: 2019-09-28 | End: 2019-11-04 | Stop reason: SDUPTHER

## 2019-10-24 DIAGNOSIS — I10 ESSENTIAL HYPERTENSION: ICD-10-CM

## 2019-10-25 RX ORDER — DILTIAZEM HYDROCHLORIDE 240 MG/1
240 CAPSULE, COATED, EXTENDED RELEASE ORAL DAILY
Qty: 90 CAPSULE | Refills: 1
Start: 2019-10-25 | End: 2019-10-29 | Stop reason: SDUPTHER

## 2019-10-29 DIAGNOSIS — I10 ESSENTIAL HYPERTENSION: ICD-10-CM

## 2019-10-29 RX ORDER — DILTIAZEM HYDROCHLORIDE 240 MG/1
240 CAPSULE, COATED, EXTENDED RELEASE ORAL DAILY
Qty: 30 CAPSULE | Refills: 0
Start: 2019-10-29 | End: 2019-11-01 | Stop reason: SDUPTHER

## 2019-10-30 ENCOUNTER — TELEPHONE (OUTPATIENT)
Dept: FAMILY MEDICINE CLINIC | Facility: CLINIC | Age: 74
End: 2019-10-30

## 2019-10-30 DIAGNOSIS — I10 ESSENTIAL HYPERTENSION: ICD-10-CM

## 2019-10-30 RX ORDER — DILTIAZEM HYDROCHLORIDE 240 MG/1
240 CAPSULE, COATED, EXTENDED RELEASE ORAL DAILY
Qty: 30 CAPSULE | Refills: 0 | Status: CANCELLED
Start: 2019-10-30

## 2019-11-01 DIAGNOSIS — I10 ESSENTIAL HYPERTENSION: ICD-10-CM

## 2019-11-01 RX ORDER — DILTIAZEM HYDROCHLORIDE 240 MG/1
240 CAPSULE, COATED, EXTENDED RELEASE ORAL DAILY
Qty: 30 CAPSULE | Refills: 3
Start: 2019-11-01 | End: 2019-11-04 | Stop reason: SDUPTHER

## 2019-11-04 ENCOUNTER — TELEPHONE (OUTPATIENT)
Dept: FAMILY MEDICINE CLINIC | Facility: CLINIC | Age: 74
End: 2019-11-04

## 2019-11-04 DIAGNOSIS — K21.9 GASTROESOPHAGEAL REFLUX DISEASE, ESOPHAGITIS PRESENCE NOT SPECIFIED: ICD-10-CM

## 2019-11-04 DIAGNOSIS — I10 ESSENTIAL HYPERTENSION: ICD-10-CM

## 2019-11-04 RX ORDER — PANTOPRAZOLE SODIUM 40 MG/1
40 TABLET, DELAYED RELEASE ORAL DAILY
Qty: 30 TABLET | Refills: 0 | Status: CANCELLED | OUTPATIENT
Start: 2019-11-04

## 2019-11-04 RX ORDER — PANTOPRAZOLE SODIUM 40 MG/1
40 TABLET, DELAYED RELEASE ORAL DAILY
Qty: 90 TABLET | Refills: 1 | Status: SHIPPED | OUTPATIENT
Start: 2019-11-04 | End: 2020-05-04 | Stop reason: SDUPTHER

## 2019-11-04 RX ORDER — DILTIAZEM HYDROCHLORIDE 240 MG/1
240 CAPSULE, COATED, EXTENDED RELEASE ORAL DAILY
Qty: 30 CAPSULE | Refills: 3 | Status: CANCELLED
Start: 2019-11-04

## 2019-11-04 RX ORDER — DILTIAZEM HYDROCHLORIDE 240 MG/1
240 CAPSULE, COATED, EXTENDED RELEASE ORAL DAILY
Qty: 90 CAPSULE | Refills: 1 | Status: SHIPPED | OUTPATIENT
Start: 2019-11-04 | End: 2020-05-04 | Stop reason: SDUPTHER

## 2019-11-04 NOTE — TELEPHONE ENCOUNTER
Patient is requesting CARTIA and PROTONIX to be refilled. While the CARTIA was sent in on 11/01/19 (see chart), the [facility] pharmacy states they have yet to receive the request.    Please advise.

## 2019-11-06 ENCOUNTER — OFFICE VISIT (OUTPATIENT)
Dept: FAMILY MEDICINE CLINIC | Facility: CLINIC | Age: 74
End: 2019-11-06

## 2019-11-06 VITALS
DIASTOLIC BLOOD PRESSURE: 60 MMHG | TEMPERATURE: 98.6 F | RESPIRATION RATE: 18 BRPM | WEIGHT: 152 LBS | OXYGEN SATURATION: 97 % | HEIGHT: 63 IN | BODY MASS INDEX: 26.93 KG/M2 | HEART RATE: 64 BPM | SYSTOLIC BLOOD PRESSURE: 124 MMHG

## 2019-11-06 DIAGNOSIS — N18.9 CHRONIC KIDNEY DISEASE, UNSPECIFIED CKD STAGE: ICD-10-CM

## 2019-11-06 DIAGNOSIS — I10 ESSENTIAL HYPERTENSION: Primary | ICD-10-CM

## 2019-11-06 DIAGNOSIS — E78.2 MIXED HYPERLIPIDEMIA: ICD-10-CM

## 2019-11-06 PROCEDURE — 99213 OFFICE O/P EST LOW 20 MIN: CPT | Performed by: FAMILY MEDICINE

## 2019-11-06 NOTE — PROGRESS NOTES
Subjective   Carol Jean is a 74 y.o. female.   Has recently seen nephrology and had stable kidney disease.  Hypertension   This is a chronic problem. The current episode started more than 1 year ago. The problem is unchanged. The problem is controlled. Pertinent negatives include no anxiety, blurred vision, chest pain, malaise/fatigue, palpitations, peripheral edema or shortness of breath. There are no associated agents to hypertension. Risk factors for coronary artery disease include family history, dyslipidemia and post-menopausal state. Past treatments include calcium channel blockers, beta blockers and diuretics. Current antihypertension treatment includes calcium channel blockers, beta blockers and diuretics. The current treatment provides significant improvement. There are no compliance problems.         The following portions of the patient's history were reviewed and updated as appropriate: allergies, current medications, past family history, past medical history, past social history, past surgical history and problem list.  Vitals:    11/06/19 1145   BP: 124/60   Pulse: 64   Resp: 18   Temp: 98.6 °F (37 °C)   SpO2: 97%     Body mass index is 26.93 kg/m².  Review of Systems   Constitutional: Negative.  Negative for activity change, fatigue, fever, malaise/fatigue, unexpected weight gain and unexpected weight loss.   HENT: Negative.  Negative for congestion, sneezing and sore throat.    Eyes: Negative.  Negative for blurred vision, double vision and visual disturbance.   Respiratory: Negative.  Negative for cough, chest tightness, shortness of breath and wheezing.    Cardiovascular: Negative.  Negative for chest pain, palpitations and leg swelling.   Gastrointestinal: Negative.  Negative for abdominal distention, abdominal pain, blood in stool, constipation, diarrhea and nausea.   Endocrine: Negative.  Negative for cold intolerance and heat intolerance.   Genitourinary: Negative.  Negative for dysuria,  frequency, urgency and urinary incontinence.   Musculoskeletal: Negative.  Negative for arthralgias and myalgias.   Skin: Negative.  Negative for rash.   Allergic/Immunologic: Negative.    Neurological: Negative.  Negative for dizziness, syncope, numbness and memory problem.   Hematological: Negative.  Negative for adenopathy.   Psychiatric/Behavioral: Negative.  Negative for suicidal ideas and depressed mood. The patient is not nervous/anxious.    All other systems reviewed and are negative.      Objective   Physical Exam   Constitutional: She is oriented to person, place, and time. She appears well-developed and well-nourished. No distress.   HENT:   Right Ear: External ear normal.   Left Ear: External ear normal.   Nose: Nose normal.   Mouth/Throat: Oropharynx is clear and moist.   Eyes: Conjunctivae and EOM are normal. Pupils are equal, round, and reactive to light.   Neck: Normal range of motion. Neck supple. No thyromegaly present.   Cardiovascular: Normal rate, regular rhythm and normal heart sounds.   No murmur heard.  Pulmonary/Chest: Effort normal and breath sounds normal. No respiratory distress. She has no wheezes.   Abdominal: Soft. Bowel sounds are normal. She exhibits no distension and no mass. There is no tenderness. There is no rebound and no guarding. No hernia.   Musculoskeletal: Normal range of motion. She exhibits no edema or tenderness.   Lymphadenopathy:     She has no cervical adenopathy.   Neurological: She is alert and oriented to person, place, and time. She has normal reflexes.   Skin: Skin is warm and dry. No rash noted. She is not diaphoretic. No erythema. No pallor.   Psychiatric: She has a normal mood and affect. Her behavior is normal. Judgment and thought content normal.   Nursing note and vitals reviewed.          Assessment/Plan    was seen today for hypertension.    Diagnoses and all orders for this visit:    Essential hypertension    Mixed hyperlipidemia    Chronic kidney  disease, unspecified CKD stage      Continue current medications and follow-up with specialists.

## 2019-11-17 DIAGNOSIS — F41.9 ANXIETY: ICD-10-CM

## 2019-11-18 RX ORDER — CITALOPRAM 20 MG/1
TABLET ORAL
Qty: 30 TABLET | Refills: 0 | Status: SHIPPED | OUTPATIENT
Start: 2019-11-18 | End: 2020-01-17 | Stop reason: SDUPTHER

## 2019-12-05 ENCOUNTER — OFFICE VISIT (OUTPATIENT)
Dept: INTERNAL MEDICINE | Facility: CLINIC | Age: 74
End: 2019-12-05

## 2019-12-05 VITALS
RESPIRATION RATE: 16 BRPM | WEIGHT: 152 LBS | DIASTOLIC BLOOD PRESSURE: 68 MMHG | SYSTOLIC BLOOD PRESSURE: 124 MMHG | HEART RATE: 56 BPM | BODY MASS INDEX: 27.97 KG/M2 | HEIGHT: 62 IN | TEMPERATURE: 98.9 F

## 2019-12-05 DIAGNOSIS — E78.5 HYPERLIPIDEMIA, UNSPECIFIED HYPERLIPIDEMIA TYPE: ICD-10-CM

## 2019-12-05 DIAGNOSIS — R31.9 HEMATURIA, UNSPECIFIED TYPE: ICD-10-CM

## 2019-12-05 DIAGNOSIS — I10 ESSENTIAL HYPERTENSION: ICD-10-CM

## 2019-12-05 DIAGNOSIS — R82.998 LEUKOCYTES IN URINE: ICD-10-CM

## 2019-12-05 DIAGNOSIS — Z23 IMMUNIZATION DUE: ICD-10-CM

## 2019-12-05 DIAGNOSIS — K21.9 GASTROESOPHAGEAL REFLUX DISEASE WITHOUT ESOPHAGITIS: Primary | ICD-10-CM

## 2019-12-05 LAB
BILIRUB BLD-MCNC: NEGATIVE MG/DL
CLARITY, POC: CLEAR
COLOR UR: YELLOW
EXPIRATION DATE: ABNORMAL
GLUCOSE UR STRIP-MCNC: NEGATIVE MG/DL
KETONES UR QL: NEGATIVE
LEUKOCYTE EST, POC: ABNORMAL
Lab: ABNORMAL
NITRITE UR-MCNC: NEGATIVE MG/ML
PH UR: 5 [PH] (ref 5–8)
PROT UR STRIP-MCNC: NEGATIVE MG/DL
RBC # UR STRIP: ABNORMAL /UL
SP GR UR: 1.02 (ref 1–1.03)
UROBILINOGEN UR QL: NORMAL

## 2019-12-05 PROCEDURE — 80053 COMPREHEN METABOLIC PANEL: CPT | Performed by: INTERNAL MEDICINE

## 2019-12-05 PROCEDURE — 90670 PCV13 VACCINE IM: CPT | Performed by: INTERNAL MEDICINE

## 2019-12-05 PROCEDURE — 80061 LIPID PANEL: CPT | Performed by: INTERNAL MEDICINE

## 2019-12-05 PROCEDURE — 81015 MICROSCOPIC EXAM OF URINE: CPT | Performed by: INTERNAL MEDICINE

## 2019-12-05 PROCEDURE — G0009 ADMIN PNEUMOCOCCAL VACCINE: HCPCS | Performed by: INTERNAL MEDICINE

## 2019-12-05 PROCEDURE — 99214 OFFICE O/P EST MOD 30 MIN: CPT | Performed by: INTERNAL MEDICINE

## 2019-12-05 PROCEDURE — 85025 COMPLETE CBC W/AUTO DIFF WBC: CPT | Performed by: INTERNAL MEDICINE

## 2019-12-05 PROCEDURE — 36415 COLL VENOUS BLD VENIPUNCTURE: CPT | Performed by: INTERNAL MEDICINE

## 2019-12-05 PROCEDURE — 84443 ASSAY THYROID STIM HORMONE: CPT | Performed by: INTERNAL MEDICINE

## 2019-12-05 PROCEDURE — 81003 URINALYSIS AUTO W/O SCOPE: CPT | Performed by: INTERNAL MEDICINE

## 2019-12-05 PROCEDURE — 87086 URINE CULTURE/COLONY COUNT: CPT | Performed by: INTERNAL MEDICINE

## 2019-12-06 LAB
ALBUMIN SERPL-MCNC: 4.3 G/DL (ref 3.5–5.2)
ALBUMIN/GLOB SERPL: 1.6 G/DL
ALP SERPL-CCNC: 71 U/L (ref 39–117)
ALT SERPL W P-5'-P-CCNC: 11 U/L (ref 1–33)
ANION GAP SERPL CALCULATED.3IONS-SCNC: 13.3 MMOL/L (ref 5–15)
AST SERPL-CCNC: 17 U/L (ref 1–32)
BACTERIA SPEC AEROBE CULT: NO GROWTH
BACTERIA UR QL AUTO: ABNORMAL /HPF
BASOPHILS # BLD AUTO: 0.04 10*3/MM3 (ref 0–0.2)
BASOPHILS NFR BLD AUTO: 0.4 % (ref 0–1.5)
BILIRUB SERPL-MCNC: 1 MG/DL (ref 0.2–1.2)
BUN BLD-MCNC: 20 MG/DL (ref 8–23)
BUN/CREAT SERPL: 18.3 (ref 7–25)
CALCIUM SPEC-SCNC: 9.3 MG/DL (ref 8.6–10.5)
CHLORIDE SERPL-SCNC: 100 MMOL/L (ref 98–107)
CHOLEST SERPL-MCNC: 146 MG/DL (ref 0–200)
CO2 SERPL-SCNC: 28.7 MMOL/L (ref 22–29)
CREAT BLD-MCNC: 1.09 MG/DL (ref 0.57–1)
DEPRECATED RDW RBC AUTO: 39.6 FL (ref 37–54)
EOSINOPHIL # BLD AUTO: 0.14 10*3/MM3 (ref 0–0.4)
EOSINOPHIL NFR BLD AUTO: 1.5 % (ref 0.3–6.2)
ERYTHROCYTE [DISTWIDTH] IN BLOOD BY AUTOMATED COUNT: 12 % (ref 12.3–15.4)
GFR SERPL CREATININE-BSD FRML MDRD: 49 ML/MIN/1.73
GLOBULIN UR ELPH-MCNC: 2.7 GM/DL
GLUCOSE BLD-MCNC: 95 MG/DL (ref 65–99)
HCT VFR BLD AUTO: 41.2 % (ref 34–46.6)
HDLC SERPL-MCNC: 71 MG/DL (ref 40–60)
HGB BLD-MCNC: 14 G/DL (ref 12–15.9)
HYALINE CASTS UR QL AUTO: ABNORMAL /LPF
IMM GRANULOCYTES # BLD AUTO: 0.03 10*3/MM3 (ref 0–0.05)
IMM GRANULOCYTES NFR BLD AUTO: 0.3 % (ref 0–0.5)
LDLC SERPL CALC-MCNC: 58 MG/DL (ref 0–100)
LDLC/HDLC SERPL: 0.82 {RATIO}
LYMPHOCYTES # BLD AUTO: 2.32 10*3/MM3 (ref 0.7–3.1)
LYMPHOCYTES NFR BLD AUTO: 25 % (ref 19.6–45.3)
MCH RBC QN AUTO: 30.8 PG (ref 26.6–33)
MCHC RBC AUTO-ENTMCNC: 34 G/DL (ref 31.5–35.7)
MCV RBC AUTO: 90.7 FL (ref 79–97)
MONOCYTES # BLD AUTO: 0.84 10*3/MM3 (ref 0.1–0.9)
MONOCYTES NFR BLD AUTO: 9.1 % (ref 5–12)
NEUTROPHILS # BLD AUTO: 5.91 10*3/MM3 (ref 1.7–7)
NEUTROPHILS NFR BLD AUTO: 63.7 % (ref 42.7–76)
NRBC BLD AUTO-RTO: 0 /100 WBC (ref 0–0.2)
PLATELET # BLD AUTO: 306 10*3/MM3 (ref 140–450)
PMV BLD AUTO: 10.3 FL (ref 6–12)
POTASSIUM BLD-SCNC: 3.5 MMOL/L (ref 3.5–5.2)
PROT SERPL-MCNC: 7 G/DL (ref 6–8.5)
RBC # BLD AUTO: 4.54 10*6/MM3 (ref 3.77–5.28)
RBC # UR: ABNORMAL /HPF
REF LAB TEST METHOD: ABNORMAL
SODIUM BLD-SCNC: 142 MMOL/L (ref 136–145)
SQUAMOUS #/AREA URNS HPF: ABNORMAL /HPF
TRIGL SERPL-MCNC: 85 MG/DL (ref 0–150)
TSH SERPL DL<=0.05 MIU/L-ACNC: 2.3 UIU/ML (ref 0.27–4.2)
VLDLC SERPL-MCNC: 17 MG/DL (ref 5–40)
WBC NRBC COR # BLD: 9.28 10*3/MM3 (ref 3.4–10.8)
WBC UR QL AUTO: ABNORMAL /HPF

## 2019-12-08 DIAGNOSIS — R31.9 HEMATURIA, UNSPECIFIED TYPE: Primary | ICD-10-CM

## 2019-12-11 ENCOUNTER — TELEPHONE (OUTPATIENT)
Dept: INTERNAL MEDICINE | Facility: CLINIC | Age: 74
End: 2019-12-11

## 2019-12-11 NOTE — TELEPHONE ENCOUNTER
Pt called regarding test results on her kidneys and wants to know if Dr Chaudhary is aware of the situation. Please advise

## 2019-12-11 NOTE — TELEPHONE ENCOUNTER
See result note for details.  S/W pt.      S/W pt, expl that Dr Cramer states she saw nephrology and had U/S done and that he does want her to have CT Scan and see urology.  Verb understanding and agreement.  States she would like to proceed then.  Expl referral coord. will contact her with appt details.  Verb apprec.

## 2019-12-17 NOTE — PROGRESS NOTES
Chief Complaint   Patient presents with   • New patient, Establish care       History of Present Illness    The patient presents for a follow-up related to GERD. The patient is on pantoprazole for her gastroesophageal reflux. The medication is taken on a regular basis and gives complete relief of the symptoms. She reports no abdominal pain, belching, chest pain, diarrhea, dysphagia, early satiety, heartburn, hoarseness, nausea, odynophagia, rectal bleeding, vomiting or weight loss. The GERD has no known aggravating factors.    The patient presents for a follow-up related to hyperlipidemia. She is following a low fat diet. She reports that she is exercising. She is taking simvastatin. The patient is taking her medication as prescribed. She reports no medication side effects, including muscle cramps, abdominal pain, headaches or weakness. She denies shortness of breath, orthopnea, paroxysmal nocturnal dyspnea, dyspnea on exertion, edema, palpitations or syncope.    The patient presents for a follow-up related to hypertension. The patient reports that she has had no headaches or blurred vision. She states that she is taking her medication as prescribed. She is not having medication side effects.    Review of Systems    CONSTITUTIONAL- Denies Fever, Chills, Sweats, Fatigue, Weakness or Malaise.    NECK- Denies Decreased Range of Motion, Stiffness, Thyroid Nodules, Enlarged Lymph Nodes or Goiter.    EYES- Denies Eye Pain, Eye Drainage, Eye Redness, Eye Discharge, Decreased Vision, Visual Disturbance, Diplopia, Visual Loss or Swollen Eyelid.    HENT- Denies Nasal Discharge, Sore Throat, Ear Pain, Ear Drainage, Sinus Pain, Nasal Congestion, Decreased Hearing or Tinnitus.    PULMONARY- Denies Wheezing, Sputum Production, Cough, Hemoptysis or Pleuritic Chest Pain.    GASTROINTESTINAL- Denies Constipation.    CARDIOVASCULAR- Denies Claudication or Irregular Heart Beat.    GENITOURINARY- Denies Dysuria, Hematuria, Urinary  Frequency, Urinary Leakage, Pelvic Pain, Vaginal Prolapse, Vaginal Discharge, Dyspareunia or Abnormal Menses.    ENDOCRINE- Denies Cold Intolerance, Depression, Hair Loss, Heat Intolerance, Memory Loss, Polydypsia, Polyphagia, Polyuria, Sleep Disturbance or Weight Gain.    NEUROLOGIC- Denies Seizures, Confusion or Excessive Daytime Sleepiness.    MUSCULOSKELETAL- Denies Joint Pain, Joint Stiffness, Decreased Range of Motion, Joint Swelling or Erythema of Joints.    INTEGUMENTARY- Denies Rash, Lumps, Sores, Itching, Dryness, Color Change, Changes in Hair, Brittle Nails, Discolored Nails, Thickened Nails, Growths or Alopecia.    Medications      Current Outpatient Medications:   •  chlorthalidone (HYGROTON) 25 MG tablet, Take 0.5 tablets by mouth Daily., Disp: 45 tablet, Rfl: 3  •  Cholecalciferol (VITAMIN D-3) 5000 units tablet, Take  by mouth Daily., Disp: , Rfl:   •  citalopram (CeleXA) 20 MG tablet, TAKE 1/2 (ONE-HALF) TABLET BY MOUTH ONCE DAILY FOR 21 DAYS AND THEN 1 ONCE DAILY, Disp: 30 tablet, Rfl: 0  •  dilTIAZem CD (CARTIA XT) 240 MG 24 hr capsule, Take 1 capsule by mouth Daily., Disp: 90 capsule, Rfl: 1  •  Glucosamine HCl (GLUCOSAMINE PO), Take 2 tablets by mouth Daily. As directed   , Disp: , Rfl:   •  metoprolol tartrate (LOPRESSOR) 25 MG tablet, Take 1 tablet by mouth 2 (Two) Times a Day., Disp: 180 tablet, Rfl: 3  •  pantoprazole (PROTONIX) 40 MG EC tablet, Take 1 tablet by mouth Daily., Disp: 90 tablet, Rfl: 1  •  potassium chloride (K-DUR) 10 MEQ CR tablet, Take 1 tablet by mouth Daily., Disp: 30 tablet, Rfl: 3  •  rivaroxaban (XARELTO) 20 MG tablet, Take 1 tablet by mouth Daily., Disp: 90 tablet, Rfl: 1  •  simvastatin (ZOCOR) 40 MG tablet, Take 1 tablet by mouth Every Night., Disp: 90 tablet, Rfl: 3  •  vitamin C (ASCORBIC ACID) 500 MG tablet, Take 1,000 mg by mouth Daily., Disp: , Rfl:      Allergies    Allergies   Allergen Reactions   • Codeine Sulfate Swelling   • Erythromycin Swelling      "\"tongue swelling\".   • Meperidine Swelling   • Morphine And Related Swelling   • Penicillins Swelling     \"swelling\" at site.   • Sulfa Antibiotics Swelling   • Sulfadiazine Swelling       Problem List    Patient Active Problem List   Diagnosis   • History of pulmonary embolism   • Hypertension   • Acute pulmonary embolism (CMS/HCC)   • DVT (deep venous thrombosis) (CMS/HCC)   • Circadian rhythm sleep disorder, delayed sleep phase type   • Psychophysiological insomnia   • Hyperlipidemia   • Calculus of kidney   • Cobalamin deficiency       Medications, Allergies, Problems List and Past History were reviewed and updated.    Physical Examination    /68 (BP Location: Right arm, Patient Position: Sitting, Cuff Size: Adult)   Pulse 56   Temp 98.9 °F (37.2 °C) (Temporal)   Resp 16   Ht 156.8 cm (61.75\")   Wt 68.9 kg (152 lb)   LMP  (LMP Unknown)   Breastfeeding No   BMI 28.03 kg/m²     HEENT: Head- Normocephalic Atraumatic. Facies- Within normal limits. Pinnas- Normal texture and shape bilaterally. Canals- Normal bilaterally. TMs- Normal bilaterally. Nares- Patent bilaterally. Nasal Septum- is normal. There is no tenderness to palpation over the frontal or maxillary sinuses. Lids- Normal bilaterally. Conjunctiva- Clear bilaterally. Sclera- Anicteric bilaterally. Oropharynx- Moist with no lesions. Tonsils- No enlargement, erythema or exudate.    Neck: Thyroid- non enlarged, symmetric and has no nodules. No bruits are detected. ROM- Normal Range of Motion with no rigidity.    Lungs: Auscultation- Clear to auscultation bilaterally. There are no retractions, clubbing or cyanosis. The Expiratory to Inspiratory ratio is equal.    Cardiovascular: There are no carotid bruits. Heart- Normal Rate with Regular rhythm and no murmurs. There are no gallops. There are no rubs. In the lower extremities there is no edema. The upper extremities do not have edema.    Abdomen: Soft, benign, non-tender with no masses, hernias, " organomegaly or scars.    Impression and Assessment    Encounter for Immunization Administration.    Gastroesophageal Reflux Disease.    Hyperlipidemia.    Essential Hypertension.    Plan    Gastroesophageal Reflux Disease Plan: The current plan was continued.    Hyperlipidemia Plan: The patient was instructed to exercise daily, eat a low fat diet and continue her medications.    Essential Hypertension Plan: The current plan was continued.    Counseled regarding immunizations and applicable VIS given.    Immunizations Ordered and Administered: Prevnar 13.     was seen today for new patient, establish care.    Diagnoses and all orders for this visit:    Gastroesophageal reflux disease without esophagitis  -     Comprehensive Metabolic Panel    Hyperlipidemia, unspecified hyperlipidemia type  -     Comprehensive Metabolic Panel  -     Lipid Panel    Essential hypertension  -     Comprehensive Metabolic Panel  -     CBC & Differential  -     TSH  -     POC Urinalysis Dipstick, Automated  -     CBC Auto Differential    Immunization due  -     Pneumococcal Conjugate Vaccine 13-Valent All    Leukocytes in urine  -     Urine Culture - Urine, Urine, Clean Catch    Hematuria, unspecified type  -     Urinalysis, Microscopic Only - Urine, Clean Catch          Return to Office    The patient was instructed to return for follow-up in 3 months.    The patient was instructed to return sooner if the condition changes, worsens, or does not resolve.

## 2019-12-19 ENCOUNTER — HOSPITAL ENCOUNTER (OUTPATIENT)
Dept: CT IMAGING | Facility: HOSPITAL | Age: 74
Discharge: HOME | End: 2019-12-19
Admitting: INTERNAL MEDICINE

## 2019-12-19 ENCOUNTER — OFFICE VISIT (OUTPATIENT)
Dept: CARDIOLOGY | Facility: CLINIC | Age: 74
End: 2019-12-19

## 2019-12-19 VITALS
WEIGHT: 149 LBS | DIASTOLIC BLOOD PRESSURE: 55 MMHG | SYSTOLIC BLOOD PRESSURE: 115 MMHG | HEIGHT: 63 IN | BODY MASS INDEX: 26.4 KG/M2 | HEART RATE: 64 BPM

## 2019-12-19 DIAGNOSIS — I82.4Y9 DEEP VEIN THROMBOSIS (DVT) OF PROXIMAL LOWER EXTREMITY, UNSPECIFIED CHRONICITY, UNSPECIFIED LATERALITY (HCC): ICD-10-CM

## 2019-12-19 DIAGNOSIS — E78.2 MIXED HYPERLIPIDEMIA: ICD-10-CM

## 2019-12-19 DIAGNOSIS — I10 ESSENTIAL HYPERTENSION: ICD-10-CM

## 2019-12-19 DIAGNOSIS — Z86.711 HISTORY OF PULMONARY EMBOLISM: Primary | ICD-10-CM

## 2019-12-19 DIAGNOSIS — R31.9 HEMATURIA, UNSPECIFIED TYPE: ICD-10-CM

## 2019-12-19 PROBLEM — Z86.718 HISTORY OF DVT (DEEP VEIN THROMBOSIS): Status: ACTIVE | Noted: 2019-12-19

## 2019-12-19 PROCEDURE — 25010000002 IOPAMIDOL 61 % SOLUTION: Performed by: INTERNAL MEDICINE

## 2019-12-19 PROCEDURE — 99213 OFFICE O/P EST LOW 20 MIN: CPT | Performed by: INTERNAL MEDICINE

## 2019-12-19 PROCEDURE — 74177 CT ABD & PELVIS W/CONTRAST: CPT

## 2019-12-19 RX ADMIN — IOPAMIDOL 85 ML: 612 INJECTION, SOLUTION INTRAVENOUS at 12:01

## 2019-12-19 NOTE — PROGRESS NOTES
"  OFFICE FOLLOW UP     Date of Encounter:2019     Name: Carol Jean  : 1945  Address: [facility] [address] [facility] [address]    PCP: Flakito Chaudhary MD  [address]  [facility] [address]    Carol Jean is a 74 y.o. female.    Chief Complaint: Follow up of HTN, history of PE    Problem List:   1. Acute pulmonary embolism, bilateral, 2012.  a. Initiation of CPR per family.  b. EMS transport to [facility] Emergency Room.  c. Restoration of rhythm and blood pressure at [facility] Emergency Room.  d. “Shock” manifest by hypotension and multiorgan failure:  Transient hepatic dysfunction, resolved.  Transient nonoliguric ATN, resolved.  Transient metabolic acidosis, resolved.  Pressor support.  e. Bilateral EKOS catheter placement/treatment:  Vena cava filter placed.  f. “Normal coronary arteries” by angiography, 2012.  g. Recurrent DVT of RLE, 2012:  Hematology workup with subsequent Xarelto dosing (followed by Dr. Ashby)  2. Hypertension.  3. Upper gastrointestinal bleeding/gastritis related to lytic therapy and heparins, resolved:  a. “Hemorrhagic gastritis.”  4. Right “groin” bleeding, 10/12/2012.  a. Probably “venous” related to heparins.  b. Nonsurgical treatment, resolved.  5. History of nephrolithiasis, inactive.  6. Methicillin-resistant Staphylococcus aureus - tracheobronchitis, resolved.  7. Renal insufficiency, followed by NAL   8. Status post operations, remote.  a. Nephrolithiasis with lithotripsy.  b. Bilateral breast reduction.  c.  section x2.  Allergies:  Allergies   Allergen Reactions   • Erythromycin Swelling     \"tongue swelling\".   • Codeine Sulfate Swelling   • Meperidine Swelling   • Morphine And Related Swelling   • Penicillins Swelling     \"swelling\" at site.   • Sulfa Antibiotics Swelling   • Sulfadiazine Swelling     Current Medications:  •  chlorthalidone (HYGROTON) 25 MG tablet, Take 0.5 " "tablets by mouth Daily.  •  Cholecalciferol (VITAMIN D-3) 5000 units tablet, Take  by mouth Daily  •  citalopram (CeleXA) 20 MG tablet, TAKE 1/2 (ONE-HALF) TABLET BY MOUTH ONCE DAILY FOR 21 DAYS AND THEN 1 ONCE DAILY  •  dilTIAZem CD (CARTIA XT) 240 MG 24 hr capsule, Take 1 capsule by mouth Daily.  •  Glucosamine HCl (GLUCOSAMINE PO), Take 2 tablets by mouth Daily. As directed    •  metoprolol tartrate (LOPRESSOR) 25 MG tablet, Take 1 tablet by mouth 2 (Two) Times a Day.   •  pantoprazole (PROTONIX) 40 MG EC tablet, Take 1 tablet by mouth Daily.  •  potassium chloride (K-DUR) 10 MEQ CR tablet, Take 1 tablet by mouth Daily  •  rivaroxaban (XARELTO) 20 MG tablet, Take 1 tablet by mouth Daily.  •  simvastatin (ZOCOR) 40 MG tablet, Take 1 tablet by mouth Every Night.  •  vitamin C (ASCORBIC ACID) 500 MG tablet, Take 1,000 mg by mouth Daily.    History of Present Illness:        Carol Jean returns for annual follow up today. She reports recent renal insufficiency work-up, and has seen NAL. She had a renal US, and had a CT with contrast today. She denies any symptoms with the exception of low back pain that is intermittent and mild. She denies chest pain, dyspnea, palpitations. She remains on Xarelto 20mg.       The following portions of the patient's history were reviewed and updated as appropriate: allergies, current medications and problem list.    ROS: Pertinent positives as listed in the HPI.  All other systems reviewed and negative.    Objective:  Vitals:    12/19/19 1347 12/19/19 1348   BP: 119/61 115/55   BP Location: Left arm Left arm   Patient Position: Sitting Standing   Pulse: 57 64   Weight: 67.6 kg (149 lb)    Height: 160 cm (63\")      Physical Exam:  GENERAL: Alert, cooperative, in no acute distress.   HEENT: Normocephalic, no adenopathy, no jugular venous distention  HEART: No discrete PMI is noted. Regular rhythm, normal rate, and no murmurs, gallops, or rubs.   LUNGS: Clear to auscultation " bilaterally. No wheezing, rales or ronchi.  ABDOMEN: Soft, bowel sounds present, non-tender   NEUROLOGIC: No focal abnormalities involving strength or sensation are noted.   EXTREMITIES: No clubbing, cyanosis, or edema noted.     Diagnostic Data:    Lab Results   Component Value Date    TSH 2.300 12/05/2019      Lab Results   Component Value Date    GLUCOSE 95 12/05/2019    BUN 20 12/05/2019    CREATININE 1.09 (H) 12/05/2019    EGFRIFNONA 49 (L) 12/05/2019    BCR 18.3 12/05/2019    K 3.5 12/05/2019    CO2 28.7 12/05/2019    CALCIUM 9.3 12/05/2019    ALBUMIN 4.30 12/05/2019    AST 17 12/05/2019    ALT 11 12/05/2019      Lab Results   Component Value Date    CHOL 146 12/05/2019    TRIG 85 12/05/2019    HDL 71 (H) 12/05/2019    LDL 58 12/05/2019     Lab Results   Component Value Date    WBC 9.28 12/05/2019    HGB 14.0 12/05/2019    HCT 41.2 12/05/2019    MCV 90.7 12/05/2019     12/05/2019     Procedures    Assessment and Plan:   1.  History of pulmonary embolus: Continue Xarelto 20mg. She reports recent work up for renal insufficiency with NAL. Her last GFR was 49, and technically her dose should be renally adjusted for CrCl <50, however due to her history of recurrent DVT as well as being just 1 point away from the cut-off, we will have her follow up with Dr. Ashby as previously scheduled in January and this can be further addressed at that time.    2.  HTN: Well controlled on current medications.   3.  HLD:  LDL 58, continue simvastatin.     I will see Carol WISE Satterly back in one year or sooner on an as needed basis.    Scribed for Flakito Dill MD by Elba Padilla PA-C. 12/19/2019 2:24 PM.

## 2019-12-24 RX ORDER — POTASSIUM CHLORIDE 750 MG/1
TABLET, FILM COATED, EXTENDED RELEASE ORAL
Qty: 90 TABLET | Refills: 0 | Status: SHIPPED | OUTPATIENT
Start: 2019-12-24 | End: 2020-03-16 | Stop reason: SDUPTHER

## 2019-12-29 DIAGNOSIS — I10 ESSENTIAL HYPERTENSION: ICD-10-CM

## 2019-12-29 DIAGNOSIS — E78.2 MIXED HYPERLIPIDEMIA: ICD-10-CM

## 2019-12-30 RX ORDER — SIMVASTATIN 40 MG
TABLET ORAL
Qty: 90 TABLET | Refills: 3 | Status: SHIPPED | OUTPATIENT
Start: 2019-12-30 | End: 2020-12-28

## 2020-01-17 ENCOUNTER — TELEPHONE (OUTPATIENT)
Dept: INTERNAL MEDICINE | Facility: CLINIC | Age: 75
End: 2020-01-17

## 2020-01-17 DIAGNOSIS — F41.9 ANXIETY: ICD-10-CM

## 2020-01-17 DIAGNOSIS — I10 ESSENTIAL HYPERTENSION: ICD-10-CM

## 2020-01-17 RX ORDER — CHLORTHALIDONE 25 MG/1
TABLET ORAL
Qty: 45 TABLET | Refills: 3 | Status: SHIPPED | OUTPATIENT
Start: 2020-01-17 | End: 2021-01-05 | Stop reason: SDUPTHER

## 2020-01-17 RX ORDER — CITALOPRAM 20 MG/1
TABLET ORAL
Qty: 30 TABLET | Refills: 0 | Status: SHIPPED | OUTPATIENT
Start: 2020-01-17 | End: 2020-03-12

## 2020-01-17 NOTE — TELEPHONE ENCOUNTER
Pt called to get a refill of the selected Rx. Pt says that she has about a week worth of med left.    [facility] [address]

## 2020-01-21 ENCOUNTER — OFFICE VISIT (OUTPATIENT)
Dept: ONCOLOGY | Facility: CLINIC | Age: 75
End: 2020-01-21

## 2020-01-21 VITALS
WEIGHT: 154 LBS | HEART RATE: 57 BPM | TEMPERATURE: 97.9 F | HEIGHT: 63 IN | DIASTOLIC BLOOD PRESSURE: 59 MMHG | RESPIRATION RATE: 20 BRPM | BODY MASS INDEX: 27.29 KG/M2 | SYSTOLIC BLOOD PRESSURE: 112 MMHG | OXYGEN SATURATION: 95 %

## 2020-01-21 DIAGNOSIS — Z86.711 HISTORY OF PULMONARY EMBOLISM: Primary | ICD-10-CM

## 2020-01-21 PROCEDURE — 99214 OFFICE O/P EST MOD 30 MIN: CPT | Performed by: INTERNAL MEDICINE

## 2020-01-21 NOTE — PROGRESS NOTES
DATE OF VISIT: 1/21/2020    REASON FOR VISIT:   1. DVT while on therapeutic Coumadin 11/23/2012.   2. Pulmonary embolism 09/30/2012.     HISTORY OF PRESENT ILLNESS: The patient is a very pleasant 74 y.o.   female with past medical history significant for massive pulmonary  embolism September 2012 requiring cardiac resuscitation. The patient was placed  on chronic anticoagulation with Coumadin since, however, she presented with  acute DVT of the right lower extremity while on Coumadin 11/23/2012. The  patient was switched to Lovenox 100 micrograms subcu daily since. The patient  was switched from Lovenox to Xarelto 20 mg p.o. daily on 03/15/2013 secondary  to this would be more convenient to the patient in avoiding the daily  injections. The patient is here today in scheduled follow-up visit.     SUBJECTIVE: The patient is here today by herself.  She has been having microscopic hematuria.  She is followed by nephrology as well as urology.  She has been compliant with her Xarelto daily.    REVIEW OF SYSTEMS: The other 9 systems reviewed by me and negative except as  mentioned in HPI.     PAST MEDICAL HISTORY/SOCIAL HISTORY/FAMILY HISTORY: Unchanged from my prior  documentation done on 11/24/2012.       Current Outpatient Medications:   •  chlorthalidone (HYGROTON) 25 MG tablet, TAKE 1/2 (ONE-HALF) TABLET BY MOUTH ONCE DAILY, Disp: 45 tablet, Rfl: 3  •  Cholecalciferol (VITAMIN D-3) 5000 units tablet, Take  by mouth Daily., Disp: , Rfl:   •  citalopram (CeleXA) 20 MG tablet, Take one daily, Disp: 30 tablet, Rfl: 0  •  dilTIAZem CD (CARTIA XT) 240 MG 24 hr capsule, Take 1 capsule by mouth Daily., Disp: 90 capsule, Rfl: 1  •  Glucosamine HCl (GLUCOSAMINE PO), Take 2 tablets by mouth Daily. As directed   , Disp: , Rfl:   •  metoprolol tartrate (LOPRESSOR) 25 MG tablet, TAKE 1 TABLET BY MOUTH TWICE DAILY, Disp: 180 tablet, Rfl: 3  •  pantoprazole (PROTONIX) 40 MG EC tablet, Take 1 tablet by mouth Daily., Disp: 90  "tablet, Rfl: 1  •  potassium chloride (K-DUR) 10 MEQ CR tablet, TAKE 1 TABLET BY MOUTH ONCE DAILY, Disp: 90 tablet, Rfl: 0  •  rivaroxaban (XARELTO) 10 MG tablet, Take 1 tablet by mouth Daily., Disp: 90 tablet, Rfl: 2  •  rivaroxaban (XARELTO) 20 MG tablet, Take 1 tablet by mouth Daily., Disp: 30 tablet, Rfl: 11  •  simvastatin (ZOCOR) 40 MG tablet, TAKE 1 TABLET BY MOUTH AT NIGHT, Disp: 90 tablet, Rfl: 3  •  vitamin C (ASCORBIC ACID) 500 MG tablet, Take 1,000 mg by mouth Daily., Disp: , Rfl:     PHYSICAL EXAMINATION:   /59   Pulse 57   Temp 97.9 °F (36.6 °C) (Oral)   Resp 20   Ht 160 cm (62.99\")   Wt 69.9 kg (154 lb)   LMP  (LMP Unknown)   SpO2 95%   BMI 27.29 kg/m²     ECOG1  GENERAL: Age appropriate. No acute distress.   HEENT: Head atraumatic, normocephalic.   NECK: Supple. No JVD. No lymphadenopathy.   LUNGS: Clear to auscultation bilaterally. No wheezing. No rhonchi.   HEART: Regular rate and rhythm. S1, S2, no murmurs.   ABDOMEN: Soft, nontender, nondistended. Bowel sounds positive. No  hepatosplenomegaly.   EXTREMITIES: No clubbing, cyanosis, or edema.   SKIN: No rashes. No purpura.   NEUROLOGIC: Awake and oriented x3. Strength 5 out of 5 in all muscle groups.     No visits with results within 2 Week(s) from this visit.   Latest known visit with results is:   Office Visit on 12/05/2019   Component Date Value Ref Range Status   • Glucose 12/05/2019 95  65 - 99 mg/dL Final   • BUN 12/05/2019 20  8 - 23 mg/dL Final   • Creatinine 12/05/2019 1.09* 0.57 - 1.00 mg/dL Final   • Sodium 12/05/2019 142  136 - 145 mmol/L Final   • Potassium 12/05/2019 3.5  3.5 - 5.2 mmol/L Final   • Chloride 12/05/2019 100  98 - 107 mmol/L Final   • CO2 12/05/2019 28.7  22.0 - 29.0 mmol/L Final   • Calcium 12/05/2019 9.3  8.6 - 10.5 mg/dL Final   • Total Protein 12/05/2019 7.0  6.0 - 8.5 g/dL Final   • Albumin 12/05/2019 4.30  3.50 - 5.20 g/dL Final   • ALT (SGPT) 12/05/2019 11  1 - 33 U/L Final   • AST (SGOT) 12/05/2019 " 17  1 - 32 U/L Final   • Alkaline Phosphatase 12/05/2019 71  39 - 117 U/L Final   • Total Bilirubin 12/05/2019 1.0  0.2 - 1.2 mg/dL Final   • eGFR Non African Amer 12/05/2019 49* >60 mL/min/1.73 Final   • Globulin 12/05/2019 2.7  gm/dL Final   • A/G Ratio 12/05/2019 1.6  g/dL Final   • BUN/Creatinine Ratio 12/05/2019 18.3  7.0 - 25.0 Final   • Anion Gap 12/05/2019 13.3  5.0 - 15.0 mmol/L Final   • Total Cholesterol 12/05/2019 146  0 - 200 mg/dL Final   • Triglycerides 12/05/2019 85  0 - 150 mg/dL Final   • HDL Cholesterol 12/05/2019 71* 40 - 60 mg/dL Final   • LDL Cholesterol  12/05/2019 58  0 - 100 mg/dL Final   • VLDL Cholesterol 12/05/2019 17  5 - 40 mg/dL Final   • LDL/HDL Ratio 12/05/2019 0.82   Final   • TSH 12/05/2019 2.300  0.270 - 4.200 uIU/mL Final   • Color 12/05/2019 Yellow  Yellow, Straw, Dark Yellow, Rosa Elena Final   • Clarity, UA 12/05/2019 Clear  Clear Final   • Specific Gravity  12/05/2019 1.020  1.005 - 1.030 Final   • pH, Urine 12/05/2019 5.0  5.0 - 8.0 Final   • Leukocytes 12/05/2019 500 Herb/ul* Negative Final   • Nitrite, UA 12/05/2019 Negative  Negative Final   • Protein, POC 12/05/2019 Negative  Negative mg/dL Final   • Glucose, UA 12/05/2019 Negative  Negative, 1000 mg/dL (3+) mg/dL Final   • Ketones, UA 12/05/2019 Negative  Negative Final   • Urobilinogen, UA 12/05/2019 Normal  Normal Final   • Bilirubin 12/05/2019 Negative  Negative Final   • Blood, UA 12/05/2019 50 Paul/ul* Negative Final   • Lot Number 12/05/2019 [id number]   Final   • Expiration Date 12/05/2019 7-31-20   Final   • WBC 12/05/2019 9.28  3.40 - 10.80 10*3/mm3 Final   • RBC 12/05/2019 4.54  3.77 - 5.28 10*6/mm3 Final   • Hemoglobin 12/05/2019 14.0  12.0 - 15.9 g/dL Final   • Hematocrit 12/05/2019 41.2  34.0 - 46.6 % Final   • MCV 12/05/2019 90.7  79.0 - 97.0 fL Final   • MCH 12/05/2019 30.8  26.6 - 33.0 pg Final   • MCHC 12/05/2019 34.0  31.5 - 35.7 g/dL Final   • RDW 12/05/2019 12.0* 12.3 - 15.4 % Final   • RDW-SD 12/05/2019  39.6  37.0 - 54.0 fl Final   • MPV 12/05/2019 10.3  6.0 - 12.0 fL Final   • Platelets 12/05/2019 306  140 - 450 10*3/mm3 Final   • Neutrophil % 12/05/2019 63.7  42.7 - 76.0 % Final   • Lymphocyte % 12/05/2019 25.0  19.6 - 45.3 % Final   • Monocyte % 12/05/2019 9.1  5.0 - 12.0 % Final   • Eosinophil % 12/05/2019 1.5  0.3 - 6.2 % Final   • Basophil % 12/05/2019 0.4  0.0 - 1.5 % Final   • Immature Grans % 12/05/2019 0.3  0.0 - 0.5 % Final   • Neutrophils, Absolute 12/05/2019 5.91  1.70 - 7.00 10*3/mm3 Final   • Lymphocytes, Absolute 12/05/2019 2.32  0.70 - 3.10 10*3/mm3 Final   • Monocytes, Absolute 12/05/2019 0.84  0.10 - 0.90 10*3/mm3 Final   • Eosinophils, Absolute 12/05/2019 0.14  0.00 - 0.40 10*3/mm3 Final   • Basophils, Absolute 12/05/2019 0.04  0.00 - 0.20 10*3/mm3 Final   • Immature Grans, Absolute 12/05/2019 0.03  0.00 - 0.05 10*3/mm3 Final   • nRBC 12/05/2019 0.0  0.0 - 0.2 /100 WBC Final   • RBC, UA 12/05/2019 3-5* None Seen, 0-2 /HPF Final   • WBC, UA 12/05/2019 13-20* None Seen, 0-2 /HPF Final   • Bacteria, UA 12/05/2019 None Seen  None Seen /HPF Final   • Squamous Epithelial Cells, UA 12/05/2019 0-2  None Seen, 0-2 /HPF Final   • Hyaline Casts, UA 12/05/2019 3-6  None Seen /LPF Final   • Methodology 12/05/2019 Automated Microscopy   Final   • Urine Culture 12/05/2019 No growth   Final        ASSESSMENT: The patient is a pleasant 74 y.o. female with DVT/PE.    PROBLEM LIST:  1. Acute right lower extremity DVT while on therapeutic Coumadin 11/23/2012.   2. Pulmonary embolism with cardiac arrest 09/30/2012.   3.  Hypertension  4.  Hypercholesterolemia  5.  Heartburn  6.  Arthritis    PLAN:   1. We will continue Xarelto however I will go down on the dose to 10 mg daily.  Maintenance trial revealed 10 mg was as effective as 20 with less risk of bleeding.  She will stay on this indefinitely secondary to massive PEs that were unprovoked.  I will refill it today.  2. I reviewed the blood work results  from December 5, 2019. I told her that her CBC is normal. We will follow up on the CMP results and notify her of any significant findings.   3. We will see the patient back in 12 months with repeat labs including CBC and CMP.   4. She will continue Lopressor for hypertension, as well as follow up with Dr. Dill yearly for cardiology care.   5.  We'll continue Zocor 20 g daily for hypercholesterolemia  6. We'll continue Protonix 40 mg daily for heartburn    Amanda Ashby MD  1/21/2020

## 2020-03-12 DIAGNOSIS — F41.9 ANXIETY: ICD-10-CM

## 2020-03-12 RX ORDER — CITALOPRAM 20 MG/1
TABLET ORAL
Qty: 30 TABLET | Refills: 5 | Status: SHIPPED | OUTPATIENT
Start: 2020-03-12 | End: 2021-03-14

## 2020-03-16 RX ORDER — POTASSIUM CHLORIDE 750 MG/1
10 TABLET, FILM COATED, EXTENDED RELEASE ORAL DAILY
Qty: 90 TABLET | Refills: 1 | Status: SHIPPED | OUTPATIENT
Start: 2020-03-16 | End: 2020-09-15

## 2020-03-16 NOTE — TELEPHONE ENCOUNTER
Patient requested a refill of potassium chloride (K-DUR) 10 MEQ CR tablet    [facility] in [place] confirmed    Please call and advise. Patient call back [phone]

## 2020-05-04 DIAGNOSIS — K21.9 GASTROESOPHAGEAL REFLUX DISEASE, ESOPHAGITIS PRESENCE NOT SPECIFIED: ICD-10-CM

## 2020-05-04 DIAGNOSIS — I10 ESSENTIAL HYPERTENSION: ICD-10-CM

## 2020-05-04 RX ORDER — DILTIAZEM HYDROCHLORIDE 240 MG/1
240 CAPSULE, COATED, EXTENDED RELEASE ORAL DAILY
Qty: 30 CAPSULE | Refills: 0 | Status: SHIPPED | OUTPATIENT
Start: 2020-05-04 | End: 2020-05-26

## 2020-05-04 RX ORDER — PANTOPRAZOLE SODIUM 40 MG/1
40 TABLET, DELAYED RELEASE ORAL DAILY
Qty: 30 TABLET | Refills: 0 | Status: SHIPPED | OUTPATIENT
Start: 2020-05-04 | End: 2020-06-02

## 2020-05-26 DIAGNOSIS — I10 ESSENTIAL HYPERTENSION: ICD-10-CM

## 2020-05-26 RX ORDER — DILTIAZEM HYDROCHLORIDE 240 MG/1
CAPSULE, EXTENDED RELEASE ORAL
Qty: 30 CAPSULE | Refills: 0 | Status: SHIPPED | OUTPATIENT
Start: 2020-05-26 | End: 2020-06-25

## 2020-06-01 DIAGNOSIS — K21.9 GASTROESOPHAGEAL REFLUX DISEASE, ESOPHAGITIS PRESENCE NOT SPECIFIED: ICD-10-CM

## 2020-06-02 RX ORDER — PANTOPRAZOLE SODIUM 40 MG/1
TABLET, DELAYED RELEASE ORAL
Qty: 30 TABLET | Refills: 0 | Status: SHIPPED | OUTPATIENT
Start: 2020-06-02 | End: 2020-06-25

## 2020-06-04 ENCOUNTER — OFFICE VISIT (OUTPATIENT)
Dept: INTERNAL MEDICINE | Facility: CLINIC | Age: 75
End: 2020-06-04

## 2020-06-04 VITALS
BODY MASS INDEX: 27.82 KG/M2 | DIASTOLIC BLOOD PRESSURE: 68 MMHG | HEART RATE: 68 BPM | TEMPERATURE: 97.3 F | WEIGHT: 157 LBS | RESPIRATION RATE: 16 BRPM | SYSTOLIC BLOOD PRESSURE: 118 MMHG

## 2020-06-04 DIAGNOSIS — I10 ESSENTIAL HYPERTENSION: Primary | ICD-10-CM

## 2020-06-04 DIAGNOSIS — R82.998 LEUKOCYTES IN URINE: ICD-10-CM

## 2020-06-04 DIAGNOSIS — E78.5 HYPERLIPIDEMIA, UNSPECIFIED HYPERLIPIDEMIA TYPE: ICD-10-CM

## 2020-06-04 DIAGNOSIS — R31.9 HEMATURIA, UNSPECIFIED TYPE: ICD-10-CM

## 2020-06-04 DIAGNOSIS — K21.9 GASTROESOPHAGEAL REFLUX DISEASE WITHOUT ESOPHAGITIS: ICD-10-CM

## 2020-06-04 LAB
BILIRUB BLD-MCNC: NEGATIVE MG/DL
CLARITY, POC: ABNORMAL
COLOR UR: YELLOW
EXPIRATION DATE: ABNORMAL
GLUCOSE UR STRIP-MCNC: NEGATIVE MG/DL
KETONES UR QL: NEGATIVE
LEUKOCYTE EST, POC: ABNORMAL
Lab: ABNORMAL
NITRITE UR-MCNC: NEGATIVE MG/ML
PH UR: 6 [PH] (ref 5–8)
PROT UR STRIP-MCNC: NEGATIVE MG/DL
RBC # UR STRIP: ABNORMAL /UL
SP GR UR: 1.02 (ref 1–1.03)
UROBILINOGEN UR QL: ABNORMAL

## 2020-06-04 PROCEDURE — 99214 OFFICE O/P EST MOD 30 MIN: CPT | Performed by: INTERNAL MEDICINE

## 2020-06-04 PROCEDURE — 81003 URINALYSIS AUTO W/O SCOPE: CPT | Performed by: INTERNAL MEDICINE

## 2020-06-04 PROCEDURE — 36415 COLL VENOUS BLD VENIPUNCTURE: CPT | Performed by: INTERNAL MEDICINE

## 2020-06-04 NOTE — PROGRESS NOTES
Chief Complaint   Patient presents with   • Follow-up     6 month follow up chronic medical problems       History of Present Illness    The patient presents for a follow-up related to hypertension. The patient reports that she has had no headaches, chest pain, dyspnea, edema, syncope, blurred vision or palpitations. She states that she is taking her medication as prescribed. She is not having medication side effects.    The patient presents for a follow-up related to hyperlipidemia. She is following a low fat diet. She reports that she is exercising. She is taking simvastatin. The patient is taking her medication as prescribed. She reports no medication side effects, including muscle cramps, abdominal pain, headaches or weakness. She denies orthopnea, paroxysmal nocturnal dyspnea or dyspnea on exertion.    The patient presents for a follow-up related to GERD. The patient is on pantoprazole for her gastroesophageal reflux. The medication is taken on a regular basis and gives complete relief of the symptoms. She reports no abdominal pain, belching, diarrhea, dysphagia, early satiety, heartburn, hoarseness, nausea, odynophagia, rectal bleeding, vomiting or weight loss. The GERD has no known aggravating factors.    Review of Systems    CONSTITUTIONAL- Denies Fever, Chills, Sweats, Fatigue, Weakness or Malaise.    HENT- Denies Nasal Discharge, Sore Throat, Ear Pain, Ear Drainage, Sinus Pain, Nasal Congestion, Decreased Hearing or Tinnitus.    GASTROINTESTINAL- Denies Constipation.    PULMONARY- Denies Wheezing, Sputum Production, Cough, Hemoptysis or Pleuritic Chest Pain.    CARDIOVASCULAR- Denies Claudication or Irregular Heart Beat.    Medications      Current Outpatient Medications:   •  CARTIA  MG 24 hr capsule, Take 1 capsule by mouth once daily, Disp: 30 capsule, Rfl: 0  •  chlorthalidone (HYGROTON) 25 MG tablet, TAKE 1/2 (ONE-HALF) TABLET BY MOUTH ONCE DAILY, Disp: 45 tablet, Rfl: 3  •  Cholecalciferol  "(VITAMIN D-3) 5000 units tablet, Take  by mouth Daily., Disp: , Rfl:   •  citalopram (CeleXA) 20 MG tablet, Take 1 tablet by mouth once daily, Disp: 30 tablet, Rfl: 5  •  Glucosamine HCl (GLUCOSAMINE PO), Take 2 tablets by mouth Daily. As directed   , Disp: , Rfl:   •  metoprolol tartrate (LOPRESSOR) 25 MG tablet, TAKE 1 TABLET BY MOUTH TWICE DAILY, Disp: 180 tablet, Rfl: 3  •  pantoprazole (PROTONIX) 40 MG EC tablet, Take 1 tablet by mouth once daily, Disp: 30 tablet, Rfl: 0  •  potassium chloride (K-DUR) 10 MEQ CR tablet, Take 1 tablet by mouth Daily., Disp: 90 tablet, Rfl: 1  •  rivaroxaban (XARELTO) 10 MG tablet, Take 1 tablet by mouth Daily., Disp: 90 tablet, Rfl: 2  •  simvastatin (ZOCOR) 40 MG tablet, TAKE 1 TABLET BY MOUTH AT NIGHT, Disp: 90 tablet, Rfl: 3  •  vitamin C (ASCORBIC ACID) 500 MG tablet, Take 1,000 mg by mouth Daily., Disp: , Rfl:      Allergies    Allergies   Allergen Reactions   • Erythromycin Swelling     \"tongue swelling\".   • Codeine Sulfate Swelling   • Meperidine Swelling   • Morphine And Related Swelling   • Penicillins Swelling     \"swelling\" at site.   • Sulfa Antibiotics Swelling   • Sulfadiazine Swelling       Problem List    Patient Active Problem List   Diagnosis   • History of pulmonary embolism   • Hypertension   • Acute pulmonary embolism (CMS/HCC)   • DVT (deep venous thrombosis) (CMS/HCC)   • Circadian rhythm sleep disorder, delayed sleep phase type   • Psychophysiological insomnia   • Hyperlipidemia   • Calculus of kidney   • Cobalamin deficiency   • History of DVT (deep vein thrombosis)       Medications, Allergies, Problems List and Past History were reviewed and updated.    Physical Examination    /68 (BP Location: Left arm, Patient Position: Sitting, Cuff Size: Adult)   Pulse 68   Temp 97.3 °F (36.3 °C) (Temporal)   Resp 16   Wt 71.2 kg (157 lb)   LMP  (LMP Unknown)   Breastfeeding No   BMI 27.82 kg/m²     HEENT: Head- Normocephalic Atraumatic. Facies- Within " normal limits. Pinnas- Normal texture and shape bilaterally. Canals- Normal bilaterally. TMs- Normal bilaterally. Nares- Patent bilaterally. Nasal Septum- is normal. There is no tenderness to palpation over the frontal or maxillary sinuses. Lids- Normal bilaterally. Conjunctiva- Clear bilaterally. Sclera- Anicteric bilaterally. Oropharynx- Moist with no lesions. Tonsils- No enlargement, erythema or exudate.    Neck: Thyroid- non enlarged, symmetric and has no nodules. No bruits are detected.    Lungs: Auscultation- Clear to auscultation bilaterally. There are no retractions, clubbing or cyanosis. The Expiratory to Inspiratory ratio is equal.    Cardiovascular: There are no carotid bruits. Heart- Normal Rate with Regular rhythm and no murmurs. There are no gallops. There are no rubs. In the lower extremities there is no edema. The upper extremities do not have edema.    Abdomen: Soft, benign, non-tender with no masses, hernias, organomegaly or scars.    Impression and Assessment    Essential Hypertension.    Hyperlipidemia.    Gastroesophageal Reflux Disease.    Plan    Gastroesophageal Reflux Disease Plan: The current plan was continued.    Essential Hypertension Plan: The current plan was continued.    Hyperlipidemia Plan: The patient was instructed to exercise daily, eat a low fat diet and continue her medications.     was seen today for follow-up.    Diagnoses and all orders for this visit:    Essential hypertension  -     Comprehensive Metabolic Panel  -     POC Urinalysis Dipstick, Automated    Hyperlipidemia, unspecified hyperlipidemia type  -     Comprehensive Metabolic Panel  -     Lipid Panel    Gastroesophageal reflux disease without esophagitis        Return to Office    The patient was instructed to return for follow-up in 6 months. Next Visit: Physical.    The patient was instructed to return sooner if the condition changes, worsens, or does not resolve.

## 2020-06-05 LAB
ALBUMIN SERPL-MCNC: 4.2 G/DL (ref 3.5–5.2)
ALBUMIN/GLOB SERPL: 1.8 G/DL
ALP SERPL-CCNC: 85 U/L (ref 39–117)
ALT SERPL-CCNC: 13 U/L (ref 1–33)
AST SERPL-CCNC: 15 U/L (ref 1–32)
BILIRUB SERPL-MCNC: 0.6 MG/DL (ref 0.2–1.2)
BUN SERPL-MCNC: 21 MG/DL (ref 8–23)
BUN/CREAT SERPL: 16 (ref 7–25)
CALCIUM SERPL-MCNC: 9.2 MG/DL (ref 8.6–10.5)
CHLORIDE SERPL-SCNC: 101 MMOL/L (ref 98–107)
CHOLEST SERPL-MCNC: 156 MG/DL (ref 0–200)
CO2 SERPL-SCNC: 30.1 MMOL/L (ref 22–29)
CREAT SERPL-MCNC: 1.31 MG/DL (ref 0.57–1)
GLOBULIN SER CALC-MCNC: 2.4 GM/DL
GLUCOSE SERPL-MCNC: 106 MG/DL (ref 65–99)
HDLC SERPL-MCNC: 74 MG/DL (ref 40–60)
LDLC SERPL CALC-MCNC: 70 MG/DL (ref 0–100)
POTASSIUM SERPL-SCNC: 4 MMOL/L (ref 3.5–5.2)
PROT SERPL-MCNC: 6.6 G/DL (ref 6–8.5)
SODIUM SERPL-SCNC: 141 MMOL/L (ref 136–145)
TRIGL SERPL-MCNC: 62 MG/DL (ref 0–150)
VLDLC SERPL CALC-MCNC: 12.4 MG/DL

## 2020-06-07 LAB
BACTERIA #/AREA URNS HPF: ABNORMAL /HPF
BACTERIA UR CULT: NORMAL
BACTERIA UR CULT: NORMAL
CASTS URNS MICRO: ABNORMAL
CRYSTALS URNS MICRO: ABNORMAL
EPI CELLS #/AREA URNS HPF: ABNORMAL /HPF
RBC #/AREA URNS HPF: ABNORMAL /HPF
WBC #/AREA URNS HPF: ABNORMAL /HPF

## 2020-06-25 ENCOUNTER — OFFICE VISIT (OUTPATIENT)
Dept: CARDIOLOGY | Facility: CLINIC | Age: 75
End: 2020-06-25

## 2020-06-25 VITALS
DIASTOLIC BLOOD PRESSURE: 64 MMHG | OXYGEN SATURATION: 98 % | HEART RATE: 62 BPM | BODY MASS INDEX: 28.05 KG/M2 | HEIGHT: 63 IN | SYSTOLIC BLOOD PRESSURE: 114 MMHG | WEIGHT: 158.3 LBS | TEMPERATURE: 97.8 F

## 2020-06-25 DIAGNOSIS — I10 ESSENTIAL HYPERTENSION: Primary | ICD-10-CM

## 2020-06-25 DIAGNOSIS — I10 ESSENTIAL HYPERTENSION: ICD-10-CM

## 2020-06-25 DIAGNOSIS — K21.9 GASTROESOPHAGEAL REFLUX DISEASE, ESOPHAGITIS PRESENCE NOT SPECIFIED: ICD-10-CM

## 2020-06-25 DIAGNOSIS — Z86.711 HISTORY OF PULMONARY EMBOLISM: ICD-10-CM

## 2020-06-25 PROCEDURE — 99213 OFFICE O/P EST LOW 20 MIN: CPT | Performed by: INTERNAL MEDICINE

## 2020-06-25 RX ORDER — PANTOPRAZOLE SODIUM 40 MG/1
TABLET, DELAYED RELEASE ORAL
Qty: 30 TABLET | Refills: 0 | Status: SHIPPED | OUTPATIENT
Start: 2020-06-25 | End: 2020-07-23

## 2020-06-25 RX ORDER — DILTIAZEM HYDROCHLORIDE 240 MG/1
CAPSULE, EXTENDED RELEASE ORAL
Qty: 30 CAPSULE | Refills: 0 | Status: SHIPPED | OUTPATIENT
Start: 2020-06-25 | End: 2020-07-27

## 2020-06-25 NOTE — PROGRESS NOTES
"  OFFICE FOLLOW UP     Date of Encounter:2020     Name: Carol Jean  : 1945  Address: [facility] [address] [facility] [address]    PCP: Flakito Chaudhary MD  [address]  [facility] [address]    Carol Jean is a 74 y.o. female.      Chief Complaint: Follow up of History of PE, HTN    Problem List:   1. Acute pulmonary embolism, bilateral, 2012.  a. Initiation of CPR per family.  b. EMS transport to [facility] Emergency Room.  c. Restoration of rhythm and blood pressure at [facility] Emergency Room.  d. “Shock” manifest by hypotension and multiorgan failure:  Transient hepatic dysfunction, resolved.  Transient nonoliguric ATN, resolved.  Transient metabolic acidosis, resolved.  Pressor support.  e. Bilateral EKOS catheter placement/treatment:  Vena cava filter placed.  f. “Normal coronary arteries” by angiography, 2012.  g. Recurrent DVT of RLE, 2012:  Hematology workup with subsequent Xarelto dosing (followed by Dr. Ashby)  2. Hypertension.  3. Upper gastrointestinal bleeding/gastritis related to lytic therapy and heparins, resolved:  a. “Hemorrhagic gastritis.”  4. Right “groin” bleeding, 10/12/2012.  a. Probably “venous” related to heparins.  b. Nonsurgical treatment, resolved.  5. History of nephrolithiasis, inactive.  6. Methicillin-resistant Staphylococcus aureus - tracheobronchitis, resolved.  7. Renal insufficiency, followed by [facility]   8. Microscopic hematuria  a. Cystoscoopy did not reveal source of bleeding, Xarelto was decreased to 10 mg daily (2020)  9. Status post operations, remote.  a. Nephrolithiasis with lithotripsy.  b. Bilateral breast reduction.  c.  section x2.    Allergies:  Allergies   Allergen Reactions   • Erythromycin Swelling     \"tongue swelling\".   • Codeine Sulfate Swelling   • Meperidine Swelling   • Morphine And Related Swelling   • Penicillins Swelling     \"swelling\" at site.   • " "Sulfa Antibiotics Swelling   • Sulfadiazine Swelling       Current Medications:  •  chlorthalidone (HYGROTON) 25 MG tablet, TAKE 1/2 (ONE-HALF) TABLET BY MOUTH ONCE DAILY  •  Cholecalciferol (VITAMIN D-3) 5000 units tablet, Take  by mouth Daily.  •  citalopram (CeleXA) 20 MG tablet, Take 1 tablet by mouth once daily  •  dilTIAZem (TIAZAC) 240 MG 24 hr capsule, Take 1 capsule by mouth once daily  •  Glucosamine HCl (GLUCOSAMINE PO), Take 2 tablets by mouth Daily. As directed    •  metoprolol tartrate (LOPRESSOR) 25 MG tablet, TAKE 1 TABLET BY MOUTH TWICE DAILY  •  pantoprazole (PROTONIX) 40 MG EC tablet, Take 1 tablet by mouth once daily  •  potassium chloride (K-DUR) 10 MEQ CR tablet, Take 1 tablet by mouth Daily.  •  rivaroxaban (XARELTO) 10 MG tablet, Take 1 tablet by mouth Daily  •  simvastatin (ZOCOR) 40 MG tablet, TAKE 1 TABLET BY MOUTH AT NIGHT  •  vitamin C (ASCORBIC ACID) 500 MG tablet, Take 1,000 mg by mouth Daily.    History of Present Illness:       Carol Jean returns for follow up today. She has stayed quarantined most of the spring/summer for COVID.  Dr. Ashby has cut her Xarelto back to 10 mg daily and she is tolerating this dose. No source was found per Dr. Evans for her hematuria. Cystoscopy did not find occult bleeding source. Hematuria has not recurred on lower dose Xarelto. She rides a stationary bike three miles a day without exertional symptoms. She denies chest pain, shortness of breath.     The following portions of the patient's history were reviewed and updated as appropriate: allergies, current medications and problem list.    ROS: Pertinent positives as listed in the HPI.  All other systems reviewed and negative.    Objective:    Vitals:    06/25/20 1354 06/25/20 1355   BP: 137/69 114/64   BP Location: Right arm Right arm   Patient Position: Sitting Standing   Pulse: 59 62   Temp: 97.8 °F (36.6 °C)    SpO2: 98%    Weight: 71.8 kg (158 lb 4.8 oz)    Height: 160 cm (63\")      Physical " Exam:  GENERAL: Alert, cooperative, in no acute distress.   HEENT: Normocephalic, no adenopathy, no jugular venous distention  HEART: No discrete PMI is noted. Regular rhythm, normal rate, and no murmurs, gallops, or rubs.   LUNGS: Clear to auscultation bilaterally. No wheezing, rales or ronchi.  ABDOMEN: Soft, bowel sounds present, non-tender   NEUROLOGIC: No focal abnormalities involving strength or sensation are noted.   EXTREMITIES: No clubbing, cyanosis, or edema noted.      Diagnostic Data:    Lab Results   Component Value Date    CHLPL 156 06/04/2020    TRIG 62 06/04/2020    HDL 74 (H) 06/04/2020    LDL 70 06/04/2020      Lab Results   Component Value Date    GLUCOSE 106 06/04/2020    BUN 21 06/04/2020    CREATININE 1.31 (H) 06/04/2020    EGFRIFNONA 40 (L) 06/04/2020    EGFRIFAFRI 48 (L) 06/04/2020    BCR 16.0 06/04/2020    K 4.0 06/04/2020    CO2 30.1 (H) 06/04/2020    CALCIUM 9.2 06/04/2020    PROTENTOTREF 6.6 06/04/2020    ALBUMIN 4.20 06/04/2020    LABIL2 1.8 06/04/2020    AST 15 06/04/2020    ALT 13 06/04/2020      Lab Results   Component Value Date    TSH 2.300 12/05/2019      Procedures    Assessment and Plan:   1.  History of PE (unprovoked):  Her estimated GFR of 40 should require lower Xarelto dose, however we would prefer her to be on Xarelto 15 mg daily, this was adjusted today. She was given samples.  2.  HTN:  Well controlled on current medications.   3.  CKD: BMP in 6 months.     I will see Carol WISE Satterly back in 6 months or sooner on an as needed basis.    Scribed for Flakito Dill MD by Marlyn Graham RN. 06/25/2020 3:37 PM.       EMR Dragon/Transcription Disclaimer:  Much of this encounter note is an electronic transcription/translation of spoken language to printed text.  The electronic translation of spoken language may permit erroneous, or at times, nonsensical words or phrases to be inadvertently transcribed.  Although I have reviewed the note for such errors, some may still  exist.

## 2020-07-23 DIAGNOSIS — K21.9 GASTROESOPHAGEAL REFLUX DISEASE, ESOPHAGITIS PRESENCE NOT SPECIFIED: ICD-10-CM

## 2020-07-23 RX ORDER — PANTOPRAZOLE SODIUM 40 MG/1
TABLET, DELAYED RELEASE ORAL
Qty: 30 TABLET | Refills: 3 | Status: SHIPPED | OUTPATIENT
Start: 2020-07-23 | End: 2020-11-28

## 2020-07-26 DIAGNOSIS — I10 ESSENTIAL HYPERTENSION: ICD-10-CM

## 2020-07-27 RX ORDER — DILTIAZEM HYDROCHLORIDE 240 MG/1
CAPSULE, EXTENDED RELEASE ORAL
Qty: 30 CAPSULE | Refills: 5 | Status: SHIPPED | OUTPATIENT
Start: 2020-07-27 | End: 2021-01-24

## 2020-08-14 ENCOUNTER — TRANSCRIBE ORDERS (OUTPATIENT)
Dept: INTERNAL MEDICINE | Facility: CLINIC | Age: 75
End: 2020-08-14

## 2020-08-14 DIAGNOSIS — Z12.31 VISIT FOR SCREENING MAMMOGRAM: Primary | ICD-10-CM

## 2020-09-15 RX ORDER — POTASSIUM CHLORIDE 750 MG/1
TABLET, FILM COATED, EXTENDED RELEASE ORAL
Qty: 90 TABLET | Refills: 0 | Status: SHIPPED | OUTPATIENT
Start: 2020-09-15 | End: 2020-12-15

## 2020-11-04 ENCOUNTER — OFFICE VISIT (OUTPATIENT)
Dept: ORTHOPEDIC SURGERY | Facility: CLINIC | Age: 75
End: 2020-11-04

## 2020-11-04 VITALS — HEIGHT: 63 IN | WEIGHT: 160 LBS | OXYGEN SATURATION: 98 % | HEART RATE: 67 BPM | BODY MASS INDEX: 28.35 KG/M2

## 2020-11-04 DIAGNOSIS — M17.11 PRIMARY OSTEOARTHRITIS OF RIGHT KNEE: Primary | ICD-10-CM

## 2020-11-04 PROCEDURE — 20610 DRAIN/INJ JOINT/BURSA W/O US: CPT | Performed by: ORTHOPAEDIC SURGERY

## 2020-11-04 PROCEDURE — 99203 OFFICE O/P NEW LOW 30 MIN: CPT | Performed by: ORTHOPAEDIC SURGERY

## 2020-11-04 RX ORDER — TRIAMCINOLONE ACETONIDE 40 MG/ML
40 INJECTION, SUSPENSION INTRA-ARTICULAR; INTRAMUSCULAR
Status: COMPLETED | OUTPATIENT
Start: 2020-11-04 | End: 2020-11-04

## 2020-11-04 RX ORDER — ROPIVACAINE HYDROCHLORIDE 5 MG/ML
4 INJECTION, SOLUTION EPIDURAL; INFILTRATION; PERINEURAL
Status: COMPLETED | OUTPATIENT
Start: 2020-11-04 | End: 2020-11-04

## 2020-11-04 RX ADMIN — ROPIVACAINE HYDROCHLORIDE 4 ML: 5 INJECTION, SOLUTION EPIDURAL; INFILTRATION; PERINEURAL at 09:12

## 2020-11-04 RX ADMIN — TRIAMCINOLONE ACETONIDE 40 MG: 40 INJECTION, SUSPENSION INTRA-ARTICULAR; INTRAMUSCULAR at 09:12

## 2020-11-04 NOTE — PROGRESS NOTES
Procedure   Large Joint Arthrocentesis: R knee  Date/Time: 11/4/2020 9:12 AM  Consent given by: patient  Site marked: site marked  Timeout: Immediately prior to procedure a time out was called to verify the correct patient, procedure, equipment, support staff and site/side marked as required   Supporting Documentation  Indications: pain   Procedure Details  Location: knee - R knee  Preparation: Patient was prepped and draped in the usual sterile fashion  Needle size: 22 G  Approach: anterolateral  Medications administered: 40 mg triamcinolone acetonide 40 MG/ML; 4 mL ropivacaine 0.5 %  Patient tolerance: patient tolerated the procedure well with no immediate complications

## 2020-11-04 NOTE — PROGRESS NOTES
[facility] [place]    Subjective     Chief Complaint   Patient presents with   • Right Knee - Pain        HPI    Carol Jean is a 75 y.o. female who presents with right knee pain.  Onset: atraumatic and gradual in nature. The issue has been ongoing for 2 week(s). Pain is a 8/10 on the pain scale. Pain is described as aching. Associated symptoms include pain and popping. The pain is worse with walking; resting, ice and pain medication and/or NSAID improve the pain. Previous treatments have included: Tylenol.  Remote history of injection about 5 years ago, with good relief.  She is not necessarily interested in surgical intervention.    I have reviewed the following portions of the patient's history and agree with: History of Present Illness and Review of Systems    Patient Active Problem List   Diagnosis   • History of pulmonary embolism   • Hypertension   • Acute pulmonary embolism (CMS/HCC)   • DVT (deep venous thrombosis) (CMS/HCC)   • Circadian rhythm sleep disorder, delayed sleep phase type   • Psychophysiological insomnia   • Hyperlipidemia   • Calculus of kidney   • Cobalamin deficiency   • History of DVT (deep vein thrombosis)     Past Medical History:   Diagnosis Date   • Acute deep vein thrombosis of lower extremity (CMS/HCC)    • Acute pulmonary embolism (CMS/HCC) 11/22/2016    Bilateral, 09/30/2012 Initiation of CPR per family. EMS transport to [facility]. Restoration of rhythm and blood pressure at [facility]. “Shock” manifest by hypotension and multiorgan failure:  Transient hepatic dysfunction, resolved.  Transient nonoliguric ATN, resolved.  Transient metabolic acidosis, resolved.  Pressor support. Bilateral    • Arthritis    • Bladder problem    • Blood clotting disorder (CMS/HCC)    • Bone pain    • DVT (deep venous thrombosis) (CMS/HCC)    • Easy bruising    • H/O bone density study 08/2012   • H/O colonoscopy  2012   • H/O mammogram 2012   • HBP (high blood pressure)    • History of blood transfusion    • Hypertension 2016   • Kidney stones    • Synovial cyst of popliteal space    • Upper gastrointestinal bleeding     gastritis related to lytic therapy and heparins, resolved: a. “Hemorrhagic gastritis.”   • Vitamin B12 deficiency       Past Surgical History:   Procedure Laterality Date   • BILATERAL BREAST REDUCTION     • CATARACT EXTRACTION, BILATERAL     •  SECTION     •  SECTION     • COLONOSCOPY     • KIDNEY STONE SURGERY      Nephrolithiasis with lithotripsy.   • OTHER SURGICAL HISTORY      barry filter placement in Vena Cava   • REDUCTION MAMMAPLASTY Bilateral    • TONSILLECTOMY        Family History   Problem Relation Age of Onset   • Stroke Mother    • Hypertension Mother    • Arthritis Mother    • Hyperlipidemia Mother    • Heart attack Father    • COPD Father    • Thyroid disease Father    • Heart disease Father    • Stroke Other    • Coronary artery disease Other    • Breast cancer Other    • COPD Other    • Stroke Other    • Breast cancer Other    • COPD Other    • Ovarian cancer Neg Hx      Social History     Socioeconomic History   • Marital status:      Spouse name: Not on file   • Number of children: Not on file   • Years of education: Not on file   • Highest education level: Not on file   Tobacco Use   • Smoking status: Never Smoker   • Smokeless tobacco: Never Used   Substance and Sexual Activity   • Alcohol use: Yes     Frequency: 2-4 times a month     Comment: occas   • Drug use: No   • Sexual activity: Defer      Current Outpatient Medications on File Prior to Visit   Medication Sig Dispense Refill   • chlorthalidone (HYGROTON) 25 MG tablet TAKE 1/2 (ONE-HALF) TABLET BY MOUTH ONCE DAILY 45 tablet 3   • Cholecalciferol (VITAMIN D-3) 5000 units tablet Take  by mouth Daily.     • citalopram (CeleXA) 20 MG tablet Take 1 tablet by mouth once daily 30  "tablet 5   • dilTIAZem (TIAZAC) 240 MG 24 hr capsule Take 1 capsule by mouth once daily 30 capsule 5   • Glucosamine HCl (GLUCOSAMINE PO) Take 2 tablets by mouth Daily. As directed         • metoprolol tartrate (LOPRESSOR) 25 MG tablet TAKE 1 TABLET BY MOUTH TWICE DAILY 180 tablet 3   • pantoprazole (PROTONIX) 40 MG EC tablet Take 1 tablet by mouth once daily 30 tablet 3   • potassium chloride 10 MEQ CR tablet Take 1 tablet by mouth once daily 90 tablet 0   • rivaroxaban (XARELTO) 15 MG tablet Take 1 tablet by mouth Daily. 30 tablet 11   • simvastatin (ZOCOR) 40 MG tablet TAKE 1 TABLET BY MOUTH AT NIGHT 90 tablet 3   • vitamin C (ASCORBIC ACID) 500 MG tablet Take 1,000 mg by mouth Daily.       No current facility-administered medications on file prior to visit.       Allergies   Allergen Reactions   • Erythromycin Swelling     \"tongue swelling\".   • Codeine Sulfate Swelling   • Meperidine Swelling   • Morphine And Related Swelling   • Penicillins Swelling     \"swelling\" at site.   • Sulfa Antibiotics Swelling   • Sulfadiazine Swelling        Review of Systems   Constitutional: Negative.    HENT: Negative.    Eyes: Negative.    Respiratory: Negative.    Cardiovascular: Negative.    Gastrointestinal: Negative.    Endocrine: Negative.    Genitourinary: Negative.    Musculoskeletal: Positive for arthralgias.   Skin: Negative.    Allergic/Immunologic: Negative.    Neurological: Negative.    Hematological: Negative.    Psychiatric/Behavioral: Negative.         Objective      Physical Exam  Pulse 67   Ht 160 cm (62.99\")   Wt 72.6 kg (160 lb)   LMP  (LMP Unknown)   SpO2 98%   BMI 28.35 kg/m²     Body mass index is 28.35 kg/m².    General:   Mental Status:  Alert   Appearance: Cooperative, in no acute distress   Build and Nutrition: Well-nourished well-developed female   Orientation: Alert and oriented to person, place and time   Posture: Normal   Gait: Mild limp    Integument:   Right knee: No skin lesions, no rash, no " ecchymosis    Neurologic:   Sensation:    Right foot: Intact to light touch on the dorsal and plantar aspect   Motor:  Right lower extremity: 5/5 quadriceps, hamstrings, ankle dorsiflexors, and ankle plantar flexors    Vascular:   Right lower extremity: 2+ dorsalis pedis pulse, prompt capillary refill    Lower Extremities:   Right Knee:    Tenderness:  Medial and lateral joint line tenderness    Effusion:  None    Swelling:  None    Crepitus:  Positive    Atrophy:  None    Range of motion:  Extension: 0°       Flexion: 120°  Instability:  No varus laxity, no valgus laxity, negative anterior drawer  Deformities:  Mild valgus      Imaging/Studies      Imaging Results (Last 24 Hours)     Procedure Component Value Units Date/Time    XR Knee 4+ View Right [[id number]] Resulted: 11/04/20 0900     Updated: 11/04/20 0901    Narrative:      Right Knee Radiographs  Indication: right knee pain  Views: Standing AP's and skiers of both knees, with lateral and sunrise   views of the right knee    Comparison: 11/24/2012    Findings:   Advanced arthritic changes, with bone-on-bone contact lateral compartment,   tricompartmental osteophytes, advanced patellofemoral degeneration, with   no acute bony abnormalities.  Worsening compared to the previous films.          Assessment and Plan     Diagnoses and all orders for this visit:    1. Primary osteoarthritis of right knee (Primary)  -     XR Knee 4+ View Right  -     Large Joint Arthrocentesis: R knee        1. Primary osteoarthritis of right knee        Reviewed my findings with patient today.  She has advanced right knee arthritis, we discussed treatment options.  She is not interested in surgical intervention.  Significant history of DVT and pulmonary embolus in the past.  She would like to have an injection, this was provided.  Please see my procedure note for details.  I will see her back in 3 months, but sooner for any problems.    Procedure Note:  The potential benefits of  performing a therapeutic right knee joint injection, as well as potential risks (including, but not limited to infection, swelling, pain, bleeding, bruising, nerve/blood vessel damage, skin color changes, transient elevation in blood glucose levels, and fat atrophy) were discussed with the patient.  After informed consent, timeout procedure was performed, and the skin on the right knee was prepped with chlorhexidine soap and alcohol, after which ethyl chloride was applied to the skin at the injection site. Via the anterolateral approach, 1ml of Kenalog 40mg/ml mixed with 4ml 0.5% ropivacaine plain was injected into the knee joint.  The patient tolerated the procedure well, experiencing 100% improvement a few minutes following the injection. There were no complications.  Band-Aid was applied to the injection site. Post-procedural instructions were given to the patient and/or their caregiver.      Return in about 3 months (around 2/4/2021).    Medical Decision Making  Management Options : prescription/IM medicine  Data/Risk: radiology tests and independent visualization of imaging, lab tests, or EMG/NCV      Flakito Chase MD  11/04/20  14:40 BRADLEY Martin disclaimer:  Much of this encounter note is an electronic transcription/translation of spoken language to printed text. The electronic translation of spoken language may permit erroneous, or at times, nonsensical words or phrases to be inadvertently transcribed; Although I have reviewed the note for such errors, some may still exist.

## 2020-11-27 DIAGNOSIS — K21.9 GASTROESOPHAGEAL REFLUX DISEASE: ICD-10-CM

## 2020-11-28 RX ORDER — PANTOPRAZOLE SODIUM 40 MG/1
TABLET, DELAYED RELEASE ORAL
Qty: 30 TABLET | Refills: 0 | Status: SHIPPED | OUTPATIENT
Start: 2020-11-28 | End: 2020-12-24

## 2020-12-09 ENCOUNTER — HOSPITAL ENCOUNTER (OUTPATIENT)
Dept: MAMMOGRAPHY | Facility: HOSPITAL | Age: 75
Discharge: HOME | End: 2020-12-09
Admitting: INTERNAL MEDICINE

## 2020-12-09 DIAGNOSIS — Z12.31 VISIT FOR SCREENING MAMMOGRAM: ICD-10-CM

## 2020-12-09 PROCEDURE — 77063 BREAST TOMOSYNTHESIS BI: CPT

## 2020-12-09 PROCEDURE — 77067 SCR MAMMO BI INCL CAD: CPT | Performed by: RADIOLOGY

## 2020-12-09 PROCEDURE — 77067 SCR MAMMO BI INCL CAD: CPT

## 2020-12-09 PROCEDURE — 77063 BREAST TOMOSYNTHESIS BI: CPT | Performed by: RADIOLOGY

## 2020-12-15 RX ORDER — POTASSIUM CHLORIDE 750 MG/1
TABLET, FILM COATED, EXTENDED RELEASE ORAL
Qty: 90 TABLET | Refills: 1 | Status: SHIPPED | OUTPATIENT
Start: 2020-12-15 | End: 2021-06-14

## 2020-12-23 DIAGNOSIS — K21.9 GASTROESOPHAGEAL REFLUX DISEASE: ICD-10-CM

## 2020-12-24 DIAGNOSIS — E78.2 MIXED HYPERLIPIDEMIA: ICD-10-CM

## 2020-12-24 RX ORDER — PANTOPRAZOLE SODIUM 40 MG/1
TABLET, DELAYED RELEASE ORAL
Qty: 30 TABLET | Refills: 0 | Status: SHIPPED | OUTPATIENT
Start: 2020-12-24 | End: 2021-01-19 | Stop reason: SDUPTHER

## 2020-12-28 DIAGNOSIS — I10 ESSENTIAL HYPERTENSION: ICD-10-CM

## 2020-12-28 RX ORDER — SIMVASTATIN 40 MG
TABLET ORAL
Qty: 90 TABLET | Refills: 2 | Status: SHIPPED | OUTPATIENT
Start: 2020-12-28 | End: 2021-09-20

## 2020-12-28 NOTE — TELEPHONE ENCOUNTER
Lab Results   Component Value Date    CHOL 146 12/05/2019    CHLPL 156 06/04/2020    TRIG 62 06/04/2020    HDL 74 (H) 06/04/2020    LDL 70 06/04/2020    Next appt 1/2021

## 2020-12-28 NOTE — TELEPHONE ENCOUNTER
Next appt 1/2021, last seen 6/2020  Lab Results   Component Value Date    GLUCOSE 95 12/05/2019    CALCIUM 9.2 06/04/2020     06/04/2020    K 4.0 06/04/2020    CO2 30.1 (H) 06/04/2020     06/04/2020    BUN 21 06/04/2020    CREATININE 1.31 (H) 06/04/2020    EGFRIFAFRI 48 (L) 06/04/2020    EGFRIFNONA 40 (L) 06/04/2020    BCR 16.0 06/04/2020    ANIONGAP 13.3 12/05/2019

## 2020-12-31 ENCOUNTER — TELEPHONE (OUTPATIENT)
Dept: INTERNAL MEDICINE | Facility: CLINIC | Age: 75
End: 2020-12-31

## 2020-12-31 NOTE — TELEPHONE ENCOUNTER
PT STATED THAT SHE DOES NOT WANT THE FLU VACCINATION, SHE GOT REALLY SICK WHEN SHE TOOK IT PREVIOUSLY.    CALL BACK:[phone]

## 2020-12-31 NOTE — TELEPHONE ENCOUNTER
LVM with office # for pt to return call regarding Flu vaccination.     HUB: Please advise pt we are updating their chart /records regarding the Flu vaccination for 2020/2021. Have they received the Flu vaccination or does she plan on receiving vaccination in office?  Please reply to me with response.     Thank you,   Fiona Small CMA

## 2021-01-05 ENCOUNTER — LAB (OUTPATIENT)
Dept: LAB | Facility: HOSPITAL | Age: 76
End: 2021-01-05

## 2021-01-05 ENCOUNTER — OFFICE VISIT (OUTPATIENT)
Dept: CARDIOLOGY | Facility: CLINIC | Age: 76
End: 2021-01-05

## 2021-01-05 VITALS
DIASTOLIC BLOOD PRESSURE: 57 MMHG | SYSTOLIC BLOOD PRESSURE: 108 MMHG | WEIGHT: 162 LBS | OXYGEN SATURATION: 96 % | BODY MASS INDEX: 28.7 KG/M2 | HEIGHT: 63 IN | HEART RATE: 72 BPM

## 2021-01-05 DIAGNOSIS — Z86.711 HISTORY OF PULMONARY EMBOLISM: Primary | ICD-10-CM

## 2021-01-05 DIAGNOSIS — Z86.718 HISTORY OF DVT (DEEP VEIN THROMBOSIS): ICD-10-CM

## 2021-01-05 DIAGNOSIS — I10 ESSENTIAL HYPERTENSION: ICD-10-CM

## 2021-01-05 LAB
ANION GAP SERPL CALCULATED.3IONS-SCNC: 9.7 MMOL/L (ref 5–15)
BUN SERPL-MCNC: 23 MG/DL (ref 8–23)
BUN/CREAT SERPL: 22.1 (ref 7–25)
CALCIUM SPEC-SCNC: 9.4 MG/DL (ref 8.6–10.5)
CHLORIDE SERPL-SCNC: 101 MMOL/L (ref 98–107)
CO2 SERPL-SCNC: 29.3 MMOL/L (ref 22–29)
CREAT SERPL-MCNC: 1.04 MG/DL (ref 0.57–1)
GFR SERPL CREATININE-BSD FRML MDRD: 52 ML/MIN/1.73
GLUCOSE SERPL-MCNC: 75 MG/DL (ref 65–99)
POTASSIUM SERPL-SCNC: 4 MMOL/L (ref 3.5–5.2)
SODIUM SERPL-SCNC: 140 MMOL/L (ref 136–145)

## 2021-01-05 PROCEDURE — 36415 COLL VENOUS BLD VENIPUNCTURE: CPT

## 2021-01-05 PROCEDURE — 80048 BASIC METABOLIC PNL TOTAL CA: CPT

## 2021-01-05 PROCEDURE — 99214 OFFICE O/P EST MOD 30 MIN: CPT | Performed by: INTERNAL MEDICINE

## 2021-01-05 RX ORDER — CHLORTHALIDONE 25 MG/1
12.5 TABLET ORAL DAILY
Qty: 45 TABLET | Refills: 3 | Status: SHIPPED | OUTPATIENT
Start: 2021-01-05 | End: 2022-01-05

## 2021-01-05 NOTE — PROGRESS NOTES
"  OFFICE FOLLOW UP     Date of Encounter:2021     Name: Carol Jean  : 1945  Address: [facility] [address] [facility] [address]    PCP: Flakito Chaudhary MD  [address] [address]    Carol Jean is a 75 y.o. female.    Chief Complaint: Follow up of PE, HTN    Problem List:   1. Acute pulmonary embolism, bilateral, 2012.  a. Initiation of CPR per family.  b. EMS transport to [facility].  c. Restoration of rhythm and blood pressure at [facility].  d. “Shock” manifest by hypotension and multiorgan failure:  Transient hepatic dysfunction, resolved.  Transient nonoliguric ATN, resolved.  Transient metabolic acidosis, resolved.  Pressor support.  e. Bilateral EKOS catheter placement/treatment:  Vena cava filter placed.  f. “Normal coronary arteries” by angiography, 2012.  g. Recurrent DVT of RLE, 2012:  Hematology workup with subsequent Xarelto dosing (followed by Dr. Ashby)  2. Hypertension.  3. Upper gastrointestinal bleeding/gastritis related to lytic therapy and heparins, resolved:  a. “Hemorrhagic gastritis.”  4. Right “groin” bleeding, 10/12/2012.  a. Probably “venous” related to heparins.  b. Nonsurgical treatment, resolved.  5. History of nephrolithiasis, inactive.  6. Methicillin-resistant Staphylococcus aureus - tracheobronchitis, resolved.  7. Renal insufficiency, followed by NAL   8. Microscopic hematuria  a. Cystoscopy did not reveal source of bleeding, Xarelto was decreased to 10 mg daily (2020)  9. Status post operations, remote.  a. Nephrolithiasis with lithotripsy.  b. Bilateral breast reduction.  c.  section x2.       Allergies:  Allergies   Allergen Reactions   • Erythromycin Swelling     \"tongue swelling\".   • Codeine Sulfate Swelling   • Meperidine Swelling   • Morphine And Related Swelling   • Penicillins Swelling     \"swelling\" at site.   • Sulfa " "Antibiotics Swelling   • Sulfadiazine Swelling     Current Medications:  •  chlorthalidone (HYGROTON) 25 MG tablet, TAKE 1/2 (ONE-HALF) TABLET BY MOUTH ONCE DAILY  •  Cholecalciferol (VITAMIN D-3) 5000 units tablet, Take  by mouth Daily.  •  citalopram (CeleXA) 20 MG tablet, Take 1 tablet by mouth once daily (Patient taking differently: 10 mg.)  •  dilTIAZem (TIAZAC) 240 MG 24 hr capsule, Take 1 capsule by mouth once daily  •  Glucosamine HCl (GLUCOSAMINE PO), Take 2 tablets by mouth Daily. As directed    •  metoprolol tartrate (LOPRESSOR) 25 MG tablet, Take 1 tablet by mouth twice daily  •  pantoprazole (PROTONIX) 40 MG EC tablet, Take 1 tablet by mouth once daily  •  potassium chloride 10 MEQ CR tablet, Take 1 tablet by mouth once daily  •  rivaroxaban (XARELTO) 15 MG tablet, Take 1 tablet by mouth Daily.  •  simvastatin (ZOCOR) 40 MG tablet, TAKE 1 TABLET BY MOUTH AT NIGHT  •  vitamin C (ASCORBIC ACID) 500 MG tablet, Take 1,000 mg by mouth Daily    History of Present Illness:           Mrs. Satterly returns today for follow up. She is overall stable from cardiac standpoint. She denies any chest pain or dyspnea, and remains on Xarelto 15mg. She has not had a repeat renal function since last June. She has remained quarantined during the pandemic. 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     The following portions of the patient's history were reviewed and updated as appropriate: allergies, current medications and problem list.    ROS: Pertinent positives as listed in the HPI.  All other systems reviewed and negative.    Objective:  Vitals:    01/05/21 1419 01/05/21 1421   BP: 112/65 108/57   BP Location: Left arm Left arm   Patient Position: Sitting Standing   Pulse: 68 72   SpO2: 96%    Weight: 73.5 kg (162 lb)    Height: 160 cm (63\")      Body mass index is 28.7 kg/m².    Physical " Exam:  GENERAL: Alert, cooperative, in no acute distress.   HEENT: Normocephalic, no adenopathy, no jugular venous distention  HEART: No discrete PMI is noted. Regular rhythm, normal rate, and no murmurs, gallops, or rubs.   LUNGS: Clear to auscultation bilaterally. No wheezing, rales or ronchi.  ABDOMEN: Soft, bowel sounds present, non-tender   NEUROLOGIC: No focal abnormalities involving strength or sensation are noted.   EXTREMITIES: No clubbing, cyanosis, or edema noted.     Diagnostic Data:      Procedures    Assessment and Plan:     1. History of unprovoked DVT/PE: Continue Xarelto 15mg for anticoagulation, however she needs repeat labs today to re-check renal function. We discussed that in terms of her decision to pursue elective knee replacement, her cardiac risk would be low, however her risk in terms of her coagulopathy needs to be discussed with Dr. Ashby who has the final say. She has an appointment to see him later this month and will address this with him.   2. Hypertension: well controlled.  3. CKD: obtain BMP today as above, and we will call her with results.  4. Follow up in 6 months, sooner if needed.       Scribed for Flakito Dill MD by Elba Padilla PA-C. 1/5/2021  15:11 EST

## 2021-01-06 ENCOUNTER — TELEPHONE (OUTPATIENT)
Dept: ONCOLOGY | Facility: CLINIC | Age: 76
End: 2021-01-06

## 2021-01-06 NOTE — TELEPHONE ENCOUNTER
Provider: VIOLA   Caller: PATRICK CROSS  Relationship to Patient:SELF    Phone Number: [phone]  Reason for Call: PT IS WANTING TO HAVE KNEE/HIP SURGERY AND NEEDS TO SPEAK TO DR ROD BECAUSE SHE HAS BLOOD CLOTS, NEEDS HIS OPINION, PT ALSO NEEDS TO SCHEDULE HER 1 YR FU ASAP.   When was the patient last seen: 01/21/20    PLEASE CALL PT BACK ASAP

## 2021-01-11 ENCOUNTER — OFFICE VISIT (OUTPATIENT)
Dept: ORTHOPEDIC SURGERY | Facility: CLINIC | Age: 76
End: 2021-01-11

## 2021-01-11 VITALS — HEIGHT: 63 IN | OXYGEN SATURATION: 98 % | BODY MASS INDEX: 28.7 KG/M2 | WEIGHT: 162 LBS | HEART RATE: 73 BPM

## 2021-01-11 DIAGNOSIS — M25.551 RIGHT HIP PAIN: Primary | ICD-10-CM

## 2021-01-11 PROCEDURE — 99214 OFFICE O/P EST MOD 30 MIN: CPT | Performed by: ORTHOPAEDIC SURGERY

## 2021-01-11 NOTE — PROGRESS NOTES
[facility]    Subjective     Chief Complaint   Patient presents with   • Right Hip - Pain        HPI    Carol Jean is a 75 y.o. female who presents with a new problem of right hip pain.  Onset: atraumatic and gradual in nature. The issue has been ongoing for 2 and 3 week(s). Pain is a 7/10 on the pain scale. Pain is described as aching and burning. Associated symptoms include pain. The pain is worse with standing; resting improve the pain. Previous treatments have included: NSAIDS.  Pain is on the lateral aspect of the hip.  No groin pain.  No history of trauma.    I have reviewed the following portions of the patient's history and agree with: History of Present Illness and Review of Systems    Patient Active Problem List   Diagnosis   • History of pulmonary embolism   • Hypertension   • Acute pulmonary embolism (CMS/HCC)   • DVT (deep venous thrombosis) (CMS/HCC)   • Circadian rhythm sleep disorder, delayed sleep phase type   • Psychophysiological insomnia   • Hyperlipidemia   • Calculus of kidney   • Cobalamin deficiency   • History of DVT (deep vein thrombosis)     Past Medical History:   Diagnosis Date   • Acute deep vein thrombosis of lower extremity (CMS/HCC)    • Acute pulmonary embolism (CMS/HCC) 11/22/2016    Bilateral, 09/30/2012 Initiation of CPR per family. EMS transport to [facility]. Restoration of rhythm and blood pressure at [facility]. “Shock” manifest by hypotension and multiorgan failure:  Transient hepatic dysfunction, resolved.  Transient nonoliguric ATN, resolved.  Transient metabolic acidosis, resolved.  Pressor support. Bilateral    • Arthritis    • Bladder problem    • Blood clotting disorder (CMS/HCC)    • Bone pain    • DVT (deep venous thrombosis) (CMS/HCC)    • Easy bruising    • H/O bone density study 08/2012   • H/O colonoscopy 08/2012   • H/O mammogram 08/2012   • HBP (high blood pressure)     • History of blood transfusion    • Hypertension 2016   • Kidney stones    • Synovial cyst of popliteal space    • Upper gastrointestinal bleeding     gastritis related to lytic therapy and heparins, resolved: a. “Hemorrhagic gastritis.”   • Vitamin B12 deficiency       Past Surgical History:   Procedure Laterality Date   • BILATERAL BREAST REDUCTION     • CATARACT EXTRACTION, BILATERAL     •  SECTION     •  SECTION     • COLONOSCOPY     • KIDNEY STONE SURGERY      Nephrolithiasis with lithotripsy.   • OTHER SURGICAL HISTORY      barry filter placement in Vena Cava   • REDUCTION MAMMAPLASTY Bilateral    • TONSILLECTOMY        Family History   Problem Relation Age of Onset   • Stroke Mother    • Hypertension Mother    • Arthritis Mother    • Hyperlipidemia Mother    • Heart attack Father    • COPD Father    • Thyroid disease Father    • Heart disease Father    • Stroke Other    • Coronary artery disease Other    • COPD Other    • Stroke Other    • COPD Other    • Breast cancer Maternal Aunt    • Ovarian cancer Neg Hx      Social History     Socioeconomic History   • Marital status:      Spouse name: Not on file   • Number of children: Not on file   • Years of education: Not on file   • Highest education level: Not on file   Tobacco Use   • Smoking status: Never Smoker   • Smokeless tobacco: Never Used   Substance and Sexual Activity   • Alcohol use: Yes     Frequency: 2-4 times a month     Comment: occas   • Drug use: No   • Sexual activity: Defer      Current Outpatient Medications on File Prior to Visit   Medication Sig Dispense Refill   • chlorthalidone (HYGROTON) 25 MG tablet Take 0.5 tablets by mouth Daily. 45 tablet 3   • Cholecalciferol (VITAMIN D-3) 5000 units tablet Take  by mouth Daily.     • citalopram (CeleXA) 20 MG tablet Take 1 tablet by mouth once daily (Patient taking differently: 10 mg.) 30 tablet 5   • dilTIAZem (TIAZAC) 240 MG 24 hr capsule Take 1  "capsule by mouth once daily 30 capsule 5   • Glucosamine HCl (GLUCOSAMINE PO) Take 2 tablets by mouth Daily. As directed         • metoprolol tartrate (LOPRESSOR) 25 MG tablet Take 1 tablet by mouth twice daily 180 tablet 1   • pantoprazole (PROTONIX) 40 MG EC tablet Take 1 tablet by mouth once daily 30 tablet 0   • potassium chloride 10 MEQ CR tablet Take 1 tablet by mouth once daily 90 tablet 1   • rivaroxaban (XARELTO) 15 MG tablet Take 1 tablet by mouth Daily. 30 tablet 11   • simvastatin (ZOCOR) 40 MG tablet TAKE 1 TABLET BY MOUTH AT NIGHT 90 tablet 2   • vitamin C (ASCORBIC ACID) 500 MG tablet Take 1,000 mg by mouth Daily.       No current facility-administered medications on file prior to visit.       Allergies   Allergen Reactions   • Erythromycin Swelling     \"tongue swelling\".   • Codeine Sulfate Swelling   • Meperidine Swelling   • Morphine And Related Swelling   • Penicillins Swelling     \"swelling\" at site.   • Sulfa Antibiotics Swelling   • Sulfadiazine Swelling        Review of Systems   Constitutional: Negative.    HENT: Negative.    Eyes: Negative.    Respiratory: Negative.    Cardiovascular: Negative.    Gastrointestinal: Negative.    Endocrine: Negative.    Genitourinary: Negative.    Musculoskeletal: Positive for arthralgias.   Skin: Negative.    Allergic/Immunologic: Negative.    Neurological: Negative.    Hematological: Negative.    Psychiatric/Behavioral: Negative.         Objective      Physical Exam  Pulse 73   Ht 160 cm (63\")   Wt 73.5 kg (162 lb)   LMP  (LMP Unknown)   SpO2 98%   BMI 28.70 kg/m²     Body mass index is 28.7 kg/m².    General:   Mental Status:  Alert   Appearance: Cooperative, in no acute distress   Build and Nutrition: Well-nourished well-developed female   Orientation: Alert and oriented to person, place and time   Posture: Normal   Gait: Normal    Integument:   Right hip: No skin lesions, no rash, no ecchymosis    Neurologic:   Sensation:    Right foot: Intact to " light touch on the dorsal and plantar aspect   Motor:  Right lower extremity: 5/5 quadriceps, hamstrings, ankle dorsiflexors, and ankle plantar flexors    Vascular:   Right lower extremity: 2+ dorsalis pedis pulse, prompt capillary refill    Lower Extremities:   Right Hip:    Tenderness:  None    Swelling: None    Crepitus:  None    Atrophy:  None    Range of motion:  External Rotation: 30°       Internal Rotation: 30°       Flexion:  100°       Extension:  0°   Instability:  None  Deformities:  None  Functional testing: Negative Stinchfield    No leg length discrepancy      Imaging/Studies      Imaging Results (Last 24 Hours)     Procedure Component Value Units Date/Time    XR Hip With or Without Pelvis 2 - 3 View Right [[id number]] Resulted: 01/11/21 0831     Updated: 01/11/21 0832    Narrative:      Right Hip Radiographs  Indication: right hip pain  Views: low AP pelvis and lateral of the right hip    Comparison: AP view only from 7/7/2018    Findings:   Degenerative changes, with osteophytes at the head neck junction, no acute   bony abnormalities.  No unusual bony features.          Assessment and Plan     Diagnoses and all orders for this visit:    1. Right hip pain (Primary)  -     XR Hip With or Without Pelvis 2 - 3 View Right        1. Right hip pain        I reviewed my findings with patient today.  She has lateral hip pain, with likely bursitis.  I recommended a trial of physical therapy, and a referral was provided.  I will see her back in 2 months to ensure improvement, but sooner for any problems.  Injection may be considered if appropriate in the future.    No follow-ups on file.    Medical Decision Making  Management Options : physical/occupational therapy  Data/Risk: radiology tests and independent visualization of imaging, lab tests, or EMG/NCV      Flakito Chase MD  01/11/21  08:38 BRADLEY Martin disclaimer:  Much of this encounter note is an electronic transcription/translation of spoken  language to printed text. The electronic translation of spoken language may permit erroneous, or at times, nonsensical words or phrases to be inadvertently transcribed; Although I have reviewed the note for such errors, some may still exist.

## 2021-01-12 ENCOUNTER — TREATMENT (OUTPATIENT)
Dept: PHYSICAL THERAPY | Facility: CLINIC | Age: 76
End: 2021-01-12

## 2021-01-12 DIAGNOSIS — M25.559 PAIN, HIP: Primary | ICD-10-CM

## 2021-01-12 PROCEDURE — 97110 THERAPEUTIC EXERCISES: CPT | Performed by: PHYSICAL THERAPIST

## 2021-01-12 PROCEDURE — 97161 PT EVAL LOW COMPLEX 20 MIN: CPT | Performed by: PHYSICAL THERAPIST

## 2021-01-12 NOTE — PROGRESS NOTES
Physical Therapy Initial Evaluation and Plan of Care      Patient: Carol Jean   : 1945  Diagnosis/ICD-10 Code:  Pain, hip [M25.559]  Referring practitioner: Flakito Chase MD  Date of Initial Visit: 2021  Today's Date: 2021  Patient seen for 1 sessions           Subjective Questionnaire: pt to complete LEFS at home and return next visit      Subjective Evaluation    History of Present Illness  Mechanism of injury: The pt reported a 6 week history of progressive lateral R hip pain with no NATALIIA. She reports that pain is worse with prolonged standing, walking, and squatting. She gets relief from laying on the R side and sitting. She takes Tylenol as needed with some relief and also gets relief with ice. She is unable to take NSAIDs. She alternates ice and heat and feels both help somewhat. She reports a similar episode of pain on the R side about 3 years ago and was treated in PT with success. She has recently tried to stretch and feels it helps.    Pain  Current pain rating: 3  At best pain ratin  At worst pain ratin  Location: R hip   Quality: dull ache  Relieving factors: change in position, rest and ice  Aggravating factors: prolonged positioning, standing, ambulation and squatting  Progression: worsening    Social Support  Lives in: one-story house    Hand dominance: right    Diagnostic Tests  X-ray: normal    Treatments  Previous treatment: physical therapy  Patient Goals  Patient goals for therapy: decreased pain, increased motion, increased strength, independence with ADLs/IADLs and return to sport/leisure activities             Objective          Palpation   Left   No palpable tenderness to the gluteus rolo, gluteus medius, piriformis and quadratus lumborum.     Right   No palpable tenderness to the gluteus rolo, piriformis and quadratus lumborum. Tenderness of the gluteus medius.     Tenderness     Left Hip   No tenderness in the greater trochanter.     Right Hip    Tenderness in the greater trochanter (minimal).     Neurological Testing     Sensation     Hip   Left Hip   Intact: light touch    Right Hip   Intact: light touch    Passive Range of Motion   Left Hip   Flexion: 120 degrees   External rotation (90/90): 50 degrees   Internal rotation (90/90): 30 degrees     Right Hip   Flexion: 120 degrees   External rotation (90/90): 50 degrees   Internal rotation (90/90): 30 degrees     Strength/Myotome Testing     Left Hip   Planes of Motion   Flexion: 4+  Extension: 4  Abduction: 4-    Right Hip   Planes of Motion   Flexion: 4-  Extension: 4-  Abduction: 3    Ambulation     Comments   No apparent gait deviations          Assessment & Plan     Assessment  Impairments: abnormal gait, abnormal muscle firing, abnormal or restricted ROM, activity intolerance, impaired balance, impaired physical strength, lacks appropriate home exercise program and pain with function  Assessment details: The patient is a 74 yo female who presented to PT with evolving characteristics of acute R hip pain with low complexity. Signs and symptoms are consistent with glute med tendinopathy. She had very minimal TTP over the greater trochanter and but pain was reproduced with activation of the glute med and with SLS. She had significant ipsilateral weakness of the glute med and used the hip flexors to compensate functionally. She is unable to take NSAIDs so she was encouraged to ice to manage inflammation. She was prescribed an HEP for isometric glute strengthening and for stretching of tight hip mm. I expect the patient to make a timely recovery with skilled PT intervention.     Prognosis: good  Functional Limitations: lifting, walking, uncomfortable because of pain, standing, stooping and unable to perform repetitive tasks  Goals  Plan Goals: Short Term Goals (4 weeks):     1. The patient will be independent and compliant with initial HEP.     2. The patient will report pain at rest 1/10 or less and worst  pain 4/10 or less.    3. The patient will display decreased TTP in the glute med and dec mm tension in the surrounding musculature.    4.  R hip AROM will improve to  55 deg ER, 35 deg IR.    5. The patient will demonstrate inc strength evidenced by MMT as follows: hip abd 4/5, hip ext 4/5, hip ER 4+/5, and hip IR 4+/5.    6. LEFS will improve by 9 points or more.     7. The pt will ambulate 200 feet with no AD and gait pattern free of apparent deviations.        Long Term Goals (8 weeks):     1. The patient will be appropriate for independent management and compliant with progressed HEP.     2. The patient will report pain at rest 0/10 or less and worst pain 2/10 or less.    3. The patient will return to work duties and/or ADLs with no limitations due to hip pain or dysfunction.    4. The patient will return to recreational and community activities with no limitations due to hip pain or dysfunction.      Plan  Therapy options: will be seen for skilled physical therapy services  Planned modality interventions: cryotherapy, electrical stimulation/Russian stimulation, iontophoresis, TENS and thermotherapy (hydrocollator packs)  Planned therapy interventions: ADL retraining, body mechanics training, balance/weight-bearing training, flexibility, functional ROM exercises, gait training, home exercise program, joint mobilization, manual therapy, neuromuscular re-education, postural training, soft tissue mobilization, strengthening, stretching, therapeutic activities and transfer training  Frequency: 1x week  Duration in visits: 8  Duration in weeks: 8  Treatment plan discussed with: patient  Plan details: The pt will likely benefit from TE/TA/NMED to improve strength of the hips, quads, and hamstrings, to improve balance, and to restore normal proprioception. MT will be utilized to improve ROM and jt mobility. Modalities will be used as needed for pain modulation. Dry needling as indicated.         Visit Diagnoses:     ICD-10-CM ICD-9-CM   1. Pain, hip  M25.559 719.45       Timed:  Manual Therapy:    0     mins  68789;  Therapeutic Exercise:    10     mins  82127;     Neuromuscular Francisco:    0    mins  65497;    Therapeutic Activity:     0     mins  27407;     Gait Trainin     mins  15451;     Ultrasound:     0     mins  32940;    Electrical Stimulation:    0     mins  01451 ( );    Untimed:  Electrical Stimulation:    0     mins  08560 ( );  Mechanical Traction:    0     mins  01212;     Timed Treatment:   10   mins   Total Treatment:     25   mins    PT SIGNATURE: Bebeto Waite PT   DATE TREATMENT INITIATED: 2021    Initial Certification  Certification Period: 2021  I certify that the therapy services are furnished while this patient is under my care.  The services outlined above are required by this patient, and will be reviewed every 90 days.     PHYSICIAN: Flakito Chase MD      DATE:     Please sign and return via fax to [phone].. Thank you, [facility].

## 2021-01-19 ENCOUNTER — TREATMENT (OUTPATIENT)
Dept: PHYSICAL THERAPY | Facility: CLINIC | Age: 76
End: 2021-01-19

## 2021-01-19 DIAGNOSIS — K21.9 GASTROESOPHAGEAL REFLUX DISEASE: ICD-10-CM

## 2021-01-19 DIAGNOSIS — M25.559 PAIN, HIP: Primary | ICD-10-CM

## 2021-01-19 PROCEDURE — 97110 THERAPEUTIC EXERCISES: CPT | Performed by: PHYSICAL THERAPIST

## 2021-01-19 PROCEDURE — 97112 NEUROMUSCULAR REEDUCATION: CPT | Performed by: PHYSICAL THERAPIST

## 2021-01-19 RX ORDER — PANTOPRAZOLE SODIUM 40 MG/1
40 TABLET, DELAYED RELEASE ORAL DAILY
Qty: 90 TABLET | Refills: 1 | Status: SHIPPED | OUTPATIENT
Start: 2021-01-19 | End: 2021-07-19

## 2021-01-20 NOTE — PROGRESS NOTES
Physical Therapy Daily Progress Note      Patient: Carol Jean   : 1945  Referring practitioner: Flakito Chase MD  Date of Initial Visit: Type: THERAPY  Noted: 2021  Today's Date: 2021  Patient seen for 2 sessions           Subjective Evaluation    History of Present Illness  Mechanism of injury: The pt stated that her hip pain has been largely unchanged this week but she feels that her exercises have been productive nonetheless. She has been stretching frequently and feels confident with all stretches. Her HEP strengthening exercises have been more challenging and she reported that they may be too difficult.     Pain  Current pain ratin  Location: R hip           Objective   See Exercise, Manual, and Modality Logs for complete treatment.       Assessment & Plan     Assessment  Assessment details: The pt displayed improvement in glute med activation in SLS activities today and was able to maintain a neutral pelvis. She was easily fatigued with exercise, however, and reported increased discomfort in the hip with performance of her standing iso hip abd and standing hip hikes. These were regressed to include seated isometric hip abd and ER along with SL clams and were tolerated much better. Eccentric exercises for glute med and max were also introduced and were tolerated well with no complaints of pain and appropriate mm activation with minimal compensation. A bridge was attempted but proved too challenging and was not added to her HEP. She was encouraged to continue icing.     Plan  Plan details: Assess response to regressed exercises and progress as tolerated.         Visit Diagnoses:    ICD-10-CM ICD-9-CM   1. Pain, hip  M25.559 719.45       Progress per Plan of Care           Timed:  Manual Therapy:    0     mins  71663;  Therapeutic Exercise:    29     mins  16888;     Neuromuscular Francisco:    10    mins  57785;    Therapeutic Activity:     0     mins  45369;     Gait Training:       0     mins  05992;     Ultrasound:     0     mins  91112;    Electrical Stimulation:    0     mins  96951 ( );    Untimed:  Electrical Stimulation:    0     mins  14350 ( );  Mechanical Traction:    0     mins  92510;     Timed Treatment:   39   mins   Total Treatment:     39   mins  Bebeto Waite PT  Physical Therapist

## 2021-01-24 DIAGNOSIS — I10 ESSENTIAL HYPERTENSION: ICD-10-CM

## 2021-01-24 RX ORDER — DILTIAZEM HYDROCHLORIDE 240 MG/1
CAPSULE, EXTENDED RELEASE ORAL
Qty: 30 CAPSULE | Refills: 0 | Status: SHIPPED | OUTPATIENT
Start: 2021-01-24 | End: 2021-02-23 | Stop reason: SDUPTHER

## 2021-01-25 ENCOUNTER — IMMUNIZATION (OUTPATIENT)
Dept: VACCINE CLINIC | Facility: HOSPITAL | Age: 76
End: 2021-01-25

## 2021-01-25 DIAGNOSIS — Z86.711 HISTORY OF PULMONARY EMBOLISM: Primary | ICD-10-CM

## 2021-01-25 PROCEDURE — 91300 HC SARSCOV02 VAC 30MCG/0.3ML IM: CPT | Performed by: INTERNAL MEDICINE

## 2021-01-25 PROCEDURE — 0001A: CPT | Performed by: INTERNAL MEDICINE

## 2021-01-25 NOTE — PROGRESS NOTES
DATE OF VISIT: 1/26/2021    REASON FOR VISIT:   1. DVT while on therapeutic Coumadin 11/23/2012.   2. Pulmonary embolism 09/30/2012.     HISTORY OF PRESENT ILLNESS: The patient is a very pleasant 75 y.o.   female with past medical history significant for massive pulmonary  embolism September 2012 requiring cardiac resuscitation. The patient was placed  on chronic anticoagulation with Coumadin since, however, she presented with  acute DVT of the right lower extremity while on Coumadin 11/23/2012. The  patient was switched to Lovenox 100 micrograms subcu daily since. The patient  was switched from Lovenox to Xarelto 20 mg p.o. daily on 03/15/2013 secondary  to this would be more convenient to the patient in avoiding the daily  injections. The patient is here today in scheduled follow-up visit.     SUBJECTIVE: The patient is here today by herself.  She is complaining of right knee pain.  She had an x-ray done that revealed bone-on-bone.  She is considering right knee replacement with Dr. Chase.    REVIEW OF SYSTEMS: The other 9 systems reviewed by me and negative except as  mentioned in HPI.     PAST MEDICAL HISTORY/SOCIAL HISTORY/FAMILY HISTORY: Unchanged from my prior  documentation done on 11/24/2012.       Current Outpatient Medications:   •  chlorthalidone (HYGROTON) 25 MG tablet, Take 0.5 tablets by mouth Daily., Disp: 45 tablet, Rfl: 3  •  Cholecalciferol (VITAMIN D-3) 5000 units tablet, Take  by mouth Daily., Disp: , Rfl:   •  citalopram (CeleXA) 20 MG tablet, Take 1 tablet by mouth once daily (Patient taking differently: 10 mg.), Disp: 30 tablet, Rfl: 5  •  dilTIAZem (TIAZAC) 240 MG 24 hr capsule, Take 1 capsule by mouth once daily, Disp: 30 capsule, Rfl: 0  •  Glucosamine HCl (GLUCOSAMINE PO), Take 2 tablets by mouth Daily. As directed   , Disp: , Rfl:   •  metoprolol tartrate (LOPRESSOR) 25 MG tablet, Take 1 tablet by mouth twice daily, Disp: 180 tablet, Rfl: 1  •  pantoprazole (PROTONIX) 40 MG EC  "tablet, Take 1 tablet by mouth Daily., Disp: 90 tablet, Rfl: 1  •  potassium chloride 10 MEQ CR tablet, Take 1 tablet by mouth once daily, Disp: 90 tablet, Rfl: 1  •  rivaroxaban (XARELTO) 15 MG tablet, Take 1 tablet by mouth Daily., Disp: 30 tablet, Rfl: 11  •  simvastatin (ZOCOR) 40 MG tablet, TAKE 1 TABLET BY MOUTH AT NIGHT, Disp: 90 tablet, Rfl: 2  •  vitamin C (ASCORBIC ACID) 500 MG tablet, Take 1,000 mg by mouth Daily., Disp: , Rfl:     PHYSICAL EXAMINATION:   /62   Pulse 70   Temp 97.3 °F (36.3 °C) (Temporal)   Resp 16   Ht 160 cm (62.99\")   Wt 73 kg (161 lb)   LMP  (LMP Unknown)   SpO2 95%   BMI 28.53 kg/m²     ECOG1  GENERAL: Age appropriate. No acute distress.   HEENT: Head atraumatic, normocephalic.   NECK: Supple. No JVD. No lymphadenopathy.   LUNGS: Clear to auscultation bilaterally. No wheezing. No rhonchi.   HEART: Regular rate and rhythm. S1, S2, no murmurs.   ABDOMEN: Soft, nontender, nondistended. Bowel sounds positive. No  hepatosplenomegaly.   EXTREMITIES: No clubbing, cyanosis, or edema.   SKIN: No rashes. No purpura.   NEUROLOGIC: Awake and oriented x3. Strength 5 out of 5 in all muscle groups.     Lab on 01/26/2021   Component Date Value Ref Range Status   • WBC 01/26/2021 7.00  3.40 - 10.80 10*3/mm3 Final   • RBC 01/26/2021 4.52  3.77 - 5.28 10*6/mm3 Final   • Hemoglobin 01/26/2021 13.8  12.0 - 15.9 g/dL Final   • Hematocrit 01/26/2021 41.6  34.0 - 46.6 % Final   • RDW 01/26/2021 13.3  12.3 - 15.4 % Final   • MCV 01/26/2021 92.1  79.0 - 97.0 fL Final   • MCH 01/26/2021 30.7  26.6 - 33.0 pg Final   • MCHC 01/26/2021 33.3  31.5 - 35.7 g/dL Final   • MPV 01/26/2021 7.1  6.0 - 12.0 fL Final   • Platelets 01/26/2021 334  140 - 450 10*3/mm3 Final   • Neutrophil % 01/26/2021 56.9  42.7 - 76.0 % Final   • Lymphocyte % 01/26/2021 34.5  19.6 - 45.3 % Final   • Monocyte % 01/26/2021 8.6  5.0 - 12.0 % Final   • Neutrophils, Absolute 01/26/2021 4.00  1.70 - 7.00 10*3/mm3 Final   • " Lymphocytes, Absolute 01/26/2021 2.40  0.70 - 3.10 10*3/mm3 Final   • Monocytes, Absolute 01/26/2021 0.60  0.10 - 0.90 10*3/mm3 Final        ASSESSMENT: The patient is a pleasant 75 y.o. female with DVT/PE.    PROBLEM LIST:  1. Acute right lower extremity DVT while on therapeutic Coumadin 11/23/2012.   2. Pulmonary embolism with cardiac arrest 09/30/2012.   3.  Hypertension  4.  Hypercholesterolemia  5.  Heartburn  6.  Arthritis    PLAN:   1. We will continue Xarelto however the patient could not afford Xarelto 10 mg daily.  She has been getting samples from Dr. Dill office 15 mg daily which is an acceptable alternative.  She will stay on this indefinitely secondary to massive PEs that were unprovoked.   2. I reviewed the blood work results from today I reassured the patient CBC is normal.  3. We will see the patient back in 12 months with repeat labs including CBC and CMP.   4. She will continue Lopressor for hypertension, as well as follow up with Dr. Dill yearly for cardiology care.   5.  We'll continue Zocor 20 g daily for hypercholesterolemia  6.  We'll continue Protonix 40 mg daily for heartburn  7.  Regarding her potential right knee replacement I recommend to stop Xarelto 2 days prior to the procedure and resume as soon as she is cleared by her surgeon.  Amanda Ashby MD  1/26/2021

## 2021-01-26 ENCOUNTER — LAB (OUTPATIENT)
Dept: LAB | Facility: HOSPITAL | Age: 76
End: 2021-01-26

## 2021-01-26 ENCOUNTER — OFFICE VISIT (OUTPATIENT)
Dept: ONCOLOGY | Facility: CLINIC | Age: 76
End: 2021-01-26

## 2021-01-26 VITALS
BODY MASS INDEX: 28.53 KG/M2 | RESPIRATION RATE: 16 BRPM | HEIGHT: 63 IN | OXYGEN SATURATION: 95 % | DIASTOLIC BLOOD PRESSURE: 62 MMHG | WEIGHT: 161 LBS | HEART RATE: 70 BPM | SYSTOLIC BLOOD PRESSURE: 105 MMHG | TEMPERATURE: 97.3 F

## 2021-01-26 DIAGNOSIS — Z86.711 HISTORY OF PULMONARY EMBOLISM: ICD-10-CM

## 2021-01-26 DIAGNOSIS — I26.99 OTHER ACUTE PULMONARY EMBOLISM WITHOUT ACUTE COR PULMONALE (HCC): Primary | ICD-10-CM

## 2021-01-26 LAB
ALBUMIN SERPL-MCNC: 3.9 G/DL (ref 3.5–5.2)
ALBUMIN/GLOB SERPL: 1.4 G/DL
ALP SERPL-CCNC: 89 U/L (ref 39–117)
ALT SERPL W P-5'-P-CCNC: 10 U/L (ref 1–33)
ANION GAP SERPL CALCULATED.3IONS-SCNC: 11 MMOL/L (ref 5–15)
AST SERPL-CCNC: 19 U/L (ref 1–32)
BILIRUB SERPL-MCNC: 0.8 MG/DL (ref 0–1.2)
BUN SERPL-MCNC: 17 MG/DL (ref 8–23)
BUN/CREAT SERPL: 14.9 (ref 7–25)
CALCIUM SPEC-SCNC: 9.1 MG/DL (ref 8.6–10.5)
CHLORIDE SERPL-SCNC: 100 MMOL/L (ref 98–107)
CO2 SERPL-SCNC: 30 MMOL/L (ref 22–29)
CREAT SERPL-MCNC: 1.14 MG/DL (ref 0.57–1)
ERYTHROCYTE [DISTWIDTH] IN BLOOD BY AUTOMATED COUNT: 13.3 % (ref 12.3–15.4)
GFR SERPL CREATININE-BSD FRML MDRD: 46 ML/MIN/1.73
GLOBULIN UR ELPH-MCNC: 2.7 GM/DL
GLUCOSE SERPL-MCNC: 106 MG/DL (ref 65–99)
HCT VFR BLD AUTO: 41.6 % (ref 34–46.6)
HGB BLD-MCNC: 13.8 G/DL (ref 12–15.9)
LYMPHOCYTES # BLD AUTO: 2.4 10*3/MM3 (ref 0.7–3.1)
LYMPHOCYTES NFR BLD AUTO: 34.5 % (ref 19.6–45.3)
MCH RBC QN AUTO: 30.7 PG (ref 26.6–33)
MCHC RBC AUTO-ENTMCNC: 33.3 G/DL (ref 31.5–35.7)
MCV RBC AUTO: 92.1 FL (ref 79–97)
MONOCYTES # BLD AUTO: 0.6 10*3/MM3 (ref 0.1–0.9)
MONOCYTES NFR BLD AUTO: 8.6 % (ref 5–12)
NEUTROPHILS NFR BLD AUTO: 4 10*3/MM3 (ref 1.7–7)
NEUTROPHILS NFR BLD AUTO: 56.9 % (ref 42.7–76)
PLATELET # BLD AUTO: 334 10*3/MM3 (ref 140–450)
PMV BLD AUTO: 7.1 FL (ref 6–12)
POTASSIUM SERPL-SCNC: 3.8 MMOL/L (ref 3.5–5.2)
PROT SERPL-MCNC: 6.6 G/DL (ref 6–8.5)
RBC # BLD AUTO: 4.52 10*6/MM3 (ref 3.77–5.28)
SODIUM SERPL-SCNC: 141 MMOL/L (ref 136–145)
WBC # BLD AUTO: 7 10*3/MM3 (ref 3.4–10.8)

## 2021-01-26 PROCEDURE — 99214 OFFICE O/P EST MOD 30 MIN: CPT | Performed by: INTERNAL MEDICINE

## 2021-01-26 PROCEDURE — 36415 COLL VENOUS BLD VENIPUNCTURE: CPT

## 2021-01-26 PROCEDURE — 80053 COMPREHEN METABOLIC PANEL: CPT

## 2021-01-26 PROCEDURE — 85025 COMPLETE CBC W/AUTO DIFF WBC: CPT

## 2021-02-02 ENCOUNTER — TELEPHONE (OUTPATIENT)
Dept: ORTHOPEDICS | Facility: OTHER | Age: 76
End: 2021-02-02

## 2021-02-08 ENCOUNTER — OFFICE VISIT (OUTPATIENT)
Dept: ORTHOPEDIC SURGERY | Facility: CLINIC | Age: 76
End: 2021-02-08

## 2021-02-08 VITALS — HEIGHT: 63 IN | BODY MASS INDEX: 28.53 KG/M2 | HEART RATE: 68 BPM | OXYGEN SATURATION: 98 % | WEIGHT: 161 LBS

## 2021-02-08 DIAGNOSIS — M17.11 PRIMARY OSTEOARTHRITIS OF RIGHT KNEE: Primary | ICD-10-CM

## 2021-02-08 PROCEDURE — 99212 OFFICE O/P EST SF 10 MIN: CPT | Performed by: ORTHOPAEDIC SURGERY

## 2021-02-08 NOTE — PROGRESS NOTES
[facility] [place]    Subjective     Chief Complaint   Patient presents with   • Follow-up     3 month recheck - Primary osteoarthritis of right knee        HPI  Ms. Carol Jean is a 75-year-old female who presents to Orthopedic clinic for follow-up of her right knee pain. At the last visit, on 11/04/2020, she was diagnosed with advanced right knee arthritis. She expressed that she was not interested in surgical intervention. She does has a significant history of DVT and pulmonary embolus in the past. The knee has caused her problems since 2012 with a history of blood clots, and embolism. She added that she has attended physical therapy for her right hip bursitis, and had a good response to therapy. Ms. Jean inquired about a knee arthroplasty, and expressed concern with the surgery because of her history of blood clots. She states that Dr. Ashby thinks she would be fine with the surgery.    Ms. Jean had a cortisone injection 3 months ago. She explains that it took 10 days for her to begin feeling relief, and that she currently still has pain relief. She adds that the knee was popping quite a bit, but this has since resolved.      I have reviewed the following portions of the patient's history and agree with: History of Present Illness and Review of Systems    Patient Active Problem List   Diagnosis   • History of pulmonary embolism   • Hypertension   • Acute pulmonary embolism (CMS/HCC)   • DVT (deep venous thrombosis) (CMS/HCC)   • Circadian rhythm sleep disorder, delayed sleep phase type   • Psychophysiological insomnia   • Hyperlipidemia   • Calculus of kidney   • Cobalamin deficiency   • History of DVT (deep vein thrombosis)     Past Medical History:   Diagnosis Date   • Acute deep vein thrombosis of lower extremity (CMS/HCC)    • Acute pulmonary embolism (CMS/HCC) 11/22/2016    Bilateral, 09/30/2012 Initiation of CPR per family. EMS transport to [facility]  Emergency Room. Restoration of rhythm and blood pressure at [facility] Emergency Room. “Shock” manifest by hypotension and multiorgan failure:  Transient hepatic dysfunction, resolved.  Transient nonoliguric ATN, resolved.  Transient metabolic acidosis, resolved.  Pressor support. Bilateral    • Arthritis    • Bladder problem    • Blood clotting disorder (CMS/[facility])    • Bone pain    • DVT (deep venous thrombosis) (CMS/[facility])    • Easy bruising    • H/O bone density study 2012   • H/O colonoscopy 2012   • H/O mammogram 2012   • HBP (high blood pressure)    • History of blood transfusion    • Hypertension 2016   • Kidney stones    • Synovial cyst of popliteal space    • Upper gastrointestinal bleeding     gastritis related to lytic therapy and heparins, resolved: a. “Hemorrhagic gastritis.”   • Vitamin B12 deficiency       Past Surgical History:   Procedure Laterality Date   • BILATERAL BREAST REDUCTION     • CATARACT EXTRACTION, BILATERAL     •  SECTION     •  SECTION     • COLONOSCOPY     • KIDNEY STONE SURGERY      Nephrolithiasis with lithotripsy.   • OTHER SURGICAL HISTORY      barry filter placement in Vena Cava   • REDUCTION MAMMAPLASTY Bilateral    • TONSILLECTOMY        Family History   Problem Relation Age of Onset   • Stroke Mother    • Hypertension Mother    • Arthritis Mother    • Hyperlipidemia Mother    • Heart attack Father    • COPD Father    • Thyroid disease Father    • Heart disease Father    • Stroke Other    • Coronary artery disease Other    • COPD Other    • Stroke Other    • COPD Other    • Breast cancer Maternal Aunt    • Ovarian cancer Neg Hx      Social History     Socioeconomic History   • Marital status:      Spouse name: Not on file   • Number of children: Not on file   • Years of education: Not on file   • Highest education level: Not on file   Tobacco Use   • Smoking status: Never Smoker   • Smokeless tobacco: Never Used  "  Substance and Sexual Activity   • Alcohol use: Yes     Frequency: 2-4 times a month     Comment: occas   • Drug use: No   • Sexual activity: Defer      Current Outpatient Medications on File Prior to Visit   Medication Sig Dispense Refill   • chlorthalidone (HYGROTON) 25 MG tablet Take 0.5 tablets by mouth Daily. 45 tablet 3   • Cholecalciferol (VITAMIN D-3) 5000 units tablet Take  by mouth Daily.     • citalopram (CeleXA) 20 MG tablet Take 1 tablet by mouth once daily (Patient taking differently: 10 mg.) 30 tablet 5   • dilTIAZem (TIAZAC) 240 MG 24 hr capsule Take 1 capsule by mouth once daily 30 capsule 0   • Glucosamine HCl (GLUCOSAMINE PO) Take 2 tablets by mouth Daily. As directed         • metoprolol tartrate (LOPRESSOR) 25 MG tablet Take 1 tablet by mouth twice daily 180 tablet 1   • pantoprazole (PROTONIX) 40 MG EC tablet Take 1 tablet by mouth Daily. 90 tablet 1   • potassium chloride 10 MEQ CR tablet Take 1 tablet by mouth once daily 90 tablet 1   • rivaroxaban (XARELTO) 15 MG tablet Take 1 tablet by mouth Daily. 30 tablet 11   • simvastatin (ZOCOR) 40 MG tablet TAKE 1 TABLET BY MOUTH AT NIGHT 90 tablet 2   • vitamin C (ASCORBIC ACID) 500 MG tablet Take 1,000 mg by mouth Daily.       No current facility-administered medications on file prior to visit.       Allergies   Allergen Reactions   • Erythromycin Swelling     \"tongue swelling\".   • Codeine Sulfate Swelling   • Meperidine Swelling   • Morphine And Related Swelling   • Penicillins Swelling     \"swelling\" at site.   • Sulfa Antibiotics Swelling   • Sulfadiazine Swelling        Review of Systems   Constitutional: Negative.    HENT: Negative.    Eyes: Negative.    Respiratory: Negative.    Cardiovascular: Negative.    Gastrointestinal: Negative.    Endocrine: Negative.    Genitourinary: Negative.    Musculoskeletal: Positive for arthralgias.   Skin: Negative.    Allergic/Immunologic: Negative.    Neurological: Negative.    Hematological: Negative.  " "  Psychiatric/Behavioral: Negative.         Objective      Physical Exam  Pulse 68   Ht 160 cm (62.99\")   Wt 73 kg (161 lb)   LMP  (LMP Unknown)   SpO2 98%   BMI 28.53 kg/m²     Body mass index is 28.53 kg/m².      General:   • Mental Status:  Alert   • Appearance: Cooperative, in no acute distress   • Build and Nutrition: Well-nourished and well-developed female.  • Orientation: Alert and oriented to person, place and time   • Posture: Normal   • Gait: Normal    Integument  • Right knee: No skin lesions, rash, or ecchymosis.    Lower Extremities  • Right Knee:       • Tenderness: No medial and lateral joint line tenderness.       • Swelling: None       • Effusion: 1+      • Crepitus: Positive      • Atrophy: None      • Range of motion:        • Extension: 5°        • Flexion: 125°       • Instability: No varus or valgus laxity. Negative anterior drawer.      • Deformities: Mild valgus.       Assessment and Plan     Diagnoses and all orders for this visit:    1. Primary osteoarthritis of right knee (Primary)        1. Primary osteoarthritis of right knee         is still experiencing relief from her previous steroid injection. Therefore, we did not perform a repeat injection today. Regarding her question about undergoing a knee arthroplasty, I discussed with Ms. Jean that she will know if and when the time is right. I have advised her that an arthroplasty would be warranted if she is experiencing constant right knee pain, is unable to perform her desired activities, and has tried all other treatment options without success.    The patient will follow up in 6 months or sooner if she has any problems.    Return in about 6 months (around 8/8/2021).      Scribed for Flakito Chase MD by BEBETO IRELAND.  2/8/2021  11:19 BRADLEY Chase MD  02/08/21  11:33 BRADLEY Martin disclaimer:  Much of this encounter note is an electronic transcription/translation of spoken language to printed text. The " electronic translation of spoken language may permit erroneous, or at times, nonsensical words or phrases to be inadvertently transcribed; Although I have reviewed the note for such errors, some may still exist.

## 2021-02-09 ENCOUNTER — DOCUMENTATION (OUTPATIENT)
Dept: PHYSICAL THERAPY | Facility: CLINIC | Age: 76
End: 2021-02-09

## 2021-02-09 DIAGNOSIS — M25.559 PAIN, HIP: Primary | ICD-10-CM

## 2021-02-09 NOTE — PROGRESS NOTES
Discharge Summary  Discharge Summary from Physical Therapy Report      Date of initial PT visit: 1/19/21    Number of Visits: 2     Goals: unknown    Discharge Plan: Continue with current home exercise program as instructed    Comments: The pt was evaluated and treated for one additional visit for R hip pain. She cancelled the remainder of her visits and reported to her orthopedist that her hip was feeling better.     Date of Discharge: 2/9/21        Bebeto Waite PT  Physical Therapist

## 2021-02-15 ENCOUNTER — IMMUNIZATION (OUTPATIENT)
Dept: VACCINE CLINIC | Facility: HOSPITAL | Age: 76
End: 2021-02-15

## 2021-02-15 PROCEDURE — 91300 HC SARSCOV02 VAC 30MCG/0.3ML IM: CPT | Performed by: INTERNAL MEDICINE

## 2021-02-15 PROCEDURE — 0002A: CPT | Performed by: INTERNAL MEDICINE

## 2021-02-23 DIAGNOSIS — I10 ESSENTIAL HYPERTENSION: ICD-10-CM

## 2021-02-23 RX ORDER — DILTIAZEM HYDROCHLORIDE 240 MG/1
240 CAPSULE, EXTENDED RELEASE ORAL DAILY
Qty: 30 CAPSULE | Refills: 5 | Status: SHIPPED | OUTPATIENT
Start: 2021-02-23 | End: 2021-08-25 | Stop reason: SDUPTHER

## 2021-02-23 NOTE — TELEPHONE ENCOUNTER
Caller: Carol Jean    Relationship: Self    Best call back number: [phone]     Medication needed:   Requested Prescriptions     Pending Prescriptions Disp Refills   • dilTIAZem (TIAZAC) 240 MG 24 hr capsule 30 capsule 0     Sig: Take 1 capsule by mouth Daily.         Does the patient have less than a 3 day supply:  [] Yes  [x] No    What is the patient's preferred pharmacy: [address] [phone] [facility] [phone]

## 2021-02-24 ENCOUNTER — OFFICE VISIT (OUTPATIENT)
Dept: INTERNAL MEDICINE | Facility: CLINIC | Age: 76
End: 2021-02-24

## 2021-02-24 VITALS
HEIGHT: 62 IN | TEMPERATURE: 98 F | SYSTOLIC BLOOD PRESSURE: 120 MMHG | DIASTOLIC BLOOD PRESSURE: 60 MMHG | HEART RATE: 64 BPM | BODY MASS INDEX: 29.88 KG/M2 | RESPIRATION RATE: 18 BRPM | WEIGHT: 162.4 LBS

## 2021-02-24 DIAGNOSIS — Z78.0 POSTMENOPAUSAL: ICD-10-CM

## 2021-02-24 DIAGNOSIS — Z00.00 HEALTHCARE MAINTENANCE: ICD-10-CM

## 2021-02-24 DIAGNOSIS — K21.9 GASTROESOPHAGEAL REFLUX DISEASE WITHOUT ESOPHAGITIS: ICD-10-CM

## 2021-02-24 DIAGNOSIS — E78.5 HYPERLIPIDEMIA, UNSPECIFIED HYPERLIPIDEMIA TYPE: ICD-10-CM

## 2021-02-24 DIAGNOSIS — Z00.00 MEDICARE ANNUAL WELLNESS VISIT, SUBSEQUENT: Primary | ICD-10-CM

## 2021-02-24 DIAGNOSIS — R82.998 LEUKOCYTES IN URINE: ICD-10-CM

## 2021-02-24 DIAGNOSIS — I10 ESSENTIAL HYPERTENSION: ICD-10-CM

## 2021-02-24 LAB
ALBUMIN SERPL-MCNC: 4.3 G/DL (ref 3.5–5.2)
ALBUMIN/GLOB SERPL: 1.7 G/DL
ALP SERPL-CCNC: 87 U/L (ref 39–117)
ALT SERPL W P-5'-P-CCNC: 6 U/L (ref 1–33)
ANION GAP SERPL CALCULATED.3IONS-SCNC: 11.7 MMOL/L (ref 5–15)
AST SERPL-CCNC: 17 U/L (ref 1–32)
BILIRUB BLD-MCNC: NEGATIVE MG/DL
BILIRUB SERPL-MCNC: 0.6 MG/DL (ref 0–1.2)
BUN SERPL-MCNC: 22 MG/DL (ref 8–23)
BUN/CREAT SERPL: 16.1 (ref 7–25)
CALCIUM SPEC-SCNC: 9.7 MG/DL (ref 8.6–10.5)
CHLORIDE SERPL-SCNC: 98 MMOL/L (ref 98–107)
CHOLEST SERPL-MCNC: 166 MG/DL (ref 0–200)
CLARITY, POC: ABNORMAL
CO2 SERPL-SCNC: 31.3 MMOL/L (ref 22–29)
COLOR UR: YELLOW
CREAT SERPL-MCNC: 1.37 MG/DL (ref 0.57–1)
EXPIRATION DATE: ABNORMAL
GFR SERPL CREATININE-BSD FRML MDRD: 38 ML/MIN/1.73
GLOBULIN UR ELPH-MCNC: 2.5 GM/DL
GLUCOSE SERPL-MCNC: 96 MG/DL (ref 65–99)
GLUCOSE UR STRIP-MCNC: NEGATIVE MG/DL
HCV AB SER DONR QL: NORMAL
HDLC SERPL-MCNC: 72 MG/DL (ref 40–60)
KETONES UR QL: NEGATIVE
LDLC SERPL CALC-MCNC: 78 MG/DL (ref 0–100)
LDLC/HDLC SERPL: 1.07 {RATIO}
LEUKOCYTE EST, POC: ABNORMAL
Lab: ABNORMAL
NITRITE UR-MCNC: NEGATIVE MG/ML
PH UR: 6 [PH] (ref 5–8)
POTASSIUM SERPL-SCNC: 3.4 MMOL/L (ref 3.5–5.2)
PROT SERPL-MCNC: 6.8 G/DL (ref 6–8.5)
PROT UR STRIP-MCNC: ABNORMAL MG/DL
RBC # UR STRIP: ABNORMAL /UL
SODIUM SERPL-SCNC: 141 MMOL/L (ref 136–145)
SP GR UR: 1.02 (ref 1–1.03)
TRIGL SERPL-MCNC: 85 MG/DL (ref 0–150)
TSH SERPL DL<=0.05 MIU/L-ACNC: 2.17 UIU/ML (ref 0.27–4.2)
UROBILINOGEN UR QL: NORMAL
VLDLC SERPL-MCNC: 16 MG/DL (ref 5–40)

## 2021-02-24 PROCEDURE — G0439 PPPS, SUBSEQ VISIT: HCPCS | Performed by: INTERNAL MEDICINE

## 2021-02-24 PROCEDURE — 87086 URINE CULTURE/COLONY COUNT: CPT | Performed by: INTERNAL MEDICINE

## 2021-02-24 PROCEDURE — 80061 LIPID PANEL: CPT | Performed by: INTERNAL MEDICINE

## 2021-02-24 PROCEDURE — 84443 ASSAY THYROID STIM HORMONE: CPT | Performed by: INTERNAL MEDICINE

## 2021-02-24 PROCEDURE — 36415 COLL VENOUS BLD VENIPUNCTURE: CPT | Performed by: INTERNAL MEDICINE

## 2021-02-24 PROCEDURE — 81003 URINALYSIS AUTO W/O SCOPE: CPT | Performed by: INTERNAL MEDICINE

## 2021-02-24 PROCEDURE — 80053 COMPREHEN METABOLIC PANEL: CPT | Performed by: INTERNAL MEDICINE

## 2021-02-24 PROCEDURE — 86803 HEPATITIS C AB TEST: CPT | Performed by: INTERNAL MEDICINE

## 2021-02-24 NOTE — PROGRESS NOTES
The ABCs of the Annual Wellness Visit  Subsequent Medicare Wellness Visit    Chief Complaint   Patient presents with   • Medicare Wellness-subsequent       Subjective   History of Present Illness:  Carol Jean is a 75 y.o. female who presents for a Subsequent Medicare Wellness Visit.    HEALTH RISK ASSESSMENT    Recent Hospitalizations:  No hospitalization(s) within the last year.    Current Medical Providers:  Patient Care Team:  Flakito Chaudhary MD as PCP - General (Internal Medicine)    Smoking Status:  Social History     Tobacco Use   Smoking Status Never Smoker   Smokeless Tobacco Never Used       Alcohol Consumption:  Social History     Substance and Sexual Activity   Alcohol Use Yes   • Frequency: 2-4 times a month    Comment: occas       Depression Screen:   PHQ-2/PHQ-9 Depression Screening 2/24/2021   Little interest or pleasure in doing things 1   Feeling down, depressed, or hopeless 0   Trouble falling or staying asleep, or sleeping too much -   Feeling tired or having little energy -   Poor appetite or overeating -   Feeling bad about yourself - or that you are a failure or have let yourself or your family down -   Trouble concentrating on things, such as reading the newspaper or watching television -   Moving or speaking so slowly that other people could have noticed. Or the opposite - being so fidgety or restless that you have been moving around a lot more than usual -   Thoughts that you would be better off dead, or of hurting yourself in some way -   Total Score 1   If you checked off any problems, how difficult have these problems made it for you to do your work, take care of things at home, or get along with other people? -       Fall Risk Screen:  [facility].    Health Habits and Functional and Cognitive Screening:  Functional & Cognitive Status 2/24/2021   Do you have difficulty preparing food and eating? No   Do you have difficulty bathing  yourself, getting dressed or grooming yourself? No   Do you have difficulty using the toilet? No   Do you have difficulty moving around from place to place? No   Do you have trouble with steps or getting out of a bed or a chair? Yes   Current Diet Well Balanced Diet   Dental Exam Up to date   Eye Exam Up to date   Exercise (times per week) 0 times per week   Current Exercise Activities Include None   Do you need help using the phone?  No   Are you deaf or do you have serious difficulty hearing?  No   Do you need help with transportation? No   Do you need help shopping? No   Do you need help preparing meals?  No   Do you need help with housework?  No   Do you need help with laundry? No   Do you need help taking your medications? No   Do you need help managing money? No   Do you ever drive or ride in a car without wearing a seat belt? No   Have you felt unusual stress, anger or loneliness in the last month? No   Who do you live with? Spouse   If you need help, do you have trouble finding someone available to you? No   Have you been bothered in the last four weeks by sexual problems? No   Do you have difficulty concentrating, remembering or making decisions? Yes         Does the patient have evidence of cognitive impairment? No    Asprin use counseling:Does not need ASA (and currently is not on it)    Age-appropriate Screening Schedule:  Refer to the list below for future screening recommendations based on patient's age, sex and/or medical conditions. Orders for these recommended tests are listed in the plan section. The patient has been provided with a written plan.    Health Maintenance   Topic Date Due   • DXA SCAN  1945   • TDAP/TD VACCINES (1 - Tdap) 10/22/1964   • ZOSTER VACCINE (1 of 2) 10/22/1995   • INFLUENZA VACCINE  12/31/2021 (Originally 8/1/2020)   • LIPID PANEL  06/04/2021   • COLONOSCOPY  11/30/2022   • MAMMOGRAM  12/09/2022          The following portions of the patient's history were reviewed and  updated as appropriate: allergies, current medications, past family history, past medical history, past social history, past surgical history and problem list.    Outpatient Medications Prior to Visit   Medication Sig Dispense Refill   • chlorthalidone (HYGROTON) 25 MG tablet Take 0.5 tablets by mouth Daily. 45 tablet 3   • Cholecalciferol (VITAMIN D-3) 5000 units tablet Take  by mouth Daily.     • citalopram (CeleXA) 20 MG tablet Take 1 tablet by mouth once daily (Patient taking differently: 10 mg.) 30 tablet 5   • dilTIAZem (TIAZAC) 240 MG 24 hr capsule Take 1 capsule by mouth Daily. 30 capsule 5   • Glucosamine HCl (GLUCOSAMINE PO) Take 2 tablets by mouth Daily. As directed         • metoprolol tartrate (LOPRESSOR) 25 MG tablet Take 1 tablet by mouth twice daily 180 tablet 1   • pantoprazole (PROTONIX) 40 MG EC tablet Take 1 tablet by mouth Daily. 90 tablet 1   • potassium chloride 10 MEQ CR tablet Take 1 tablet by mouth once daily 90 tablet 1   • rivaroxaban (XARELTO) 15 MG tablet Take 1 tablet by mouth Daily. 30 tablet 11   • simvastatin (ZOCOR) 40 MG tablet TAKE 1 TABLET BY MOUTH AT NIGHT 90 tablet 2   • vitamin C (ASCORBIC ACID) 500 MG tablet Take 1,000 mg by mouth Daily.       No facility-administered medications prior to visit.        Patient Active Problem List   Diagnosis   • History of pulmonary embolism   • Hypertension   • Acute pulmonary embolism (CMS/HCC)   • DVT (deep venous thrombosis) (CMS/HCC)   • Circadian rhythm sleep disorder, delayed sleep phase type   • Psychophysiological insomnia   • Hyperlipidemia   • Calculus of kidney   • Cobalamin deficiency   • History of DVT (deep vein thrombosis)       Advanced Care Planning:  ACP discussion was held with the patient during this visit. Patient has an advance directive in EMR which is still valid.     Review of Systems    Compared to one year ago, the patient feels her physical health is the same.  Compared to one year ago, the patient feels her  "mental health is the same.    Reviewed chart for potential of high risk medication in the elderly: yes  Reviewed chart for potential of harmful drug interactions in the elderly:yes    Objective         Vitals:    02/24/21 0806   BP: 120/60   BP Location: Left arm   Patient Position: Sitting   Cuff Size: Adult   Pulse: 64   Resp: 18   Temp: 98 °F (36.7 °C)   TempSrc: Infrared   Weight: 73.7 kg (162 lb 6.4 oz)   Height: 156.2 cm (61.5\")   PainSc:   4   PainLoc: Knee       Body mass index is 30.19 kg/m².  Discussed the patient's BMI with her. The BMI is above average; BMI management plan is completed.    Physical Exam Finger Rub Hearing{Test (right ear):passed  Finger Rub Hearing{Test (left ear):passed              Assessment/Plan   Medicare Risks and Personalized Health Plan  CMS Preventative Services Quick Reference  Immunizations Discussed/Encouraged (specific immunizations; adacel Tdap, Pneumococcal 23 and Shingrix )  Obesity/Overweight   Osteoprorosis Risk    The above risks/problems have been discussed with the patient.  Pertinent information has been shared with the patient in the After Visit Summary.  Follow up plans and orders are seen below in the Assessment/Plan Section.    Diagnoses and all orders for this visit:    1. Medicare annual wellness visit, subsequent (Primary)    2. Essential hypertension  -     Comprehensive Metabolic Panel  -     TSH  -     POC Urinalysis Dipstick, Automated    3. Gastroesophageal reflux disease without esophagitis    4. Hyperlipidemia, unspecified hyperlipidemia type  -     Comprehensive Metabolic Panel  -     Lipid Panel    5. Postmenopausal  -     DEXA Bone Density Axial; Future    6. Healthcare maintenance  -     Hepatitis C Antibody            An After Visit Summary and PPPS were given to the patient.             "

## 2021-02-24 NOTE — PROGRESS NOTES
Chief Complaint   Patient presents with   • Medicare Wellness-subsequent       History of Present Illness      The patient presents for a follow-up related to hypertension. The patient reports that she has had no headaches, chest pain, dyspnea, edema, syncope, blurred vision or palpitations. She states that she is taking her medication as prescribed. She is not having medication side effects.    The patient presents for a follow-up related to hyperlipidemia. She is following a low fat diet. She reports that she is exercising. She is taking simvastatin. The patient is taking her medication as prescribed. She reports no medication side effects, including muscle cramps, abdominal pain, headaches or weakness. She denies orthopnea, paroxysmal nocturnal dyspnea or dyspnea on exertion.    The patient presents for a follow-up related to GERD. The patient is on pantoprazole for her gastroesophageal reflux. The medication is taken on a regular basis and gives complete relief of the symptoms. She reports no abdominal pain, belching, diarrhea, dysphagia, early satiety, heartburn, hoarseness, nausea, odynophagia, rectal bleeding, vomiting or weight loss. The GERD has no known aggravating factors.    Review of Systems    CONSTITUTIONAL- Denies Fever, Chills, Sweats, Fatigue, Weakness or Malaise.    HENT- Denies Nasal Discharge, Sore Throat, Ear Pain, Ear Drainage, Sinus Pain, Nasal Congestion, Decreased Hearing or Tinnitus.    GASTROINTESTINAL- Denies Constipation.    PULMONARY- Denies Wheezing, Sputum Production, Cough, Hemoptysis or Pleuritic Chest Pain.    CARDIOVASCULAR- Denies Claudication or Irregular Heart Beat.    Medications      Current Outpatient Medications:   •  chlorthalidone (HYGROTON) 25 MG tablet, Take 0.5 tablets by mouth Daily., Disp: 45 tablet, Rfl: 3  •  Cholecalciferol (VITAMIN D-3) 5000 units tablet, Take  by mouth Daily., Disp: , Rfl:   •  citalopram (CeleXA) 20 MG tablet, Take 1 tablet by mouth once daily  "(Patient taking differently: 10 mg.), Disp: 30 tablet, Rfl: 5  •  dilTIAZem (TIAZAC) 240 MG 24 hr capsule, Take 1 capsule by mouth Daily., Disp: 30 capsule, Rfl: 5  •  Glucosamine HCl (GLUCOSAMINE PO), Take 2 tablets by mouth Daily. As directed   , Disp: , Rfl:   •  metoprolol tartrate (LOPRESSOR) 25 MG tablet, Take 1 tablet by mouth twice daily, Disp: 180 tablet, Rfl: 1  •  pantoprazole (PROTONIX) 40 MG EC tablet, Take 1 tablet by mouth Daily., Disp: 90 tablet, Rfl: 1  •  potassium chloride 10 MEQ CR tablet, Take 1 tablet by mouth once daily, Disp: 90 tablet, Rfl: 1  •  rivaroxaban (XARELTO) 15 MG tablet, Take 1 tablet by mouth Daily., Disp: 30 tablet, Rfl: 11  •  simvastatin (ZOCOR) 40 MG tablet, TAKE 1 TABLET BY MOUTH AT NIGHT, Disp: 90 tablet, Rfl: 2  •  vitamin C (ASCORBIC ACID) 500 MG tablet, Take 1,000 mg by mouth Daily., Disp: , Rfl:      Allergies    Allergies   Allergen Reactions   • Erythromycin Swelling     \"tongue swelling\".   • Codeine Sulfate Swelling   • Meperidine Swelling   • Morphine And Related Swelling   • Penicillins Swelling     \"swelling\" at site.   • Sulfa Antibiotics Swelling   • Sulfadiazine Swelling       Problem List    Patient Active Problem List   Diagnosis   • History of pulmonary embolism   • Hypertension   • Acute pulmonary embolism (CMS/HCC)   • DVT (deep venous thrombosis) (CMS/HCC)   • Circadian rhythm sleep disorder, delayed sleep phase type   • Psychophysiological insomnia   • Hyperlipidemia   • Calculus of kidney   • Cobalamin deficiency   • History of DVT (deep vein thrombosis)       Medications, Allergies, Problems List and Past History were reviewed and updated.    Physical Examination    /60 (BP Location: Left arm, Patient Position: Sitting, Cuff Size: Adult)   Pulse 64   Temp 98 °F (36.7 °C) (Infrared)   Resp 18   Ht 156.2 cm (61.5\")   Wt 73.7 kg (162 lb 6.4 oz)   LMP  (LMP Unknown)   Breastfeeding No   BMI 30.19 kg/m²     HEENT: Facies- Within normal " limits. Lids- Normal bilaterally. Conjunctiva- Clear bilaterally. Sclera- Anicteric bilaterally.    Neck: Thyroid- non enlarged, symmetric and has no nodules. No bruits are detected. ROM- Normal Range of Motion with no rigidity.    Lungs: Auscultation- Clear to auscultation bilaterally. There are no retractions, clubbing or cyanosis. The Expiratory to Inspiratory ratio is equal.    Cardiovascular: There are no carotid bruits. Heart- Normal Rate with Regular rhythm and no murmurs. There are no gallops. There are no rubs. In the lower extremities there is no edema. The upper extremities do not have edema.    Abdomen: Soft, benign, non-tender with no masses, hernias, organomegaly or scars.    Impression and Assessment    Essential Hypertension.    Hyperlipidemia.    Gastroesophageal Reflux Disease.    Plan    Gastroesophageal Reflux Disease Plan: The patient was instructed to continue the current medications.    Essential Hypertension Plan: The patient was instructed to continue the current medications.    Hyperlipidemia Plan: She was instructed to eat a low fat diet. She was encouraged to exercise daily. The patient was instructed to continue the current medications.    Diagnoses and all orders for this visit:    1. Medicare annual wellness visit, subsequent (Primary)    2. Essential hypertension  -     Comprehensive Metabolic Panel  -     TSH  -     POC Urinalysis Dipstick, Automated    3. Gastroesophageal reflux disease without esophagitis    4. Hyperlipidemia, unspecified hyperlipidemia type  -     Comprehensive Metabolic Panel  -     Lipid Panel    5. Postmenopausal  -     DEXA Bone Density Axial; Future    6. Healthcare maintenance  -     Hepatitis C Antibody        Return to Office    The patient was instructed to return for follow-up in 6 months. The patient was instructed to return sooner if the condition changes, worsens, or does not resolve.

## 2021-02-24 NOTE — PATIENT INSTRUCTIONS
Medicare Wellness  Personal Prevention Plan of Service     Date of Office Visit:  2021  Encounter Provider:  Flakito Chaudhary MD  Place of Service:  [facility]  Patient Name: Carol Jean  :  1945    As part of the Medicare Wellness portion of your visit today, we are providing you with this personalized preventive plan of services (PPPS). This plan is based upon recommendations of the [place] Preventive Services Task Force (USPSTF) and the Advisory Committee on Immunization Practices (ACIP).    This lists the preventive care services that should be considered, and provides dates of when you are due. Items listed as completed are up-to-date and do not require any further intervention.    Health Maintenance   Topic Date Due   • DXA SCAN  1945   • TDAP/TD VACCINES (1 - Tdap) 10/22/1964   • ZOSTER VACCINE (1 of 2) 10/22/1995   • HEPATITIS C SCREENING  06/15/2016   • ANNUAL WELLNESS VISIT  2020   • Pneumococcal Vaccine 65+ (2 of 2 - PPSV23) 2020   • INFLUENZA VACCINE  2021 (Originally 2020)   • LIPID PANEL  2021   • COLONOSCOPY  2022   • MAMMOGRAM  2022   • MENINGOCOCCAL VACCINE  Aged Out       Orders Placed This Encounter   Procedures   • DEXA Bone Density Axial     Standing Status:   Future     Order Specific Question:   Reason for Exam:     Answer:   POSTMENOPAUSAL   • Comprehensive Metabolic Panel   • Hepatitis C Antibody   • Lipid Panel   • TSH   • POC Urinalysis Dipstick, Automated       Return in about 6 months (around 2021) for Follow-up.

## 2021-02-25 LAB — BACTERIA SPEC AEROBE CULT: NORMAL

## 2021-03-12 DIAGNOSIS — F41.9 ANXIETY: ICD-10-CM

## 2021-03-14 RX ORDER — CITALOPRAM 20 MG/1
TABLET ORAL
Qty: 30 TABLET | Refills: 0 | Status: SHIPPED | OUTPATIENT
Start: 2021-03-14 | End: 2021-05-06

## 2021-04-14 ENCOUNTER — OFFICE VISIT (OUTPATIENT)
Dept: ORTHOPEDIC SURGERY | Facility: CLINIC | Age: 76
End: 2021-04-14

## 2021-04-14 VITALS
BODY MASS INDEX: 29.59 KG/M2 | HEART RATE: 69 BPM | SYSTOLIC BLOOD PRESSURE: 133 MMHG | WEIGHT: 160.8 LBS | DIASTOLIC BLOOD PRESSURE: 63 MMHG | HEIGHT: 62 IN

## 2021-04-14 DIAGNOSIS — M17.11 PRIMARY OSTEOARTHRITIS OF RIGHT KNEE: Primary | ICD-10-CM

## 2021-04-14 PROCEDURE — 99212 OFFICE O/P EST SF 10 MIN: CPT | Performed by: ORTHOPAEDIC SURGERY

## 2021-04-14 NOTE — PROGRESS NOTES
[facility] Orthopaedic Surgery Clinic Note    Subjective     Chief Complaint   Patient presents with   • Follow-up     2 month recheck - Primary osteoarthritis of right knee        HPI    Carol Jean is a 75 y.o. female who follows up for right knee arthritis. She states she is doing well with no problems. The injection took a while to work but is providing relief.     Her right knee pain began 9 years ago, but she is having 0 out of 10 pain today. She does have some pain with climbing stairs, but is overall improved.    I have reviewed the following portions of the patient's history and agree with: History of Present Illness and Review of Systems    Patient Active Problem List   Diagnosis   • History of pulmonary embolism   • Hypertension   • Acute pulmonary embolism (CMS/HCC)   • DVT (deep venous thrombosis) (CMS/HCC)   • Circadian rhythm sleep disorder, delayed sleep phase type   • Psychophysiological insomnia   • Hyperlipidemia   • Calculus of kidney   • Cobalamin deficiency   • History of DVT (deep vein thrombosis)     Past Medical History:   Diagnosis Date   • Acute deep vein thrombosis of lower extremity (CMS/HCC)    • Acute pulmonary embolism (CMS/HCC) 11/22/2016    Bilateral, 09/30/2012 Initiation of CPR per family. EMS transport to [facility]. Restoration of rhythm and blood pressure at [facility]. “Shock” manifest by hypotension and multiorgan failure:  Transient hepatic dysfunction, resolved.  Transient nonoliguric ATN, resolved.  Transient metabolic acidosis, resolved.  Pressor support. Bilateral    • Arthritis    • Bladder problem    • Blood clotting disorder (CMS/HCC)    • Bone pain    • DVT (deep venous thrombosis) (CMS/HCC)    • Easy bruising    • H/O bone density study 08/2012   • H/O colonoscopy 08/2012   • H/O mammogram 08/2012   • HBP (high blood pressure)    • History of blood transfusion    • Hypertension 11/22/2016   • Kidney stones     • Synovial cyst of popliteal space    • Upper gastrointestinal bleeding     gastritis related to lytic therapy and heparins, resolved: a. “Hemorrhagic gastritis.”   • Vitamin B12 deficiency       Past Surgical History:   Procedure Laterality Date   • BILATERAL BREAST REDUCTION     • CATARACT EXTRACTION, BILATERAL     •  SECTION     •  SECTION     • COLONOSCOPY     • KIDNEY STONE SURGERY      Nephrolithiasis with lithotripsy.   • OTHER SURGICAL HISTORY      barry filter placement in Vena Cava   • REDUCTION MAMMAPLASTY Bilateral    • TONSILLECTOMY        Family History   Problem Relation Age of Onset   • Stroke Mother    • Hypertension Mother    • Arthritis Mother    • Hyperlipidemia Mother    • Heart attack Father    • COPD Father    • Thyroid disease Father    • Heart disease Father    • Stroke Other    • Coronary artery disease Other    • COPD Other    • Stroke Other    • COPD Other    • Breast cancer Maternal Aunt    • Ovarian cancer Neg Hx      Social History     Socioeconomic History   • Marital status:      Spouse name: Not on file   • Number of children: Not on file   • Years of education: Not on file   • Highest education level: Not on file   Tobacco Use   • Smoking status: Never Smoker   • Smokeless tobacco: Never Used   Substance and Sexual Activity   • Alcohol use: Yes     Comment: occas   • Drug use: No   • Sexual activity: Defer      Current Outpatient Medications on File Prior to Visit   Medication Sig Dispense Refill   • chlorthalidone (HYGROTON) 25 MG tablet Take 0.5 tablets by mouth Daily. 45 tablet 3   • Cholecalciferol (VITAMIN D-3) 5000 units tablet Take  by mouth Daily.     • citalopram (CeleXA) 20 MG tablet Take 1 tablet by mouth once daily 30 tablet 0   • dilTIAZem (TIAZAC) 240 MG 24 hr capsule Take 1 capsule by mouth Daily. 30 capsule 5   • Glucosamine HCl (GLUCOSAMINE PO) Take 2 tablets by mouth Daily. As directed         • metoprolol tartrate  "(LOPRESSOR) 25 MG tablet Take 1 tablet by mouth twice daily 180 tablet 1   • pantoprazole (PROTONIX) 40 MG EC tablet Take 1 tablet by mouth Daily. 90 tablet 1   • potassium chloride 10 MEQ CR tablet Take 1 tablet by mouth once daily 90 tablet 1   • rivaroxaban (XARELTO) 15 MG tablet Take 1 tablet by mouth Daily. 30 tablet 11   • simvastatin (ZOCOR) 40 MG tablet TAKE 1 TABLET BY MOUTH AT NIGHT 90 tablet 2   • vitamin C (ASCORBIC ACID) 500 MG tablet Take 1,000 mg by mouth Daily.       No current facility-administered medications on file prior to visit.      Allergies   Allergen Reactions   • Erythromycin Swelling     \"tongue swelling\".   • Codeine Sulfate Swelling   • Meperidine Swelling   • Morphine And Related Swelling   • Penicillins Swelling     \"swelling\" at site.   • Sulfa Antibiotics Swelling   • Sulfadiazine Swelling        Review of Systems   Constitutional: Negative.    HENT: Negative.    Eyes: Negative.    Respiratory: Negative.    Cardiovascular: Negative.    Gastrointestinal: Negative.    Endocrine: Negative.    Genitourinary: Negative.    Musculoskeletal: Positive for arthralgias.   Skin: Negative.    Allergic/Immunologic: Negative.    Neurological: Negative.    Hematological: Negative.    Psychiatric/Behavioral: Negative.         Objective      Physical Exam  /63   Pulse 69   Ht 156.2 cm (61.5\")   Wt 72.9 kg (160 lb 12.8 oz)   LMP  (LMP Unknown)   BMI 29.89 kg/m²     Body mass index is 29.89 kg/m².    General:   Mental Status:  Alert   Appearance: Cooperative, in no acute distress   Build and Nutrition: Well-nourished well-developed female   Orientation: Alert and oriented to person, place and time   Posture: Normal   Gait: Normal     Integument       • Right knee: No skin lesions, rash, or ecchymosis.    Lower Extremities  • Right Knee:        • Tenderness: No medial or lateral joint line tenderness.       • Swelling: None        • Effusion: 1+       • Crepitus: Positive       • Atrophy: " None       • Range of motion:             • Extension: 0°             • Flexion: 120°        • Instability: No varus or valgus laxity. Negative anterior drawer.       • Deformities: None    Imaging/Studies  No new radiographs.    Assessment and Plan     Diagnoses and all orders for this visit:    1. Primary osteoarthritis of right knee (Primary)        1. Primary osteoarthritis of right knee        As she is doing well with no pain today, we do not need to pursue any further treatment at this time. We will follow up in 6 months, but sooner for any new or worsening problems.     Return in about 6 months (around 10/14/2021).      Scribed for Flakito Chase MD by Julianna Tobar.  04/14/21   10:51 EDT    I have personally performed the services described in this document as scribed by the above individual, and it is both accurate and complete.  Flakito Chase MD  4/14/2021  13:03 EDT

## 2021-05-06 DIAGNOSIS — F41.9 ANXIETY: ICD-10-CM

## 2021-05-10 RX ORDER — CITALOPRAM 20 MG/1
TABLET ORAL
Qty: 90 TABLET | Refills: 1 | Status: SHIPPED | OUTPATIENT
Start: 2021-05-10 | End: 2021-08-25

## 2021-05-13 ENCOUNTER — HOSPITAL ENCOUNTER (OUTPATIENT)
Dept: BONE DENSITY | Facility: HOSPITAL | Age: 76
Discharge: HOME | End: 2021-05-13
Admitting: INTERNAL MEDICINE

## 2021-05-13 DIAGNOSIS — Z78.0 POSTMENOPAUSAL: ICD-10-CM

## 2021-05-13 PROCEDURE — 77080 DXA BONE DENSITY AXIAL: CPT

## 2021-06-14 RX ORDER — POTASSIUM CHLORIDE 750 MG/1
TABLET, FILM COATED, EXTENDED RELEASE ORAL
Qty: 90 TABLET | Refills: 1 | Status: SHIPPED | OUTPATIENT
Start: 2021-06-14 | End: 2021-12-15

## 2021-06-21 ENCOUNTER — OFFICE VISIT (OUTPATIENT)
Dept: ORTHOPEDIC SURGERY | Facility: CLINIC | Age: 76
End: 2021-06-21

## 2021-06-21 VITALS
DIASTOLIC BLOOD PRESSURE: 51 MMHG | SYSTOLIC BLOOD PRESSURE: 134 MMHG | WEIGHT: 154 LBS | HEIGHT: 61 IN | HEART RATE: 66 BPM | BODY MASS INDEX: 29.07 KG/M2

## 2021-06-21 DIAGNOSIS — M17.12 PRIMARY OSTEOARTHRITIS OF LEFT KNEE: Primary | ICD-10-CM

## 2021-06-21 PROCEDURE — 99213 OFFICE O/P EST LOW 20 MIN: CPT | Performed by: ORTHOPAEDIC SURGERY

## 2021-06-21 NOTE — PROGRESS NOTES
[facility] [place]    Subjective     Chief Complaint   Patient presents with   • Left Knee - Pain        HPI    Carol Jean is a 75 y.o. female who presents with new problem of left knee popping. The patient reports that her left knee started popping approximately 3 weeks ago, though this has since improved. She denies any pain or swelling.    The patient reports that her right knee pain is doing well after 1 injection, which was performed some time ago.    I have reviewed the following portions of the patient's history and agree with: History of Present Illness and Review of Systems    Patient Active Problem List   Diagnosis   • History of pulmonary embolism   • Hypertension   • Acute pulmonary embolism (CMS/HCC)   • DVT (deep venous thrombosis) (CMS/HCC)   • Circadian rhythm sleep disorder, delayed sleep phase type   • Psychophysiological insomnia   • Hyperlipidemia   • Calculus of kidney   • Cobalamin deficiency   • History of DVT (deep vein thrombosis)     Past Medical History:   Diagnosis Date   • Acute deep vein thrombosis of lower extremity (CMS/HCC)    • Acute pulmonary embolism (CMS/HCC) 11/22/2016    Bilateral, 09/30/2012 Initiation of CPR per family. EMS transport to [facility]. Restoration of rhythm and blood pressure at [facility]. “Shock” manifest by hypotension and multiorgan failure:  Transient hepatic dysfunction, resolved.  Transient nonoliguric ATN, resolved.  Transient metabolic acidosis, resolved.  Pressor support. Bilateral    • Arthritis    • Bladder problem    • Blood clotting disorder (CMS/HCC)    • Bone pain    • DVT (deep venous thrombosis) (CMS/HCC)    • Easy bruising    • H/O bone density study 08/2012   • H/O colonoscopy 08/2012   • H/O mammogram 08/2012   • HBP (high blood pressure)    • History of blood transfusion    • Hypertension 11/22/2016   • Kidney stones    • Synovial cyst of popliteal space     • Upper gastrointestinal bleeding     gastritis related to lytic therapy and heparins, resolved: a. “Hemorrhagic gastritis.”   • Vitamin B12 deficiency       Past Surgical History:   Procedure Laterality Date   • BILATERAL BREAST REDUCTION     • CATARACT EXTRACTION, BILATERAL     •  SECTION     •  SECTION     • COLONOSCOPY     • KIDNEY STONE SURGERY      Nephrolithiasis with lithotripsy.   • OTHER SURGICAL HISTORY      barry filter placement in Vena Cava   • REDUCTION MAMMAPLASTY Bilateral    • TONSILLECTOMY        Family History   Problem Relation Age of Onset   • Stroke Mother    • Hypertension Mother    • Arthritis Mother    • Hyperlipidemia Mother    • Heart attack Father    • COPD Father    • Thyroid disease Father    • Heart disease Father    • Stroke Other    • Coronary artery disease Other    • COPD Other    • Stroke Other    • COPD Other    • Breast cancer Maternal Aunt    • Ovarian cancer Neg Hx      Social History     Socioeconomic History   • Marital status:      Spouse name: Not on file   • Number of children: Not on file   • Years of education: Not on file   • Highest education level: Not on file   Tobacco Use   • Smoking status: Never Smoker   • Smokeless tobacco: Never Used   Substance and Sexual Activity   • Alcohol use: Yes     Comment: occas   • Drug use: No   • Sexual activity: Defer      Current Outpatient Medications on File Prior to Visit   Medication Sig Dispense Refill   • chlorthalidone (HYGROTON) 25 MG tablet Take 0.5 tablets by mouth Daily. 45 tablet 3   • Cholecalciferol (VITAMIN D-3) 5000 units tablet Take  by mouth Daily.     • citalopram (CeleXA) 20 MG tablet Take 1 tablet by mouth once daily 90 tablet 1   • dilTIAZem (TIAZAC) 240 MG 24 hr capsule Take 1 capsule by mouth Daily. 30 capsule 5   • Glucosamine HCl (GLUCOSAMINE PO) Take 2 tablets by mouth Daily. As directed         • metoprolol tartrate (LOPRESSOR) 25 MG tablet Take 1 tablet by  "mouth twice daily 180 tablet 1   • pantoprazole (PROTONIX) 40 MG EC tablet Take 1 tablet by mouth Daily. 90 tablet 1   • potassium chloride 10 MEQ CR tablet Take 1 tablet by mouth once daily 90 tablet 1   • rivaroxaban (XARELTO) 15 MG tablet Take 1 tablet by mouth Daily. 30 tablet 11   • simvastatin (ZOCOR) 40 MG tablet TAKE 1 TABLET BY MOUTH AT NIGHT 90 tablet 2   • vitamin C (ASCORBIC ACID) 500 MG tablet Take 1,000 mg by mouth Daily.       No current facility-administered medications on file prior to visit.      Allergies   Allergen Reactions   • Erythromycin Swelling     \"tongue swelling\".   • Codeine Sulfate Swelling   • Meperidine Swelling   • Morphine And Related Swelling   • Penicillins Swelling     \"swelling\" at site.   • Sulfa Antibiotics Swelling   • Sulfadiazine Swelling        Review of Systems     Objective      Physical Exam  /51   Pulse 66   Ht 156.2 cm (61.5\")   Wt 69.9 kg (154 lb)   LMP  (LMP Unknown)   BMI 28.63 kg/m²     Body mass index is 28.63 kg/m².    General:   Mental Status:  Alert   Appearance: Cooperative, in no acute distress   Build and Nutrition: Well-nourished well-developed female   Orientation: Alert and oriented to person, place and time   Posture: Normal   Gait: Nonantalgic    Integument  • Left knee: No skin lesions, rash, or ecchymosis.    Neurologic  • Sensation:  • Left foot: Intact to light touch on the dorsal and plantar aspects    Motor  • Left lower extremity: 5/5 quadriceps, hamstrings, ankle dorsiflexors, and ankle plantar flexors    Vascular  • Left lower extremity: 2+ dorsalis pedis pulse. Prompt capillary refill.    Lower Extremities  • Left Knee:  • Tenderness: No medial or lateral joint line tenderness.  • Swelling: None  • Effusion: 1+  • Crepitus: Positive  • Atrophy: None  • Range of motion:  • Extension: 5°  • Flexion: 110°  • Instability: No varus or valgus laxity. Negative anterior drawer.  • Deformities: Valgus " alignment    Imaging/Studies      Imaging Results (Last 24 Hours)     Procedure Component Value Units Date/Time    XR Knee 4+ View Left [[id number]] Resulted: 06/21/21 0905     Updated: 06/21/21 0906    Narrative:      Left Knee Radiographs  Indication: left knee pain  Views: Standing AP's and skiers of both knees, with lateral and sunrise   views of the left knee    Comparison: no prior studies available    Findings:   Advanced arthritis, with tricompartmental degeneration, loose body in the   lateral compartment, advanced patellofemoral arthritis, with complete   obliteration of that joint space, with no acute bony abnormalities.          Assessment and Plan     Diagnoses and all orders for this visit:    1. Primary osteoarthritis of left knee (Primary)  -     XR Knee 4+ View Left        1. Primary osteoarthritis of left knee        We discussed her left knee x-rays, which demonstrated bone-on-bone arthritis, particularly behind the patella. I would not recommend an injection at this point, as she is not experiencing any pain. However, we can try this if she develops pain in the future. We also discussed the possibility of a left total knee arthroplasty, if she were to fail conservative treatment measures.    The patient will follow up as needed.    Scribed for Flakito Chase MD by Andie Jefferson.  06/21/21   09:56 EDT    I have personally performed the services described in this document as scribed by the above individual, and it is both accurate and complete.  Flakito Chase MD  6/22/2021  05:45 EDT

## 2021-06-28 DIAGNOSIS — I10 ESSENTIAL HYPERTENSION: ICD-10-CM

## 2021-06-28 NOTE — TELEPHONE ENCOUNTER
"Next appt 7/8/2021  Lab Results   Component Value Date    GLUCOSE 96 02/24/2021    BUN 22 02/24/2021    CREATININE 1.37 (H) 02/24/2021    EGFRIFNONA 38 (L) 02/24/2021    EGFRIFAFRI 48 (L) 06/04/2020    BCR 16.1 02/24/2021    K 3.4 (L) 02/24/2021    CO2 31.3 (H) 02/24/2021    CALCIUM 9.7 02/24/2021    PROTENTOTREF 6.6 06/04/2020    ALBUMIN 4.30 02/24/2021    LABIL2 1.8 06/04/2020    AST 17 02/24/2021    ALT 6 02/24/2021 01/05/21 1419 01/05/21 1421   BP: 112/65 108/57   BP Location: Left arm Left arm   Patient Position: Sitting Standing   Pulse: 68 72   SpO2: 96%     Weight: 73.5 kg (162 lb)     Height: 160 cm (63\")             "

## 2021-07-07 NOTE — PROGRESS NOTES
"  OFFICE FOLLOW UP     Date of Encounter:2021     Name: Carol Jean  : 1945  Address: [facility] [address] [facility] [address]    PCP: Flakito Chaudhary MD  [address] [address]    Carol Jean is a 75 y.o. female.      Chief Complaint: Follow up of PE, HTN    Problem List:   1. Acute pulmonary embolism, bilateral, 2012.  a. Initiation of CPR per family.  b. EMS transport to [facility] Emergency Room.  c. Restoration of rhythm and blood pressure at [facility] Emergency Room.  d. “Shock” manifest by hypotension and multiorgan failure:  Transient hepatic dysfunction, resolved.  Transient nonoliguric ATN, resolved.  Transient metabolic acidosis, resolved.  Pressor support.  e. Bilateral EKOS catheter placement/treatment:  Vena cava filter placed.  f. “Normal coronary arteries” by angiography, 2012.  g. Recurrent DVT of RLE, 2012:  Hematology workup with subsequent Xarelto dosing (followed by Dr. Ashby)  2. Hypertension.  3. Upper gastrointestinal bleeding/gastritis related to lytic therapy and heparins, resolved:  a. “Hemorrhagic gastritis.”  4. Right “groin” bleeding, 10/12/2012.  a. Probably “venous” related to heparins.  b. Nonsurgical treatment, resolved.  5. History of nephrolithiasis, inactive.  6. Methicillin-resistant Staphylococcus aureus - tracheobronchitis, resolved.  7. Renal insufficiency, followed by [facility]   8. Microscopic hematuria  a. Cystoscopy did not reveal source of bleeding, Xarelto was decreased to 10 mg daily (2020)  9. Status post operations, remote.  a. Nephrolithiasis with lithotripsy.  b. Bilateral breast reduction.  c.  section x2.  Allergies:  Allergies   Allergen Reactions   • Erythromycin Swelling     \"tongue swelling\".   • Codeine Sulfate Swelling   • Meperidine Swelling   • Morphine And Related Swelling   • Penicillins Swelling     \"swelling\" at site.   • Sulfa " "Antibiotics Swelling   • Sulfadiazine Swelling       Current Medications:  Current Outpatient Medications   Medication Instructions   • chlorthalidone (HYGROTON) 12.5 mg, Oral, Daily   • Cholecalciferol (VITAMIN D-3) 5000 units tablet Oral, Daily   • citalopram (CeleXA) 20 MG tablet Take 1 tablet by mouth once daily   • dilTIAZem (TIAZAC) 240 mg, Oral, Daily   • Glucosamine HCl (GLUCOSAMINE PO) 2 tablets, Oral, Daily, As directed   • metoprolol tartrate (LOPRESSOR) 25 MG tablet Take 1 tablet by mouth twice daily   • pantoprazole (PROTONIX) 40 mg, Oral, Daily   • potassium chloride 10 MEQ CR tablet Take 1 tablet by mouth once daily   • rivaroxaban (XARELTO) 15 mg, Oral, Daily   • simvastatin (ZOCOR) 40 MG tablet TAKE 1 TABLET BY MOUTH AT NIGHT   • vitamin C (ASCORBIC ACID) 1,000 mg, Oral, Daily        History of Present Illness:    Mrs. Jean returns for routine scheduled follow-up today.  She has been vaccinated.  She has no symptoms of angina or congestive heart failure.  She has no symptoms of DVT or recurrent PTE.  Interestingly, in a review of labs in her chart, this former patient of [facility] ([facility]) has shown an increase in serum creatinine from 1.04 on January 5-1.37 on February 24.           The following portions of the patient's history were reviewed and updated as appropriate: allergies, current medications and problem list.    ROS: Pertinent positives as listed in the HPI.  All other systems reviewed and negative.    Objective:    Vitals:    07/08/21 1502 07/08/21 1505   BP: 116/59 122/66   BP Location: Right arm Right arm   Patient Position: Sitting Sitting   Pulse: 53 57   SpO2: 96%    Weight: 74.4 kg (164 lb)    Height: 160 cm (63\")      Body mass index is 29.05 kg/m².    Physical Exam: GENERAL: Alert, cooperative, in no acute distress.   HEENT: Normocephalic, no adenopathy, no jugular venous distention  HEART: No discrete PMI is noted. Regular rhythm, normal rate, and no murmurs, gallops, or rubs. " "  LUNGS: Clear to auscultation bilaterally. No wheezing, rales or ronchi.  ABDOMEN: Soft, bowel sounds present, non-tender   NEUROLOGIC: No focal abnormalities involving strength or sensation are noted.   EXTREMITIES: No clubbing, cyanosis, or edema noted.         Diagnostic Data:      Procedures        Assessment and Plan:   1. PE: She had a life-threatening pulmonary embolus related to unprovoked DVT and will be anticoagulated for life.  2. HTN: She is \"at target\" (AHA) on current medications.  I will not make changes.  3. Unspecified renal disease with recent creatinine increase: I do not have detailed records of the patient's prior renal insufficiency for which she was evaluated [facility].  Her creatinine is trending upwards.  I think would be a good time to revisit her nephrologist, Brayan Moncada MD, to assess the situation.  She will go ahead and make the appointment.    She will return to see me in 6 months.            EMR Dragon/Transcription Disclaimer:  Much of this encounter note is an electronic transcription/translation of spoken language to printed text.  The electronic translation of spoken language may permit erroneous, or at times, nonsensical words or phrases to be inadvertently transcribed.  Although I have reviewed the note for such errors, some may still exist.           "

## 2021-07-08 ENCOUNTER — OFFICE VISIT (OUTPATIENT)
Dept: CARDIOLOGY | Facility: CLINIC | Age: 76
End: 2021-07-08

## 2021-07-08 VITALS
DIASTOLIC BLOOD PRESSURE: 66 MMHG | SYSTOLIC BLOOD PRESSURE: 122 MMHG | HEART RATE: 57 BPM | OXYGEN SATURATION: 96 % | BODY MASS INDEX: 29.06 KG/M2 | WEIGHT: 164 LBS | HEIGHT: 63 IN

## 2021-07-08 DIAGNOSIS — Z86.718 HISTORY OF DVT (DEEP VEIN THROMBOSIS): Primary | ICD-10-CM

## 2021-07-08 PROCEDURE — 99214 OFFICE O/P EST MOD 30 MIN: CPT | Performed by: INTERNAL MEDICINE

## 2021-07-19 DIAGNOSIS — K21.9 GASTROESOPHAGEAL REFLUX DISEASE: ICD-10-CM

## 2021-07-21 RX ORDER — PANTOPRAZOLE SODIUM 40 MG/1
TABLET, DELAYED RELEASE ORAL
Qty: 90 TABLET | Refills: 1 | Status: SHIPPED | OUTPATIENT
Start: 2021-07-21 | End: 2022-01-18

## 2021-08-09 ENCOUNTER — OFFICE VISIT (OUTPATIENT)
Dept: ORTHOPEDIC SURGERY | Facility: CLINIC | Age: 76
End: 2021-08-09

## 2021-08-09 VITALS
HEIGHT: 63 IN | BODY MASS INDEX: 28.53 KG/M2 | SYSTOLIC BLOOD PRESSURE: 123 MMHG | DIASTOLIC BLOOD PRESSURE: 64 MMHG | WEIGHT: 161 LBS | HEART RATE: 65 BPM

## 2021-08-09 DIAGNOSIS — M17.11 PRIMARY OSTEOARTHRITIS OF RIGHT KNEE: ICD-10-CM

## 2021-08-09 DIAGNOSIS — M17.12 PRIMARY OSTEOARTHRITIS OF LEFT KNEE: Primary | ICD-10-CM

## 2021-08-09 PROCEDURE — 99212 OFFICE O/P EST SF 10 MIN: CPT | Performed by: ORTHOPAEDIC SURGERY

## 2021-08-09 NOTE — PROGRESS NOTES
[facility] Note    Subjective     Chief Complaint   Patient presents with   • Follow-up     7 WEEKS- Primary osteoarthritis of left knee, 4 MONTHS- Primary osteoarthritis of Right knee        HPI    Carol Jean is a 75 y.o. female who follows up for her bilateral knees. She has a known history of arthritis in both knees. She rates her pain as a 2 out of 10 and states she is doing well.    I have reviewed the following portions of the patient's history and agree with: History of Present Illness and Review of Systems    Patient Active Problem List   Diagnosis   • History of pulmonary embolism   • Hypertension   • Acute pulmonary embolism (CMS/HCC)   • DVT (deep venous thrombosis) (CMS/HCC)   • Circadian rhythm sleep disorder, delayed sleep phase type   • Psychophysiological insomnia   • Hyperlipidemia   • Calculus of kidney   • Cobalamin deficiency   • History of DVT (deep vein thrombosis)     Past Medical History:   Diagnosis Date   • Acute deep vein thrombosis of lower extremity (CMS/HCC)    • Acute pulmonary embolism (CMS/HCC) 11/22/2016    Bilateral, 09/30/2012 Initiation of CPR per family. EMS transport to [facility] Emergency Room. Restoration of rhythm and blood pressure at [facility] Emergency Room. “Shock” manifest by hypotension and multiorgan failure:  Transient hepatic dysfunction, resolved.  Transient nonoliguric ATN, resolved.  Transient metabolic acidosis, resolved.  Pressor support. Bilateral    • Arthritis    • Bladder problem    • Blood clotting disorder (CMS/HCC)    • Bone pain    • DVT (deep venous thrombosis) (CMS/HCC)    • Easy bruising    • H/O bone density study 08/2012   • H/O colonoscopy 08/2012   • H/O mammogram 08/2012   • HBP (high blood pressure)    • History of blood transfusion    • Hypertension 11/22/2016   • Kidney stones    • Synovial cyst of popliteal space    • Upper gastrointestinal bleeding     gastritis related to lytic  therapy and heparins, resolved: a. “Hemorrhagic gastritis.”   • Vitamin B12 deficiency       Past Surgical History:   Procedure Laterality Date   • BILATERAL BREAST REDUCTION     • CATARACT EXTRACTION, BILATERAL     •  SECTION     •  SECTION     • COLONOSCOPY     • KIDNEY STONE SURGERY      Nephrolithiasis with lithotripsy.   • OTHER SURGICAL HISTORY      barry filter placement in Vena Cava   • REDUCTION MAMMAPLASTY Bilateral    • TONSILLECTOMY        Family History   Problem Relation Age of Onset   • Stroke Mother    • Hypertension Mother    • Arthritis Mother    • Hyperlipidemia Mother    • Heart attack Father    • COPD Father    • Thyroid disease Father    • Heart disease Father    • Stroke Other    • Coronary artery disease Other    • COPD Other    • Stroke Other    • COPD Other    • Breast cancer Maternal Aunt    • Ovarian cancer Neg Hx      Social History     Socioeconomic History   • Marital status:      Spouse name: Not on file   • Number of children: Not on file   • Years of education: Not on file   • Highest education level: Not on file   Tobacco Use   • Smoking status: Never Smoker   • Smokeless tobacco: Never Used   Substance and Sexual Activity   • Alcohol use: Yes     Comment: occas   • Drug use: No   • Sexual activity: Defer      Current Outpatient Medications on File Prior to Visit   Medication Sig Dispense Refill   • chlorthalidone (HYGROTON) 25 MG tablet Take 0.5 tablets by mouth Daily. 45 tablet 3   • Cholecalciferol (VITAMIN D-3) 5000 units tablet Take  by mouth Daily.     • citalopram (CeleXA) 20 MG tablet Take 1 tablet by mouth once daily (Patient taking differently: Takes 1/2 tablet daily) 90 tablet 1   • dilTIAZem (TIAZAC) 240 MG 24 hr capsule Take 1 capsule by mouth Daily. 30 capsule 5   • Glucosamine HCl (GLUCOSAMINE PO) Take 2 tablets by mouth Daily. As directed         • metoprolol tartrate (LOPRESSOR) 25 MG tablet Take 1 tablet by mouth twice daily  "180 tablet 3   • pantoprazole (PROTONIX) 40 MG EC tablet Take 1 tablet by mouth once daily 90 tablet 1   • potassium chloride 10 MEQ CR tablet Take 1 tablet by mouth once daily 90 tablet 1   • rivaroxaban (XARELTO) 15 MG tablet Take 1 tablet by mouth Daily. 30 tablet 11   • simvastatin (ZOCOR) 40 MG tablet TAKE 1 TABLET BY MOUTH AT NIGHT 90 tablet 2   • vitamin C (ASCORBIC ACID) 500 MG tablet Take 1,000 mg by mouth Daily.       No current facility-administered medications on file prior to visit.      Allergies   Allergen Reactions   • Erythromycin Swelling     \"tongue swelling\".   • Codeine Sulfate Swelling   • Meperidine Swelling   • Morphine And Related Swelling   • Penicillins Swelling     \"swelling\" at site.   • Sulfa Antibiotics Swelling   • Sulfadiazine Swelling        Review of Systems   Constitutional: Negative for activity change, appetite change, chills, diaphoresis, fatigue, fever and unexpected weight change.   HENT: Negative for congestion, dental problem, drooling, ear discharge, ear pain, facial swelling, hearing loss, mouth sores, nosebleeds, postnasal drip, rhinorrhea, sinus pressure, sneezing, sore throat, tinnitus, trouble swallowing and voice change.    Eyes: Negative for photophobia, pain, discharge, redness, itching and visual disturbance.   Respiratory: Negative for apnea, cough, choking, chest tightness, shortness of breath, wheezing and stridor.    Cardiovascular: Negative for chest pain, palpitations and leg swelling.   Gastrointestinal: Negative for abdominal distention, abdominal pain, anal bleeding, blood in stool, constipation, diarrhea, nausea, rectal pain and vomiting.   Endocrine: Negative for cold intolerance, heat intolerance, polydipsia, polyphagia and polyuria.   Genitourinary: Negative for decreased urine volume, difficulty urinating, dysuria, enuresis, flank pain, frequency, genital sores, hematuria and urgency.   Musculoskeletal: Positive for arthralgias. Negative for back " "pain, gait problem, joint swelling, myalgias, neck pain and neck stiffness.   Skin: Negative for color change, pallor, rash and wound.   Allergic/Immunologic: Negative for environmental allergies, food allergies and immunocompromised state.   Neurological: Negative for dizziness, tremors, seizures, syncope, facial asymmetry, speech difficulty, weakness, light-headedness, numbness and headaches.   Hematological: Negative for adenopathy. Does not bruise/bleed easily.   Psychiatric/Behavioral: Negative for agitation, behavioral problems, confusion, decreased concentration, dysphoric mood, hallucinations, self-injury, sleep disturbance and suicidal ideas. The patient is not nervous/anxious and is not hyperactive.         Objective      Physical Exam  /64   Pulse 65   Ht 160 cm (62.99\")   Wt 73 kg (161 lb)   LMP  (LMP Unknown)   BMI 28.53 kg/m²     Body mass index is 28.53 kg/m².    General:   Mental Status:  Alert   Appearance: Cooperative, in no acute distress   Build and Nutrition: Well-nourished well-developed female   Orientation: Alert and oriented to person, place and time   Posture: Normal   Gait: Normal    Integument  • Right knee: No skin lesions, rash, or ecchymosis.  • Left knee: No skin lesions, rash, or ecchymosis.    Lower Extremities  • Right Knee:  • Tenderness: No medial or lateral joint line tenderness.  • Swelling: None  • Effusion: 1+  • Crepitus: Positive  • Atrophy: None  • Range of motion:  • Extension: 5°  • Flexion: 110°  • Instability: No varus or valgus laxity. Negative anterior drawer.  • Deformities: Mild valgus alignment    • Left Knee:  • Tenderness: No medial or lateral joint line tenderness.  • Swelling: None  • Effusion: Trace  • Crepitus: Positive  • Atrophy: None  • Range of motion:  • Extension: 0°  • Flexion: 120°  • Instability: No varus or valgus laxity. Negative anterior drawer.  • Deformities: Neutral alignment    Imaging/Studies  Imaging Results (Last 24 Hours)     " ** No results found for the last 24 hours. **            Assessment and Plan     Diagnoses and all orders for this visit:    1. Primary osteoarthritis of left knee (Primary)    2. Primary osteoarthritis of right knee        1. Primary osteoarthritis of left knee    2. Primary osteoarthritis of right knee        The patient is doing well today. We will schedule her for a follow-up in 6 months, but she can call and push the appointment out further if she is doing well at that time.    Return in about 6 months (around 2/9/2022).      Transcribed from ambient dictation for Flakito Chase MD by Andie Jefferson.  08/09/21   10:43 EDT    I have personally performed the services described in this document as transcribed by the above individual, and it is both accurate and complete.  Flakito Chase MD  8/9/2021  12:23 EDT

## 2021-08-25 ENCOUNTER — OFFICE VISIT (OUTPATIENT)
Dept: INTERNAL MEDICINE | Facility: CLINIC | Age: 76
End: 2021-08-25

## 2021-08-25 ENCOUNTER — LAB (OUTPATIENT)
Dept: LAB | Facility: HOSPITAL | Age: 76
End: 2021-08-25

## 2021-08-25 VITALS
WEIGHT: 163 LBS | TEMPERATURE: 98.6 F | RESPIRATION RATE: 16 BRPM | DIASTOLIC BLOOD PRESSURE: 64 MMHG | SYSTOLIC BLOOD PRESSURE: 118 MMHG | HEART RATE: 72 BPM | BODY MASS INDEX: 28.88 KG/M2

## 2021-08-25 DIAGNOSIS — N18.32 STAGE 3B CHRONIC KIDNEY DISEASE (HCC): ICD-10-CM

## 2021-08-25 DIAGNOSIS — I10 ESSENTIAL HYPERTENSION: Primary | ICD-10-CM

## 2021-08-25 DIAGNOSIS — E78.5 HYPERLIPIDEMIA, UNSPECIFIED HYPERLIPIDEMIA TYPE: ICD-10-CM

## 2021-08-25 DIAGNOSIS — K21.9 GASTROESOPHAGEAL REFLUX DISEASE WITHOUT ESOPHAGITIS: ICD-10-CM

## 2021-08-25 DIAGNOSIS — F32.9 REACTIVE DEPRESSION: ICD-10-CM

## 2021-08-25 LAB
BASOPHILS # BLD AUTO: 0.04 10*3/MM3 (ref 0–0.2)
BASOPHILS NFR BLD AUTO: 0.6 % (ref 0–1.5)
DEPRECATED RDW RBC AUTO: 39.2 FL (ref 37–54)
EOSINOPHIL # BLD AUTO: 0.12 10*3/MM3 (ref 0–0.4)
EOSINOPHIL NFR BLD AUTO: 1.9 % (ref 0.3–6.2)
ERYTHROCYTE [DISTWIDTH] IN BLOOD BY AUTOMATED COUNT: 12.2 % (ref 12.3–15.4)
HCT VFR BLD AUTO: 42.4 % (ref 34–46.6)
HGB BLD-MCNC: 14.4 G/DL (ref 12–15.9)
IMM GRANULOCYTES # BLD AUTO: 0.02 10*3/MM3 (ref 0–0.05)
IMM GRANULOCYTES NFR BLD AUTO: 0.3 % (ref 0–0.5)
LYMPHOCYTES # BLD AUTO: 1.87 10*3/MM3 (ref 0.7–3.1)
LYMPHOCYTES NFR BLD AUTO: 30.3 % (ref 19.6–45.3)
MCH RBC QN AUTO: 29.9 PG (ref 26.6–33)
MCHC RBC AUTO-ENTMCNC: 34 G/DL (ref 31.5–35.7)
MCV RBC AUTO: 88 FL (ref 79–97)
MONOCYTES # BLD AUTO: 0.59 10*3/MM3 (ref 0.1–0.9)
MONOCYTES NFR BLD AUTO: 9.5 % (ref 5–12)
NEUTROPHILS NFR BLD AUTO: 3.54 10*3/MM3 (ref 1.7–7)
NEUTROPHILS NFR BLD AUTO: 57.4 % (ref 42.7–76)
NRBC BLD AUTO-RTO: 0 /100 WBC (ref 0–0.2)
PLATELET # BLD AUTO: 322 10*3/MM3 (ref 140–450)
PMV BLD AUTO: 9.9 FL (ref 6–12)
RBC # BLD AUTO: 4.82 10*6/MM3 (ref 3.77–5.28)
WBC # BLD AUTO: 6.18 10*3/MM3 (ref 3.4–10.8)

## 2021-08-25 PROCEDURE — 80061 LIPID PANEL: CPT | Performed by: INTERNAL MEDICINE

## 2021-08-25 PROCEDURE — 99214 OFFICE O/P EST MOD 30 MIN: CPT | Performed by: INTERNAL MEDICINE

## 2021-08-25 PROCEDURE — 85025 COMPLETE CBC W/AUTO DIFF WBC: CPT | Performed by: INTERNAL MEDICINE

## 2021-08-25 PROCEDURE — 80053 COMPREHEN METABOLIC PANEL: CPT | Performed by: INTERNAL MEDICINE

## 2021-08-25 PROCEDURE — 36415 COLL VENOUS BLD VENIPUNCTURE: CPT | Performed by: INTERNAL MEDICINE

## 2021-08-25 RX ORDER — DILTIAZEM HYDROCHLORIDE 240 MG/1
240 CAPSULE, EXTENDED RELEASE ORAL DAILY
Qty: 30 CAPSULE | Refills: 5 | Status: SHIPPED | OUTPATIENT
Start: 2021-08-25 | End: 2022-02-22

## 2021-08-25 NOTE — PROGRESS NOTES
Chief Complaint   Patient presents with   • Follow-up     6 MONTH FOLLOW UP CHRONIC MEDICAL PROBLEMS       History of Present Illness      The patient presents for a follow-up related to hypertension. The patient reports that she has had no headaches, chest pain, dyspnea, edema, syncope, blurred vision or palpitations. She states that she is taking her medication as prescribed. She is not having medication side effects.    The patient presents for a follow-up related to GERD. The patient is on pantoprazole for her gastroesophageal reflux. The medication is taken on a regular basis and gives complete relief of the symptoms. She reports no abdominal pain, belching, diarrhea, dysphagia, early satiety, heartburn, hoarseness, nausea, odynophagia, rectal bleeding, vomiting or weight loss. The GERD has no known aggravating factors.    The patient presents for a follow-up related to hyperlipidemia. She is following a low fat diet. She reports that she is exercising. She is taking simvastatin. The patient is taking her medication as prescribed. She reports no medication side effects, including muscle cramps, abdominal pain, headaches or weakness. She denies orthopnea, paroxysmal nocturnal dyspnea or dyspnea on exertion.    The patient presents for a follow-up related to depression. She reports currently having depression symptoms. She denies suicidal ideation. Her energy level is fair. She denies agorophobia. She sleeps well. She is not tearful. She states that her current depression symptoms are worsened. She is currently taking a medication. The current medication regimen consists of citalopram. The patient denies having medication side effects including nausea, headaches, anxiety, increased depression, anorgasmia or fatigue.    The patient presents for a follow-up related to Stage 3 Chronic Kidney Disease. She states that she does monitor her fluid intake. Her Sodium intake is controlled. She is taking her medications as  prescribed.       The patient reports that her energy level is decreased. She denies shortness of breath. She does not have edema, and she denies abdominal swelling. She also denies pruritis, bruising, neuropathy, fevers, cough or epistaxis.    Review of Systems    CONSTITUTIONAL- Denies Chills, Sweats, Weakness or Malaise.    HENT- Denies Nasal Discharge, Sore Throat, Ear Pain, Ear Drainage, Sinus Pain, Nasal Congestion, Decreased Hearing or Tinnitus.    PULMONARY- Denies Wheezing, Sputum Production, Hemoptysis or Pleuritic Chest Pain.    GASTROINTESTINAL- Denies Constipation.    CARDIOVASCULAR- Denies Claudication or Irregular Heart Beat.    Medications      Current Outpatient Medications:   •  chlorthalidone (HYGROTON) 25 MG tablet, Take 0.5 tablets by mouth Daily., Disp: 45 tablet, Rfl: 3  •  Cholecalciferol (VITAMIN D-3) 5000 units tablet, Take  by mouth Daily., Disp: , Rfl:   •  citalopram (CeleXA) 20 MG tablet, Take 1 tablet by mouth once daily (Patient taking differently: Takes 1/2 tablet daily), Disp: 90 tablet, Rfl: 1  •  dilTIAZem (TIAZAC) 240 MG 24 hr capsule, Take 1 capsule by mouth Daily., Disp: 30 capsule, Rfl: 5  •  Glucosamine HCl (GLUCOSAMINE PO), Take 2 tablets by mouth Daily. As directed   , Disp: , Rfl:   •  metoprolol tartrate (LOPRESSOR) 25 MG tablet, Take 1 tablet by mouth twice daily, Disp: 180 tablet, Rfl: 3  •  pantoprazole (PROTONIX) 40 MG EC tablet, Take 1 tablet by mouth once daily, Disp: 90 tablet, Rfl: 1  •  potassium chloride 10 MEQ CR tablet, Take 1 tablet by mouth once daily, Disp: 90 tablet, Rfl: 1  •  rivaroxaban (XARELTO) 15 MG tablet, Take 1 tablet by mouth Daily., Disp: 30 tablet, Rfl: 11  •  simvastatin (ZOCOR) 40 MG tablet, TAKE 1 TABLET BY MOUTH AT NIGHT, Disp: 90 tablet, Rfl: 2  •  vitamin C (ASCORBIC ACID) 500 MG tablet, Take 1,000 mg by mouth Daily., Disp: , Rfl:   •  Zinc 40 MG tablet, Take  by mouth Daily., Disp: , Rfl:      Allergies    Allergies   Allergen Reactions  "  • Erythromycin Swelling     \"tongue swelling\".   • Codeine Sulfate Swelling   • Meperidine Swelling   • Morphine And Related Swelling   • Penicillins Swelling     \"swelling\" at site.   • Sulfa Antibiotics Swelling   • Sulfadiazine Swelling       Problem List    Patient Active Problem List   Diagnosis   • History of pulmonary embolism   • Hypertension   • Acute pulmonary embolism (CMS/HCC)   • DVT (deep venous thrombosis) (CMS/HCC)   • Circadian rhythm sleep disorder, delayed sleep phase type   • Psychophysiological insomnia   • Hyperlipidemia   • Calculus of kidney   • Cobalamin deficiency   • History of DVT (deep vein thrombosis)       Medications, Allergies, Problems List and Past History were reviewed and updated.    Physical Examination    /64 (BP Location: Right arm, Patient Position: Sitting, Cuff Size: Adult)   Pulse 72   Temp 98.6 °F (37 °C) (Infrared)   Resp 16   Wt 73.9 kg (163 lb)   LMP  (LMP Unknown)   Breastfeeding No   BMI 28.88 kg/m²     HEENT: Head- Normocephalic Atraumatic. Facies- Within normal limits. Pinnas- Normal texture and shape bilaterally. Canals- Normal bilaterally. TMs- Normal bilaterally. Nares- Patent bilaterally. Nasal Septum- is normal. There is no tenderness to palpation over the frontal or maxillary sinuses. Lids- Normal bilaterally. Conjunctiva- Clear bilaterally. Sclera- Anicteric bilaterally. Oropharynx- Moist with no lesions. Tonsils- No enlargement, erythema or exudate.    Neck: Thyroid- non enlarged, symmetric and has no nodules. No bruits are detected. ROM- Normal Range of Motion with no rigidity.    Lungs: Auscultation- Clear to auscultation bilaterally. There are no retractions, clubbing or cyanosis. The Expiratory to Inspiratory ratio is equal.    Cardiovascular: There are no carotid bruits. Heart- Normal Rate with Regular rhythm and no murmurs. There are no gallops. There are no rubs. In the lower extremities there is no edema. The upper extremities do not " have edema.    Abdomen: Soft, benign, non-tender with no masses, hernias, organomegaly or scars.    Impression and Assessment    Essential Hypertension.    Gastroesophageal Reflux Disease.    Hyperlipidemia.    Major Depressive Disorder Single Episode Unspecified. (F33.40)    Chronic Kidney Disease Stage 3 (GFR 30-59).    Plan    Gastroesophageal Reflux Disease Plan: The patient was instructed to continue the current medications.    Essential Hypertension Plan: The patient was instructed to continue the current medications.    Hyperlipidemia Plan: The patient was instructed to exercise daily, eat a low fat diet and continue her medications.    Chronic Kidney Disease Stage 3 (GFR 30-59) Plan: A referral was made to nephrology.    Major Depressive Disorder Single Episode Unspecified Plan: Medication will be added as noted.    Diagnoses and all orders for this visit:    1. Essential hypertension (Primary)  -     dilTIAZem (TIAZAC) 240 MG 24 hr capsule; Take 1 capsule by mouth Daily.  Dispense: 30 capsule; Refill: 5  -     CBC & Differential  -     Comprehensive Metabolic Panel    2. Gastroesophageal reflux disease without esophagitis    3. Hyperlipidemia, unspecified hyperlipidemia type  -     Comprehensive Metabolic Panel  -     Lipid Panel    4. Reactive depression  -     sertraline (Zoloft) 50 MG tablet; Take 1 tablet by mouth Daily.  Dispense: 30 tablet; Refill: 5    5. Stage 3b chronic kidney disease (CMS/[facility])  -     Ambulatory Referral to Nephrology        Return to Office    The patient was instructed to return for follow-up in 3 months. The patient was instructed to return sooner if the condition changes, worsens, or does not resolve.

## 2021-08-26 LAB
ALBUMIN SERPL-MCNC: 4.3 G/DL (ref 3.5–5.2)
ALBUMIN/GLOB SERPL: 1.8 G/DL
ALP SERPL-CCNC: 94 U/L (ref 39–117)
ALT SERPL W P-5'-P-CCNC: 9 U/L (ref 1–33)
ANION GAP SERPL CALCULATED.3IONS-SCNC: 10.3 MMOL/L (ref 5–15)
AST SERPL-CCNC: 18 U/L (ref 1–32)
BILIRUB SERPL-MCNC: 0.9 MG/DL (ref 0–1.2)
BUN SERPL-MCNC: 18 MG/DL (ref 8–23)
BUN/CREAT SERPL: 14.4 (ref 7–25)
CALCIUM SPEC-SCNC: 9.3 MG/DL (ref 8.6–10.5)
CHLORIDE SERPL-SCNC: 100 MMOL/L (ref 98–107)
CHOLEST SERPL-MCNC: 139 MG/DL (ref 0–200)
CO2 SERPL-SCNC: 31.7 MMOL/L (ref 22–29)
CREAT SERPL-MCNC: 1.25 MG/DL (ref 0.57–1)
GFR SERPL CREATININE-BSD FRML MDRD: 42 ML/MIN/1.73
GLOBULIN UR ELPH-MCNC: 2.4 GM/DL
GLUCOSE SERPL-MCNC: 92 MG/DL (ref 65–99)
HDLC SERPL-MCNC: 64 MG/DL (ref 40–60)
LDLC SERPL CALC-MCNC: 62 MG/DL (ref 0–100)
LDLC/HDLC SERPL: 0.98 {RATIO}
POTASSIUM SERPL-SCNC: 3.6 MMOL/L (ref 3.5–5.2)
PROT SERPL-MCNC: 6.7 G/DL (ref 6–8.5)
SODIUM SERPL-SCNC: 142 MMOL/L (ref 136–145)
TRIGL SERPL-MCNC: 63 MG/DL (ref 0–150)
VLDLC SERPL-MCNC: 13 MG/DL (ref 5–40)

## 2021-08-31 ENCOUNTER — TELEPHONE (OUTPATIENT)
Dept: INTERNAL MEDICINE | Facility: CLINIC | Age: 76
End: 2021-08-31

## 2021-08-31 NOTE — TELEPHONE ENCOUNTER
Informed the patient that we do not give the COVID antibody infusions here but advised her of where they still do.

## 2021-08-31 NOTE — TELEPHONE ENCOUNTER
Caller: Carol Jean    Relationship: Self    Best call back number: [phone]    What is the best time to reach you: ANYTIME     Who are you requesting to speak with (clinical staff, provider,  specific staff member): CLINICAL STAFF     What was the call regarding: PATIENT WOULD LIKE TO KNOW IF THIS OFFICE GIVES THE COVID ANTIBODY SHOT.     Do you require a callback: YES

## 2021-09-05 ENCOUNTER — HOSPITAL ENCOUNTER (INPATIENT)
Facility: HOSPITAL | Age: 76
LOS: 4 days | Discharge: HOME | End: 2021-09-09
Attending: EMERGENCY MEDICINE | Admitting: INTERNAL MEDICINE

## 2021-09-05 ENCOUNTER — APPOINTMENT (OUTPATIENT)
Dept: GENERAL RADIOLOGY | Facility: HOSPITAL | Age: 76
End: 2021-09-05

## 2021-09-05 ENCOUNTER — APPOINTMENT (OUTPATIENT)
Dept: CT IMAGING | Facility: HOSPITAL | Age: 76
End: 2021-09-05

## 2021-09-05 DIAGNOSIS — E87.6 HYPOKALEMIA: ICD-10-CM

## 2021-09-05 DIAGNOSIS — Z86.718 HISTORY OF DVT (DEEP VEIN THROMBOSIS): ICD-10-CM

## 2021-09-05 DIAGNOSIS — R09.02 HYPOXIA: ICD-10-CM

## 2021-09-05 DIAGNOSIS — Z86.711 HISTORY OF PULMONARY EMBOLISM: ICD-10-CM

## 2021-09-05 DIAGNOSIS — I26.99 OTHER ACUTE PULMONARY EMBOLISM WITHOUT ACUTE COR PULMONALE (HCC): ICD-10-CM

## 2021-09-05 DIAGNOSIS — U07.1 COVID-19: Primary | ICD-10-CM

## 2021-09-05 LAB
ALBUMIN SERPL-MCNC: 3.8 G/DL (ref 3.5–5.2)
ALBUMIN SERPL-MCNC: 3.8 G/DL (ref 3.5–5.2)
ALBUMIN/GLOB SERPL: 1.2 G/DL
ALP SERPL-CCNC: 81 U/L (ref 39–117)
ALP SERPL-CCNC: 82 U/L (ref 39–117)
ALT SERPL W P-5'-P-CCNC: 10 U/L (ref 1–33)
ALT SERPL W P-5'-P-CCNC: 9 U/L (ref 1–33)
ANION GAP SERPL CALCULATED.3IONS-SCNC: 14 MMOL/L (ref 5–15)
AST SERPL-CCNC: 21 U/L (ref 1–32)
AST SERPL-CCNC: 22 U/L (ref 1–32)
BACTERIA UR QL AUTO: ABNORMAL /HPF
BASOPHILS # BLD AUTO: 0.01 10*3/MM3 (ref 0–0.2)
BASOPHILS NFR BLD AUTO: 0.1 % (ref 0–1.5)
BILIRUB CONJ SERPL-MCNC: <0.2 MG/DL (ref 0–0.3)
BILIRUB INDIRECT SERPL-MCNC: NORMAL MG/DL
BILIRUB SERPL-MCNC: 0.4 MG/DL (ref 0–1.2)
BILIRUB SERPL-MCNC: 0.4 MG/DL (ref 0–1.2)
BILIRUB UR QL STRIP: NEGATIVE
BUN SERPL-MCNC: 19 MG/DL (ref 8–23)
BUN/CREAT SERPL: 16.7 (ref 7–25)
CALCIUM SPEC-SCNC: 8.8 MG/DL (ref 8.6–10.5)
CHLORIDE SERPL-SCNC: 97 MMOL/L (ref 98–107)
CLARITY UR: CLEAR
CO2 SERPL-SCNC: 29 MMOL/L (ref 22–29)
COLOR UR: YELLOW
CREAT SERPL-MCNC: 1.14 MG/DL (ref 0.57–1)
CRP SERPL-MCNC: 10.92 MG/DL (ref 0–0.5)
D DIMER PPP FEU-MCNC: <0.27 MCGFEU/ML (ref 0–0.56)
D-LACTATE SERPL-SCNC: 1.3 MMOL/L (ref 0.5–2)
DEPRECATED RDW RBC AUTO: 40.7 FL (ref 37–54)
EOSINOPHIL # BLD AUTO: 0 10*3/MM3 (ref 0–0.4)
EOSINOPHIL NFR BLD AUTO: 0 % (ref 0.3–6.2)
ERYTHROCYTE [DISTWIDTH] IN BLOOD BY AUTOMATED COUNT: 12.6 % (ref 12.3–15.4)
ERYTHROCYTE [SEDIMENTATION RATE] IN BLOOD: 52 MM/HR (ref 0–30)
GFR SERPL CREATININE-BSD FRML MDRD: 46 ML/MIN/1.73
GLOBULIN UR ELPH-MCNC: 3.2 GM/DL
GLUCOSE SERPL-MCNC: 148 MG/DL (ref 65–99)
GLUCOSE UR STRIP-MCNC: NEGATIVE MG/DL
HCT VFR BLD AUTO: 44.3 % (ref 34–46.6)
HGB BLD-MCNC: 15.1 G/DL (ref 12–15.9)
HGB UR QL STRIP.AUTO: ABNORMAL
HYALINE CASTS UR QL AUTO: ABNORMAL /LPF
IMM GRANULOCYTES # BLD AUTO: 0.02 10*3/MM3 (ref 0–0.05)
IMM GRANULOCYTES NFR BLD AUTO: 0.3 % (ref 0–0.5)
KETONES UR QL STRIP: ABNORMAL
LDH SERPL-CCNC: 235 U/L (ref 135–214)
LEUKOCYTE ESTERASE UR QL STRIP.AUTO: ABNORMAL
LYMPHOCYTES # BLD AUTO: 0.99 10*3/MM3 (ref 0.7–3.1)
LYMPHOCYTES NFR BLD AUTO: 13.8 % (ref 19.6–45.3)
MAGNESIUM SERPL-MCNC: 1.6 MG/DL (ref 1.6–2.4)
MCH RBC QN AUTO: 30.1 PG (ref 26.6–33)
MCHC RBC AUTO-ENTMCNC: 34.1 G/DL (ref 31.5–35.7)
MCV RBC AUTO: 88.2 FL (ref 79–97)
MONOCYTES # BLD AUTO: 0.46 10*3/MM3 (ref 0.1–0.9)
MONOCYTES NFR BLD AUTO: 6.4 % (ref 5–12)
NEUTROPHILS NFR BLD AUTO: 5.71 10*3/MM3 (ref 1.7–7)
NEUTROPHILS NFR BLD AUTO: 79.4 % (ref 42.7–76)
NITRITE UR QL STRIP: NEGATIVE
NRBC BLD AUTO-RTO: 0 /100 WBC (ref 0–0.2)
NT-PROBNP SERPL-MCNC: 196.6 PG/ML (ref 0–1800)
PH UR STRIP.AUTO: 5.5 [PH] (ref 5–8)
PLATELET # BLD AUTO: 246 10*3/MM3 (ref 140–450)
PMV BLD AUTO: 9.7 FL (ref 6–12)
POTASSIUM SERPL-SCNC: 2.9 MMOL/L (ref 3.5–5.2)
PROCALCITONIN SERPL-MCNC: 0.15 NG/ML (ref 0–0.25)
PROT SERPL-MCNC: 7 G/DL (ref 6–8.5)
PROT SERPL-MCNC: 7 G/DL (ref 6–8.5)
PROT UR QL STRIP: ABNORMAL
RBC # BLD AUTO: 5.02 10*6/MM3 (ref 3.77–5.28)
RBC # UR: ABNORMAL /HPF
REF LAB TEST METHOD: ABNORMAL
SODIUM SERPL-SCNC: 140 MMOL/L (ref 136–145)
SP GR UR STRIP: 1.02 (ref 1–1.03)
SQUAMOUS #/AREA URNS HPF: ABNORMAL /HPF
TROPONIN T SERPL-MCNC: <0.01 NG/ML (ref 0–0.03)
UROBILINOGEN UR QL STRIP: ABNORMAL
WBC # BLD AUTO: 7.19 10*3/MM3 (ref 3.4–10.8)
WBC UR QL AUTO: ABNORMAL /HPF

## 2021-09-05 PROCEDURE — 81001 URINALYSIS AUTO W/SCOPE: CPT | Performed by: EMERGENCY MEDICINE

## 2021-09-05 PROCEDURE — 80053 COMPREHEN METABOLIC PANEL: CPT | Performed by: EMERGENCY MEDICINE

## 2021-09-05 PROCEDURE — 93005 ELECTROCARDIOGRAM TRACING: CPT | Performed by: EMERGENCY MEDICINE

## 2021-09-05 PROCEDURE — 86140 C-REACTIVE PROTEIN: CPT | Performed by: EMERGENCY MEDICINE

## 2021-09-05 PROCEDURE — 85379 FIBRIN DEGRADATION QUANT: CPT | Performed by: EMERGENCY MEDICINE

## 2021-09-05 PROCEDURE — 99223 1ST HOSP IP/OBS HIGH 75: CPT | Performed by: INTERNAL MEDICINE

## 2021-09-05 PROCEDURE — 84145 PROCALCITONIN (PCT): CPT | Performed by: EMERGENCY MEDICINE

## 2021-09-05 PROCEDURE — 25010000002 DEXAMETHASONE PER 1 MG: Performed by: EMERGENCY MEDICINE

## 2021-09-05 PROCEDURE — 83615 LACTATE (LD) (LDH) ENZYME: CPT | Performed by: EMERGENCY MEDICINE

## 2021-09-05 PROCEDURE — 80076 HEPATIC FUNCTION PANEL: CPT | Performed by: INTERNAL MEDICINE

## 2021-09-05 PROCEDURE — 83605 ASSAY OF LACTIC ACID: CPT | Performed by: EMERGENCY MEDICINE

## 2021-09-05 PROCEDURE — 71250 CT THORAX DX C-: CPT

## 2021-09-05 PROCEDURE — 71045 X-RAY EXAM CHEST 1 VIEW: CPT

## 2021-09-05 PROCEDURE — 85652 RBC SED RATE AUTOMATED: CPT | Performed by: EMERGENCY MEDICINE

## 2021-09-05 PROCEDURE — 99284 EMERGENCY DEPT VISIT MOD MDM: CPT

## 2021-09-05 PROCEDURE — 83880 ASSAY OF NATRIURETIC PEPTIDE: CPT | Performed by: EMERGENCY MEDICINE

## 2021-09-05 PROCEDURE — XW033E5 INTRODUCTION OF REMDESIVIR ANTI-INFECTIVE INTO PERIPHERAL VEIN, PERCUTANEOUS APPROACH, NEW TECHNOLOGY GROUP 5: ICD-10-PCS | Performed by: INTERNAL MEDICINE

## 2021-09-05 PROCEDURE — 87040 BLOOD CULTURE FOR BACTERIA: CPT | Performed by: EMERGENCY MEDICINE

## 2021-09-05 PROCEDURE — 85025 COMPLETE CBC W/AUTO DIFF WBC: CPT | Performed by: EMERGENCY MEDICINE

## 2021-09-05 PROCEDURE — 84484 ASSAY OF TROPONIN QUANT: CPT | Performed by: EMERGENCY MEDICINE

## 2021-09-05 PROCEDURE — 83735 ASSAY OF MAGNESIUM: CPT | Performed by: EMERGENCY MEDICINE

## 2021-09-05 RX ORDER — ATORVASTATIN CALCIUM 20 MG/1
20 TABLET, FILM COATED ORAL DAILY
Refills: 2 | Status: DISCONTINUED | OUTPATIENT
Start: 2021-09-05 | End: 2021-09-09 | Stop reason: HOSPADM

## 2021-09-05 RX ORDER — POTASSIUM CHLORIDE 750 MG/1
40 CAPSULE, EXTENDED RELEASE ORAL AS NEEDED
Status: DISCONTINUED | OUTPATIENT
Start: 2021-09-05 | End: 2021-09-09 | Stop reason: HOSPADM

## 2021-09-05 RX ORDER — AMOXICILLIN 250 MG
2 CAPSULE ORAL 2 TIMES DAILY
Status: DISCONTINUED | OUTPATIENT
Start: 2021-09-05 | End: 2021-09-08

## 2021-09-05 RX ORDER — DIPHENHYDRAMINE HCL 25 MG
25 CAPSULE ORAL NIGHTLY PRN
Status: DISCONTINUED | OUTPATIENT
Start: 2021-09-05 | End: 2021-09-09 | Stop reason: HOSPADM

## 2021-09-05 RX ORDER — DILTIAZEM HYDROCHLORIDE 240 MG/1
240 CAPSULE, COATED, EXTENDED RELEASE ORAL
Refills: 5 | Status: DISCONTINUED | OUTPATIENT
Start: 2021-09-05 | End: 2021-09-09 | Stop reason: HOSPADM

## 2021-09-05 RX ORDER — SODIUM CHLORIDE 0.9 % (FLUSH) 0.9 %
10 SYRINGE (ML) INJECTION AS NEEDED
Status: DISCONTINUED | OUTPATIENT
Start: 2021-09-05 | End: 2021-09-09 | Stop reason: HOSPADM

## 2021-09-05 RX ORDER — POLYETHYLENE GLYCOL 3350 17 G/17G
17 POWDER, FOR SOLUTION ORAL DAILY PRN
Status: DISCONTINUED | OUTPATIENT
Start: 2021-09-05 | End: 2021-09-09 | Stop reason: HOSPADM

## 2021-09-05 RX ORDER — POTASSIUM CHLORIDE 750 MG/1
20 CAPSULE, EXTENDED RELEASE ORAL ONCE
Status: COMPLETED | OUTPATIENT
Start: 2021-09-05 | End: 2021-09-05

## 2021-09-05 RX ORDER — SODIUM CHLORIDE 0.9 % (FLUSH) 0.9 %
10 SYRINGE (ML) INJECTION EVERY 12 HOURS SCHEDULED
Status: DISCONTINUED | OUTPATIENT
Start: 2021-09-05 | End: 2021-09-09 | Stop reason: HOSPADM

## 2021-09-05 RX ORDER — MAGNESIUM SULFATE HEPTAHYDRATE 40 MG/ML
2 INJECTION, SOLUTION INTRAVENOUS AS NEEDED
Status: DISCONTINUED | OUTPATIENT
Start: 2021-09-05 | End: 2021-09-09 | Stop reason: HOSPADM

## 2021-09-05 RX ORDER — BISACODYL 5 MG/1
5 TABLET, DELAYED RELEASE ORAL DAILY PRN
Status: DISCONTINUED | OUTPATIENT
Start: 2021-09-05 | End: 2021-09-09 | Stop reason: HOSPADM

## 2021-09-05 RX ORDER — POTASSIUM CHLORIDE 1.5 G/1.77G
40 POWDER, FOR SOLUTION ORAL AS NEEDED
Status: DISCONTINUED | OUTPATIENT
Start: 2021-09-05 | End: 2021-09-09 | Stop reason: HOSPADM

## 2021-09-05 RX ORDER — ACETAMINOPHEN 325 MG/1
650 TABLET ORAL EVERY 4 HOURS PRN
Status: DISCONTINUED | OUTPATIENT
Start: 2021-09-05 | End: 2021-09-09 | Stop reason: HOSPADM

## 2021-09-05 RX ORDER — POTASSIUM CHLORIDE 7.45 MG/ML
10 INJECTION INTRAVENOUS
Status: DISCONTINUED | OUTPATIENT
Start: 2021-09-05 | End: 2021-09-09 | Stop reason: HOSPADM

## 2021-09-05 RX ORDER — MAGNESIUM SULFATE HEPTAHYDRATE 40 MG/ML
4 INJECTION, SOLUTION INTRAVENOUS AS NEEDED
Status: DISCONTINUED | OUTPATIENT
Start: 2021-09-05 | End: 2021-09-09 | Stop reason: HOSPADM

## 2021-09-05 RX ORDER — DEXAMETHASONE SODIUM PHOSPHATE 4 MG/ML
6 INJECTION, SOLUTION INTRA-ARTICULAR; INTRALESIONAL; INTRAMUSCULAR; INTRAVENOUS; SOFT TISSUE ONCE
Status: COMPLETED | OUTPATIENT
Start: 2021-09-05 | End: 2021-09-05

## 2021-09-05 RX ORDER — ONDANSETRON 2 MG/ML
4 INJECTION INTRAMUSCULAR; INTRAVENOUS EVERY 6 HOURS PRN
Status: DISCONTINUED | OUTPATIENT
Start: 2021-09-05 | End: 2021-09-09 | Stop reason: HOSPADM

## 2021-09-05 RX ORDER — PANTOPRAZOLE SODIUM 40 MG/1
40 TABLET, DELAYED RELEASE ORAL DAILY
Status: DISCONTINUED | OUTPATIENT
Start: 2021-09-05 | End: 2021-09-09 | Stop reason: HOSPADM

## 2021-09-05 RX ORDER — LOPERAMIDE HYDROCHLORIDE 2 MG/1
2 CAPSULE ORAL 4 TIMES DAILY PRN
Status: DISCONTINUED | OUTPATIENT
Start: 2021-09-05 | End: 2021-09-09 | Stop reason: HOSPADM

## 2021-09-05 RX ORDER — BISACODYL 10 MG
10 SUPPOSITORY, RECTAL RECTAL DAILY PRN
Status: DISCONTINUED | OUTPATIENT
Start: 2021-09-05 | End: 2021-09-09 | Stop reason: HOSPADM

## 2021-09-05 RX ADMIN — POTASSIUM CHLORIDE 20 MEQ: 750 CAPSULE, EXTENDED RELEASE ORAL at 14:50

## 2021-09-05 RX ADMIN — POTASSIUM CHLORIDE 40 MEQ: 750 CAPSULE, EXTENDED RELEASE ORAL at 23:36

## 2021-09-05 RX ADMIN — REMDESIVIR 200 MG: 100 INJECTION, POWDER, LYOPHILIZED, FOR SOLUTION INTRAVENOUS at 21:07

## 2021-09-05 RX ADMIN — ATORVASTATIN CALCIUM 20 MG: 20 TABLET, FILM COATED ORAL at 21:07

## 2021-09-05 RX ADMIN — RIVAROXABAN 15 MG: 15 TABLET, FILM COATED ORAL at 21:22

## 2021-09-05 RX ADMIN — SODIUM CHLORIDE 1000 ML: 9 INJECTION, SOLUTION INTRAVENOUS at 13:51

## 2021-09-05 RX ADMIN — DEXAMETHASONE SODIUM PHOSPHATE 6 MG: 4 INJECTION, SOLUTION INTRA-ARTICULAR; INTRALESIONAL; INTRAMUSCULAR; INTRAVENOUS; SOFT TISSUE at 13:52

## 2021-09-05 RX ADMIN — POTASSIUM CHLORIDE 40 MEQ: 750 CAPSULE, EXTENDED RELEASE ORAL at 21:07

## 2021-09-05 RX ADMIN — SERTRALINE HYDROCHLORIDE 50 MG: 50 TABLET ORAL at 21:07

## 2021-09-05 RX ADMIN — POTASSIUM CHLORIDE 40 MEQ: 750 CAPSULE, EXTENDED RELEASE ORAL at 18:21

## 2021-09-05 RX ADMIN — SODIUM CHLORIDE, PRESERVATIVE FREE 10 ML: 5 INJECTION INTRAVENOUS at 21:14

## 2021-09-05 RX ADMIN — LIDOCAINE HYDROCHLORIDE: 20 SOLUTION ORAL; TOPICAL at 18:21

## 2021-09-05 NOTE — PLAN OF CARE
Goal Outcome Evaluation:  Plan of Care Reviewed With: patient        Progress: no change  Outcome Summary: Pt admit from ED with N/V, COVID+. 02 @ 3L per NC. No c/o pain. Patient to get dose 1 of Remdesivir tonight. Currently replacing K+ of 2.9. 20 MEQ given in ER. Spoke to Dr. Couch. She ordered 40 mEq to be given at 1820, 2100 and 2300. K+ to be rechecked with AM labs. Pt did have somewhat of an appetite this evening, no nausea.

## 2021-09-05 NOTE — ED PROVIDER NOTES
Subjective   75-year-old female presents for evaluation of symptoms related to COVID-19.  Of note, the patient is fully vaccinated.  She states that she became symptomatic with COVID-19 symptoms 1 week ago at which time she tested positive.  Today, she called her physician when her oxygen saturations were hovering around 90% and was advised to call EMS and come to the emergency department to be evaluated.  Per EMS, the patient is not oxygen dependent and her room air oxygen saturations were 90%.  She was given supplemental oxygen and brought to our facility.  The patient endorses cough, fever, fatigue, myalgias, shortness of breath, and nausea/diarrhea.          Review of Systems   Constitutional: Positive for appetite change, fatigue and fever.   Respiratory: Positive for cough and shortness of breath.    Gastrointestinal: Positive for diarrhea and nausea.   Neurological: Positive for weakness.   All other systems reviewed and are negative.      Past Medical History:   Diagnosis Date   • Acute deep vein thrombosis of lower extremity (CMS/HCC)    • Acute pulmonary embolism (CMS/HCC) 11/22/2016    Bilateral, 09/30/2012 Initiation of CPR per family. EMS transport to [facility]. Restoration of rhythm and blood pressure at [facility]. “Shock” manifest by hypotension and multiorgan failure:  Transient hepatic dysfunction, resolved.  Transient nonoliguric ATN, resolved.  Transient metabolic acidosis, resolved.  Pressor support. Bilateral    • Arthritis    • Bladder problem    • Blood clotting disorder (CMS/HCC)    • Bone pain    • DVT (deep venous thrombosis) (CMS/HCC)    • Easy bruising    • H/O bone density study 08/2012   • H/O colonoscopy 08/2012   • H/O mammogram 08/2012   • HBP (high blood pressure)    • History of blood transfusion    • Hypertension 11/22/2016   • Kidney stones    • Synovial cyst of popliteal space    • Upper gastrointestinal bleeding      "gastritis related to lytic therapy and heparins, resolved: a. “Hemorrhagic gastritis.”   • Vitamin B12 deficiency        Allergies   Allergen Reactions   • Erythromycin Swelling     \"tongue swelling\".   • Codeine Sulfate Swelling   • Meperidine Swelling   • Morphine And Related Swelling   • Penicillins Swelling     \"swelling\" at site.   • Sulfa Antibiotics Swelling   • Sulfadiazine Swelling       Past Surgical History:   Procedure Laterality Date   • BILATERAL BREAST REDUCTION     • CATARACT EXTRACTION, BILATERAL     •  SECTION     •  SECTION     • COLONOSCOPY     • KIDNEY STONE SURGERY      Nephrolithiasis with lithotripsy.   • OTHER SURGICAL HISTORY      barry filter placement in Vena Cava   • REDUCTION MAMMAPLASTY Bilateral    • TONSILLECTOMY         Family History   Problem Relation Age of Onset   • Stroke Mother    • Hypertension Mother    • Arthritis Mother    • Hyperlipidemia Mother    • Heart attack Father    • COPD Father    • Thyroid disease Father    • Heart disease Father    • Stroke Other    • Coronary artery disease Other    • COPD Other    • Stroke Other    • COPD Other    • Breast cancer Maternal Aunt    • Ovarian cancer Neg Hx        Social History     Socioeconomic History   • Marital status:      Spouse name: Not on file   • Number of children: Not on file   • Years of education: Not on file   • Highest education level: Not on file   Tobacco Use   • Smoking status: Never Smoker   • Smokeless tobacco: Never Used   Substance and Sexual Activity   • Alcohol use: Yes     Comment: occas   • Drug use: No   • Sexual activity: Defer           Objective   Physical Exam  Vitals and nursing note reviewed.   Constitutional:       General: She is not in acute distress.     Appearance: She is well-developed. She is not diaphoretic.      Comments: Nontoxic-appearing elderly female   HENT:      Head: Normocephalic and atraumatic.   Eyes:      Pupils: Pupils are equal, " round, and reactive to light.   Neck:      Comments: No meningeal signs or nuchal rigidity  Cardiovascular:      Rate and Rhythm: Normal rate and regular rhythm.      Heart sounds: Normal heart sounds. No murmur heard.   No friction rub. No gallop.    Pulmonary:      Effort: Pulmonary effort is normal. No respiratory distress.      Breath sounds: Normal breath sounds. No wheezing or rales.   Abdominal:      General: Bowel sounds are normal. There is no distension.      Palpations: Abdomen is soft. There is no mass.      Tenderness: There is no abdominal tenderness. There is no guarding or rebound.      Comments: No focal abdominal tenderness, no peritoneal signs, no pain out of proportion to exam   Musculoskeletal:         General: Normal range of motion.      Cervical back: Normal range of motion and neck supple. No rigidity.      Right lower leg: No edema.      Left lower leg: No edema.   Skin:     General: Skin is warm and dry.      Findings: No erythema or rash.   Neurological:      General: No focal deficit present.      Mental Status: She is alert and oriented to person, place, and time.   Psychiatric:         Mood and Affect: Mood normal.         Thought Content: Thought content normal.         Judgment: Judgment normal.         Procedures           ED Course  ED Course as of Sep 05 1529   Sun Sep 05, 2021   1342 75-year-old female presents for evaluation of symptoms related to COVID-19.  Of note, the patient is fully vaccinated.  She states that she became symptomatic 1 week ago and tested positive for the virus at that time.  She has been monitoring her oxygen saturations at home and today they were consistently hovering at 90% so her primary care physician advised her to call EMS and come to the emergency department.  On arrival, the patient is nontoxic-appearing.  She is hypoxic on room air at 90% and supplemental oxygen given via nasal cannula.  Decadron given.  We will obtain labs and a chest x-ray, and  we will seek admission to the hospitalist for further evaluation and treatment.    [DD]   1402 I discussed the patient's case with Dr. Arevalo, and the patient will be admitted under her care for further evaluation and treatment.  She is aware/agreeable with the plan at this time.    [DD]   1403 Potassium replaced.    [DD]   1405 I personally viewed the patient's x-ray images myself, and I am in agreement with the radiologist's reading for final interpretation.        [DD]   1514 I discussed the patient's case with Dr. Arevalo, and the patient will be admitted under her care for further evaluation and treatment.  The patient is aware/agreeable with the plan at this time.    [DD]      ED Course User Index  [DD] Lopez Garcia MD                                   Recent Results (from the past 24 hour(s))   Comprehensive Metabolic Panel    Collection Time: 09/05/21  1:26 PM    Specimen: Blood   Result Value Ref Range    Glucose 148 (H) 65 - 99 mg/dL    BUN 19 8 - 23 mg/dL    Creatinine 1.14 (H) 0.57 - 1.00 mg/dL    Sodium 140 136 - 145 mmol/L    Potassium 2.9 (L) 3.5 - 5.2 mmol/L    Chloride 97 (L) 98 - 107 mmol/L    CO2 29.0 22.0 - 29.0 mmol/L    Calcium 8.8 8.6 - 10.5 mg/dL    Total Protein 7.0 6.0 - 8.5 g/dL    Albumin 3.80 3.50 - 5.20 g/dL    ALT (SGPT) 10 1 - 33 U/L    AST (SGOT) 22 1 - 32 U/L    Alkaline Phosphatase 82 39 - 117 U/L    Total Bilirubin 0.4 0.0 - 1.2 mg/dL    eGFR Non African Amer 46 (L) >60 mL/min/1.73    Globulin 3.2 gm/dL    A/G Ratio 1.2 g/dL    BUN/Creatinine Ratio 16.7 7.0 - 25.0    Anion Gap 14.0 5.0 - 15.0 mmol/L   BNP    Collection Time: 09/05/21  1:26 PM    Specimen: Blood   Result Value Ref Range    proBNP 196.6 0.0-1,800.0 pg/mL   D-dimer, Quantitative    Collection Time: 09/05/21  1:26 PM    Specimen: Blood   Result Value Ref Range    D-Dimer, Quantitative <0.27 0.00 - 0.56 MCGFEU/mL   Troponin    Collection Time: 09/05/21  1:26 PM    Specimen: Blood   Result Value Ref Range     Troponin T <0.010 0.000 - 0.030 ng/mL   Lactic Acid, Plasma    Collection Time: 09/05/21  1:26 PM    Specimen: Blood   Result Value Ref Range    Lactate 1.3 0.5 - 2.0 mmol/L   Procalcitonin    Collection Time: 09/05/21  1:26 PM    Specimen: Blood   Result Value Ref Range    Procalcitonin 0.15 0.00 - 0.25 ng/mL   Magnesium    Collection Time: 09/05/21  1:26 PM    Specimen: Blood   Result Value Ref Range    Magnesium 1.6 1.6 - 2.4 mg/dL   Sedimentation Rate    Collection Time: 09/05/21  1:26 PM    Specimen: Blood   Result Value Ref Range    Sed Rate 52 (H) 0 - 30 mm/hr   C-reactive Protein    Collection Time: 09/05/21  1:26 PM    Specimen: Blood   Result Value Ref Range    C-Reactive Protein 10.92 (H) 0.00 - 0.50 mg/dL   Lactate Dehydrogenase    Collection Time: 09/05/21  1:26 PM    Specimen: Blood   Result Value Ref Range     (H) 135 - 214 U/L   CBC Auto Differential    Collection Time: 09/05/21  1:26 PM    Specimen: Blood   Result Value Ref Range    WBC 7.19 3.40 - 10.80 10*3/mm3    RBC 5.02 3.77 - 5.28 10*6/mm3    Hemoglobin 15.1 12.0 - 15.9 g/dL    Hematocrit 44.3 34.0 - 46.6 %    MCV 88.2 79.0 - 97.0 fL    MCH 30.1 26.6 - 33.0 pg    MCHC 34.1 31.5 - 35.7 g/dL    RDW 12.6 12.3 - 15.4 %    RDW-SD 40.7 37.0 - 54.0 fl    MPV 9.7 6.0 - 12.0 fL    Platelets 246 140 - 450 10*3/mm3    Neutrophil % 79.4 (H) 42.7 - 76.0 %    Lymphocyte % 13.8 (L) 19.6 - 45.3 %    Monocyte % 6.4 5.0 - 12.0 %    Eosinophil % 0.0 (L) 0.3 - 6.2 %    Basophil % 0.1 0.0 - 1.5 %    Immature Grans % 0.3 0.0 - 0.5 %    Neutrophils, Absolute 5.71 1.70 - 7.00 10*3/mm3    Lymphocytes, Absolute 0.99 0.70 - 3.10 10*3/mm3    Monocytes, Absolute 0.46 0.10 - 0.90 10*3/mm3    Eosinophils, Absolute 0.00 0.00 - 0.40 10*3/mm3    Basophils, Absolute 0.01 0.00 - 0.20 10*3/mm3    Immature Grans, Absolute 0.02 0.00 - 0.05 10*3/mm3    nRBC 0.0 0.0 - 0.2 /100 WBC   Hepatic Function Panel    Collection Time: 09/05/21  1:26 PM    Specimen: Blood   Result Value  Ref Range    Total Protein 7.0 6.0 - 8.5 g/dL    Albumin 3.80 3.50 - 5.20 g/dL    ALT (SGPT) 9 1 - 33 U/L    AST (SGOT) 21 1 - 32 U/L    Alkaline Phosphatase 81 39 - 117 U/L    Total Bilirubin 0.4 0.0 - 1.2 mg/dL    Bilirubin, Direct <0.2 0.0 - 0.3 mg/dL    Bilirubin, Indirect     Urinalysis With Culture If Indicated - Urine, Clean Catch    Collection Time: 09/05/21  1:50 PM    Specimen: Urine, Clean Catch   Result Value Ref Range    Color, UA Yellow Yellow, Straw    Appearance, UA Clear Clear    pH, UA 5.5 5.0 - 8.0    Specific Gravity, UA 1.023 1.001 - 1.030    Glucose, UA Negative Negative    Ketones, UA 15 mg/dL (1+) (A) Negative    Bilirubin, UA Negative Negative    Blood, UA Trace (A) Negative    Protein,  mg/dL (2+) (A) Negative    Leuk Esterase, UA Trace (A) Negative    Nitrite, UA Negative Negative    Urobilinogen, UA 1.0 E.U./dL 0.2 - 1.0 E.U./dL   Urinalysis, Microscopic Only - Urine, Clean Catch    Collection Time: 09/05/21  1:50 PM    Specimen: Urine, Clean Catch   Result Value Ref Range    RBC, UA 3-6 (A) None Seen, 0-2 /HPF    WBC, UA 3-5 (A) None Seen, 0-2 /HPF    Bacteria, UA None Seen None Seen, Trace /HPF    Squamous Epithelial Cells, UA 3-6 (A) None Seen, 0-2 /HPF    Hyaline Casts, UA 7-12 0 - 6 /LPF    Methodology Automated Microscopy      Note: In addition to lab results from this visit, the labs listed above may include labs taken at another facility or during a different encounter within the last 24 hours. Please correlate lab times with ED admission and discharge times for further clarification of the services performed during this visit.    XR Chest 1 View   Final Result   Cardiac enlargement without evidence of additional acute   cardiopulmonary abnormality.       This report was finalized on 9/5/2021 2:11 PM by Edgardo Moreno.            Vitals:    09/05/21 1316 09/05/21 1330   BP: 118/50 124/90   BP Location: Right arm    Patient Position: Lying    Pulse: 77 79   Resp: 14   "  Temp: 97.9 °F (36.6 °C)    TempSrc: Oral    SpO2: 90% 96%   Weight: 73 kg (161 lb)    Height: 160 cm (63\")      Medications   sodium chloride 0.9 % flush 10 mL (has no administration in time range)   sodium chloride 0.9 % flush 10 mL (has no administration in time range)   sodium chloride 0.9 % flush 10 mL (has no administration in time range)   acetaminophen (TYLENOL) tablet 650 mg (has no administration in time range)   diphenhydrAMINE (BENADRYL) capsule 25 mg (has no administration in time range)   sennosides-docusate (PERICOLACE) 8.6-50 MG per tablet 2 tablet (has no administration in time range)     And   polyethylene glycol (MIRALAX) packet 17 g (has no administration in time range)     And   bisacodyl (DULCOLAX) EC tablet 5 mg (has no administration in time range)     And   bisacodyl (DULCOLAX) suppository 10 mg (has no administration in time range)   ondansetron (ZOFRAN) injection 4 mg (has no administration in time range)   remdesivir 200 mg in sodium chloride 0.9 % 290 mL IVPB (powder vial) (has no administration in time range)     Followed by   remdesivir 100 mg in sodium chloride 0.9 % 270 mL IVPB (powder vial) (has no administration in time range)   Pharmacy Consult - Remdesivir (has no administration in time range)   dexamethasone (DECADRON) tablet 6 mg (has no administration in time range)   dexamethasone (DECADRON) injection 6 mg (6 mg Intravenous Given 9/5/21 1352)   sodium chloride 0.9 % bolus 1,000 mL (1,000 mL Intravenous New Bag 9/5/21 1351)   potassium chloride (MICRO-K) CR capsule 20 mEq (20 mEq Oral Given 9/5/21 1450)     ECG/EMG Results (last 24 hours)     Procedure Component Value Units Date/Time    ECG 12 Lead [[id number]] Collected: 09/05/21 1319     Updated: 09/05/21 1318        ECG 12 Lead                     MDM    Final diagnoses:   COVID-19   Hypoxia   Hypokalemia       ED Disposition  ED Disposition     ED Disposition Condition Comment    Decision to Admit  Level of Care: Telemetry " [5]   Diagnosis: COVID-19 [[id number]]   Certification: I Certify That Inpatient Hospital Services Are Medically Necessary For Greater Than 2 Midnights            No follow-up provider specified.       Medication List      No changes were made to your prescriptions during this visit.          Lopez Garcia MD  09/05/21 1536

## 2021-09-05 NOTE — H&P
[facility] Medicine Services  HISTORY AND PHYSICAL    Patient Name: Carol Jean  : 1945  MRN: [id number]  Primary Care Physician: Flakito Chaudhary MD  Date of admission: 2021      Subjective   Subjective     Chief Complaint:  Diarrhea    HPI:  Carol Jean is a 75 y.o. female with history of hypertension, CAD, recurrent DVT first diagnosed in  as well as PE on Xarelto admitted after testing positive for Covid 1 week ago today on .  Since then, she has had significant diarrhea and not able to tolerate oral intake secondary to nausea as well as vomiting.  She presented on room air but subsequently oxygen levels required her to be on 3 L nasal cannula.  She is already on Xarelto for history of DVT and PE and had been worked up by Dr. Ashby for hypercoagulable work-up.   Her  also tested positive for Covid on the same day last week but has been asymptomatic    He did receive Pfizer vaccine she thinks in March      COVID Details:    Symptoms:    [] NONE [] Fever []  Cough [] Shortness of breath [] Change in taste/smell      The patient has a COVID HM Topic on their chart, and they are fully vaccinated.      Review of Systems   Constitutional: Positive for appetite change and fatigue. Negative for fever.   Eyes: Negative for visual disturbance.   Respiratory: Positive for shortness of breath. Negative for cough.    Cardiovascular: Negative for chest pain.   Gastrointestinal: Positive for diarrhea, nausea and vomiting. Negative for abdominal pain.   Genitourinary: Negative for dysuria.   Musculoskeletal: Positive for arthralgias and myalgias.   Skin: Negative for rash and wound.   Neurological: Positive for weakness.        All other systems reviewed and are negative.     Personal History     Past Medical History:   Diagnosis Date   • Acute deep vein thrombosis of lower extremity (CMS/HCC)    • Acute pulmonary embolism (CMS/HCC) 2016     Bilateral, 2012 Initiation of CPR per family. EMS transport to [facility]. Restoration of rhythm and blood pressure at [facility]. “Shock” manifest by hypotension and multiorgan failure:  Transient hepatic dysfunction, resolved.  Transient nonoliguric ATN, resolved.  Transient metabolic acidosis, resolved.  Pressor support. Bilateral    • Arthritis    • Bladder problem    • Blood clotting disorder (CMS/HCC)    • Bone pain    • DVT (deep venous thrombosis) (CMS/[facility])    • Easy bruising    • H/O bone density study 2012   • H/O colonoscopy 2012   • H/O mammogram 2012   • HBP (high blood pressure)    • History of blood transfusion    • Hypertension 2016   • Kidney stones    • Synovial cyst of popliteal space    • Upper gastrointestinal bleeding     gastritis related to lytic therapy and heparins, resolved: a. “Hemorrhagic gastritis.”   • Vitamin B12 deficiency        Past Surgical History:   Procedure Laterality Date   • BILATERAL BREAST REDUCTION     • CATARACT EXTRACTION, BILATERAL     •  SECTION     •  SECTION     • COLONOSCOPY     • KIDNEY STONE SURGERY      Nephrolithiasis with lithotripsy.   • OTHER SURGICAL HISTORY      barry filter placement in Vena Cava   • REDUCTION MAMMAPLASTY Bilateral    • TONSILLECTOMY         Family History: family history includes Arthritis in her mother; Breast cancer in her maternal aunt; COPD in her father and other family members; Coronary artery disease in an other family member; Heart attack in her father; Heart disease in her father; Hyperlipidemia in her mother; Hypertension in her mother; Stroke in her mother and other family members; Thyroid disease in her father. Otherwise pertinent FHx was reviewed and unremarkable.     Social History:  reports that she has never smoked. She has never used smokeless tobacco. She reports current alcohol use. She reports that she does  "not use drugs.  Social History     Social History Narrative   • Not on file       Medications:  Available home medication information reviewed.  (Not in a hospital admission)      Allergies   Allergen Reactions   • Erythromycin Swelling     \"tongue swelling\".   • Codeine Sulfate Swelling   • Meperidine Swelling   • Morphine And Related Swelling   • Penicillins Swelling     \"swelling\" at site.   • Sulfa Antibiotics Swelling   • Sulfadiazine Swelling       Objective   Objective     Vital Signs:   Temp:  [97.9 °F (36.6 °C)] 97.9 °F (36.6 °C)  Heart Rate:  [77-79] 79  Resp:  [14] 14  BP: (118-124)/(50-90) 124/90  Flow (L/min):  [3] 3       Physical Exam  Constitutional:       General: She is not in acute distress.  HENT:      Head: Normocephalic and atraumatic.      Nose:      Comments: Wearing mask  Cardiovascular:      Rate and Rhythm: Normal rate and regular rhythm.      Heart sounds: No murmur heard.     Pulmonary:      Effort: Pulmonary effort is normal. No respiratory distress.   Abdominal:      General: There is no distension.      Palpations: Abdomen is soft.      Tenderness: There is abdominal tenderness.   Musculoskeletal:         General: No swelling.      Right lower leg: No edema.      Left lower leg: No edema.   Skin:     General: Skin is warm and dry.   Neurological:      Mental Status: She is alert and oriented to person, place, and time.   Psychiatric:         Mood and Affect: Mood normal.         Behavior: Behavior normal.            Result Review:  I have personally reviewed the results from the time of this admission to 9/5/2021 15:10 EDT and agree with these findings:  [x]  Laboratory  [x]  Microbiology  []  Radiology  []  EKG/Telemetry   []  Cardiology/Vascular   []  Pathology  [x]  Old records  []  Other:  Most notable findings include:       LAB RESULTS:      Lab 09/05/21  1326   WBC 7.19   HEMOGLOBIN 15.1   HEMATOCRIT 44.3   PLATELETS 246   NEUTROS ABS 5.71   IMMATURE GRANS (ABS) 0.02   LYMPHS " ABS 0.99   MONOS ABS 0.46   EOS ABS 0.00   MCV 88.2   SED RATE 52*   CRP 10.92*   PROCALCITONIN 0.15   LACTATE 1.3   *   D DIMER QUANT <0.27         Lab 09/05/21  1326   SODIUM 140   POTASSIUM 2.9*   CHLORIDE 97*   CO2 29.0   ANION GAP 14.0   BUN 19   CREATININE 1.14*   GLUCOSE 148*   CALCIUM 8.8   MAGNESIUM 1.6         Lab 09/05/21  1326   TOTAL PROTEIN 7.0   ALBUMIN 3.80   GLOBULIN 3.2   ALT (SGPT) 10   AST (SGOT) 22   BILIRUBIN 0.4   ALK PHOS 82         Lab 09/05/21  1326   PROBNP 196.6   TROPONIN T <0.010                 UA    Urinalysis 2/24/21 9/5/21 9/5/21     1350 1350   Squamous Epithelial Cells, UA   3-6 (A)   Specific Gravity, UA  1.023    Ketones, UA Negative 15 mg/dL (1+) (A)    Blood, UA  Trace (A)    Leukocytes,  Herb/ul (A) Trace (A)    Nitrite, UA  Negative    RBC, UA   3-6 (A)   WBC, UA   3-5 (A)   Bacteria, UA   None Seen   (A) Abnormal value              Microbiology Results (last 10 days)     ** No results found for the last 240 hours. **          XR Chest 1 View    Result Date: 9/5/2021  EXAMINATION: XR CHEST 1 VW-  INDICATION: sob  COMPARISON: 10/7/2012  FINDINGS: There is mild cardiac enlargement. There is no focal consolidation. No pleural effusion or pneumothorax.      Impression: Cardiac enlargement without evidence of additional acute cardiopulmonary abnormality.  This report was finalized on 9/5/2021 2:11 PM by Edgardo Moreno.            Assessment/Plan   Assessment & Plan     Active Hospital Problems    Diagnosis  POA   • COVID-19 [U07.1]  Yes       Diarrhea, nausea, vomiting secondary to COVID-19 infection  Now with hypoxia after being admitted to the ER on 3 L    -Recent Z-Joseph but patient states her stool is essentially water  -Add Imodium  -Check chest CT without contrast, patient already anticoagulated  -Start remdesivir  -Decadron first dose 9/5, continue for total of 10 days      Hypokalemia secondary to above  -Replace per protocol  -Hold  HCTZ    Hypertension  -Continue diltiazem with hold parameters  -Metoprolol held, okay to restart but did not want double rate control if she is going to get remdesivir    History of previous DVT and PE  -Continue Xarelto    Reflux/dysphagia  -Secondary to vomiting  -GI cocktail x1        DVT prophylaxis: Xarelto      CODE STATUS: DNR, has living will and verified by patient  Code Status and Medical Interventions:   Ordered at: 09/05/21 1508     Code Status:    No CPR     Medical Interventions (Level of Support Prior to Arrest):    Full     Comments:    also on living will       Admission Status:  I believe this patient meets INPATIENT status due to hypoxia.  I feel patient’s risk for adverse outcomes and need for care warrant INPATIENT evaluation and I predict the patient’s care encounter to likely last beyond 2 midnights.      Suly Couch, DO  09/05/21

## 2021-09-06 LAB
ALBUMIN SERPL-MCNC: 3.4 G/DL (ref 3.5–5.2)
ALBUMIN/GLOB SERPL: 1.2 G/DL
ALP SERPL-CCNC: 72 U/L (ref 39–117)
ALT SERPL W P-5'-P-CCNC: 10 U/L (ref 1–33)
ANION GAP SERPL CALCULATED.3IONS-SCNC: 10 MMOL/L (ref 5–15)
AST SERPL-CCNC: 21 U/L (ref 1–32)
BASOPHILS # BLD AUTO: 0 10*3/MM3 (ref 0–0.2)
BASOPHILS NFR BLD AUTO: 0 % (ref 0–1.5)
BILIRUB SERPL-MCNC: 0.2 MG/DL (ref 0–1.2)
BUN SERPL-MCNC: 20 MG/DL (ref 8–23)
BUN/CREAT SERPL: 21.1 (ref 7–25)
CALCIUM SPEC-SCNC: 8.6 MG/DL (ref 8.6–10.5)
CHLORIDE SERPL-SCNC: 106 MMOL/L (ref 98–107)
CO2 SERPL-SCNC: 26 MMOL/L (ref 22–29)
CREAT SERPL-MCNC: 0.95 MG/DL (ref 0.57–1)
CRP SERPL-MCNC: 11.68 MG/DL (ref 0–0.5)
D DIMER PPP FEU-MCNC: <0.27 MCGFEU/ML (ref 0–0.56)
DEPRECATED RDW RBC AUTO: 40.3 FL (ref 37–54)
EOSINOPHIL # BLD AUTO: 0 10*3/MM3 (ref 0–0.4)
EOSINOPHIL NFR BLD AUTO: 0 % (ref 0.3–6.2)
ERYTHROCYTE [DISTWIDTH] IN BLOOD BY AUTOMATED COUNT: 12.4 % (ref 12.3–15.4)
ERYTHROCYTE [SEDIMENTATION RATE] IN BLOOD: 33 MM/HR (ref 0–30)
FIBRINOGEN PPP-MCNC: 644 MG/DL (ref 220–470)
FLUAV RNA RESP QL NAA+PROBE: NOT DETECTED
FLUBV RNA RESP QL NAA+PROBE: NOT DETECTED
GFR SERPL CREATININE-BSD FRML MDRD: 57 ML/MIN/1.73
GLOBULIN UR ELPH-MCNC: 2.9 GM/DL
GLUCOSE SERPL-MCNC: 154 MG/DL (ref 65–99)
HCT VFR BLD AUTO: 41.5 % (ref 34–46.6)
HGB BLD-MCNC: 13.8 G/DL (ref 12–15.9)
IMM GRANULOCYTES # BLD AUTO: 0.02 10*3/MM3 (ref 0–0.05)
IMM GRANULOCYTES NFR BLD AUTO: 0.4 % (ref 0–0.5)
LDH SERPL-CCNC: 238 U/L (ref 135–214)
LYMPHOCYTES # BLD AUTO: 0.78 10*3/MM3 (ref 0.7–3.1)
LYMPHOCYTES NFR BLD AUTO: 15.3 % (ref 19.6–45.3)
MAGNESIUM SERPL-MCNC: 1.8 MG/DL (ref 1.6–2.4)
MCH RBC QN AUTO: 29.3 PG (ref 26.6–33)
MCHC RBC AUTO-ENTMCNC: 33.3 G/DL (ref 31.5–35.7)
MCV RBC AUTO: 88.1 FL (ref 79–97)
MONOCYTES # BLD AUTO: 0.51 10*3/MM3 (ref 0.1–0.9)
MONOCYTES NFR BLD AUTO: 10 % (ref 5–12)
NEUTROPHILS NFR BLD AUTO: 3.8 10*3/MM3 (ref 1.7–7)
NEUTROPHILS NFR BLD AUTO: 74.3 % (ref 42.7–76)
NRBC BLD AUTO-RTO: 0 /100 WBC (ref 0–0.2)
PLATELET # BLD AUTO: 249 10*3/MM3 (ref 140–450)
PMV BLD AUTO: 9.9 FL (ref 6–12)
POTASSIUM SERPL-SCNC: 4.9 MMOL/L (ref 3.5–5.2)
PROCALCITONIN SERPL-MCNC: 0.12 NG/ML (ref 0–0.25)
PROT SERPL-MCNC: 6.3 G/DL (ref 6–8.5)
QT INTERVAL: 384 MS
QTC INTERVAL: 442 MS
RBC # BLD AUTO: 4.71 10*6/MM3 (ref 3.77–5.28)
SARS-COV-2 RNA RESP QL NAA+PROBE: DETECTED
SODIUM SERPL-SCNC: 142 MMOL/L (ref 136–145)
WBC # BLD AUTO: 5.11 10*3/MM3 (ref 3.4–10.8)

## 2021-09-06 PROCEDURE — 80053 COMPREHEN METABOLIC PANEL: CPT | Performed by: INTERNAL MEDICINE

## 2021-09-06 PROCEDURE — 85652 RBC SED RATE AUTOMATED: CPT | Performed by: INTERNAL MEDICINE

## 2021-09-06 PROCEDURE — 83735 ASSAY OF MAGNESIUM: CPT | Performed by: INTERNAL MEDICINE

## 2021-09-06 PROCEDURE — 85379 FIBRIN DEGRADATION QUANT: CPT | Performed by: INTERNAL MEDICINE

## 2021-09-06 PROCEDURE — 87636 SARSCOV2 & INF A&B AMP PRB: CPT | Performed by: INTERNAL MEDICINE

## 2021-09-06 PROCEDURE — 85025 COMPLETE CBC W/AUTO DIFF WBC: CPT | Performed by: INTERNAL MEDICINE

## 2021-09-06 PROCEDURE — 83615 LACTATE (LD) (LDH) ENZYME: CPT | Performed by: INTERNAL MEDICINE

## 2021-09-06 PROCEDURE — 25010000003 MAGNESIUM SULFATE 4 GM/100ML SOLUTION: Performed by: INTERNAL MEDICINE

## 2021-09-06 PROCEDURE — 84145 PROCALCITONIN (PCT): CPT | Performed by: INTERNAL MEDICINE

## 2021-09-06 PROCEDURE — 63710000001 DEXAMETHASONE PER 0.25 MG: Performed by: INTERNAL MEDICINE

## 2021-09-06 PROCEDURE — 86140 C-REACTIVE PROTEIN: CPT | Performed by: INTERNAL MEDICINE

## 2021-09-06 PROCEDURE — 99232 SBSQ HOSP IP/OBS MODERATE 35: CPT | Performed by: INTERNAL MEDICINE

## 2021-09-06 PROCEDURE — 85384 FIBRINOGEN ACTIVITY: CPT | Performed by: INTERNAL MEDICINE

## 2021-09-06 RX ADMIN — SODIUM CHLORIDE, PRESERVATIVE FREE 10 ML: 5 INJECTION INTRAVENOUS at 09:46

## 2021-09-06 RX ADMIN — RIVAROXABAN 15 MG: 15 TABLET, FILM COATED ORAL at 18:16

## 2021-09-06 RX ADMIN — DEXAMETHASONE 6 MG: 2 TABLET ORAL at 09:46

## 2021-09-06 RX ADMIN — SERTRALINE HYDROCHLORIDE 50 MG: 50 TABLET ORAL at 20:16

## 2021-09-06 RX ADMIN — DILTIAZEM HYDROCHLORIDE 240 MG: 240 CAPSULE, COATED, EXTENDED RELEASE ORAL at 09:46

## 2021-09-06 RX ADMIN — PANTOPRAZOLE SODIUM 40 MG: 40 TABLET, DELAYED RELEASE ORAL at 09:46

## 2021-09-06 RX ADMIN — MAGNESIUM SULFATE HEPTAHYDRATE 4 G: 40 INJECTION, SOLUTION INTRAVENOUS at 06:05

## 2021-09-06 RX ADMIN — REMDESIVIR 100 MG: 100 INJECTION, POWDER, LYOPHILIZED, FOR SOLUTION INTRAVENOUS at 16:17

## 2021-09-06 RX ADMIN — SODIUM CHLORIDE, PRESERVATIVE FREE 10 ML: 5 INJECTION INTRAVENOUS at 20:17

## 2021-09-06 RX ADMIN — ATORVASTATIN CALCIUM 20 MG: 20 TABLET, FILM COATED ORAL at 20:16

## 2021-09-06 NOTE — PROGRESS NOTES
[facility] Medicine Services  PROGRESS NOTE    Patient Name: Carol Jean  : 1945  MRN: [id number]    Date of Admission: 2021  Primary Care Physician: Flakito Chaudhary MD    Subjective   Subjective     CC:  covid 19 pneumonia    HPI:  Feeling better today, diarrhea persists    ROS:  Gen- No fevers, chills  CV- No chest pain, palpitations  Resp- No cough, dyspnea  GI- + loose stool          Objective   Objective     Vital Signs:   Temp:  [97.7 °F (36.5 °C)-99.5 °F (37.5 °C)] 98.1 °F (36.7 °C)  Heart Rate:  [55-71] 65  Resp:  [16-19] 18  BP: (112-132)/(51-65) 121/60  Flow (L/min):  [1-3] 1     Physical Exam:  With patient's consent, physical exam was conducted via visual telemedicine encounter in the interest of PPE conservation and to limit provider exposure to COVID-19.     Constitutional - no acute distress, nontoxic, in bed  HEENT-NCAT, mucous membranes moist  CV-RRR by telemetry  Resp-non labored, speaks in full sentences  Ext-No lower extremity cyanosis, clubbing or edema bilaterally  Neuro-alert and oriented, speech clear, moves all extremities   Psych-normal affect   Skin- No rash on exposed UE or LE bilaterally      Results Reviewed:  LAB RESULTS:      Lab 21  1326   WBC 5.11 7.19   HEMOGLOBIN 13.8 15.1   HEMATOCRIT 41.5 44.3   PLATELETS 249 246   NEUTROS ABS 3.80 5.71   IMMATURE GRANS (ABS) 0.02 0.02   LYMPHS ABS 0.78 0.99   MONOS ABS 0.51 0.46   EOS ABS 0.00 0.00   MCV 88.1 88.2   SED RATE 33* 52*   CRP 11.68* 10.92*   PROCALCITONIN 0.12 0.15   LACTATE  --  1.3   * 235*   D DIMER QUANT <0.27 <0.27         Lab 21  0253 21  1326   SODIUM 142 140   POTASSIUM 4.9 2.9*   CHLORIDE 106 97*   CO2 26.0 29.0   ANION GAP 10.0 14.0   BUN 20 19   CREATININE 0.95 1.14*   GLUCOSE 154* 148*   CALCIUM 8.6 8.8   MAGNESIUM 1.8 1.6         Lab 21  0253 21  1326   TOTAL PROTEIN 6.3 7.0  7.0   ALBUMIN 3.40* 3.80  3.80    GLOBULIN 2.9 3.2   ALT (SGPT) 10 9  10   AST (SGOT) 21 21  22   BILIRUBIN 0.2 0.4  0.4   BILIRUBIN DIRECT  --  <0.2   ALK PHOS 72 81  82         Lab 09/05/21  1326   PROBNP 196.6   TROPONIN T <0.010                 Brief Urine Lab Results  (Last result in the past 365 days)      Color   Clarity   Blood   Leuk Est   Nitrite   Protein   CREAT   Urine HCG        09/05/21 1350 Yellow Clear Trace Trace Negative 100 mg/dL (2+)               Microbiology Results Abnormal     Procedure Component Value - Date/Time    Blood Culture - Blood, Arm, Left [[id number]] Collected: 09/05/21 1340    Lab Status: Preliminary result Specimen: Blood from Arm, Left Updated: 09/06/21 1402     Blood Culture No growth at 24 hours    Blood Culture - Blood, Arm, Right [[id number]] Collected: 09/05/21 1340    Lab Status: Preliminary result Specimen: Blood from Arm, Right Updated: 09/06/21 1402     Blood Culture No growth at 24 hours          CT Chest Without Contrast Diagnostic    Result Date: 9/5/2021  EXAMINATION: CT CHEST WO CONTRAST DIAGNOSTIC-  INDICATION: covid; U07.1-COVID-19; R09.02-Hypoxemia; E87.6-Hypokalemia  TECHNIQUE: Axial noncontrast CT of the chest with multiplanar reconstruction  The radiation dose reduction device was turned on for each scan per the ALARA (As Low as Reasonably Achievable) protocol.  COMPARISON: 11/24/2012  FINDINGS: No pathologic axillary adenopathy or other worrisome body wall soft tissue finding in the chest. No acute findings in the partially imaged upper abdomen, with redemonstrated cholelithiasis. IVC filter noted in place. No pleural or pericardial effusion. No pathologic mediastinal or hilar lymphadenopathy. Nonaneurysmal mildly atherosclerotic thoracic aorta. Evaluation of the osseous structures demonstrates no evidence of acute fracture or aggressive osseous lesion. Evaluation of the lung parenchyma demonstrates scattered multifocal bilateral areas of groundglass opacity, typical in appearance for  Covid 19 pneumonia.      Impression: Multifocal pneumonia typical of Covid 19 related pneumonia.   This report was finalized on 9/5/2021 4:19 PM by Edgardo Moreno.      XR Chest 1 View    Result Date: 9/5/2021  EXAMINATION: XR CHEST 1 VW-  INDICATION: sob  COMPARISON: 10/7/2012  FINDINGS: There is mild cardiac enlargement. There is no focal consolidation. No pleural effusion or pneumothorax.      Impression: Cardiac enlargement without evidence of additional acute cardiopulmonary abnormality.  This report was finalized on 9/5/2021 2:11 PM by Edgardo Moreno.            I have reviewed the medications:  Scheduled Meds:atorvastatin, 20 mg, Oral, Daily  dexamethasone, 6 mg, Oral, Daily With Breakfast  dilTIAZem CD, 240 mg, Oral, Q24H  pantoprazole, 40 mg, Oral, Daily  remdesivir, 100 mg, Intravenous, Daily With Lunch  rivaroxaban, 15 mg, Oral, Daily With Dinner  senna-docusate sodium, 2 tablet, Oral, BID  sertraline, 50 mg, Oral, Daily  sodium chloride, 10 mL, Intravenous, Q12H      Continuous Infusions:Pharmacy Consult - Remdesivir,       PRN Meds:.•  acetaminophen  •  senna-docusate sodium **AND** polyethylene glycol **AND** bisacodyl **AND** bisacodyl  •  diphenhydrAMINE  •  loperamide  •  magnesium sulfate **OR** magnesium sulfate **OR** magnesium sulfate  •  ondansetron  •  Pharmacy Consult - Remdesivir  •  potassium & sodium phosphates **OR** potassium & sodium phosphates  •  potassium chloride **OR** potassium chloride **OR** potassium chloride  •  [COMPLETED] Insert peripheral IV **AND** sodium chloride  •  sodium chloride    Assessment/Plan   Assessment & Plan     Active Hospital Problems    Diagnosis  POA   • COVID-19 [U07.1]  Yes      Resolved Hospital Problems   No resolved problems to display.        Brief Hospital Course to date:  Carol Jean is a 75 y.o. female  with history of hypertension, CAD, recurrent DVT first diagnosed in 2012 as well as PE on Xarelto admitted after testing positive for  Covid 1 week ago today on 8/29.  Since then, she has had significant diarrhea and not able to tolerate oral intake secondary to nausea as well as vomiting.  She presented on room air but subsequently oxygen levels required her to be on 3 L nasal cannula.  She is already on Xarelto for history of DVT and PE and had been worked up by Dr. Ashby for hypercoagulable state    COVID 19 pneumonia  - still some loose stool, but only one bm today  - bilateral infiltrates on CT chest  - remdesivir day 2, dex day 2    Hypokalemia and hypomagnesemia  -Replace per protocol  -Hold HCTZ    Hypertension  -Continue dilt, metoprolol and HCTZ held    History of PE  -xarelto    GERD      DVT prophylaxis: xarelto  Medical DVT prophylaxis orders are present.       AM-PAC 6 Clicks Score (PT): 24 (09/06/21 0815)    Disposition: I expect the patient to be discharged home TBD    CODE STATUS:   Code Status and Medical Interventions:   Ordered at: 09/05/21 1507     Code Status:    No CPR     Medical Interventions (Level of Support Prior to Arrest):    Full     Comments:    also on living will       Stef Hewitt MD  09/06/21

## 2021-09-06 NOTE — PLAN OF CARE
Pt VSS on 3L nasal cannula. Pt finished potassium replacement and potassium with am draw was 4.9. Pt has rested well this shift with no complaints.      Problem: Adult Inpatient Plan of Care  Goal: Plan of Care Review  Outcome: Ongoing, Progressing  Flowsheets (Taken 9/6/2021 0509)  Progress: no change  Plan of Care Reviewed With: patient  Goal: Patient-Specific Goal (Individualized)  Outcome: Ongoing, Progressing  Goal: Absence of Hospital-Acquired Illness or Injury  Outcome: Ongoing, Progressing  Intervention: Identify and Manage Fall Risk  Recent Flowsheet Documentation  Taken 9/6/2021 0427 by Shabnam Felix, RN  Safety Promotion/Fall Prevention:   activity supervised   assistive device/personal items within reach   clutter free environment maintained   fall prevention program maintained   lighting adjusted   nonskid shoes/slippers when out of bed   room organization consistent   safety round/check completed  Taken 9/6/2021 0237 by Shabnam Felix RN  Safety Promotion/Fall Prevention:   activity supervised   assistive device/personal items within reach   clutter free environment maintained   fall prevention program maintained   lighting adjusted   nonskid shoes/slippers when out of bed   room organization consistent   safety round/check completed  Taken 9/6/2021 0023 by hSabnam Felix, RN  Safety Promotion/Fall Prevention:   activity supervised   assistive device/personal items within reach   clutter free environment maintained   fall prevention program maintained   lighting adjusted   nonskid shoes/slippers when out of bed   room organization consistent   safety round/check completed  Taken 9/5/2021 2219 by Shabnam Felix, RN  Safety Promotion/Fall Prevention:   activity supervised   assistive device/personal items within reach   clutter free environment maintained   fall prevention program maintained   lighting adjusted   nonskid shoes/slippers when out of bed   room organization consistent   safety  round/check completed  Taken 9/5/2021 2028 by Shabnam Felix RN  Safety Promotion/Fall Prevention:   activity supervised   assistive device/personal items within reach   clutter free environment maintained   fall prevention program maintained   lighting adjusted   nonskid shoes/slippers when out of bed   room organization consistent   safety round/check completed  Intervention: Prevent Skin Injury  Recent Flowsheet Documentation  Taken 9/6/2021 0427 by Shabnam Felix RN  Body Position: position changed independently  Taken 9/6/2021 0237 by Shabnam Felix RN  Body Position: position changed independently  Taken 9/6/2021 0023 by Shabnam Felix RN  Body Position: position changed independently  Taken 9/5/2021 2219 by Shabnam Felix RN  Body Position: position changed independently  Taken 9/5/2021 2028 by Shabnam Felix RN  Body Position: position changed independently  Intervention: Prevent and Manage VTE (venous thromboembolism) Risk  Recent Flowsheet Documentation  Taken 9/5/2021 2028 by Shabnam Felix RN  VTE Prevention/Management:   bilateral   dorsiflexion/plantar flexion performed  Intervention: Prevent Infection  Recent Flowsheet Documentation  Taken 9/6/2021 0427 by Shabnam Felix RN  Infection Prevention:   visitors restricted/screened   single patient room provided   rest/sleep promoted   personal protective equipment utilized   hand hygiene promoted   equipment surfaces disinfected   environmental surveillance performed  Taken 9/6/2021 0237 by Shabnam Felix RN  Infection Prevention:   visitors restricted/screened   single patient room provided   rest/sleep promoted   personal protective equipment utilized   hand hygiene promoted   equipment surfaces disinfected   environmental surveillance performed  Taken 9/6/2021 0023 by Shabnam Felix RN  Infection Prevention:   visitors restricted/screened   single patient room provided   rest/sleep promoted   personal protective  equipment utilized   hand hygiene promoted   equipment surfaces disinfected   environmental surveillance performed  Taken 9/5/2021 2219 by Shabnam Felix, RN  Infection Prevention:   visitors restricted/screened   single patient room provided   rest/sleep promoted   personal protective equipment utilized   hand hygiene promoted   equipment surfaces disinfected   environmental surveillance performed  Taken 9/5/2021 2028 by Shabnam Felix, RN  Infection Prevention:   visitors restricted/screened   single patient room provided   rest/sleep promoted   personal protective equipment utilized   hand hygiene promoted   equipment surfaces disinfected   environmental surveillance performed  Goal: Optimal Comfort and Wellbeing  Outcome: Ongoing, Progressing  Intervention: Provide Person-Centered Care  Recent Flowsheet Documentation  Taken 9/5/2021 2028 by Shabnam Felix, RN  Trust Relationship/Rapport:   care explained   choices provided   questions answered   questions encouraged  Goal: Readiness for Transition of Care  Outcome: Ongoing, Progressing     Problem: Electrolyte Imbalance  Goal: Electrolyte Balance  Outcome: Ongoing, Progressing   Goal Outcome Evaluation:  Plan of Care Reviewed With: patient        Progress: no change

## 2021-09-06 NOTE — PLAN OF CARE
Goal Outcome Evaluation:  Plan of Care Reviewed With: patient        Progress: improving  Outcome Summary: Attempted to wean patient down to RA, but ended up having to place her on 1 LPM NC. She appears well and in NAD, breathing even and unlabored and SpO2 is stable at 92%. Pt is hopeful to go home soon. VSS, will continue to monitor.

## 2021-09-07 LAB
ALBUMIN SERPL-MCNC: 3.4 G/DL (ref 3.5–5.2)
ALBUMIN/GLOB SERPL: 1.1 G/DL
ALP SERPL-CCNC: 73 U/L (ref 39–117)
ALT SERPL W P-5'-P-CCNC: 16 U/L (ref 1–33)
ANION GAP SERPL CALCULATED.3IONS-SCNC: 14 MMOL/L (ref 5–15)
AST SERPL-CCNC: 28 U/L (ref 1–32)
BASOPHILS # BLD AUTO: 0.01 10*3/MM3 (ref 0–0.2)
BASOPHILS NFR BLD AUTO: 0.1 % (ref 0–1.5)
BILIRUB SERPL-MCNC: 0.2 MG/DL (ref 0–1.2)
BUN SERPL-MCNC: 29 MG/DL (ref 8–23)
BUN/CREAT SERPL: 32.2 (ref 7–25)
CALCIUM SPEC-SCNC: 9 MG/DL (ref 8.6–10.5)
CHLORIDE SERPL-SCNC: 104 MMOL/L (ref 98–107)
CO2 SERPL-SCNC: 23 MMOL/L (ref 22–29)
CREAT SERPL-MCNC: 0.9 MG/DL (ref 0.57–1)
CRP SERPL-MCNC: 5.62 MG/DL (ref 0–0.5)
DEPRECATED RDW RBC AUTO: 40.5 FL (ref 37–54)
EOSINOPHIL # BLD AUTO: 0 10*3/MM3 (ref 0–0.4)
EOSINOPHIL NFR BLD AUTO: 0 % (ref 0.3–6.2)
ERYTHROCYTE [DISTWIDTH] IN BLOOD BY AUTOMATED COUNT: 12.4 % (ref 12.3–15.4)
ERYTHROCYTE [SEDIMENTATION RATE] IN BLOOD: 33 MM/HR (ref 0–30)
GFR SERPL CREATININE-BSD FRML MDRD: 61 ML/MIN/1.73
GLOBULIN UR ELPH-MCNC: 3.1 GM/DL
GLUCOSE SERPL-MCNC: 161 MG/DL (ref 65–99)
HCT VFR BLD AUTO: 42.1 % (ref 34–46.6)
HGB BLD-MCNC: 14.1 G/DL (ref 12–15.9)
IMM GRANULOCYTES # BLD AUTO: 0.1 10*3/MM3 (ref 0–0.05)
IMM GRANULOCYTES NFR BLD AUTO: 0.7 % (ref 0–0.5)
LYMPHOCYTES # BLD AUTO: 1.09 10*3/MM3 (ref 0.7–3.1)
LYMPHOCYTES NFR BLD AUTO: 8 % (ref 19.6–45.3)
MAGNESIUM SERPL-MCNC: 2.1 MG/DL (ref 1.6–2.4)
MCH RBC QN AUTO: 29.6 PG (ref 26.6–33)
MCHC RBC AUTO-ENTMCNC: 33.5 G/DL (ref 31.5–35.7)
MCV RBC AUTO: 88.4 FL (ref 79–97)
MONOCYTES # BLD AUTO: 0.93 10*3/MM3 (ref 0.1–0.9)
MONOCYTES NFR BLD AUTO: 6.8 % (ref 5–12)
NEUTROPHILS NFR BLD AUTO: 11.54 10*3/MM3 (ref 1.7–7)
NEUTROPHILS NFR BLD AUTO: 84.4 % (ref 42.7–76)
NRBC BLD AUTO-RTO: 0 /100 WBC (ref 0–0.2)
PLATELET # BLD AUTO: 280 10*3/MM3 (ref 140–450)
PMV BLD AUTO: 9.7 FL (ref 6–12)
POTASSIUM SERPL-SCNC: 3.8 MMOL/L (ref 3.5–5.2)
PROT SERPL-MCNC: 6.5 G/DL (ref 6–8.5)
RBC # BLD AUTO: 4.76 10*6/MM3 (ref 3.77–5.28)
SODIUM SERPL-SCNC: 141 MMOL/L (ref 136–145)
WBC # BLD AUTO: 13.67 10*3/MM3 (ref 3.4–10.8)

## 2021-09-07 PROCEDURE — 86140 C-REACTIVE PROTEIN: CPT | Performed by: INTERNAL MEDICINE

## 2021-09-07 PROCEDURE — 97161 PT EVAL LOW COMPLEX 20 MIN: CPT | Performed by: PHYSICAL THERAPIST

## 2021-09-07 PROCEDURE — 85025 COMPLETE CBC W/AUTO DIFF WBC: CPT | Performed by: INTERNAL MEDICINE

## 2021-09-07 PROCEDURE — 80053 COMPREHEN METABOLIC PANEL: CPT | Performed by: INTERNAL MEDICINE

## 2021-09-07 PROCEDURE — 85652 RBC SED RATE AUTOMATED: CPT | Performed by: INTERNAL MEDICINE

## 2021-09-07 PROCEDURE — 63710000001 DEXAMETHASONE PER 0.25 MG: Performed by: INTERNAL MEDICINE

## 2021-09-07 PROCEDURE — 83735 ASSAY OF MAGNESIUM: CPT | Performed by: INTERNAL MEDICINE

## 2021-09-07 PROCEDURE — 99232 SBSQ HOSP IP/OBS MODERATE 35: CPT | Performed by: NURSE PRACTITIONER

## 2021-09-07 RX ORDER — BENZONATATE 100 MG/1
100 CAPSULE ORAL 3 TIMES DAILY PRN
Status: DISCONTINUED | OUTPATIENT
Start: 2021-09-07 | End: 2021-09-09 | Stop reason: HOSPADM

## 2021-09-07 RX ORDER — SIMETHICONE 80 MG
80 TABLET,CHEWABLE ORAL 4 TIMES DAILY PRN
Status: DISCONTINUED | OUTPATIENT
Start: 2021-09-07 | End: 2021-09-09 | Stop reason: HOSPADM

## 2021-09-07 RX ADMIN — SIMETHICONE 80 MG: 80 TABLET, CHEWABLE ORAL at 17:58

## 2021-09-07 RX ADMIN — DEXAMETHASONE 6 MG: 2 TABLET ORAL at 08:55

## 2021-09-07 RX ADMIN — RIVAROXABAN 15 MG: 15 TABLET, FILM COATED ORAL at 17:58

## 2021-09-07 RX ADMIN — SODIUM CHLORIDE, PRESERVATIVE FREE 10 ML: 5 INJECTION INTRAVENOUS at 20:30

## 2021-09-07 RX ADMIN — PANTOPRAZOLE SODIUM 40 MG: 40 TABLET, DELAYED RELEASE ORAL at 08:55

## 2021-09-07 RX ADMIN — ACETAMINOPHEN 650 MG: 325 TABLET, FILM COATED ORAL at 13:13

## 2021-09-07 RX ADMIN — SODIUM CHLORIDE, PRESERVATIVE FREE 10 ML: 5 INJECTION INTRAVENOUS at 08:55

## 2021-09-07 RX ADMIN — BENZONATATE 100 MG: 100 CAPSULE ORAL at 22:43

## 2021-09-07 RX ADMIN — ATORVASTATIN CALCIUM 20 MG: 20 TABLET, FILM COATED ORAL at 20:30

## 2021-09-07 RX ADMIN — REMDESIVIR 100 MG: 100 INJECTION, POWDER, LYOPHILIZED, FOR SOLUTION INTRAVENOUS at 13:13

## 2021-09-07 RX ADMIN — SERTRALINE HYDROCHLORIDE 50 MG: 50 TABLET ORAL at 20:30

## 2021-09-07 RX ADMIN — DILTIAZEM HYDROCHLORIDE 240 MG: 240 CAPSULE, COATED, EXTENDED RELEASE ORAL at 08:55

## 2021-09-07 NOTE — CASE MANAGEMENT/SOCIAL WORK
Discharge Planning Assessment  [facility]     Patient Name: Carol Jean  MRN: [id number]  Today's Date: 9/7/2021    Admit Date: 9/5/2021    Discharge Needs Assessment     Row Name 09/07/21 0955       Living Environment    Lives With  spouse    Name(s) of Who Lives With Patient   Edgardo Jean [phone]    Current Living Arrangements  home/apartment/condo    Primary Care Provided by  self    Provides Primary Care For  no one    Family Caregiver if Needed  spouse    Family Caregiver Names   Edgardo Jean [phone]    Quality of Family Relationships  helpful;involved;supportive    Able to Return to Prior Arrangements  yes       Transition Planning    Transportation Anticipated  family or friend will provide       Discharge Needs Assessment    Readmission Within the Last 30 Days  no previous admission in last 30 days    Equipment Currently Used at Home  none        Discharge Plan     Row Name 09/07/21 0956       Plan    Plan  Home with transport needed if  not able.    Plan Comments  SW met with patient at bedside to discuss discharge planning. Lives in [facility] with  Edgardo Jean [phone]. SW spoke with patient’s  to obtain IDP information due to patients COVID status.  reports that he tested positive Saturday with no symptom. Reports patient is independent of ADL’s, no DME or HH. Patient has Medicare and [employer] and can afford medications. Plan is home with transport needed if  not able.    Final Discharge Disposition Code  01 - home or self-care    Row Name 09/07/21 0816       Plan    Final Discharge Disposition Code  01 - home or self-care        Continued Care and Services - Admitted Since 9/5/2021    Coordination has not been started for this encounter.       Expected Discharge Date and Time     Expected Discharge Date Expected Discharge Time    Sep 10, 2021         Demographic Summary     Row Name 09/07/21 0955       General  Information    Admission Type  inpatient    Arrived From  emergency department    Referral Source  admission list    Reason for Consult  discharge planning        Functional Status     Row Name 09/07/21 0955       Functional Status    Usual Activity Tolerance  good    Current Activity Tolerance  good       Functional Status, IADL    Medications  independent    Meal Preparation  independent    Housekeeping  independent    Laundry  independent    Shopping  independent    IADL Comments  no DME       Employment/    Employment/ Comments  Patient has Medicare and [employer] and can afford medications.        Psychosocial    No documentation.       Abuse/Neglect    No documentation.       Legal    No documentation.       Substance Abuse    No documentation.       Patient Forms    No documentation.           TRAVIS Carlos (Kay)

## 2021-09-07 NOTE — PLAN OF CARE
Goal Outcome Evaluation:  Plan of Care Reviewed With: patient           Outcome Summary: PT evaluation completed.  Pt transferred supine<-->sit modified independently, stood independently, and ambulated 140 feet without AD with SBAx1.  O2 on RA noted to be 85% post gait with 2 minutes of PLB needed to increase to 90%.  Skilled PT services warranted to improve endurance.  Recommend home at d/c.

## 2021-09-07 NOTE — PROGRESS NOTES
[facility] Medicine Services  PROGRESS NOTE    Patient Name: Carol Jean  : 1945  MRN: [id number]    Date of Admission: 2021  Primary Care Physician: Flakito Chaudhary MD    Subjective   Subjective     CC:  covid 19 pneumonia    HPI:  Patient sitting up in bed eating lunch. States much better today and got food requested. No n/v. Diarrhea resolved  Breathing better. Still on oxygen. No dyspnea, lightheadedness. Mild non productive cough    ROS:  Gen- No fevers, chills  CV- No chest pain, palpitations  Resp- No cough, dyspnea  GI- no n/v/d or abd pain          Objective   Objective     Vital Signs:   Temp:  [97.9 °F (36.6 °C)-98.3 °F (36.8 °C)] 98.3 °F (36.8 °C)  Heart Rate:  [63-75] 63  Resp:  [16-18] 18  BP: (118-136)/(59-68) 128/65  Flow (L/min):  [1-2] 2     Physical Exam:  With patient's consent, physical exam was conducted via visual telemedicine encounter in the interest of PPE conservation and to limit provider exposure to COVID-19.     Constitutional: No acute distress, awake, alert  HENT: NCAT, mucous membranes moist  Respiratory: on 2L respiratory effort normal   Cardiovascular: NSR on tele  Musculoskeletal: No bilateral ankle edema  Psychiatric: Appropriate affect, cooperative  Neurologic: Oriented x 3, HAMILTON freely, speech clear  Skin: No rashes        Results Reviewed:  LAB RESULTS:      Lab 21  0354 21  0253 21  1326   WBC 13.67* 5.11 7.19   HEMOGLOBIN 14.1 13.8 15.1   HEMATOCRIT 42.1 41.5 44.3   PLATELETS 280 249 246   NEUTROS ABS 11.54* 3.80 5.71   IMMATURE GRANS (ABS) 0.10* 0.02 0.02   LYMPHS ABS 1.09 0.78 0.99   MONOS ABS 0.93* 0.51 0.46   EOS ABS 0.00 0.00 0.00   MCV 88.4 88.1 88.2   SED RATE 33* 33* 52*   CRP 5.62* 11.68* 10.92*   PROCALCITONIN  --  0.12 0.15   LACTATE  --   --  1.3   LDH  --  238* 235*   D DIMER QUANT  --  <0.27 <0.27         Lab 21  0354 21  0253 21  1326   SODIUM 141 142 140   POTASSIUM 3.8 4.9  2.9*   CHLORIDE 104 106 97*   CO2 23.0 26.0 29.0   ANION GAP 14.0 10.0 14.0   BUN 29* 20 19   CREATININE 0.90 0.95 1.14*   GLUCOSE 161* 154* 148*   CALCIUM 9.0 8.6 8.8   MAGNESIUM 2.1 1.8 1.6         Lab 09/07/21  0354 09/06/21  0253 09/05/21  1326   TOTAL PROTEIN 6.5 6.3 7.0  7.0   ALBUMIN 3.40* 3.40* 3.80  3.80   GLOBULIN 3.1 2.9 3.2   ALT (SGPT) 16 10 9  10   AST (SGOT) 28 21 21  22   BILIRUBIN 0.2 0.2 0.4  0.4   BILIRUBIN DIRECT  --   --  <0.2   ALK PHOS 73 72 81  82         Lab 09/05/21  1326   PROBNP 196.6   TROPONIN T <0.010                 Brief Urine Lab Results  (Last result in the past 365 days)      Color   Clarity   Blood   Leuk Est   Nitrite   Protein   CREAT   Urine HCG        09/05/21 1350 Yellow Clear Trace Trace Negative 100 mg/dL (2+)               Microbiology Results Abnormal     Procedure Component Value - Date/Time    Blood Culture - Blood, Arm, Left [[id number]] Collected: 09/05/21 1340    Lab Status: Preliminary result Specimen: Blood from Arm, Left Updated: 09/06/21 1402     Blood Culture No growth at 24 hours    Blood Culture - Blood, Arm, Right [[id number]] Collected: 09/05/21 1340    Lab Status: Preliminary result Specimen: Blood from Arm, Right Updated: 09/06/21 1402     Blood Culture No growth at 24 hours          CT Chest Without Contrast Diagnostic    Result Date: 9/5/2021  EXAMINATION: CT CHEST WO CONTRAST DIAGNOSTIC-  INDICATION: covid; U07.1-COVID-19; R09.02-Hypoxemia; E87.6-Hypokalemia  TECHNIQUE: Axial noncontrast CT of the chest with multiplanar reconstruction  The radiation dose reduction device was turned on for each scan per the ALARA (As Low as Reasonably Achievable) protocol.  COMPARISON: 11/24/2012  FINDINGS: No pathologic axillary adenopathy or other worrisome body wall soft tissue finding in the chest. No acute findings in the partially imaged upper abdomen, with redemonstrated cholelithiasis. IVC filter noted in place. No pleural or pericardial effusion. No  pathologic mediastinal or hilar lymphadenopathy. Nonaneurysmal mildly atherosclerotic thoracic aorta. Evaluation of the osseous structures demonstrates no evidence of acute fracture or aggressive osseous lesion. Evaluation of the lung parenchyma demonstrates scattered multifocal bilateral areas of groundglass opacity, typical in appearance for Covid 19 pneumonia.      Impression: Multifocal pneumonia typical of Covid 19 related pneumonia.   This report was finalized on 9/5/2021 4:19 PM by Edgardo Moreno.            I have reviewed the medications:  Scheduled Meds:atorvastatin, 20 mg, Oral, Daily  dexamethasone, 6 mg, Oral, Daily With Breakfast  dilTIAZem CD, 240 mg, Oral, Q24H  pantoprazole, 40 mg, Oral, Daily  remdesivir, 100 mg, Intravenous, Daily With Lunch  rivaroxaban, 15 mg, Oral, Daily With Dinner  senna-docusate sodium, 2 tablet, Oral, BID  sertraline, 50 mg, Oral, Daily  sodium chloride, 10 mL, Intravenous, Q12H      Continuous Infusions:Pharmacy Consult - Remdesivir,       PRN Meds:.•  acetaminophen  •  senna-docusate sodium **AND** polyethylene glycol **AND** bisacodyl **AND** bisacodyl  •  diphenhydrAMINE  •  loperamide  •  magnesium sulfate **OR** magnesium sulfate **OR** magnesium sulfate  •  ondansetron  •  Pharmacy Consult - Remdesivir  •  potassium & sodium phosphates **OR** potassium & sodium phosphates  •  potassium chloride **OR** potassium chloride **OR** potassium chloride  •  [COMPLETED] Insert peripheral IV **AND** sodium chloride  •  sodium chloride    Assessment/Plan   Assessment & Plan     Active Hospital Problems    Diagnosis  POA   • COVID-19 [U07.1]  Yes      Resolved Hospital Problems   No resolved problems to display.        Brief Hospital Course to date:  Carol Jean is a 75 y.o. female  with history of hypertension, CAD, recurrent DVT first diagnosed in 2012 as well as PE on Xarelto admitted after testing positive for Covid 1 week ago today on 8/29.  Since then, she has had  significant diarrhea and not able to tolerate oral intake secondary to nausea as well as vomiting.  She presented on room air but subsequently oxygen levels required her to be on 3 L nasal cannula.  She is already on Xarelto for history of DVT and PE and had been worked up by Dr. Ashby for hypercoagulable state:    This patient's problems and plans were partially entered by my partner and updated as appropriate by me 09/07/21.      COVID 19 pneumonia  - diarrhea resolved. Tolerating diet  - bilateral infiltrates on CT chest  - remdesivir day 3  - Dexamethasone day 3  -isolation through 9/19  - crp trending down, procal wnl  -tylenol as needed for headache    Hypokalemia and hypomagnesemia  -Replace per protocol  -Holding HCTZ    Hypertension  -Continue dilt, metoprolol and HCTZ held    History of PE  -xarelto    GERD  - ppi    DVT prophylaxis: xarelto  Medical DVT prophylaxis orders are present.       AM-PAC 6 Clicks Score (PT): 24 (09/07/21 0800)    Disposition: I expect the patient to be discharged home when remdesivir completed    CODE STATUS:   Code Status and Medical Interventions:   Ordered at: 09/05/21 1507     Code Status:    No CPR     Medical Interventions (Level of Support Prior to Arrest):    Full     Comments:    also on living will       Mira Patton, APRN  09/07/21

## 2021-09-07 NOTE — THERAPY EVALUATION
Patient Name: Carol Jean  : 1945    MRN: [id number]                              Today's Date: 2021       Admit Date: 2021    Visit Dx:     ICD-10-CM ICD-9-CM   1. COVID-19  U07.1 079.89   2. Hypoxia  R09.02 799.02   3. Hypokalemia  E87.6 276.8     Patient Active Problem List   Diagnosis   • History of pulmonary embolism   • Hypertension   • Acute pulmonary embolism (CMS/HCC)   • DVT (deep venous thrombosis) (CMS/HCC)   • Circadian rhythm sleep disorder, delayed sleep phase type   • Psychophysiological insomnia   • Hyperlipidemia   • Calculus of kidney   • Cobalamin deficiency   • History of DVT (deep vein thrombosis)   • COVID-19     Past Medical History:   Diagnosis Date   • Acute deep vein thrombosis of lower extremity (CMS/HCC)    • Acute pulmonary embolism (CMS/HCC) 2016    Bilateral, 2012 Initiation of CPR per family. EMS transport to [facility]. Restoration of rhythm and blood pressure at [facility]. “Shock” manifest by hypotension and multiorgan failure:  Transient hepatic dysfunction, resolved.  Transient nonoliguric ATN, resolved.  Transient metabolic acidosis, resolved.  Pressor support. Bilateral    • Arthritis    • Bladder problem    • Blood clotting disorder (CMS/HCC)    • Bone pain    • DVT (deep venous thrombosis) (CMS/HCC)    • Easy bruising    • H/O bone density study 2012   • H/O colonoscopy 2012   • H/O mammogram 2012   • HBP (high blood pressure)    • History of blood transfusion    • Hypertension 2016   • Kidney stones    • Synovial cyst of popliteal space    • Upper gastrointestinal bleeding     gastritis related to lytic therapy and heparins, resolved: a. “Hemorrhagic gastritis.”   • Vitamin B12 deficiency      Past Surgical History:   Procedure Laterality Date   • BILATERAL BREAST REDUCTION     • CATARACT EXTRACTION, BILATERAL     •  SECTION     •  SECTION      • COLONOSCOPY     • KIDNEY STONE SURGERY      Nephrolithiasis with lithotripsy.   • OTHER SURGICAL HISTORY      barry filter placement in Vena Cava   • REDUCTION MAMMAPLASTY Bilateral 1990   • TONSILLECTOMY       General Information     Row Name 09/07/21 1450          Physical Therapy Time and Intention    Document Type  evaluation  -LM     Mode of Treatment  individual therapy;physical therapy  -     Row Name 09/07/21 1450          General Information    Patient Profile Reviewed  yes  -LM     Prior Level of Function  independent:;all household mobility;gait;ADL's  -LM     Existing Precautions/Restrictions  oxygen therapy device and L/min  -LM     Barriers to Rehab  none identified  -LM     Row Name 09/07/21 1450          Living Environment    Lives With  spouse  -LM     Row Name 09/07/21 1450          Home Main Entrance    Number of Stairs, Main Entrance  none  -LM     Row Name 09/07/21 1450          Stairs Within Home, Primary    Number of Stairs, Within Home, Primary  none  -LM     Row Name 09/07/21 1450          Cognition    Orientation Status (Cognition)  oriented x 4  -LM     Row Name 09/07/21 1450          Safety Issues, Functional Mobility    Impairments Affecting Function (Mobility)  endurance/activity tolerance;shortness of breath  -LM       User Key  (r) = Recorded By, (t) = Taken By, (c) = Cosigned By    Initials Name Provider Type    LM Naina Young PT Physical Therapist        Mobility     Row Name 09/07/21 1450          Bed Mobility    Bed Mobility  supine-sit;sit-supine  -LM     Supine-Sit [place] (Bed Mobility)  modified independence  -LM     Sit-Supine [place] (Bed Mobility)  modified independence  -LM     Assistive Device (Bed Mobility)  head of bed elevated  -LM     Row Name 09/07/21 1450          Sit-Stand Transfer    Sit-Stand [place] (Transfers)  independent  -LM     Assistive Device (Sit-Stand Transfers)  -- No AD  -LM     Row Name 09/07/21 1450          Gait/Stairs  (Locomotion)    [place] Level (Gait)  standby assist  -LM     Assistive Device (Gait)  -- No AD  -LM     Distance in Feet (Gait)  140  -LM     Comment (Gait/Stairs)  Ambulated on RA - O2 noted to be 85% post gait.  Educated on PLB.  Pt able to increase to 90% within 2 minutes.  O2 then redonned.  -LM       User Key  (r) = Recorded By, (t) = Taken By, (c) = Cosigned By    Initials Name Provider Type    Naina Chan PT Physical Therapist        Obj/Interventions     Row Name 09/07/21 1450          Range of Motion Comprehensive    General Range of Motion  bilateral lower extremity ROM WFL  -LM     Row Name 09/07/21 1450          Strength Comprehensive (MMT)    General Manual Muscle Testing (MMT) Assessment  no strength deficits identified BLEs  -LM     Row Name 09/07/21 1450          Balance    Balance Assessment  sitting static balance;standing static balance;standing dynamic balance  -LM     Static Sitting Balance  WFL;unsupported;sitting, edge of bed  -LM     Static Standing Balance  WFL;unsupported  -LM     Dynamic Standing Balance  WFL;unsupported  -LM     Row Name 09/07/21 1450          Sensory Assessment (Somatosensory)    Sensory Assessment (Somatosensory)  LE sensation intact  -LM       User Key  (r) = Recorded By, (t) = Taken By, (c) = Cosigned By    Initials Name Provider Type    Naina Chan PT Physical Therapist        Goals/Plan     Row Name 09/07/21 1450          Gait Training Goal 1 (PT)    Activity/Assistive Device (Gait Training Goal 1, PT)  gait (walking locomotion)  -LM     [place] Level (Gait Training Goal 1, PT)  independent  -LM     Distance (Gait Training Goal 1, PT)  200 feet  -LM     Time Frame (Gait Training Goal 1, PT)  long term goal (LTG);10 days  -LM       User Key  (r) = Recorded By, (t) = Taken By, (c) = Cosigned By    Initials Name Provider Type    Naina Chan PT Physical Therapist        Clinical Impression     Row Name 09/07/21 1450          Pain     Additional Documentation  Pain Scale: Numbers Pre/Post-Treatment (Group)  -LM     Row Name 09/07/21 1450          Pain Scale: Numbers Pre/Post-Treatment    Pretreatment Pain Rating  0/10 - no pain  -LM     Posttreatment Pain Rating  0/10 - no pain  -LM     Row Name 09/07/21 1450          Plan of Care Review    Plan of Care Reviewed With  patient  -LM     Outcome Summary  PT evaluation completed.  Pt transferred supine<-->sit modified independently, stood independently, and ambulated 140 feet without AD with SBAx1.  O2 on RA noted to be 85% post gait with 2 minutes of PLB needed to increase to 90%.  Skilled PT services warranted to improve endurance.  Recommend home at d/c.  -LM     Row Name 09/07/21 1450          Therapy Assessment/Plan (PT)    Rehab Potential (PT)  good, to achieve stated therapy goals  -LM     Criteria for Skilled Interventions Met (PT)  yes;meets criteria;skilled treatment is necessary  -LM     Row Name 09/07/21 1450          Vital Signs    Pre Systolic BP Rehab  128  -LM     Pre Treatment Diastolic BP  66  -LM     Pretreatment Heart Rate (beats/min)  69  -LM     Pre SpO2 (%)  94  -LM     O2 Delivery Pre Treatment  supplemental O2  -LM     Intra SpO2 (%)  85  -LM     O2 Delivery Intra Treatment  room air  -LM     Post SpO2 (%)  92  -LM     O2 Delivery Post Treatment  supplemental O2  -LM     Pre Patient Position  Supine  -LM     Intra Patient Position  Sitting  -LM     Post Patient Position  Supine  -LM     Row Name 09/07/21 1450          Positioning and Restraints    Pre-Treatment Position  in bed  -LM     Post Treatment Position  bed  -LM     In Bed  supine;call light within reach;encouraged to call for assist;notified nsg  -LM       User Key  (r) = Recorded By, (t) = Taken By, (c) = Cosigned By    Initials Name Provider Type    LM Naina Young, PT Physical Therapist        Outcome Measures     Row Name 09/07/21 1450 09/07/21 0800       How much help from another person do you currently  need...    Turning from your back to your side while in flat bed without using bedrails?  4  -LM  4  -CG    Moving from lying on back to sitting on the side of a flat bed without bedrails?  4  -LM  4  -CG    Moving to and from a bed to a chair (including a wheelchair)?  4  -LM  4  -CG    Standing up from a chair using your arms (e.g., wheelchair, bedside chair)?  4  -LM  4  -CG    Climbing 3-5 steps with a railing?  3  -LM  4  -CG    To walk in hospital room?  3  -LM  4  -CG    AM-PAC 6 Clicks Score (PT)  22  -LM  24  -CG    Row Name 09/07/21 1450          Functional Assessment    Outcome Measure Options  AM-PAC 6 Clicks Basic Mobility (PT)  -       User Key  (r) = Recorded By, (t) = Taken By, (c) = Cosigned By    Initials Name Provider Type    LM Naina Young, PT Physical Therapist    CG Jamie Mujica RN Registered Nurse                       Physical Therapy Education                 Title: PT OT SLP Therapies (Done)     Topic: Physical Therapy (Done)     Point: Mobility training (Done)     Learning Progress Summary           Patient Acceptance, E, VU,DU by  at 9/7/2021 1523                   Point: Precautions (Done)     Learning Progress Summary           Patient Acceptance, E, VU,DU by  at 9/7/2021 1523                               User Key     Initials Effective Dates Name Provider Type Discipline     06/16/21 -  Naina Young, PT Physical Therapist PT              PT Recommendation and Plan  Planned Therapy Interventions (PT): balance training, bed mobility training, gait training, home exercise program, motor coordination training, neuromuscular re-education, patient/family education, postural re-education, ROM (range of motion), strengthening, stretching, transfer training  Plan of Care Reviewed With: patient  Outcome Summary: PT evaluation completed.  Pt transferred supine<-->sit modified independently, stood independently, and ambulated 140 feet without AD with SBAx1.  O2 on RA noted to be  85% post gait with 2 minutes of PLB needed to increase to 90%.  Skilled PT services warranted to improve endurance.  Recommend home at d/c.     Time Calculation:   PT Charges     Row Name 09/07/21 1450             Time Calculation    Start Time  1450  -LM      PT Received On  09/07/21  -LM      PT Goal Re-Cert Due Date  09/17/21  -LM         Untimed Charges    PT Eval/Re-eval Minutes  48  -LM         Total Minutes    Untimed Charges Total Minutes  48  -LM       Total Minutes  48  -LM        User Key  (r) = Recorded By, (t) = Taken By, (c) = Cosigned By    Initials Name Provider Type    LM Naina Young, PT Physical Therapist        Therapy Charges for Today     Code Description Service Date Service Provider Modifiers Qty    85092758290 HC PT EVAL LOW COMPLEXITY 4 9/7/2021 Naina Young, PT GP 1          PT G-Codes  Outcome Measure Options: AM-PAC 6 Clicks Basic Mobility (PT)  AM-PAC 6 Clicks Score (PT): 22    Naina Young PT  9/7/2021

## 2021-09-07 NOTE — PLAN OF CARE
Goal Outcome Evaluation:  Plan of Care Reviewed With: patient        Progress: improving  Outcome Summary: VSS on 1LNC. Complaints of a headache relieved with PRN meds. Ambulating independently. Anitcipate DC home. Discussed plan of care with patient verbalizes understanding.

## 2021-09-08 LAB
ALBUMIN SERPL-MCNC: 3 G/DL (ref 3.5–5.2)
ALBUMIN/GLOB SERPL: 1 G/DL
ALP SERPL-CCNC: 67 U/L (ref 39–117)
ALT SERPL W P-5'-P-CCNC: 15 U/L (ref 1–33)
ANION GAP SERPL CALCULATED.3IONS-SCNC: 10 MMOL/L (ref 5–15)
AST SERPL-CCNC: 21 U/L (ref 1–32)
BASOPHILS # BLD AUTO: 0.01 10*3/MM3 (ref 0–0.2)
BASOPHILS NFR BLD AUTO: 0.1 % (ref 0–1.5)
BILIRUB SERPL-MCNC: 0.3 MG/DL (ref 0–1.2)
BUN SERPL-MCNC: 30 MG/DL (ref 8–23)
BUN/CREAT SERPL: 33.3 (ref 7–25)
CALCIUM SPEC-SCNC: 8.5 MG/DL (ref 8.6–10.5)
CHLORIDE SERPL-SCNC: 103 MMOL/L (ref 98–107)
CO2 SERPL-SCNC: 25 MMOL/L (ref 22–29)
CREAT SERPL-MCNC: 0.9 MG/DL (ref 0.57–1)
CRP SERPL-MCNC: 2.91 MG/DL (ref 0–0.5)
D DIMER PPP FEU-MCNC: <0.27 MCGFEU/ML (ref 0–0.56)
DEPRECATED RDW RBC AUTO: 41.9 FL (ref 37–54)
EOSINOPHIL # BLD AUTO: 0 10*3/MM3 (ref 0–0.4)
EOSINOPHIL NFR BLD AUTO: 0 % (ref 0.3–6.2)
ERYTHROCYTE [DISTWIDTH] IN BLOOD BY AUTOMATED COUNT: 12.6 % (ref 12.3–15.4)
ERYTHROCYTE [SEDIMENTATION RATE] IN BLOOD: 32 MM/HR (ref 0–30)
FIBRINOGEN PPP-MCNC: 503 MG/DL (ref 220–470)
GFR SERPL CREATININE-BSD FRML MDRD: 61 ML/MIN/1.73
GLOBULIN UR ELPH-MCNC: 2.9 GM/DL
GLUCOSE SERPL-MCNC: 166 MG/DL (ref 65–99)
HCT VFR BLD AUTO: 41.3 % (ref 34–46.6)
HGB BLD-MCNC: 13.5 G/DL (ref 12–15.9)
IMM GRANULOCYTES # BLD AUTO: 0.09 10*3/MM3 (ref 0–0.05)
IMM GRANULOCYTES NFR BLD AUTO: 0.7 % (ref 0–0.5)
LDH SERPL-CCNC: 258 U/L (ref 135–214)
LYMPHOCYTES # BLD AUTO: 0.83 10*3/MM3 (ref 0.7–3.1)
LYMPHOCYTES NFR BLD AUTO: 6.9 % (ref 19.6–45.3)
MCH RBC QN AUTO: 29.7 PG (ref 26.6–33)
MCHC RBC AUTO-ENTMCNC: 32.7 G/DL (ref 31.5–35.7)
MCV RBC AUTO: 90.8 FL (ref 79–97)
MONOCYTES # BLD AUTO: 0.73 10*3/MM3 (ref 0.1–0.9)
MONOCYTES NFR BLD AUTO: 6.1 % (ref 5–12)
NEUTROPHILS NFR BLD AUTO: 10.36 10*3/MM3 (ref 1.7–7)
NEUTROPHILS NFR BLD AUTO: 86.2 % (ref 42.7–76)
NRBC BLD AUTO-RTO: 0 /100 WBC (ref 0–0.2)
PLAT MORPH BLD: NORMAL
PLATELET # BLD AUTO: 300 10*3/MM3 (ref 140–450)
PMV BLD AUTO: 10 FL (ref 6–12)
POTASSIUM SERPL-SCNC: 3.7 MMOL/L (ref 3.5–5.2)
PROCALCITONIN SERPL-MCNC: 0.1 NG/ML (ref 0–0.25)
PROT SERPL-MCNC: 5.9 G/DL (ref 6–8.5)
RBC # BLD AUTO: 4.55 10*6/MM3 (ref 3.77–5.28)
RBC MORPH BLD: NORMAL
SODIUM SERPL-SCNC: 138 MMOL/L (ref 136–145)
WBC # BLD AUTO: 12.02 10*3/MM3 (ref 3.4–10.8)
WBC MORPH BLD: NORMAL

## 2021-09-08 PROCEDURE — 80053 COMPREHEN METABOLIC PANEL: CPT | Performed by: INTERNAL MEDICINE

## 2021-09-08 PROCEDURE — 99233 SBSQ HOSP IP/OBS HIGH 50: CPT | Performed by: NURSE PRACTITIONER

## 2021-09-08 PROCEDURE — 85007 BL SMEAR W/DIFF WBC COUNT: CPT | Performed by: INTERNAL MEDICINE

## 2021-09-08 PROCEDURE — 85379 FIBRIN DEGRADATION QUANT: CPT | Performed by: INTERNAL MEDICINE

## 2021-09-08 PROCEDURE — 25010000002 FUROSEMIDE PER 20 MG: Performed by: NURSE PRACTITIONER

## 2021-09-08 PROCEDURE — 83615 LACTATE (LD) (LDH) ENZYME: CPT | Performed by: INTERNAL MEDICINE

## 2021-09-08 PROCEDURE — 85652 RBC SED RATE AUTOMATED: CPT | Performed by: INTERNAL MEDICINE

## 2021-09-08 PROCEDURE — 85025 COMPLETE CBC W/AUTO DIFF WBC: CPT | Performed by: INTERNAL MEDICINE

## 2021-09-08 PROCEDURE — 86140 C-REACTIVE PROTEIN: CPT | Performed by: INTERNAL MEDICINE

## 2021-09-08 PROCEDURE — 63710000001 DEXAMETHASONE PER 0.25 MG: Performed by: INTERNAL MEDICINE

## 2021-09-08 PROCEDURE — 85384 FIBRINOGEN ACTIVITY: CPT | Performed by: INTERNAL MEDICINE

## 2021-09-08 PROCEDURE — 84145 PROCALCITONIN (PCT): CPT | Performed by: INTERNAL MEDICINE

## 2021-09-08 RX ORDER — FUROSEMIDE 10 MG/ML
20 INJECTION INTRAMUSCULAR; INTRAVENOUS ONCE
Status: COMPLETED | OUTPATIENT
Start: 2021-09-08 | End: 2021-09-08

## 2021-09-08 RX ORDER — CHLORTHALIDONE 25 MG/1
12.5 TABLET ORAL DAILY
Status: DISCONTINUED | OUTPATIENT
Start: 2021-09-09 | End: 2021-09-09 | Stop reason: HOSPADM

## 2021-09-08 RX ADMIN — DILTIAZEM HYDROCHLORIDE 240 MG: 240 CAPSULE, COATED, EXTENDED RELEASE ORAL at 09:22

## 2021-09-08 RX ADMIN — SODIUM CHLORIDE, PRESERVATIVE FREE 10 ML: 5 INJECTION INTRAVENOUS at 20:00

## 2021-09-08 RX ADMIN — PANTOPRAZOLE SODIUM 40 MG: 40 TABLET, DELAYED RELEASE ORAL at 09:23

## 2021-09-08 RX ADMIN — FUROSEMIDE 20 MG: 10 INJECTION, SOLUTION INTRAMUSCULAR; INTRAVENOUS at 09:23

## 2021-09-08 RX ADMIN — DEXAMETHASONE 6 MG: 2 TABLET ORAL at 09:22

## 2021-09-08 RX ADMIN — RIVAROXABAN 15 MG: 15 TABLET, FILM COATED ORAL at 17:54

## 2021-09-08 RX ADMIN — REMDESIVIR 100 MG: 100 INJECTION, POWDER, LYOPHILIZED, FOR SOLUTION INTRAVENOUS at 13:21

## 2021-09-08 RX ADMIN — SODIUM CHLORIDE, PRESERVATIVE FREE 10 ML: 5 INJECTION INTRAVENOUS at 13:21

## 2021-09-08 RX ADMIN — ATORVASTATIN CALCIUM 20 MG: 20 TABLET, FILM COATED ORAL at 20:00

## 2021-09-08 RX ADMIN — SERTRALINE HYDROCHLORIDE 50 MG: 50 TABLET ORAL at 20:00

## 2021-09-08 NOTE — PROGRESS NOTES
[facility] Medicine Services  PROGRESS NOTE    Patient Name: Carol Jean  : 1945  MRN: [id number]    Date of Admission: 2021  Primary Care Physician: Flakito Chaudhary MD    Subjective   Subjective     CC:  covid 19 pneumonia    HPI:  Patient reports feeling better. Many trips to restroom this am. No soa/ vernon, lightheadedness, palpitations,   No further diarrhea  Tolerating diet  Mild cough, no wheeze    ROS:  Gen- No fevers, chills  CV- No chest pain, palpitations  Resp- No so/ dyspnea  GI- no n/v/d or abd pain          Objective   Objective     Vital Signs:   Temp:  [97.9 °F (36.6 °C)-98.6 °F (37 °C)] 97.9 °F (36.6 °C)  Heart Rate:  [52-77] 65  Resp:  [16-19] 16  BP: (112-141)/(60-79) 119/69  Flow (L/min):  [2-3] 3     Physical Exam:  With patient's consent, physical exam was conducted via visual telemedicine encounter in the interest of PPE conservation and to limit provider exposure to COVID-19.     Constitutional: No acute distress, awake, alert  HENT: NCAT, mucous membranes moist  Respiratory: on 3lnc, respiratory effort normal   Cardiovascular: NSR on tele  Musculoskeletal: No bilateral ankle edema  Psychiatric: Appropriate affect, cooperative  Neurologic: Oriented x 3, H[place]IL[place] freely, speech clear  Skin: No rashes        Results Reviewed:  LAB RESULTS:      Lab 21  0357 21  0354 21  0253 21  1326   WBC 12.02* 13.67* 5.11 7.19   HEMOGLOBIN 13.5 14.1 13.8 15.1   HEMATOCRIT 41.3 42.1 41.5 44.3   PLATELETS 300 280 249 246   NEUTROS ABS 10.36* 11.54* 3.80 5.71   IMMATURE GRANS (ABS) 0.09* 0.10* 0.02 0.02   LYMPHS ABS 0.83 1.09 0.78 0.99   MONOS ABS 0.73 0.93* 0.51 0.46   EOS ABS 0.00 0.00 0.00 0.00   MCV 90.8 88.4 88.1 88.2   SED RATE 32* 33* 33* 52*   CRP 2.91* 5.62* 11.68* 10.92*   PROCALCITONIN 0.10  --  0.12 0.15   LACTATE  --   --   --  1.3   *  --  238* 235*   D DIMER QUANT <0.27  --  <0.27 <0.27         Lab 21  0357  09/07/21  0354 09/06/21  0253 09/05/21  1326   SODIUM 138 141 142 140   POTASSIUM 3.7 3.8 4.9 2.9*   CHLORIDE 103 104 106 97*   CO2 25.0 23.0 26.0 29.0   ANION GAP 10.0 14.0 10.0 14.0   BUN 30* 29* 20 19   CREATININE 0.90 0.90 0.95 1.14*   GLUCOSE 166* 161* 154* 148*   CALCIUM 8.5* 9.0 8.6 8.8   MAGNESIUM  --  2.1 1.8 1.6         Lab 09/08/21  0357 09/07/21  0354 09/06/21  0253 09/05/21  1326   TOTAL PROTEIN 5.9* 6.5 6.3 7.0  7.0   ALBUMIN 3.00* 3.40* 3.40* 3.80  3.80   GLOBULIN 2.9 3.1 2.9 3.2   ALT (SGPT) 15 16 10 9  10   AST (SGOT) 21 28 21 21  22   BILIRUBIN 0.3 0.2 0.2 0.4  0.4   BILIRUBIN DIRECT  --   --   --  <0.2   ALK PHOS 67 73 72 81  82         Lab 09/05/21  1326   PROBNP 196.6   TROPONIN T <0.010                 Brief Urine Lab Results  (Last result in the past 365 days)      Color   Clarity   Blood   Leuk Est   Nitrite   Protein   CREAT   Urine HCG        09/05/21 1350 Yellow Clear Trace Trace Negative 100 mg/dL (2+)               Microbiology Results Abnormal     Procedure Component Value - Date/Time    Blood Culture - Blood, Arm, Left [[id number]] Collected: 09/05/21 1340    Lab Status: Preliminary result Specimen: Blood from Arm, Left Updated: 09/07/21 1400     Blood Culture No growth at 2 days    Blood Culture - Blood, Arm, Right [[id number]] Collected: 09/05/21 1340    Lab Status: Preliminary result Specimen: Blood from Arm, Right Updated: 09/07/21 1400     Blood Culture No growth at 2 days          No radiology results from the last 24 hrs        I have reviewed the medications:  Scheduled Meds:atorvastatin, 20 mg, Oral, Daily  dexamethasone, 6 mg, Oral, Daily With Breakfast  dilTIAZem CD, 240 mg, Oral, Q24H  pantoprazole, 40 mg, Oral, Daily  remdesivir, 100 mg, Intravenous, Daily With Lunch  rivaroxaban, 15 mg, Oral, Daily With Dinner  sertraline, 50 mg, Oral, Daily  sodium chloride, 10 mL, Intravenous, Q12H      Continuous Infusions:Pharmacy Consult - Remdesivir,       PRN Meds:.•   acetaminophen  •  benzonatate  •  [DISCONTINUED] senna-docusate sodium **AND** polyethylene glycol **AND** bisacodyl **AND** bisacodyl  •  diphenhydrAMINE  •  loperamide  •  magnesium sulfate **OR** magnesium sulfate **OR** magnesium sulfate  •  ondansetron  •  Pharmacy Consult - Remdesivir  •  potassium & sodium phosphates **OR** potassium & sodium phosphates  •  potassium chloride **OR** potassium chloride **OR** potassium chloride  •  simethicone  •  [COMPLETED] Insert peripheral IV **AND** sodium chloride  •  sodium chloride    Assessment/Plan   Assessment & Plan     Active Hospital Problems    Diagnosis  POA   • COVID-19 [U07.1]  Yes   • Hyperlipidemia [E78.5]  Yes   • Hypertension [I10]  Yes   • History of pulmonary embolism [Z86.711]  Yes      Resolved Hospital Problems   No resolved problems to display.        Brief Hospital Course to date:  Carol Jean is a 75 y.o. female  with history of hypertension, CAD, recurrent DVT first diagnosed in 2012 as well as PE on Xarelto admitted after testing positive for Covid 1 week ago today on 8/29.  Since then, she has had significant diarrhea and not able to tolerate oral intake secondary to nausea as well as vomiting.  She presented on room air but subsequently oxygen levels required her to be on 3 L nasal cannula.  She is already on Xarelto for history of DVT and PE and had been worked up by Dr. Ashby for hypercoagulable state:    This patient's problems and plans were partially entered by my partner and updated as appropriate by me 09/08/21.      COVID 19 pneumonia  - diarrhea resolved. Tolerating diet  - bilateral infiltrates on CT chest  - remdesivir day 4/5  - Dexamethasone day 4/10  -increase to 3L NC today- given dose IV lasix 20mg, monitor  -isolation through 9/19  - crp trending down, procal wnl  -tylenol as needed for headache    Hypokalemia and hypomagnesemia  -Replace per protocol  -Holding chlorthalidone  -am labs    Hypertension  -Continue dilt,  metoprolol   -resume chlorthalidone tomorrow    History of PE  -xarelto    GERD  - ppi    DVT prophylaxis: xarelto  Medical DVT prophylaxis orders are present.       AM-PAC 6 Clicks Score (PT): 22 (09/08/21 0800)    Disposition: I expect the patient to be discharged home when remdesivir completed possibly with oxygen    CODE STATUS:   Code Status and Medical Interventions:   Ordered at: 09/05/21 1507     Code Status:    No CPR     Medical Interventions (Level of Support Prior to Arrest):    Full     Comments:    also on living will       Mira Patton, APRN  09/08/21

## 2021-09-08 NOTE — PLAN OF CARE
While sleeping patient SPO2 decreased to 88%, patient's oxygen titrated up to 3L NC. Pericolace held due to patient's report of diarrhea. No events overnight.            Problem: Adult Inpatient Plan of Care  Goal: Plan of Care Review  Outcome: Ongoing, Progressing  Goal: Patient-Specific Goal (Individualized)  Outcome: Ongoing, Progressing  Goal: Absence of Hospital-Acquired Illness or Injury  Outcome: Ongoing, Progressing  Intervention: Identify and Manage Fall Risk  Recent Flowsheet Documentation  Taken 9/8/2021 0400 by Miryam Lozano RN  Safety Promotion/Fall Prevention:   safety round/check completed   nonskid shoes/slippers when out of bed   fall prevention program maintained   assistive device/personal items within reach  Taken 9/8/2021 0230 by Miyram Lozano RN  Safety Promotion/Fall Prevention:   safety round/check completed   nonskid shoes/slippers when out of bed   activity supervised   assistive device/personal items within reach   fall prevention program maintained  Taken 9/8/2021 0000 by Miryam Lozano RN  Safety Promotion/Fall Prevention:   safety round/check completed   nonskid shoes/slippers when out of bed   activity supervised   assistive device/personal items within reach   fall prevention program maintained  Taken 9/7/2021 2200 by Miryam Lozano RN  Safety Promotion/Fall Prevention:   safety round/check completed   nonskid shoes/slippers when out of bed  Taken 9/7/2021 2028 by Miryam Lozano RN  Safety Promotion/Fall Prevention:   safety round/check completed   nonskid shoes/slippers when out of bed  Intervention: Prevent Skin Injury  Recent Flowsheet Documentation  Taken 9/8/2021 0400 by Miryam Lozano RN  Body Position: position changed independently  Taken 9/8/2021 0230 by Miryam Lozano RN  Body Position: position changed independently  Taken 9/8/2021 0000 by Miryam Lozano RN  Body Position: position changed independently  Taken 9/7/2021 2200 by Miryam Lozano RN  Body Position:   position changed  independently   supine  Taken 9/7/2021 2028 by Miryam Lozano RN  Body Position:   position changed independently   sitting up in bed  Intervention: Prevent and Manage VTE (venous thromboembolism) Risk  Recent Flowsheet Documentation  Taken 9/7/2021 2028 by Miryam Lozano RN  VTE Prevention/Management:   bilateral   dorsiflexion/plantar flexion performed  Goal: Optimal Comfort and Wellbeing  Outcome: Ongoing, Progressing  Intervention: Provide Person-Centered Care  Recent Flowsheet Documentation  Taken 9/7/2021 2028 by Miryam Lozano RN  Trust Relationship/Rapport:   care explained   choices provided   thoughts/feelings acknowledged   questions encouraged  Goal: Readiness for Transition of Care  Outcome: Ongoing, Progressing     Problem: Electrolyte Imbalance  Goal: Electrolyte Balance  Outcome: Ongoing, Progressing

## 2021-09-09 ENCOUNTER — READMISSION MANAGEMENT (OUTPATIENT)
Dept: CALL CENTER | Facility: HOSPITAL | Age: 76
End: 2021-09-09

## 2021-09-09 VITALS
TEMPERATURE: 98.1 F | OXYGEN SATURATION: 92 % | RESPIRATION RATE: 18 BRPM | SYSTOLIC BLOOD PRESSURE: 133 MMHG | HEIGHT: 63 IN | BODY MASS INDEX: 28.53 KG/M2 | WEIGHT: 161 LBS | HEART RATE: 87 BPM | DIASTOLIC BLOOD PRESSURE: 72 MMHG

## 2021-09-09 PROBLEM — D89.832 CYTOKINE RELEASE SYNDROME, GRADE 2: Status: ACTIVE | Noted: 2021-09-09

## 2021-09-09 LAB
ALBUMIN SERPL-MCNC: 3 G/DL (ref 3.5–5.2)
ALBUMIN/GLOB SERPL: 1.1 G/DL
ALP SERPL-CCNC: 64 U/L (ref 39–117)
ALT SERPL W P-5'-P-CCNC: 20 U/L (ref 1–33)
ANION GAP SERPL CALCULATED.3IONS-SCNC: 11 MMOL/L (ref 5–15)
AST SERPL-CCNC: 25 U/L (ref 1–32)
BASOPHILS # BLD AUTO: 0.01 10*3/MM3 (ref 0–0.2)
BASOPHILS NFR BLD AUTO: 0.1 % (ref 0–1.5)
BILIRUB SERPL-MCNC: 0.4 MG/DL (ref 0–1.2)
BUN SERPL-MCNC: 31 MG/DL (ref 8–23)
BUN/CREAT SERPL: 31.6 (ref 7–25)
CALCIUM SPEC-SCNC: 8.3 MG/DL (ref 8.6–10.5)
CHLORIDE SERPL-SCNC: 103 MMOL/L (ref 98–107)
CO2 SERPL-SCNC: 27 MMOL/L (ref 22–29)
CREAT SERPL-MCNC: 0.98 MG/DL (ref 0.57–1)
CRP SERPL-MCNC: 1.57 MG/DL (ref 0–0.5)
DEPRECATED RDW RBC AUTO: 40.3 FL (ref 37–54)
EOSINOPHIL # BLD AUTO: 0 10*3/MM3 (ref 0–0.4)
EOSINOPHIL NFR BLD AUTO: 0 % (ref 0.3–6.2)
ERYTHROCYTE [DISTWIDTH] IN BLOOD BY AUTOMATED COUNT: 12.7 % (ref 12.3–15.4)
ERYTHROCYTE [SEDIMENTATION RATE] IN BLOOD: 21 MM/HR (ref 0–30)
GFR SERPL CREATININE-BSD FRML MDRD: 55 ML/MIN/1.73
GLOBULIN UR ELPH-MCNC: 2.7 GM/DL
GLUCOSE SERPL-MCNC: 168 MG/DL (ref 65–99)
HCT VFR BLD AUTO: 39.7 % (ref 34–46.6)
HGB BLD-MCNC: 13.5 G/DL (ref 12–15.9)
IMM GRANULOCYTES # BLD AUTO: 0.08 10*3/MM3 (ref 0–0.05)
IMM GRANULOCYTES NFR BLD AUTO: 0.7 % (ref 0–0.5)
LYMPHOCYTES # BLD AUTO: 0.83 10*3/MM3 (ref 0.7–3.1)
LYMPHOCYTES NFR BLD AUTO: 7.7 % (ref 19.6–45.3)
MCH RBC QN AUTO: 29.6 PG (ref 26.6–33)
MCHC RBC AUTO-ENTMCNC: 34 G/DL (ref 31.5–35.7)
MCV RBC AUTO: 87.1 FL (ref 79–97)
MONOCYTES # BLD AUTO: 0.98 10*3/MM3 (ref 0.1–0.9)
MONOCYTES NFR BLD AUTO: 9.2 % (ref 5–12)
NEUTROPHILS NFR BLD AUTO: 8.81 10*3/MM3 (ref 1.7–7)
NEUTROPHILS NFR BLD AUTO: 82.3 % (ref 42.7–76)
NRBC BLD AUTO-RTO: 0 /100 WBC (ref 0–0.2)
PLATELET # BLD AUTO: 366 10*3/MM3 (ref 140–450)
PMV BLD AUTO: 9.7 FL (ref 6–12)
POTASSIUM SERPL-SCNC: 3.7 MMOL/L (ref 3.5–5.2)
PROT SERPL-MCNC: 5.7 G/DL (ref 6–8.5)
RBC # BLD AUTO: 4.56 10*6/MM3 (ref 3.77–5.28)
SODIUM SERPL-SCNC: 141 MMOL/L (ref 136–145)
WBC # BLD AUTO: 10.71 10*3/MM3 (ref 3.4–10.8)

## 2021-09-09 PROCEDURE — 86140 C-REACTIVE PROTEIN: CPT | Performed by: INTERNAL MEDICINE

## 2021-09-09 PROCEDURE — 80053 COMPREHEN METABOLIC PANEL: CPT | Performed by: INTERNAL MEDICINE

## 2021-09-09 PROCEDURE — 63710000001 DEXAMETHASONE PER 0.25 MG: Performed by: INTERNAL MEDICINE

## 2021-09-09 PROCEDURE — 99239 HOSP IP/OBS DSCHRG MGMT >30: CPT | Performed by: NURSE PRACTITIONER

## 2021-09-09 PROCEDURE — 85025 COMPLETE CBC W/AUTO DIFF WBC: CPT | Performed by: INTERNAL MEDICINE

## 2021-09-09 PROCEDURE — 85652 RBC SED RATE AUTOMATED: CPT | Performed by: INTERNAL MEDICINE

## 2021-09-09 PROCEDURE — 25010000002 FUROSEMIDE PER 20 MG: Performed by: NURSE PRACTITIONER

## 2021-09-09 RX ORDER — ACETAMINOPHEN 325 MG/1
650 TABLET ORAL EVERY 4 HOURS PRN
Start: 2021-09-09

## 2021-09-09 RX ORDER — FUROSEMIDE 10 MG/ML
20 INJECTION INTRAMUSCULAR; INTRAVENOUS ONCE
Status: COMPLETED | OUTPATIENT
Start: 2021-09-09 | End: 2021-09-09

## 2021-09-09 RX ORDER — ALBUTEROL SULFATE 90 UG/1
2 AEROSOL, METERED RESPIRATORY (INHALATION) EVERY 4 HOURS PRN
Qty: 18 G | Refills: 0 | Status: SHIPPED | OUTPATIENT
Start: 2021-09-09 | End: 2021-10-05

## 2021-09-09 RX ORDER — DEXAMETHASONE 6 MG/1
6 TABLET ORAL
Qty: 5 TABLET | Refills: 0 | Status: SHIPPED | OUTPATIENT
Start: 2021-09-10 | End: 2021-09-15

## 2021-09-09 RX ADMIN — FUROSEMIDE 20 MG: 10 INJECTION, SOLUTION INTRAMUSCULAR; INTRAVENOUS at 10:48

## 2021-09-09 RX ADMIN — DILTIAZEM HYDROCHLORIDE 240 MG: 240 CAPSULE, COATED, EXTENDED RELEASE ORAL at 10:51

## 2021-09-09 RX ADMIN — CHLORTHALIDONE 12.5 MG: 25 TABLET ORAL at 10:50

## 2021-09-09 RX ADMIN — SODIUM CHLORIDE, PRESERVATIVE FREE 10 ML: 5 INJECTION INTRAVENOUS at 13:43

## 2021-09-09 RX ADMIN — SODIUM CHLORIDE, PRESERVATIVE FREE 10 ML: 5 INJECTION INTRAVENOUS at 13:03

## 2021-09-09 RX ADMIN — PANTOPRAZOLE SODIUM 40 MG: 40 TABLET, DELAYED RELEASE ORAL at 10:54

## 2021-09-09 RX ADMIN — REMDESIVIR 100 MG: 100 INJECTION, POWDER, LYOPHILIZED, FOR SOLUTION INTRAVENOUS at 13:03

## 2021-09-09 RX ADMIN — DEXAMETHASONE 6 MG: 2 TABLET ORAL at 10:54

## 2021-09-09 NOTE — DISCHARGE SUMMARY
[facility] Medicine Services  DISCHARGE SUMMARY    Patient Name: Carol Jean  : 1945  MRN: [id number]    Date of Admission: 2021  1:13 PM  Date of Discharge:  21  Primary Care Physician: Flakito Chaudhary MD    Consults     No orders found from 2021 to 2021.          Hospital Course     Presenting Problem:   COVID-19 [U07.1]    Active Hospital Problems    Diagnosis  POA   • Cytokine release syndrome, grade 2 [D89.832]  Unknown   • COVID-19 [U07.1]  Yes   • Hyperlipidemia [E78.5]  Yes   • Hypertension [I10]  Yes   • History of pulmonary embolism [Z86.711]  Yes      Resolved Hospital Problems   No resolved problems to display.          Hospital Course:  Carol Jean is a 75 y.o. female with history of hypertension, CAD, recurrent DVT first diagnosed in  as well as PE on Xarelto.  She was admitted on 2020 after testing positive for Covid 1 week prior on .  Since then, she has had significant diarrhea and not able to tolerate oral intake secondary to nausea as well as vomiting.  She presented on room air but subsequently oxygen levels required her to be on 3 L nasal cannula.  She is already on Xarelto for history of DVT and PE and had been worked up by Dr. Ashby for hypercoagulable state.  She completed 5 days of remdesivir and Decadron. She will continue Decadron to complete 10 days of therapy . She received intermittent diuresis with IV Lasix. CRP has trended down. The patient has remained stable on 3L supplemental oxygen.  has arranged for home oxygen and portable pulse oximeter. The patient will be discharged home today in stable condition.     This patient's problems and plans were partially entered by my partner and updated as appropriate by me 21.      COVID 19 pneumonia  - diarrhea resolved. Tolerating diet  - bilateral infiltrates on CT chest  - remdesivir day 5/5  - Dexamethasone day 510  -currently on 3L NC  -repeat  Lasix 20 mg IV today   -isolation through 9/19  -crp trending down, procal wnl  -tylenol as needed for headache     Hypokalemia and hypomagnesemia  -Replaced per protocol     Hypertension  -Continue dilt, metoprolol   -resume chlorthalidone     History of PE  -xarelto     GERD  - ppi    Discharge Follow Up Recommendations for outpatient labs/diagnostics:  Follow up with PCP in 1 week via telemedicine.       Day of Discharge     HPI:   Patient seen resting in bed no apparent distress.  No acute events overnight per nursing.  She has been ambulating around room states that she is feeling better and feels ready to be discharged home.  We discussed the importance of taking all medications as prescribed and keeping all recommended follow-up appointments.    Review of Systems  Gen- No fevers, chills  CV- No chest pain, palpitations  Resp- + cough, dyspnea  GI- No N/V/D, abd pain    Vital Signs:   Temp:  [98 °F (36.7 °C)-98.1 °F (36.7 °C)] 98.1 °F (36.7 °C)  Heart Rate:  [51-88] 58  Resp:  [15-17] 16  BP: (125-145)/(64-80) 127/64     Physical Exam:  With patient's consent, physical exam was conducted via visual telemedicine encounter due to patient's current isolation requirements in the interest of PPE conservation.    Constitutional: No acute distress, awake, alert, nontoxic, normal body habitus  HENT: NCAT, MMM, no conjunctival injection  Respiratory: Good effort, nonlabored respirations   Cardiovascular:  tele with NSR  Musculoskeletal: No edema, normal muscle tone and mass for age  Psychiatric: Appropriate affect, good insight and judgement, cooperative  Neurologic: Oriented x 3, movements symmetric BUE and BLE, speech clear and fluent  Skin: No visible rashes, no jaundice seen on exposed skin through window    Pertinent  and/or Most Recent Results     LAB RESULTS:      Lab 09/09/21  0515 09/08/21  0357 09/07/21  0354 09/06/21  0253 09/05/21  1326   WBC 10.71 12.02* 13.67* 5.11 7.19   HEMOGLOBIN 13.5 13.5 14.1 13.8  15.1   HEMATOCRIT 39.7 41.3 42.1 41.5 44.3   PLATELETS 366 300 280 249 246   NEUTROS ABS 8.81* 10.36* 11.54* 3.80 5.71   IMMATURE GRANS (ABS) 0.08* 0.09* 0.10* 0.02 0.02   LYMPHS ABS 0.83 0.83 1.09 0.78 0.99   MONOS ABS 0.98* 0.73 0.93* 0.51 0.46   EOS ABS 0.00 0.00 0.00 0.00 0.00   MCV 87.1 90.8 88.4 88.1 88.2   SED RATE 21 32* 33* 33* 52*   CRP 1.57* 2.91* 5.62* 11.68* 10.92*   PROCALCITONIN  --  0.10  --  0.12 0.15   LACTATE  --   --   --   --  1.3   LDH  --  258*  --  238* 235*   D DIMER QUANT  --  <0.27  --  <0.27 <0.27         Lab 09/09/21 0515 09/08/21 0357 09/07/21 0354 09/06/21 0253 09/05/21  1326   SODIUM 141 138 141 142 140   POTASSIUM 3.7 3.7 3.8 4.9 2.9*   CHLORIDE 103 103 104 106 97*   CO2 27.0 25.0 23.0 26.0 29.0   ANION GAP 11.0 10.0 14.0 10.0 14.0   BUN 31* 30* 29* 20 19   CREATININE 0.98 0.90 0.90 0.95 1.14*   GLUCOSE 168* 166* 161* 154* 148*   CALCIUM 8.3* 8.5* 9.0 8.6 8.8   MAGNESIUM  --   --  2.1 1.8 1.6         Lab 09/09/21 0515 09/08/21 0357 09/07/21 0354 09/06/21 0253 09/05/21  1326   TOTAL PROTEIN 5.7* 5.9* 6.5 6.3 7.0  7.0   ALBUMIN 3.00* 3.00* 3.40* 3.40* 3.80  3.80   GLOBULIN 2.7 2.9 3.1 2.9 3.2   ALT (SGPT) 20 15 16 10 9  10   AST (SGOT) 25 21 28 21 21  22   BILIRUBIN 0.4 0.3 0.2 0.2 0.4  0.4   BILIRUBIN DIRECT  --   --   --   --  <0.2   ALK PHOS 64 67 73 72 81  82         Lab 09/05/21  1326   PROBNP 196.6   TROPONIN T <0.010                 Brief Urine Lab Results  (Last result in the past 365 days)      Color   Clarity   Blood   Leuk Est   Nitrite   Protein   CREAT   Urine HCG        09/05/21 1350 Yellow Clear Trace Trace Negative 100 mg/dL (2+)             Microbiology Results (last 10 days)     Procedure Component Value - Date/Time    COVID PRE-OP / PRE-PROCEDURE SCREENING ORDER (NO ISOLATION) - Swab, Nasopharynx [[id number]]  (Abnormal) Collected: 09/06/21 1046    Lab Status: Final result Specimen: Swab from Nasopharynx Updated: 09/06/21 1142    Narrative:      The  following orders were created for panel order COVID PRE-OP / PRE-PROCEDURE SCREENING ORDER (NO ISOLATION) - Swab, Nasopharynx.  Procedure                               Abnormality         Status                     ---------                               -----------         ------                     COVID-19 and FLU A/B PCR...[[id number]]  Abnormal            Final result                 Please view results for these tests on the individual orders.    COVID-19 and FLU A/B PCR - Swab, Nasopharynx [[id number]]  (Abnormal) Collected: 09/06/21 1046    Lab Status: Final result Specimen: Swab from Nasopharynx Updated: 09/06/21 1142     COVID19 Detected     Influenza A PCR Not Detected     Influenza B PCR Not Detected    Narrative:      Fact sheet for providers: https://www.fda.gov/media/422343/download    Fact sheet for patients: https://www.fda.gov/media/700392/download    Test performed by PCR.  Influenza A and Influenza B negative results should be considered presumptive in samples that have a positive SARS-CoV-2 result.    Competitive inhibition studies showed that SARS-CoV-2 virus, when present at concentrations above 3.6E+04 copies/mL, can inhibit the detection and amplification of influenza A and influenza B virus RNA if present at or below 1.8E+02 copies/mL or 4.9E+02 copies/mL, respectively, and may lead to false negative influenza virus results. If co-infection with influenza A or influenza B virus is suspected in samples with a positive SARS-CoV-2 result, the sample should be re-tested with another FDA cleared, approved, or authorized influenza test, if influenza virus detection would change clinical management.    Blood Culture - Blood, Arm, Left [[id number]] Collected: 09/05/21 1340    Lab Status: Preliminary result Specimen: Blood from Arm, Left Updated: 09/08/21 1400     Blood Culture No growth at 3 days    Blood Culture - Blood, Arm, Right [[id number]] Collected: 09/05/21 1340    Lab Status: Preliminary  result Specimen: Blood from Arm, Right Updated: 09/08/21 1400     Blood Culture No growth at 3 days          CT Chest Without Contrast Diagnostic    Result Date: 9/5/2021  EXAMINATION: CT CHEST WO CONTRAST DIAGNOSTIC-  INDICATION: covid; U07.1-COVID-19; R09.02-Hypoxemia; E87.6-Hypokalemia  TECHNIQUE: Axial noncontrast CT of the chest with multiplanar reconstruction  The radiation dose reduction device was turned on for each scan per the ALARA (As Low as Reasonably Achievable) protocol.  COMPARISON: 11/24/2012  FINDINGS: No pathologic axillary adenopathy or other worrisome body wall soft tissue finding in the chest. No acute findings in the partially imaged upper abdomen, with redemonstrated cholelithiasis. IVC filter noted in place. No pleural or pericardial effusion. No pathologic mediastinal or hilar lymphadenopathy. Nonaneurysmal mildly atherosclerotic thoracic aorta. Evaluation of the osseous structures demonstrates no evidence of acute fracture or aggressive osseous lesion. Evaluation of the lung parenchyma demonstrates scattered multifocal bilateral areas of groundglass opacity, typical in appearance for Covid 19 pneumonia.      Multifocal pneumonia typical of Covid 19 related pneumonia.   This report was finalized on 9/5/2021 4:19 PM by Edgardo Moreno.      XR Chest 1 View    Result Date: 9/5/2021  EXAMINATION: XR CHEST 1 VW-  INDICATION: sob  COMPARISON: 10/7/2012  FINDINGS: There is mild cardiac enlargement. There is no focal consolidation. No pleural effusion or pneumothorax.      Cardiac enlargement without evidence of additional acute cardiopulmonary abnormality.  This report was finalized on 9/5/2021 2:11 PM by Edgardo Moreno.                    Plan for Follow-up of Pending Labs/Results: Follow-up with PCP in 1 week.  Pending Labs     Order Current Status    Blood Culture - Blood, Arm, Left Preliminary result    Blood Culture - Blood, Arm, Right Preliminary result        Discharge Details       "  Discharge Medications      New Medications      Instructions Start Date   acetaminophen 325 MG tablet  Commonly known as: TYLENOL   650 mg, Oral, Every 4 Hours PRN      albuterol sulfate  (90 Base) MCG/ACT inhaler  Commonly known as: PROVENTIL HFA;VENTOLIN HFA;PROAIR HFA   2 puffs, Inhalation, Every 4 Hours PRN      dexamethasone 6 MG tablet  Commonly known as: DECADRON   6 mg, Oral, Daily With Breakfast   Start Date: September 10, 2021        Changes to Medications      Instructions Start Date   sertraline 50 MG tablet  Commonly known as: Zoloft  What changed: additional instructions   50 mg, Oral, Daily         Continue These Medications      Instructions Start Date   chlorthalidone 25 MG tablet  Commonly known as: HYGROTON   12.5 mg, Oral, Daily      dilTIAZem 240 MG 24 hr capsule  Commonly known as: TIAZAC   240 mg, Oral, Daily      GLUCOSAMINE PO   2 tablets, Oral, Daily, As directed, OTC      pantoprazole 40 MG EC tablet  Commonly known as: PROTONIX   Take 1 tablet by mouth once daily      potassium chloride 10 MEQ CR tablet   Take 1 tablet by mouth once daily      rivaroxaban 15 MG tablet  Commonly known as: XARELTO   15 mg, Oral, Daily      simvastatin 40 MG tablet  Commonly known as: ZOCOR   TAKE 1 TABLET BY MOUTH AT NIGHT      vitamin C 500 MG tablet  Commonly known as: ASCORBIC ACID   1,000 mg, Oral, Daily, OTC      Vitamin D-3 125 MCG (5000 UT) tablet   1 tablet, Oral, Daily, OTC      Zinc 40 MG tablet   Oral, Daily, OTC         Stop These Medications    metoprolol tartrate 25 MG tablet  Commonly known as: LOPRESSOR            Allergies   Allergen Reactions   • Erythromycin Swelling     \"tongue swelling\".   • Codeine Sulfate Swelling   • Meperidine Swelling   • Morphine And Related Swelling   • Penicillins Swelling     \"swelling\" at site.   • Sulfa Antibiotics Swelling   • Sulfadiazine Swelling         Discharge Disposition:  Home or Self Care    Diet:  Hospital:  Diet Order   Procedures   • " Diet Regular       Activity:  Activity Instructions     Activity as Tolerated               CODE STATUS:    Code Status and Medical Interventions:   Ordered at: 09/05/21 1507     Code Status:    No CPR     Medical Interventions (Level of Support Prior to Arrest):    Full     Comments:    also on living will       Future Appointments   Date Time Provider Department Center   10/18/2021  9:10 AM Flakito Chase MD [facility]   11/30/2021 12:00 PM Flakito Chaudhary MD [facility]   1/13/2022  2:00 PM Flakito Dill MD [facility]   1/25/2022  1:45 PM Amanda Ashby MD [facility]   2/9/2022  8:10 AM Flakito Chase MD [facility]       Additional Instructions for the Follow-ups that You Need to Schedule     Discharge Follow-up with PCP   As directed       Currently Documented PCP:    Flakito Chaudhary MD    PCP Phone Number:    [phone]     Follow Up Details: Follow-up with PCP in 1 week.                     Gideon Castro, CHANTELL  09/09/21      Time Spent on Discharge:  I spent  41  minutes on this discharge activity which included: face-to-face encounter with the patient, reviewing the data in the system, coordination of the care with the nursing staff as well as consultants, documentation, and entering orders.

## 2021-09-09 NOTE — CASE MANAGEMENT/SOCIAL WORK
Case Management Discharge Note      Final Note: I spoke with the pt. I called the referral for home 02 to [facility]. They will deliever a portable tank to the hospital room and arrange home set up with the pt. Pulse ox for home provided to the  pt.         Selected Continued Care - Admitted Since 9/5/2021     Destination    No services have been selected for the patient.              Durable Medical Equipment Coordination complete.    Service Provider Selected Services Address Phone Fax Patient Preferred    [facility]  Durable Medical Equipment [address] [place] [phone] --          Dialysis/Infusion    No services have been selected for the patient.              Home Medical Care    No services have been selected for the patient.              Therapy    No services have been selected for the patient.              Community Resources    No services have been selected for the patient.              Community & DME    No services have been selected for the patient.                       Final Discharge Disposition Code: 01 - home or self-care

## 2021-09-09 NOTE — PLAN OF CARE
Goal Outcome Evaluation:  Plan of Care Reviewed With: patient   VSS, no complaints tonight. Sleep all night.      Progress: no change

## 2021-09-10 ENCOUNTER — TRANSITIONAL CARE MANAGEMENT TELEPHONE ENCOUNTER (OUTPATIENT)
Dept: CALL CENTER | Facility: HOSPITAL | Age: 76
End: 2021-09-10

## 2021-09-10 LAB
BACTERIA SPEC AEROBE CULT: NORMAL
BACTERIA SPEC AEROBE CULT: NORMAL

## 2021-09-10 NOTE — OUTREACH NOTE
Call Center TCM Note      Responses   [facility] patient discharged from?  [place]   Does the patient have one of the following disease processes/diagnoses(primary or secondary)?  COVID-19   COVID-19 underlying condition?  None   TCM attempt successful?  Yes   Call start time  1505   Call end time  1526   Discharge diagnosis  Acute hypoxic resp failure, COVID-19 PNA, Hypokalemia   Meds reviewed with patient/caregiver?  Yes   Is the patient having any side effects they believe may be caused by any medication additions or changes?  No   Does the patient have all medications ordered at discharge?  Yes   Is the patient taking all medications as directed (includes completed medication regime)?  Yes   Does the patient have a primary care provider?   Yes   Comments regarding PCP  Patient has a followup on video visit hospital followup  on 9/15/2021   Does the patient have an appointment with their PCP or specialist within 7 days of discharge?  Yes   Has the patient kept scheduled appointments due by today?  N/A   Has home health visited the patient within 72 hours of discharge?  N/A   What DME was ordered?  O2 from [place], pulse ox provided   Has all DME been delivered?  Yes   DME comments  02 at 3 [facility]   Psychosocial issues?  No   Did the patient receive a copy of their discharge instructions?  Yes   Did the patient receive a copy of COVID-19 specific instructions?  Yes   Nursing interventions  Reviewed instructions with patient   What is the patient's perception of their health status since discharge?  Improving   Does the patient have any of the following symptoms?  Shortness of breath, Cough   Pulse Ox monitoring  Intermittent   Pulse Ox device source  Hospital   O2 Sat: education provided  Sat levels, Monitoring frequency, When to seek care   O2 Sat education comments  96 on 3 [facility]    Is the patient/caregiver able to teach back steps to recovery at home?  Set small, achievable goals for return to baseline  health, Rest and rebuild strength, gradually increase activity, Make a list of questions for provider's appointment   If the patient is a current smoker, are they able to teach back resources for cessation?  Not a smoker   Is the patient/caregiver able to teach back the hierarchy of who to call/visit for symptoms/problems? PCP, Specialist, Home health nurse, Urgent Care, ED, 911  Yes   TCM call completed?  Yes          Rodri Lees RN    9/10/2021, 15:26 EDT

## 2021-09-10 NOTE — OUTREACH NOTE
Prep Survey      Responses   [facility] facility patient discharged from?  [place]   Is LACE score < 7 ?  No   Emergency Room discharge w/ pulse ox?  No   Eligibility  [facility]   Date of Admission  09/05/21   Date of Discharge  09/09/21   Discharge Disposition  Home or Self Care   Discharge diagnosis  Acute hypoxic resp failure, COVID-19 PNA, Hypokalemia   Does the patient have one of the following disease processes/diagnoses(primary or secondary)?  COVID-19   Does the patient have Home health ordered?  No   Is there a DME ordered?  Yes   What DME was ordered?  O2 from [place], pulse ox provided   Prep survey completed?  Yes          Melba Waite RN

## 2021-09-11 ENCOUNTER — READMISSION MANAGEMENT (OUTPATIENT)
Dept: CALL CENTER | Facility: HOSPITAL | Age: 76
End: 2021-09-11

## 2021-09-11 NOTE — OUTREACH NOTE
COVID-19 Week 1 Survey      Responses   [facility] patient discharged from?  [place]   Does the patient have one of the following disease processes/diagnoses(primary or secondary)?  COVID-19   COVID-19 underlying condition?  None   Call Number  Call 2   Week 1 Call successful?  Yes   Call start time  1118   Call end time  1120   Discharge diagnosis  Acute hypoxic resp failure, COVID-19 PNA, Hypokalemia   Meds reviewed with patient/caregiver?  Yes   Is the patient taking all medications as directed (includes completed medication regime)?  Yes   Has the patient kept scheduled appointments due by today?  N/A   What is the patient's perception of their health status since discharge?  Improving   Does the patient have any of the following symptoms?  Cough, Loss of taste/smell   Nursing Interventions  Nurse provided patient education   Pulse Ox monitoring  Intermittent   O2 Sat comments  93% on 2 L of O2    Is the patient/caregiver able to teach back steps to recovery at home?  Rest and rebuild strength, gradually increase activity, Eat a well-balance diet, Set small, achievable goals for return to baseline health   COVID-19 call completed?  Yes          Rosalba Cotton RN

## 2021-09-12 ENCOUNTER — READMISSION MANAGEMENT (OUTPATIENT)
Dept: CALL CENTER | Facility: HOSPITAL | Age: 76
End: 2021-09-12

## 2021-09-12 NOTE — OUTREACH NOTE
COVID-19 Week 1 Survey      Responses   [facility] patient discharged from?  [place]   Does the patient have one of the following disease processes/diagnoses(primary or secondary)?  COVID-19   COVID-19 underlying condition?  None   Call Number  Call 3   Week 1 Call successful?  No   Discharge diagnosis  Acute hypoxic resp failure, COVID-19 PNA, Hypokalemia          Linda Suggs RN

## 2021-09-15 ENCOUNTER — TELEMEDICINE (OUTPATIENT)
Dept: INTERNAL MEDICINE | Facility: CLINIC | Age: 76
End: 2021-09-15

## 2021-09-15 VITALS — TEMPERATURE: 98.1 F

## 2021-09-15 DIAGNOSIS — U07.1 COVID-19 VIRUS INFECTION: Primary | ICD-10-CM

## 2021-09-15 PROCEDURE — 99212 OFFICE O/P EST SF 10 MIN: CPT | Performed by: INTERNAL MEDICINE

## 2021-09-15 PROCEDURE — 1111F DSCHRG MED/CURRENT MED MERGE: CPT | Performed by: INTERNAL MEDICINE

## 2021-09-15 NOTE — PROGRESS NOTES
Chief Complaint   Patient presents with   • Hospital Follow Up Visit     Covid     Telemedicine was provided via two-way interactive audiovisual telecommunication (Zoom) due to the COVID-19 outbreak in order to minimize risk of exposure.    Others in attendance:      Risks, benefits and alternative including in-person office visit have been explained to the patient, and the patient has consented to this mode of care.  The visit was conducted on a secure line.  All parties in the room, if any other, were identified and approved by the patient prior to the telemedicine visit.  No technical issues were experienced.  A level of care equivalent to in-person care was achieved.    History of Present Illness        The patient presents to the office for a follow-up visit from a recent hospitalization. The patient was hospitalized from 05-Sep-2021 through 09-Sep-2021 with COVID-19. The records have been received and reviewed. The medication list has been reconciled. The hospital stay was uncomplicated. Her in hospital treatment consisted of intravenous steroids, oral steroids, Oxygen at 3 LPM NC and remdesivir.        Since leaving the hospital she reports that she is back to normal. She denies abdominal pain, itchy watery eyes, bone pain, chills, congestion, a dry cough, a wet cough, decreased appetite, diarrhea, dysuria, ear drainage, ear pain, eye drainage, facial pain, fever, a headache, joint pain, myalgias, nasal discharge, nausea, neck stiffness, night sweats, a rash, sneezing, a sore throat, vomiting or wheezing. She also reports that she does not have chest pain, dyspnea, edema, syncope, blurred vision or palpitations.    Review of Systems    CONSTITUTIONAL- Denies Unexplained Weight Loss, Fever, Sweats, Fatigue, Weakness or Malaise.    HENT- Denies Sinus Pain, Decreased Hearing or Tinnitus.    GASTROINTESTINAL- Denies Blood per Rectum, Constipation or Heartburn.    PULMONARY- Denies Sputum Production, Cough,  "Hemoptysis or Pleuritic Chest Pain.    CARDIOVASCULAR- Denies Claudication or Irregular Heart Beat.    Medications      Current Outpatient Medications:   •  acetaminophen (TYLENOL) 325 MG tablet, Take 2 tablets by mouth Every 4 (Four) Hours As Needed for Mild Pain ., Disp: , Rfl:   •  albuterol sulfate  (90 Base) MCG/ACT inhaler, Inhale 2 puffs Every 4 (Four) Hours As Needed for Wheezing or Shortness of Air., Disp: 18 g, Rfl: 0  •  chlorthalidone (HYGROTON) 25 MG tablet, Take 0.5 tablets by mouth Daily., Disp: 45 tablet, Rfl: 3  •  Cholecalciferol (VITAMIN D-3) 5000 units tablet, Take 1 tablet by mouth Daily. OTC, Disp: , Rfl:   •  dexamethasone (DECADRON) 6 MG tablet, Start 9/10/2021 Take 1 tablet by mouth Daily With Breakfast for 5 doses., Disp: 5 tablet, Rfl: 0  •  dilTIAZem (TIAZAC) 240 MG 24 hr capsule, Take 1 capsule by mouth Daily., Disp: 30 capsule, Rfl: 5  •  Glucosamine HCl (GLUCOSAMINE PO), Take 2 tablets by mouth Daily. As directed, OTC, Disp: , Rfl:   •  pantoprazole (PROTONIX) 40 MG EC tablet, Take 1 tablet by mouth once daily, Disp: 90 tablet, Rfl: 1  •  potassium chloride 10 MEQ CR tablet, Take 1 tablet by mouth once daily, Disp: 90 tablet, Rfl: 1  •  rivaroxaban (XARELTO) 15 MG tablet, Take 1 tablet by mouth Daily., Disp: 30 tablet, Rfl: 11  •  sertraline (Zoloft) 50 MG tablet, Take 1 tablet by mouth Daily. (Patient taking differently: Take 50 mg by mouth Daily. Takes at night), Disp: 30 tablet, Rfl: 5  •  simvastatin (ZOCOR) 40 MG tablet, TAKE 1 TABLET BY MOUTH AT NIGHT, Disp: 90 tablet, Rfl: 2  •  vitamin C (ASCORBIC ACID) 500 MG tablet, Take 1,000 mg by mouth Daily. OTC, Disp: , Rfl:   •  Zinc 40 MG tablet, Take  by mouth Daily. OTC, Disp: , Rfl:      Allergies    Allergies   Allergen Reactions   • Erythromycin Swelling     \"tongue swelling\".   • Codeine Sulfate Swelling   • Meperidine Swelling   • Morphine And Related Swelling   • Penicillins Swelling     \"swelling\" at site.   • Sulfa " Antibiotics Swelling   • Sulfadiazine Swelling       Problem List    Patient Active Problem List   Diagnosis   • History of pulmonary embolism   • Hypertension   • Acute pulmonary embolism (CMS/HCC)   • DVT (deep venous thrombosis) (CMS/HCC)   • Circadian rhythm sleep disorder, delayed sleep phase type   • Psychophysiological insomnia   • Hyperlipidemia   • Calculus of kidney   • Cobalamin deficiency   • History of DVT (deep vein thrombosis)   • COVID-19   • Cytokine release syndrome, grade 2       Medications, Allergies, Problems List and Past History were reviewed and updated.    Physical Examination    LMP  (LMP Unknown)           Impression and Assessment     Covid 19 Infection.    Plan    Covid 19 Infection Plan: Continue the current medication regimen. Continue oxygen.    Transitional Care Management Certification  I certify that the following are true:  1. Communication was made within 2 business days of discharge.  2. Complexity of Medical Decision Making is moderate.  3. Face to face visit occurred within 5 days.    *Note: 39562 is for high complexity patients with a face to face visit within 7 days of discharge.  74281 is for high complexity patients with a face to face on days 8-14 post discharge or medium complexity with face to face visit within 14 days post discharge.      Return to Office    The patient was instructed to return for follow-up in 2 weeks. The patient was instructed to return sooner if the condition changes, worsens, or does not resolve.

## 2021-09-17 ENCOUNTER — READMISSION MANAGEMENT (OUTPATIENT)
Dept: CALL CENTER | Facility: HOSPITAL | Age: 76
End: 2021-09-17

## 2021-09-17 NOTE — OUTREACH NOTE
COVID-19 Week 2 Survey      Responses   [facility] patient discharged from?  [place]   Does the patient have one of the following disease processes/diagnoses(primary or secondary)?  COVID-19   COVID-19 underlying condition?  None   Call Number  Call 1   COVID-19 Week 2: Call 1 attempt successful?  Yes   Call start time  1356   Call end time  1357   Discharge diagnosis  Acute hypoxic resp failure, COVID-19 PNA, Hypokalemia   Meds reviewed with patient/caregiver?  Yes   Is the patient taking all medications as directed (includes completed medication regime)?  Yes   Has the patient kept scheduled appointments due by today?  Yes   What is the patient's perception of their health status since discharge?  Improving   Does the patient have any of the following symptoms?  Cough, Loss of taste/smell   Pulse Ox monitoring  Intermittent   O2 Sat comments  94% on 2 L of O2    Is the patient/caregiver able to teach back steps to recovery at home?  Rest and rebuild strength, gradually increase activity, Eat a well-balance diet, Set small, achievable goals for return to baseline health   COVID-19 call completed?  Yes          Rosalba Cotton RN

## 2021-09-20 DIAGNOSIS — E78.2 MIXED HYPERLIPIDEMIA: ICD-10-CM

## 2021-09-20 RX ORDER — SIMVASTATIN 40 MG
TABLET ORAL
Qty: 90 TABLET | Refills: 3 | Status: SHIPPED | OUTPATIENT
Start: 2021-09-20 | End: 2022-09-02

## 2021-09-20 NOTE — TELEPHONE ENCOUNTER
Next appt 1/2022  Lab Results   Component Value Date    CHOL 139 08/25/2021    CHLPL 156 06/04/2020    TRIG 63 08/25/2021    HDL 64 (H) 08/25/2021    LDL 62 08/25/2021

## 2021-10-05 ENCOUNTER — LAB (OUTPATIENT)
Dept: LAB | Facility: HOSPITAL | Age: 76
End: 2021-10-05

## 2021-10-05 ENCOUNTER — HOSPITAL ENCOUNTER (OUTPATIENT)
Dept: GENERAL RADIOLOGY | Facility: HOSPITAL | Age: 76
Discharge: HOME | End: 2021-10-05

## 2021-10-05 ENCOUNTER — OFFICE VISIT (OUTPATIENT)
Dept: INTERNAL MEDICINE | Facility: CLINIC | Age: 76
End: 2021-10-05

## 2021-10-05 VITALS
WEIGHT: 153 LBS | DIASTOLIC BLOOD PRESSURE: 56 MMHG | SYSTOLIC BLOOD PRESSURE: 108 MMHG | RESPIRATION RATE: 20 BRPM | TEMPERATURE: 97.7 F | BODY MASS INDEX: 27.1 KG/M2 | HEART RATE: 64 BPM

## 2021-10-05 DIAGNOSIS — U07.1 COVID-19 VIRUS INFECTION: ICD-10-CM

## 2021-10-05 DIAGNOSIS — K59.00 CONSTIPATION, UNSPECIFIED CONSTIPATION TYPE: ICD-10-CM

## 2021-10-05 DIAGNOSIS — U07.1 COVID-19 VIRUS INFECTION: Primary | ICD-10-CM

## 2021-10-05 LAB
ALBUMIN SERPL-MCNC: 3.5 G/DL (ref 3.5–5.2)
ALBUMIN/GLOB SERPL: 1.2 G/DL
ALP SERPL-CCNC: 79 U/L (ref 39–117)
ALT SERPL W P-5'-P-CCNC: 7 U/L (ref 1–33)
ANION GAP SERPL CALCULATED.3IONS-SCNC: 11.5 MMOL/L (ref 5–15)
AST SERPL-CCNC: 15 U/L (ref 1–32)
BASOPHILS # BLD AUTO: 0.05 10*3/MM3 (ref 0–0.2)
BASOPHILS NFR BLD AUTO: 0.7 % (ref 0–1.5)
BILIRUB SERPL-MCNC: 0.2 MG/DL (ref 0–1.2)
BUN SERPL-MCNC: 15 MG/DL (ref 8–23)
BUN/CREAT SERPL: 13.2 (ref 7–25)
CALCIUM SPEC-SCNC: 9.6 MG/DL (ref 8.6–10.5)
CHLORIDE SERPL-SCNC: 101 MMOL/L (ref 98–107)
CO2 SERPL-SCNC: 29.5 MMOL/L (ref 22–29)
CREAT SERPL-MCNC: 1.14 MG/DL (ref 0.57–1)
DEPRECATED RDW RBC AUTO: 42.8 FL (ref 37–54)
EOSINOPHIL # BLD AUTO: 0.16 10*3/MM3 (ref 0–0.4)
EOSINOPHIL NFR BLD AUTO: 2.2 % (ref 0.3–6.2)
ERYTHROCYTE [DISTWIDTH] IN BLOOD BY AUTOMATED COUNT: 12.6 % (ref 12.3–15.4)
GFR SERPL CREATININE-BSD FRML MDRD: 46 ML/MIN/1.73
GLOBULIN UR ELPH-MCNC: 3 GM/DL
GLUCOSE SERPL-MCNC: 100 MG/DL (ref 65–99)
HCT VFR BLD AUTO: 36.9 % (ref 34–46.6)
HGB BLD-MCNC: 11.8 G/DL (ref 12–15.9)
IMM GRANULOCYTES # BLD AUTO: 0.06 10*3/MM3 (ref 0–0.05)
IMM GRANULOCYTES NFR BLD AUTO: 0.8 % (ref 0–0.5)
LYMPHOCYTES # BLD AUTO: 1.25 10*3/MM3 (ref 0.7–3.1)
LYMPHOCYTES NFR BLD AUTO: 17.3 % (ref 19.6–45.3)
MCH RBC QN AUTO: 29.6 PG (ref 26.6–33)
MCHC RBC AUTO-ENTMCNC: 32 G/DL (ref 31.5–35.7)
MCV RBC AUTO: 92.5 FL (ref 79–97)
MONOCYTES # BLD AUTO: 0.92 10*3/MM3 (ref 0.1–0.9)
MONOCYTES NFR BLD AUTO: 12.7 % (ref 5–12)
NEUTROPHILS NFR BLD AUTO: 4.8 10*3/MM3 (ref 1.7–7)
NEUTROPHILS NFR BLD AUTO: 66.3 % (ref 42.7–76)
NRBC BLD AUTO-RTO: 0 /100 WBC (ref 0–0.2)
PLATELET # BLD AUTO: 688 10*3/MM3 (ref 140–450)
PMV BLD AUTO: 9.9 FL (ref 6–12)
POTASSIUM SERPL-SCNC: 3.3 MMOL/L (ref 3.5–5.2)
PROT SERPL-MCNC: 6.5 G/DL (ref 6–8.5)
RBC # BLD AUTO: 3.99 10*6/MM3 (ref 3.77–5.28)
SODIUM SERPL-SCNC: 142 MMOL/L (ref 136–145)
WBC # BLD AUTO: 7.24 10*3/MM3 (ref 3.4–10.8)

## 2021-10-05 PROCEDURE — 71046 X-RAY EXAM CHEST 2 VIEWS: CPT

## 2021-10-05 PROCEDURE — 99213 OFFICE O/P EST LOW 20 MIN: CPT | Performed by: INTERNAL MEDICINE

## 2021-10-05 PROCEDURE — 36415 COLL VENOUS BLD VENIPUNCTURE: CPT | Performed by: INTERNAL MEDICINE

## 2021-10-05 PROCEDURE — 80053 COMPREHEN METABOLIC PANEL: CPT | Performed by: INTERNAL MEDICINE

## 2021-10-05 PROCEDURE — 74018 RADEX ABDOMEN 1 VIEW: CPT

## 2021-10-05 PROCEDURE — 85025 COMPLETE CBC W/AUTO DIFF WBC: CPT | Performed by: INTERNAL MEDICINE

## 2021-10-05 NOTE — PROGRESS NOTES
Chief Complaint   Patient presents with   • Follow-up     FOLLOW UP COVID PNEUMONIA       History of Present Illness    The patient presents for a follow-up related to pneumonia. She states that she completed her antibiotics. She denies a persistent cough. The patient denies a fever. The patient also denies nausea, vomiting, diarrhea, headaches, chills, rashes, sore throat, chest pain, shortness of breath, wheezing or congestion. She continues oxygen at 3 LPM. Her sats drop to 89% off oxygen.    The patient presents for an acute visit for constipation. The symptoms have been present for a one month duration. The stools are hard. There is no blood in the bowel movement. Bowel Movements are painful. There is no associated enuresis. The patient does not report abdominal pain. The patient denies neglecting the urge or putting off bowel movements. There is no history of excessive cows milk, calcium, limited fiber, iron supplements, narcotics, antacids or antidepressants. There are no new life stressors. The patient is not receiving treatment.    Medications      Current Outpatient Medications:   •  acetaminophen (TYLENOL) 325 MG tablet, Take 2 tablets by mouth Every 4 (Four) Hours As Needed for Mild Pain ., Disp: , Rfl:   •  chlorthalidone (HYGROTON) 25 MG tablet, Take 0.5 tablets by mouth Daily., Disp: 45 tablet, Rfl: 3  •  Cholecalciferol (VITAMIN D-3) 5000 units tablet, Take 1 tablet by mouth Daily. OTC, Disp: , Rfl:   •  dilTIAZem (TIAZAC) 240 MG 24 hr capsule, Take 1 capsule by mouth Daily., Disp: 30 capsule, Rfl: 5  •  Glucosamine HCl (GLUCOSAMINE PO), Take 2 tablets by mouth Daily. As directed, OTC, Disp: , Rfl:   •  metoprolol tartrate (LOPRESSOR) 25 MG tablet, Take 25 mg by mouth 2 (Two) Times a Day., Disp: , Rfl:   •  pantoprazole (PROTONIX) 40 MG EC tablet, Take 1 tablet by mouth once daily, Disp: 90 tablet, Rfl: 1  •  potassium chloride 10 MEQ CR tablet, Take 1 tablet by mouth once daily, Disp: 90 tablet,  "Rfl: 1  •  rivaroxaban (XARELTO) 15 MG tablet, Take 1 tablet by mouth Daily., Disp: 30 tablet, Rfl: 11  •  sertraline (Zoloft) 50 MG tablet, Take 1 tablet by mouth Daily. (Patient taking differently: Take 50 mg by mouth Daily. Takes at night), Disp: 30 tablet, Rfl: 5  •  simvastatin (ZOCOR) 40 MG tablet, TAKE 1 TABLET BY MOUTH AT NIGHT, Disp: 90 tablet, Rfl: 3  •  vitamin C (ASCORBIC ACID) 500 MG tablet, Take 1,000 mg by mouth Daily. OTC, Disp: , Rfl:   •  Zinc 40 MG tablet, Take  by mouth Daily. OTC, Disp: , Rfl:      Allergies    Allergies   Allergen Reactions   • Erythromycin Swelling     \"tongue swelling\".   • Codeine Sulfate Swelling   • Meperidine Swelling   • Morphine And Related Swelling   • Penicillins Swelling     \"swelling\" at site.   • Sulfa Antibiotics Swelling   • Sulfadiazine Swelling       Problem List    Patient Active Problem List   Diagnosis   • History of pulmonary embolism   • Hypertension   • Acute pulmonary embolism (HCC)   • DVT (deep venous thrombosis) (HCC)   • Circadian rhythm sleep disorder, delayed sleep phase type   • Psychophysiological insomnia   • Hyperlipidemia   • Calculus of kidney   • Cobalamin deficiency   • History of DVT (deep vein thrombosis)   • COVID-19   • Cytokine release syndrome, grade 2       Medications, Allergies, Problems List and Past History were reviewed and updated.    Physical Examination    /56 (BP Location: Left arm, Patient Position: Sitting, Cuff Size: Adult)   Pulse 64   Temp 97.7 °F (36.5 °C) (Infrared)   Resp 20   Wt 69.4 kg (153 lb)   LMP  (LMP Unknown)   Breastfeeding No   BMI 27.10 kg/m²     HEENT: Head- Normocephalic Atraumatic. Facies- Within normal limits. Pinnas- Normal texture and shape bilaterally. Canals- Normal bilaterally. TMs- Normal bilaterally. Nares- Patent bilaterally. Nasal Septum- is normal. There is no tenderness to palpation over the frontal or maxillary sinuses. Lids- Normal bilaterally. Conjunctiva- Clear bilaterally. " Sclera- Anicteric bilaterally. Oropharynx- Moist with no lesions. Tonsils- No enlargement, erythema or exudate.    Neck: Thyroid- non enlarged, symmetric and has no nodules. No bruits are detected. ROM- Normal Range of Motion with no rigidity.    Lungs: Auscultation- Clear to auscultation bilaterally. There are no retractions, clubbing or cyanosis. The Expiratory to Inspiratory ratio is equal.    Lymph Nodes: Cervical- no enlarged lymph nodes noted. Clavicular- Deferred. Axillary- Deferred. Inguinal- Deferred.    Cardiovascular: There are no carotid bruits. Heart- Normal Rate with Regular rhythm and no murmurs. There are no gallops. There are no rubs. In the lower extremities there is no edema. The upper extremities do not have edema.    Abdomen: Soft, benign, non-tender with no masses, hernias, organomegaly or scars.    Impression and Assessment    Pneumonia due to Covid 19.    Constipation.    Plan    Constipation Plan: Increased fiber was encouraged. She was encouraged to use over the counter Miralax daily. She was instructed to use stool softeners daily.    Pneumonia due to Covid 19 Plan: Continue oxygen. Recheck in one month.    Diagnoses and all orders for this visit:    1. COVID-19 virus infection (Primary)  -     XR Chest PA & Lateral; Future  -     CBC & Differential; Future  -     Comprehensive Metabolic Panel; Future  -     CBC & Differential  -     Comprehensive Metabolic Panel    2. Constipation, unspecified constipation type  -     XR Abdomen KUB; Future  -     CBC & Differential; Future  -     Comprehensive Metabolic Panel; Future  -     CBC & Differential  -     Comprehensive Metabolic Panel        Return to Office    The patient was instructed to return for follow-up in 1 month. The patient was instructed to return sooner if the condition changes, worsens, or does not resolve.

## 2021-11-04 ENCOUNTER — TELEPHONE (OUTPATIENT)
Dept: INTERNAL MEDICINE | Facility: CLINIC | Age: 76
End: 2021-11-04

## 2021-11-04 ENCOUNTER — HOSPITAL ENCOUNTER (OUTPATIENT)
Dept: GENERAL RADIOLOGY | Facility: HOSPITAL | Age: 76
Discharge: HOME | End: 2021-11-04
Admitting: INTERNAL MEDICINE

## 2021-11-04 ENCOUNTER — OFFICE VISIT (OUTPATIENT)
Dept: INTERNAL MEDICINE | Facility: CLINIC | Age: 76
End: 2021-11-04

## 2021-11-04 VITALS
SYSTOLIC BLOOD PRESSURE: 116 MMHG | HEART RATE: 70 BPM | RESPIRATION RATE: 18 BRPM | DIASTOLIC BLOOD PRESSURE: 70 MMHG | WEIGHT: 156.6 LBS | HEIGHT: 63 IN | OXYGEN SATURATION: 96 % | BODY MASS INDEX: 27.75 KG/M2

## 2021-11-04 DIAGNOSIS — J12.82 PNEUMONIA DUE TO COVID-19 VIRUS: ICD-10-CM

## 2021-11-04 DIAGNOSIS — R09.02 HYPOXEMIA: ICD-10-CM

## 2021-11-04 DIAGNOSIS — J12.82 PNEUMONIA DUE TO COVID-19 VIRUS: Primary | ICD-10-CM

## 2021-11-04 DIAGNOSIS — U07.1 PNEUMONIA DUE TO COVID-19 VIRUS: ICD-10-CM

## 2021-11-04 DIAGNOSIS — U07.1 PNEUMONIA DUE TO COVID-19 VIRUS: Primary | ICD-10-CM

## 2021-11-04 PROCEDURE — 71046 X-RAY EXAM CHEST 2 VIEWS: CPT

## 2021-11-04 PROCEDURE — 99213 OFFICE O/P EST LOW 20 MIN: CPT | Performed by: INTERNAL MEDICINE

## 2021-11-08 ENCOUNTER — TELEPHONE (OUTPATIENT)
Dept: INTERNAL MEDICINE | Facility: CLINIC | Age: 76
End: 2021-11-08

## 2021-11-08 NOTE — TELEPHONE ENCOUNTER
Caller: Carol Jean    Relationship: Self    Best call back number: [phone]     What test was performed: CHEST XRAY    When was the test performed: 11/04    Where was the test performed: [facility]    Additional notes: PATIENT IS ABLE TO SEE THE RESULTS IN HER MYCHART BUT SHE DOES NOT UNDERSTAND WHAT THEY MEAN. SHE WOULD LIKE A CALL BACK TO EXPLAIN THEM

## 2021-11-10 NOTE — TELEPHONE ENCOUNTER
S/W  at [facility], states they can do overnight pulse ox.  Requests order, last OV, demographics and insurance card be faxed to [phone].    Can you please fax these?

## 2021-11-12 ENCOUNTER — TELEPHONE (OUTPATIENT)
Dept: INTERNAL MEDICINE | Facility: CLINIC | Age: 76
End: 2021-11-12

## 2021-11-12 ENCOUNTER — TRANSCRIBE ORDERS (OUTPATIENT)
Dept: ADMINISTRATIVE | Facility: HOSPITAL | Age: 76
End: 2021-11-12

## 2021-11-12 DIAGNOSIS — Z12.31 VISIT FOR SCREENING MAMMOGRAM: Primary | ICD-10-CM

## 2021-11-12 NOTE — TELEPHONE ENCOUNTER
Caller: Carol Jean    Relationship: Self    Best call back number: [phone]     What is the best time to reach you: ANYTIME     Who are you requesting to speak with (clinical staff, provider,  specific staff member): CLINICAL STAFF     What was the call regarding: PATIENT STATES SHE WAS DIAGNOSED WITH COVID ON AUGUST 29TH AND WAS TOLD SHE WOULD HAVE TO WAIT TO 4-6 WEEKS BEFORE RECEIVING THE COVID BOOSTER. PATIENT STATES SHE WILL NEED TO RECEIVE A BOOSTER PRIOR TO HAVING HER MAMMOGRAM ON 1/21/22 AND WOULD NEED TO RECEIVE THE BOOSTER BY 12/13/21. PATIENT WOULD LIKE TO CHECK IF SHE IS ABLE TO HAVE THE BOOSTER BY THIS DATE AND IF SO WHERE TO RECEIVE THE BOOSTER.     Do you require a callback: YES

## 2021-11-14 NOTE — TELEPHONE ENCOUNTER
If she did not have treatment with remdesivir she can have her covid booster any time. If she was give remdesivir she will need to wait 3 months. She will need to wait 6-8 weeks after covid booster to have her mammogram.   Please let me know if you have any more questions.

## 2021-11-15 NOTE — PROGRESS NOTES
Chief Complaint   Patient presents with   • Follow-up     1 month - Previous COVID infection        History of Present Illness    The patient presents for a follow-up related to pneumonia. She states that she completed her antibiotics. She denies a persistent cough. The patient denies a fever. The patient also denies nausea, vomiting, diarrhea, headaches, chills, rashes, sore throat, chest pain, shortness of breath, wheezing or congestion. She continues oxygen at 2 LPM NC.    Review of Systems    CONSTITUTIONAL- Denies Unexplained Weight Loss, Sweats, Fatigue, Weakness or Malaise.    PULMONARY- Denies Sputum Production, Hemoptysis or Pleuritic Chest Pain.    CARDIOVASCULAR- Denies Claudication, Edema, Syncope, Palpitations or Irregular Heart Beat.    Medications      Current Outpatient Medications:   •  acetaminophen (TYLENOL) 325 MG tablet, Take 2 tablets by mouth Every 4 (Four) Hours As Needed for Mild Pain ., Disp: , Rfl:   •  chlorthalidone (HYGROTON) 25 MG tablet, Take 0.5 tablets by mouth Daily., Disp: 45 tablet, Rfl: 3  •  Cholecalciferol (VITAMIN D-3) 5000 units tablet, Take 1 tablet by mouth Daily. OTC, Disp: , Rfl:   •  dilTIAZem (TIAZAC) 240 MG 24 hr capsule, Take 1 capsule by mouth Daily., Disp: 30 capsule, Rfl: 5  •  Glucosamine HCl (GLUCOSAMINE PO), Take 2 tablets by mouth Daily. As directed, OTC, Disp: , Rfl:   •  metoprolol tartrate (LOPRESSOR) 25 MG tablet, Take 25 mg by mouth 2 (Two) Times a Day., Disp: , Rfl:   •  pantoprazole (PROTONIX) 40 MG EC tablet, Take 1 tablet by mouth once daily, Disp: 90 tablet, Rfl: 1  •  potassium chloride 10 MEQ CR tablet, Take 1 tablet by mouth once daily, Disp: 90 tablet, Rfl: 1  •  rivaroxaban (XARELTO) 15 MG tablet, Take 1 tablet by mouth Daily., Disp: 30 tablet, Rfl: 11  •  sertraline (Zoloft) 50 MG tablet, Take 1 tablet by mouth Daily. (Patient taking differently: Take 50 mg by mouth Daily. Takes at night), Disp: 30 tablet, Rfl: 5  •  simvastatin (ZOCOR) 40 MG  "tablet, TAKE 1 TABLET BY MOUTH AT NIGHT, Disp: 90 tablet, Rfl: 3  •  vitamin C (ASCORBIC ACID) 500 MG tablet, Take 1,000 mg by mouth Daily. OTC, Disp: , Rfl:   •  Zinc 40 MG tablet, Take  by mouth Daily. OTC, Disp: , Rfl:      Allergies    Allergies   Allergen Reactions   • Erythromycin Swelling     \"tongue swelling\".   • Codeine Sulfate Swelling   • Meperidine Swelling   • Morphine And Related Swelling   • Penicillins Swelling     \"swelling\" at site.   • Sulfa Antibiotics Swelling   • Sulfadiazine Swelling       Problem List    Patient Active Problem List   Diagnosis   • History of pulmonary embolism   • Hypertension   • Acute pulmonary embolism (HCC)   • DVT (deep venous thrombosis) (HCC)   • Circadian rhythm sleep disorder, delayed sleep phase type   • Psychophysiological insomnia   • Hyperlipidemia   • Calculus of kidney   • Cobalamin deficiency   • History of DVT (deep vein thrombosis)   • COVID-19   • Cytokine release syndrome, grade 2       Medications, Allergies, Problems List and Past History were reviewed and updated.    Physical Examination    /70   Pulse 70   Resp 18   Ht 160 cm (63\")   Wt 71 kg (156 lb 9.6 oz)   LMP  (LMP Unknown)   SpO2 96%   BMI 27.74 kg/m²     HEENT: Head- Normocephalic Atraumatic. Facies- Within normal limits. Pinnas- Normal texture and shape bilaterally. Canals- Normal bilaterally. TMs- Normal bilaterally. Nares- Patent bilaterally. Nasal Septum- is normal. There is no tenderness to palpation over the frontal or maxillary sinuses. Lids- Normal bilaterally. Conjunctiva- Clear bilaterally. Sclera- Anicteric bilaterally. Oropharynx- Moist with no lesions. Tonsils- No enlargement, erythema or exudate.    Neck: Thyroid- non enlarged, symmetric and has no nodules. No bruits are detected. ROM- Normal Range of Motion with no rigidity.    Lungs: Auscultation- Clear to auscultation bilaterally. There are no retractions, clubbing or cyanosis. The Expiratory to Inspiratory ratio " is equal.    Lymph Nodes: Cervical- no enlarged lymph nodes noted.    Cardiovascular: There are no carotid bruits. Heart- Normal Rate with Regular rhythm and no murmurs. There are no gallops. There are no rubs. In the lower extremities there is no edema. The upper extremities do not have edema.    Abdomen: Soft, benign, non-tender with no masses, hernias, organomegaly or scars.    Impression and Assessment    Pneumonia due to Covid-19..    Plan    Pneumonia due to Covid-19. Plan: An overnight oxygen desaturation study will be ordered.    Diagnoses and all orders for this visit:    1. Pneumonia due to COVID-19 virus (Primary)  -     Overnight Sleep Oximetry Study; Future  -     XR Chest PA & Lateral; Future    2. Hypoxemia  -     Overnight Sleep Oximetry Study; Future  -     XR Chest PA & Lateral; Future        Return to Office    The patient was instructed to return for follow-up in 1 month. The patient was instructed to return sooner if the condition changes, worsens, or does not resolve.

## 2021-11-15 NOTE — TELEPHONE ENCOUNTER
"S/W pt, informed that [facility] said \"If she did not have treatment with remdesivir she can have her covid booster any time. If she was give remdesivir she will need to wait 3 months. She will need to wait 6-8 weeks after covid booster to have her mammogram.\"  States she does not know what all they gave her while in hospital.  Reviewed hospital records and informed that she did receive 5 days of remdesivir while in hospital so she will need to wait 3 months from discharge from hospital to get Covid booster and then 6-8 weeks after booster to get mammogram.  Verb good understanding and great apprec.   "

## 2021-11-22 ENCOUNTER — TELEPHONE (OUTPATIENT)
Dept: INTERNAL MEDICINE | Facility: CLINIC | Age: 76
End: 2021-11-22

## 2021-11-22 NOTE — TELEPHONE ENCOUNTER
Caller: Carol Jean    Relationship: Self    Best call back number:[phone]    Caller requesting test results: YES    What test was performed: SLEEP STUDY    When was the test performed: PATIENT IS NOT SURE OF DATE BUT THINKS IT WAS ABOUT A WEEK AGO

## 2021-11-22 NOTE — TELEPHONE ENCOUNTER
S/W Enrique at [facility].  States report was uploaded today and we should have received.  Expl we have not received yet but will check fax batch and call back tomorrow if still do not have.  Verb agreement.     Report in fax batch.     Report given to Dr Cramer.

## 2021-11-24 ENCOUNTER — TELEPHONE (OUTPATIENT)
Dept: INTERNAL MEDICINE | Facility: CLINIC | Age: 76
End: 2021-11-24

## 2021-11-24 NOTE — TELEPHONE ENCOUNTER
[facility] IS CALLING TO FIND OUT IF THE DOCTOR CAN SEND IN A PRESCRIPTION FOR THE PATIENTS OXYGEN SHE STATES THAT THE PRESCRIPTION NEEDS TO SAY [address]     CALL [phone]

## 2021-11-29 ENCOUNTER — TELEPHONE (OUTPATIENT)
Dept: INTERNAL MEDICINE | Facility: CLINIC | Age: 76
End: 2021-11-29

## 2021-11-29 NOTE — TELEPHONE ENCOUNTER
Spoke with pt informed that Dr. Ramos said Her oxygen levels dropped while she was sleeping so I would recommend that she continue the oxygen for another 2 months and then we'll order a recheck.    Pt. Verbalized understanding no further questions at this time.

## 2021-11-29 NOTE — TELEPHONE ENCOUNTER
Caller: Carol Jean    Relationship: Self    Best call back number: [phone]     Caller requesting test results: PATIENT     What test was performed: SLEEP STUDY     When was the test performed: ABOUT 10 DAYS AGO     Where was the test performed: AT HOME     Additional notes: PATIENT IS REQUESTING A CALLBACK WITH RESULTS AND TO BE NOTIFIED IF SHE MAY DISCONTINUE USE OF OXYGEN AT NIGHT.

## 2021-11-29 NOTE — TELEPHONE ENCOUNTER
Her oxygen levels dropped while she was sleeping so I would recommend that she continue the oxygen for another 2 months and then we'll order a recheck.

## 2021-12-15 RX ORDER — POTASSIUM CHLORIDE 750 MG/1
TABLET, FILM COATED, EXTENDED RELEASE ORAL
Qty: 90 TABLET | Refills: 1 | Status: SHIPPED | OUTPATIENT
Start: 2021-12-15 | End: 2022-06-20

## 2022-01-03 ENCOUNTER — TELEPHONE (OUTPATIENT)
Dept: INTERNAL MEDICINE | Facility: CLINIC | Age: 77
End: 2022-01-03

## 2022-01-03 DIAGNOSIS — R09.02 HYPOXEMIA: ICD-10-CM

## 2022-01-03 DIAGNOSIS — U07.1 PNEUMONIA DUE TO COVID-19 VIRUS: Primary | ICD-10-CM

## 2022-01-03 DIAGNOSIS — J12.82 PNEUMONIA DUE TO COVID-19 VIRUS: Primary | ICD-10-CM

## 2022-01-03 NOTE — TELEPHONE ENCOUNTER
S/W pt, informed that Dr Cramer is out of the office this week but will forward to him to address upon his return.  States she called and S/W Dr Cramer on his cell and is aware he is out of the office but that he told her to call office and have us send him message.  Expl will forward message.  Verb apprec.

## 2022-01-04 DIAGNOSIS — I10 ESSENTIAL HYPERTENSION: ICD-10-CM

## 2022-01-05 RX ORDER — CHLORTHALIDONE 25 MG/1
TABLET ORAL
Qty: 45 TABLET | Refills: 1 | Status: SHIPPED | OUTPATIENT
Start: 2022-01-05 | End: 2022-09-01

## 2022-01-05 NOTE — TELEPHONE ENCOUNTER
Reached pt at [phone] (M  Told her this was placed and clarified location-she did mean [facility] [address]    See referral for any further communications

## 2022-01-05 NOTE — TELEPHONE ENCOUNTER
Sees [facility] 1/2022  Lab Results   Component Value Date    GLUCOSE 100 (H) 10/05/2021    BUN 15 10/05/2021    CREATININE 1.14 (H) 10/05/2021    EGFRIFNONA 46 (L) 10/05/2021    EGFRIFAFRI 48 (L) 06/04/2020    BCR 13.2 10/05/2021    K 3.3 (L) 10/05/2021    CO2 29.5 (H) 10/05/2021    CALCIUM 9.6 10/05/2021    PROTENTOTREF 6.6 06/04/2020    ALBUMIN 3.50 10/05/2021    LABIL2 1.8 06/04/2020    AST 15 10/05/2021    ALT 7 10/05/2021

## 2022-01-18 DIAGNOSIS — K21.9 GASTROESOPHAGEAL REFLUX DISEASE: ICD-10-CM

## 2022-01-18 DIAGNOSIS — Z01.812 BLOOD TESTS PRIOR TO TREATMENT OR PROCEDURE: Primary | ICD-10-CM

## 2022-01-18 RX ORDER — PANTOPRAZOLE SODIUM 40 MG/1
TABLET, DELAYED RELEASE ORAL
Qty: 90 TABLET | Refills: 1 | Status: SHIPPED | OUTPATIENT
Start: 2022-01-18 | End: 2022-07-18

## 2022-01-18 NOTE — PROGRESS NOTES
"[facility] Cardiology    Patient ID: Carol Jean is a 76 y.o. female.  : 1945   Contact: [phone]    Encounter date: 2022    PCP: Flakito Chaudhary MD      Chief complaint:   Chief Complaint   Patient presents with   • History of DVT       Problem List:  1. Acute pulmonary embolism, bilateral, 2012.  a. Initiation of CPR per family.  b. EMS transport to [facility].  c. Restoration of rhythm and blood pressure at [facility].  d. “Shock” manifest by hypotension and multiorgan failure:  Transient hepatic dysfunction, resolved.  Transient nonoliguric ATN, resolved.  Transient metabolic acidosis, resolved.  Pressor support.  e. Bilateral EKOS catheter placement/treatment:  Vena cava filter placed.  f. “Normal coronary arteries” by angiography, 2012.  g. Recurrent DVT of RLE, 2012:  Hematology workup with subsequent Xarelto dosing (followed by Dr. Ashby)  2. Hypertension.  3. Upper GI bleed/gastritis related to lytic therapy and heparins, resolved:  a. “Hemorrhagic gastritis.”  4. Right “groin” bleeding, 10/12/2012.  a. Probably “venous” related to heparins.  b. Nonsurgical treatment, resolved.  5. History of nephrolithiasis, inactive.  6. Methicillin-resistant Staphylococcus aureus - tracheobronchitis, resolved.  7. Renal insufficiency, followed by NAL   8. Microscopic hematuria  a. Cystoscopy did not reveal source of bleeding, Xarelto was decreased to 10 mg daily (2020)  9. Surgeries:  a. Nephrolithiasis with lithotripsy.  b. Bilateral breast reduction.  c.  section x2.    Allergies   Allergen Reactions   • Erythromycin Swelling     \"tongue swelling\".   • Codeine Sulfate Swelling   • Meperidine Swelling   • Morphine And Related Swelling   • Penicillins Swelling     \"swelling\" at site.   • Sulfa Antibiotics Swelling   • Sulfadiazine Swelling       Current " Medications:    Current Outpatient Medications:   •  acetaminophen (TYLENOL) 325 MG tablet, Take 2 tablets by mouth Every 4 (Four) Hours As Needed for Mild Pain ., Disp: , Rfl:   •  chlorthalidone (HYGROTON) 25 MG tablet, Take 1/2 (one-half) tablet by mouth once daily, Disp: 45 tablet, Rfl: 1  •  Cholecalciferol (VITAMIN D-3) 5000 units tablet, Take 1 tablet by mouth Daily. OTC, Disp: , Rfl:   •  dilTIAZem (TIAZAC) 240 MG 24 hr capsule, Take 1 capsule by mouth Daily., Disp: 30 capsule, Rfl: 5  •  Glucosamine HCl (GLUCOSAMINE PO), Take 2 tablets by mouth Daily. As directed, OTC, Disp: , Rfl:   •  metoprolol tartrate (LOPRESSOR) 25 MG tablet, Take 25 mg by mouth 2 (Two) Times a Day., Disp: , Rfl:   •  pantoprazole (PROTONIX) 40 MG EC tablet, Take 1 tablet by mouth once daily, Disp: 90 tablet, Rfl: 1  •  potassium chloride 10 MEQ CR tablet, Take 1 tablet by mouth once daily, Disp: 90 tablet, Rfl: 1  •  rivaroxaban (XARELTO) 15 MG tablet, Take 1 tablet by mouth Daily., Disp: 30 tablet, Rfl: 11  •  sertraline (Zoloft) 50 MG tablet, Take 1 tablet by mouth Daily. (Patient taking differently: Take 50 mg by mouth Daily. Takes at night), Disp: 30 tablet, Rfl: 5  •  simvastatin (ZOCOR) 40 MG tablet, TAKE 1 TABLET BY MOUTH AT NIGHT, Disp: 90 tablet, Rfl: 3  •  vitamin C (ASCORBIC ACID) 500 MG tablet, Take 1,000 mg by mouth Daily. OTC, Disp: , Rfl:   •  Zinc 40 MG tablet, Take  by mouth Daily. OTC, Disp: , Rfl:     HPI    Carol Jean is a 76 y.o. female who was formally seen by Dr. Dill presents today for a 6 month follow up for history of PE/DVT and cardiac risk factors. Since last visit, the patient has been doing well overall from a cardiovascular standpoint. She wears oxygen 24/7 and is on 3 liters since her hospitalization for COVID-19 pneumonia in September 2021. Before this she did not require oxygen and saturation level normally was around 92-93%. She would like to be off oxygen during the day and has an  "appointment with Dr. Greenberg this Friday to discuss this. She's been on chlorthalidone for awhile and was initially started due to shortness of breath and edema but denies recent fluid retention.   Dose was reduced to 1/2 due to dizziness at last visit.  Patient denies chest pain, shortness of breath, palpitations, edema, dizziness, and syncope.       The following portions of the patient's history were reviewed and updated as appropriate: allergies, current medications and problem list.    Pertinent positives as listed in the HPI.  All other systems reviewed are negative.         Vitals:    01/19/22 1449   BP: 104/60   BP Location: Left arm   Patient Position: Sitting   Pulse: 75   SpO2: 98%   Weight: 70.3 kg (155 lb)   Height: 160 cm (63\")       Physical Exam:  General: Alert and oriented.  Neck: Jugular venous pressure is within normal limits. Carotids have normal upstrokes without bruits.   Cardiovascular: Heart has a nondisplaced focal PMI. Regular rate and rhythm. No murmur, gallop or rub.  Lungs: Clear, no rales or wheezes. Equal expansion is noted.   Extremities: No edema.  Skin: Warm and dry.  Neurologic: Nonfocal.     Diagnostic Data (reviewed with patient):  Lab Results   Component Value Date    GLUCOSE 100 (H) 10/05/2021    BUN 15 10/05/2021    CREATININE 1.14 (H) 10/05/2021    EGFRIFNONA 46 (L) 10/05/2021     10/05/2021    K 3.3 (L) 10/05/2021     10/05/2021    CO2 29.5 (H) 10/05/2021    CALCIUM 9.6 10/05/2021    ALBUMIN 3.50 10/05/2021    ALKPHOS 79 10/05/2021    AST 15 10/05/2021    ALT 7 10/05/2021     Lab Results   Component Value Date    CHOL 139 08/25/2021    TRIG 63 08/25/2021    HDL 64 (H) 08/25/2021    LDL 62 08/25/2021      Lab Results   Component Value Date    WBC 7.24 10/05/2021    RBC 3.99 10/05/2021    HGB 11.8 (L) 10/05/2021    HCT 36.9 10/05/2021    MCV 92.5 10/05/2021     (H) 10/05/2021      Lab Results   Component Value Date    TSH 2.170 02/24/2021    "     Procedures      Assessment:    ICD-10-CM ICD-9-CM   1. History of pulmonary embolism  Z86.711 V12.55   2. History of DVT (deep vein thrombosis)  Z86.718 V12.51   3. Essential hypertension  I10 401.9   4. Mixed hyperlipidemia  E78.2 272.2         Plan:  1. Encouraged patient to check her oxygen saturation level twice daily without NC O2 until her appointment with Dr. Greenberg and report measurements.   2. Patient can decrease chlorthalidone 12.5 mg from daily to MWF if she is not short of breath or retaining fluid.   3. Continue on Xarelto 15 mg daily for PE/DVT prophylaxis.  4. Continue on metoprolol 25 mg BID and diltiazem 240 mg daily for hypertension.   5. Continue on simvastatin 40 mg daily for hyperlipidemia.   6. Continue all other current medications.  7. F/up in 12 months, sooner if needed.    Scribed for Aisha Medina MD by Suzette Trent. 1/19/2022 15:00 EST    I Aisah Medina MD personally performed the services described in this documentation as scribed by the above individual in my presence, and it is both accurate and complete.    Aisha Medina MD, [facility]

## 2022-01-19 ENCOUNTER — OFFICE VISIT (OUTPATIENT)
Dept: CARDIOLOGY | Facility: CLINIC | Age: 77
End: 2022-01-19

## 2022-01-19 ENCOUNTER — CLINICAL SUPPORT NO REQUIREMENTS (OUTPATIENT)
Dept: PULMONOLOGY | Facility: CLINIC | Age: 77
End: 2022-01-19

## 2022-01-19 VITALS
SYSTOLIC BLOOD PRESSURE: 104 MMHG | HEIGHT: 63 IN | HEART RATE: 75 BPM | BODY MASS INDEX: 27.46 KG/M2 | DIASTOLIC BLOOD PRESSURE: 60 MMHG | WEIGHT: 155 LBS | OXYGEN SATURATION: 98 %

## 2022-01-19 DIAGNOSIS — Z86.711 HISTORY OF PULMONARY EMBOLISM: Primary | ICD-10-CM

## 2022-01-19 DIAGNOSIS — Z01.812 BLOOD TESTS PRIOR TO TREATMENT OR PROCEDURE: ICD-10-CM

## 2022-01-19 DIAGNOSIS — E78.2 MIXED HYPERLIPIDEMIA: ICD-10-CM

## 2022-01-19 DIAGNOSIS — Z86.718 HISTORY OF DVT (DEEP VEIN THROMBOSIS): ICD-10-CM

## 2022-01-19 DIAGNOSIS — I10 ESSENTIAL HYPERTENSION: ICD-10-CM

## 2022-01-19 PROCEDURE — 99214 OFFICE O/P EST MOD 30 MIN: CPT | Performed by: INTERNAL MEDICINE

## 2022-01-19 PROCEDURE — 99211 OFF/OP EST MAY X REQ PHY/QHP: CPT | Performed by: INTERNAL MEDICINE

## 2022-01-19 PROCEDURE — U0005 INFEC AGEN DETEC AMPLI PROBE: HCPCS | Performed by: INTERNAL MEDICINE

## 2022-01-19 PROCEDURE — U0004 COV-19 TEST NON-CDC HGH THRU: HCPCS | Performed by: INTERNAL MEDICINE

## 2022-01-20 LAB — SARS-COV-2 RNA NOSE QL NAA+PROBE: NOT DETECTED

## 2022-01-21 ENCOUNTER — OFFICE VISIT (OUTPATIENT)
Dept: PULMONOLOGY | Facility: CLINIC | Age: 77
End: 2022-01-21

## 2022-01-21 ENCOUNTER — APPOINTMENT (OUTPATIENT)
Dept: MAMMOGRAPHY | Facility: HOSPITAL | Age: 77
End: 2022-01-21

## 2022-01-21 VITALS
TEMPERATURE: 98 F | RESPIRATION RATE: 18 BRPM | BODY MASS INDEX: 26.93 KG/M2 | SYSTOLIC BLOOD PRESSURE: 136 MMHG | WEIGHT: 152 LBS | DIASTOLIC BLOOD PRESSURE: 86 MMHG | HEIGHT: 63 IN | OXYGEN SATURATION: 95 % | HEART RATE: 72 BPM

## 2022-01-21 DIAGNOSIS — U07.1 PNEUMONIA DUE TO COVID-19 VIRUS: ICD-10-CM

## 2022-01-21 DIAGNOSIS — Z86.718 HISTORY OF DVT (DEEP VEIN THROMBOSIS): ICD-10-CM

## 2022-01-21 DIAGNOSIS — J12.82 PNEUMONIA DUE TO COVID-19 VIRUS: ICD-10-CM

## 2022-01-21 DIAGNOSIS — Z79.01 CHRONIC ANTICOAGULATION: ICD-10-CM

## 2022-01-21 DIAGNOSIS — U07.1 ACUTE HYPOXEMIC RESPIRATORY FAILURE DUE TO COVID-19: Primary | ICD-10-CM

## 2022-01-21 DIAGNOSIS — J96.01 ACUTE HYPOXEMIC RESPIRATORY FAILURE DUE TO COVID-19: Primary | ICD-10-CM

## 2022-01-21 PROCEDURE — 94375 RESPIRATORY FLOW VOLUME LOOP: CPT | Performed by: INTERNAL MEDICINE

## 2022-01-21 PROCEDURE — 94726 PLETHYSMOGRAPHY LUNG VOLUMES: CPT | Performed by: INTERNAL MEDICINE

## 2022-01-21 PROCEDURE — 94729 DIFFUSING CAPACITY: CPT | Performed by: INTERNAL MEDICINE

## 2022-01-21 PROCEDURE — 99204 OFFICE O/P NEW MOD 45 MIN: CPT | Performed by: INTERNAL MEDICINE

## 2022-01-21 PROCEDURE — 94618 PULMONARY STRESS TESTING: CPT | Performed by: INTERNAL MEDICINE

## 2022-01-21 NOTE — PROGRESS NOTES
"  PULMONARY  NOTE    Chief Complaint     Acute hypoxic respiratory failure, COVID-19 pneumonia, history of recurrent DVT, chronic anticoagulation    History of Present Illness     76-year-old female referred for evaluation of hypoxic respiratory failure following COVID-19 pneumonia    She has no prior history of known chronic lung disease  She has been a non-smoker    Despite being vaccinated she experienced COVID-19 pneumonia in September 2021  This resulted in a hospitalization and she received Decadron and remdesivir  She was discharged home on supplemental oxygen  She has been on supplemental oxygen since that time    Overall she feels that she is significantly improved  She has no regular cough or sputum production  She is never had hemoptysis  She is trying to walk on a regular basis and does not feel winded or limited in her activities    She does have a history of DVT and PE  She had massive PE with shock in 2012  She got EKOS and IVC filter at that time  She failed Coumadin therapy with recurrent lower extremity DVT  She has been fully anticoagulated on Xarelto since that time  She has had no recurrent PTE.    Patient Active Problem List   Diagnosis   • History of pulmonary embolism   • Hypertension   • Acute pulmonary embolism (HCC)   • DVT (deep venous thrombosis) ([facility])   • Circadian rhythm sleep disorder, delayed sleep phase type   • Psychophysiological insomnia   • Hyperlipidemia   • Calculus of kidney   • Cobalamin deficiency   • History of DVT (deep vein thrombosis)   • Cytokine release syndrome, grade 2   • Acute hypoxemic respiratory failure due to COVID-19 (HCC)   • Pneumonia due to COVID-19 virus   • Chronic anticoagulation (Xarelto)     Allergies   Allergen Reactions   • Erythromycin Swelling     \"tongue swelling\".   • Codeine Sulfate Swelling   • Meperidine Swelling   • Morphine And Related Swelling   • Penicillins Swelling     \"swelling\" at site.   • Sulfa Antibiotics Swelling   • Sulfadiazine " Swelling       Current Outpatient Medications:   •  acetaminophen (TYLENOL) 325 MG tablet, Take 2 tablets by mouth Every 4 (Four) Hours As Needed for Mild Pain ., Disp: , Rfl:   •  chlorthalidone (HYGROTON) 25 MG tablet, Take 1/2 (one-half) tablet by mouth once daily, Disp: 45 tablet, Rfl: 1  •  Cholecalciferol (VITAMIN D-3) 5000 units tablet, Take 1 tablet by mouth Daily. OTC, Disp: , Rfl:   •  dilTIAZem (TIAZAC) 240 MG 24 hr capsule, Take 1 capsule by mouth Daily., Disp: 30 capsule, Rfl: 5  •  Glucosamine HCl (GLUCOSAMINE PO), Take 2 tablets by mouth Daily. As directed, OTC, Disp: , Rfl:   •  metoprolol tartrate (LOPRESSOR) 25 MG tablet, Take 25 mg by mouth 2 (Two) Times a Day., Disp: , Rfl:   •  pantoprazole (PROTONIX) 40 MG EC tablet, Take 1 tablet by mouth once daily, Disp: 90 tablet, Rfl: 1  •  potassium chloride 10 MEQ CR tablet, Take 1 tablet by mouth once daily, Disp: 90 tablet, Rfl: 1  •  rivaroxaban (XARELTO) 15 MG tablet, Take 1 tablet by mouth Daily., Disp: 30 tablet, Rfl: 11  •  sertraline (Zoloft) 50 MG tablet, Take 1 tablet by mouth Daily. (Patient taking differently: Take 50 mg by mouth Daily. Takes at night), Disp: 30 tablet, Rfl: 5  •  simvastatin (ZOCOR) 40 MG tablet, TAKE 1 TABLET BY MOUTH AT NIGHT, Disp: 90 tablet, Rfl: 3  •  vitamin C (ASCORBIC ACID) 500 MG tablet, Take 1,000 mg by mouth Daily. OTC, Disp: , Rfl:   •  Zinc 40 MG tablet, Take  by mouth Daily. OTC, Disp: , Rfl:   MEDICATION LIST AND ALLERGIES REVIEWED.    Family History   Problem Relation Age of Onset   • Stroke Mother    • Hypertension Mother    • Arthritis Mother    • Hyperlipidemia Mother    • Heart attack Father    • COPD Father    • Thyroid disease Father    • Heart disease Father    • Stroke Other    • Coronary artery disease Other    • COPD Other    • Stroke Other    • COPD Other    • Breast cancer Maternal Aunt    • Ovarian cancer Neg Hx      Social History     Tobacco Use   • Smoking status: Never Smoker   • Smokeless  "tobacco: Never Used   Substance Use Topics   • Alcohol use: Yes     Comment: occas   • Drug use: No     Social History     Social History Narrative        Non-smoker    Drinks about 2 alcoholic beverages on a weekly basis     FAMILY AND SOCIAL HISTORY REVIEWED.    Review of Systems  ALSO REFER TO SCANNED ROS SHEET FROM SAME DATE.    /86 (BP Location: Left arm, Patient Position: Sitting, Cuff Size: Adult)   Pulse 72   Temp 98 °F (36.7 °C)   Resp 18   Ht 160 cm (63\")   Wt 68.9 kg (152 lb)   LMP  (LMP Unknown)   SpO2 95%   PF (!) 3 L/min Comment: Pulse at resting  Breastfeeding No   BMI 26.93 kg/m²   Physical Exam  Vitals and nursing note reviewed.   Constitutional:       General: She is not in acute distress.     Appearance: She is well-developed. She is not diaphoretic.   HENT:      Head: Normocephalic and atraumatic.   Neck:      Thyroid: No thyromegaly.   Cardiovascular:      Rate and Rhythm: Normal rate and regular rhythm.      Heart sounds: Normal heart sounds. No murmur heard.      Pulmonary:      Effort: Pulmonary effort is normal.      Breath sounds: Normal breath sounds. No stridor.   Chest:   Breasts:      Right: No supraclavicular adenopathy.      Left: No supraclavicular adenopathy.       Lymphadenopathy:      Cervical: No cervical adenopathy.      Upper Body:      Right upper body: No supraclavicular or epitrochlear adenopathy.      Left upper body: No supraclavicular or epitrochlear adenopathy.   Skin:     General: Skin is warm and dry.   Neurological:      Mental Status: She is alert and oriented to person, place, and time.   Psychiatric:         Behavior: Behavior normal.         Results     PFTs reveal no airway obstruction, no restriction, and a normal diffusion capacity    Chest x-ray reveals improvement in bilateral interstitial infiltrates    Exercise pulse oximetry failed to show exercise-induced desaturation    Immunization History   Administered Date(s) Administered   • " COVID-19 (PFIZER) PURPLE CAP 01/25/2021, 02/15/2021, 12/09/2021   • Flu Vaccine Quad PF >36MO 12/21/2021   • FluMist 2-49yrs 01/15/2014   • Pneumococcal Conjugate 13-Valent (PCV13) 12/05/2019     Problem List       ICD-10-CM ICD-9-CM   1. Pneumonia due to COVID-19 virus  U07.1 480.8    J12.82 079.89   2. Acute hypoxemic respiratory failure due to COVID-19 (HCC)  U07.1 518.81    J96.01 079.89     799.02   3. History of DVT (deep vein thrombosis)  Z86.718 V12.51   4. Chronic anticoagulation (Xarelto)  Z79.01 V58.61       Discussion     Clinically she is improved  Oxygenation appears improved, as well  Chest x-ray is improved although not completely back to normal    She does not need supplemental oxygen during the day  We will check 2 nights of nocturnal pulse oximetry, again.    Hopefully we get rid of her supplemental oxygen    Encouraged efforts at regular exercise which she is already pursuing    I will plan to see her back in about a month    Moderate level of Medical Decision Making complexity based on 1 undiagnosed new problem, independent interpretation of tests, and/or prescription drug management     Edmund Greenberg MD  Note electronically signed    CC: Flakito Chaudhary MD

## 2022-01-22 NOTE — PROGRESS NOTES
DATE OF VISIT: 1/22/2022    REASON FOR VISIT:   1. DVT while on therapeutic Coumadin 11/23/2012.   2. Pulmonary embolism 09/30/2012.     HISTORY OF PRESENT ILLNESS: The patient is a very pleasant 76 y.o.   female with past medical history significant for massive pulmonary  embolism September 2012 requiring cardiac resuscitation. The patient was placed  on chronic anticoagulation with Coumadin since, however, she presented with  acute DVT of the right lower extremity while on Coumadin 11/23/2012. The  patient was switched to Lovenox 100 micrograms subcu daily since. The patient  was switched from Lovenox to Xarelto 20 mg p.o. daily on 03/15/2013 secondary  to this would be more convenient to the patient in avoiding the daily  injections. The patient is here today in scheduled follow-up visit.     SUBJECTIVE: The patient is here today by herself.  She has been compliant with her treatment.  She any fever chills night the sweats.  She is staying active.  She had bad Covid pneumonia back in August 2021 and she has been recovering slowly since then.    REVIEW OF SYSTEMS: The other 9 systems reviewed by me and negative except as  mentioned in HPI.     PAST MEDICAL HISTORY/SOCIAL HISTORY/FAMILY HISTORY: Unchanged from my prior  documentation done on 11/24/2012.       Current Outpatient Medications:   •  acetaminophen (TYLENOL) 325 MG tablet, Take 2 tablets by mouth Every 4 (Four) Hours As Needed for Mild Pain ., Disp: , Rfl:   •  chlorthalidone (HYGROTON) 25 MG tablet, Take 1/2 (one-half) tablet by mouth once daily, Disp: 45 tablet, Rfl: 1  •  Cholecalciferol (VITAMIN D-3) 5000 units tablet, Take 1 tablet by mouth Daily. OTC, Disp: , Rfl:   •  dilTIAZem (TIAZAC) 240 MG 24 hr capsule, Take 1 capsule by mouth Daily., Disp: 30 capsule, Rfl: 5  •  Glucosamine HCl (GLUCOSAMINE PO), Take 2 tablets by mouth Daily. As directed, OTC, Disp: , Rfl:   •  metoprolol tartrate (LOPRESSOR) 25 MG tablet, Take 25 mg by mouth 2  (Two) Times a Day., Disp: , Rfl:   •  pantoprazole (PROTONIX) 40 MG EC tablet, Take 1 tablet by mouth once daily, Disp: 90 tablet, Rfl: 1  •  potassium chloride 10 MEQ CR tablet, Take 1 tablet by mouth once daily, Disp: 90 tablet, Rfl: 1  •  rivaroxaban (XARELTO) 15 MG tablet, Take 1 tablet by mouth Daily., Disp: 30 tablet, Rfl: 11  •  sertraline (Zoloft) 50 MG tablet, Take 1 tablet by mouth Daily. (Patient taking differently: Take 50 mg by mouth Daily. Takes at night), Disp: 30 tablet, Rfl: 5  •  simvastatin (ZOCOR) 40 MG tablet, TAKE 1 TABLET BY MOUTH AT NIGHT, Disp: 90 tablet, Rfl: 3  •  vitamin C (ASCORBIC ACID) 500 MG tablet, Take 1,000 mg by mouth Daily. OTC, Disp: , Rfl:   •  Zinc 40 MG tablet, Take  by mouth Daily. OTC, Disp: , Rfl:     PHYSICAL EXAMINATION:   LMP  (LMP Unknown)     ECOG1  GENERAL: Age appropriate. No acute distress.   HEENT: Head atraumatic, normocephalic.   NECK: Supple. No JVD. No lymphadenopathy.   LUNGS: Clear to auscultation bilaterally. No wheezing. No rhonchi.   HEART: Regular rate and rhythm. S1, S2, no murmurs.   ABDOMEN: Soft, nontender, nondistended. Bowel sounds positive. No  hepatosplenomegaly.   EXTREMITIES: No clubbing, cyanosis, or edema.   SKIN: No rashes. No purpura.   NEUROLOGIC: Awake and oriented x3. Strength 5 out of 5 in all muscle groups.     Clinical Support No Requirements on 01/19/2022   Component Date Value Ref Range Status   • SARS-CoV-2 NUNO 01/19/2022 Not Detected  Not Detected Final        ASSESSMENT: The patient is a pleasant 76 y.o. female with DVT/PE.    PROBLEM LIST:  1. Acute right lower extremity DVT while on therapeutic Coumadin 11/23/2012.   2. Pulmonary embolism with cardiac arrest 09/30/2012.   3.  Hypertension  4.  Hypercholesterolemia  5.  Heartburn  6.  Arthritis    PLAN:   1. We will continue Xarelto however the patient could not afford Xarelto 10 mg daily.  She will stay on this indefinitely secondary to massive PEs that were  unprovoked.   2. I reviewed the blood work results from today I reassured the patient CBC is normal.  3. We will see the patient back in 12 months with repeat labs including CBC and CMP.   4. She will continue Lopressor for hypertension, as well as follow up with Dr. Dill yearly for cardiology care.   5.  We'll continue Zocor 20 g daily for hypercholesterolemia  6.  We'll continue Protonix 40 mg daily for heartburn  7. I recommend to stop Xarelto 2 days prior to surgical procedures and resume as soon as she is cleared by her surgeon.  Amanda Ashby MD  1/22/2022

## 2022-01-24 ENCOUNTER — OFFICE VISIT (OUTPATIENT)
Dept: ONCOLOGY | Facility: CLINIC | Age: 77
End: 2022-01-24

## 2022-01-24 ENCOUNTER — LAB (OUTPATIENT)
Dept: LAB | Facility: HOSPITAL | Age: 77
End: 2022-01-24

## 2022-01-24 VITALS
HEART RATE: 78 BPM | WEIGHT: 154 LBS | TEMPERATURE: 97.8 F | OXYGEN SATURATION: 91 % | SYSTOLIC BLOOD PRESSURE: 131 MMHG | BODY MASS INDEX: 27.29 KG/M2 | RESPIRATION RATE: 18 BRPM | HEIGHT: 63 IN | DIASTOLIC BLOOD PRESSURE: 59 MMHG

## 2022-01-24 DIAGNOSIS — I26.99 OTHER ACUTE PULMONARY EMBOLISM WITHOUT ACUTE COR PULMONALE: ICD-10-CM

## 2022-01-24 DIAGNOSIS — I82.4Y9 DEEP VEIN THROMBOSIS (DVT) OF PROXIMAL LOWER EXTREMITY, UNSPECIFIED CHRONICITY, UNSPECIFIED LATERALITY: Primary | ICD-10-CM

## 2022-01-24 LAB
ALBUMIN SERPL-MCNC: 4.2 G/DL (ref 3.5–5.2)
ALBUMIN/GLOB SERPL: 1.6 G/DL
ALP SERPL-CCNC: 94 U/L (ref 39–117)
ALT SERPL W P-5'-P-CCNC: 9 U/L (ref 1–33)
ANION GAP SERPL CALCULATED.3IONS-SCNC: 10 MMOL/L (ref 5–15)
AST SERPL-CCNC: 17 U/L (ref 1–32)
BILIRUB SERPL-MCNC: 0.4 MG/DL (ref 0–1.2)
BUN SERPL-MCNC: 21 MG/DL (ref 8–23)
BUN/CREAT SERPL: 17.1 (ref 7–25)
CALCIUM SPEC-SCNC: 9.6 MG/DL (ref 8.6–10.5)
CHLORIDE SERPL-SCNC: 104 MMOL/L (ref 98–107)
CO2 SERPL-SCNC: 31 MMOL/L (ref 22–29)
CREAT SERPL-MCNC: 1.23 MG/DL (ref 0.57–1)
ERYTHROCYTE [DISTWIDTH] IN BLOOD BY AUTOMATED COUNT: 12.9 % (ref 12.3–15.4)
GFR SERPL CREATININE-BSD FRML MDRD: 42 ML/MIN/1.73
GLOBULIN UR ELPH-MCNC: 2.7 GM/DL
GLUCOSE SERPL-MCNC: 98 MG/DL (ref 65–99)
HCT VFR BLD AUTO: 41.3 % (ref 34–46.6)
HGB BLD-MCNC: 13.6 G/DL (ref 12–15.9)
LYMPHOCYTES # BLD AUTO: 2.3 10*3/MM3 (ref 0.7–3.1)
LYMPHOCYTES NFR BLD AUTO: 31.6 % (ref 19.6–45.3)
MCH RBC QN AUTO: 29.8 PG (ref 26.6–33)
MCHC RBC AUTO-ENTMCNC: 33 G/DL (ref 31.5–35.7)
MCV RBC AUTO: 90.4 FL (ref 79–97)
MONOCYTES # BLD AUTO: 0.5 10*3/MM3 (ref 0.1–0.9)
MONOCYTES NFR BLD AUTO: 6.9 % (ref 5–12)
NEUTROPHILS NFR BLD AUTO: 4.6 10*3/MM3 (ref 1.7–7)
NEUTROPHILS NFR BLD AUTO: 61.5 % (ref 42.7–76)
PLATELET # BLD AUTO: 305 10*3/MM3 (ref 140–450)
PMV BLD AUTO: 7.4 FL (ref 6–12)
POTASSIUM SERPL-SCNC: 3.8 MMOL/L (ref 3.5–5.2)
PROT SERPL-MCNC: 6.9 G/DL (ref 6–8.5)
RBC # BLD AUTO: 4.57 10*6/MM3 (ref 3.77–5.28)
SODIUM SERPL-SCNC: 145 MMOL/L (ref 136–145)
WBC NRBC COR # BLD: 7.4 10*3/MM3 (ref 3.4–10.8)

## 2022-01-24 PROCEDURE — 99214 OFFICE O/P EST MOD 30 MIN: CPT | Performed by: INTERNAL MEDICINE

## 2022-01-24 PROCEDURE — 36415 COLL VENOUS BLD VENIPUNCTURE: CPT

## 2022-01-24 PROCEDURE — 85025 COMPLETE CBC W/AUTO DIFF WBC: CPT

## 2022-01-24 PROCEDURE — 80053 COMPREHEN METABOLIC PANEL: CPT

## 2022-01-26 ENCOUNTER — TELEPHONE (OUTPATIENT)
Dept: ONCOLOGY | Facility: CLINIC | Age: 77
End: 2022-01-26

## 2022-01-26 NOTE — TELEPHONE ENCOUNTER
Called and let patient know that I called her pharmacy to see if we can get her xarelto any cheaper.  I tried 10mg as well and cost was double the 15mg daily dose.  I spoke with Dr. Ashby who advised to keep her on the 15mg daily of xarelto and let patient know we tried.  She asked if he had told her she could do the 15mg dose every other day and I clarified with Dr. Ashby who advised that no she has to take it daily.  She verbalized understanding.  She said once she meets her deductible that generally her cost will go down.

## 2022-02-17 DIAGNOSIS — F32.9 REACTIVE DEPRESSION: ICD-10-CM

## 2022-02-21 ENCOUNTER — OFFICE VISIT (OUTPATIENT)
Dept: INTERNAL MEDICINE | Facility: CLINIC | Age: 77
End: 2022-02-21

## 2022-02-21 VITALS
WEIGHT: 154 LBS | SYSTOLIC BLOOD PRESSURE: 116 MMHG | BODY MASS INDEX: 27.29 KG/M2 | HEART RATE: 78 BPM | TEMPERATURE: 97.6 F | RESPIRATION RATE: 17 BRPM | DIASTOLIC BLOOD PRESSURE: 66 MMHG

## 2022-02-21 DIAGNOSIS — I10 ESSENTIAL HYPERTENSION: ICD-10-CM

## 2022-02-21 DIAGNOSIS — J12.82 PNEUMONIA DUE TO COVID-19 VIRUS: Primary | ICD-10-CM

## 2022-02-21 DIAGNOSIS — U07.1 PNEUMONIA DUE TO COVID-19 VIRUS: Primary | ICD-10-CM

## 2022-02-21 DIAGNOSIS — E78.5 HYPERLIPIDEMIA, UNSPECIFIED HYPERLIPIDEMIA TYPE: ICD-10-CM

## 2022-02-21 DIAGNOSIS — K21.9 GASTROESOPHAGEAL REFLUX DISEASE WITHOUT ESOPHAGITIS: ICD-10-CM

## 2022-02-21 PROCEDURE — 99214 OFFICE O/P EST MOD 30 MIN: CPT | Performed by: INTERNAL MEDICINE

## 2022-02-22 ENCOUNTER — TELEPHONE (OUTPATIENT)
Dept: INTERNAL MEDICINE | Facility: CLINIC | Age: 77
End: 2022-02-22

## 2022-02-22 DIAGNOSIS — I10 ESSENTIAL HYPERTENSION: ICD-10-CM

## 2022-02-22 RX ORDER — DILTIAZEM HYDROCHLORIDE 240 MG/1
CAPSULE, EXTENDED RELEASE ORAL
Qty: 30 CAPSULE | Refills: 5 | Status: SHIPPED | OUTPATIENT
Start: 2022-02-22 | End: 2022-07-05 | Stop reason: SDUPTHER

## 2022-02-22 NOTE — PROGRESS NOTES
Chief Complaint   Patient presents with   • Essential hypertension       History of Present Illness    The patient presents for a follow-up related to pneumonia. She states that she completed her antibiotics. She denies a persistent cough. The patient denies a fever. The patient also denies nausea, vomiting, diarrhea, headaches, chills, rashes, sore throat, chest pain, shortness of breath, wheezing or congestion. She continues nocturnal home oxygen.    The patient presents for a follow-up related to hypertension. The patient reports that she has had no edema, syncope, blurred vision or palpitations. She states that she is taking her medication as prescribed. She is not having medication side effects.    The patient presents for a follow-up related to GERD. The patient is on pantoprazole for her gastroesophageal reflux. The medication is taken on a regular basis and gives complete relief of the symptoms. She reports no abdominal pain, belching, dysphagia, early satiety, heartburn, hoarseness, odynophagia, rectal bleeding or weight loss. The GERD has no known aggravating factors.    The patient presents for a follow-up related to hyperlipidemia. She is following a low fat diet. She reports that she is exercising. She is taking simvastatin. The patient is taking her medication as prescribed. She reports no medication side effects, including muscle cramps, abdominal pain, headaches or weakness. She denies orthopnea, paroxysmal nocturnal dyspnea or dyspnea on exertion.    Review of Systems    CONSTITUTIONAL- Denies Sweats, Fatigue, Weakness or Malaise.    HENT- Denies Nasal Discharge, Ear Pain, Ear Drainage, Sinus Pain, Decreased Hearing or Tinnitus.    PULMONARY- Denies Sputum Production, Hemoptysis or Pleuritic Chest Pain.    GASTROINTESTINAL- Denies Constipation.    CARDIOVASCULAR- Denies Claudication or Irregular Heart Beat.    Medications      Current Outpatient Medications:   •  acetaminophen (TYLENOL) 325 MG  "tablet, Take 2 tablets by mouth Every 4 (Four) Hours As Needed for Mild Pain ., Disp: , Rfl:   •  chlorthalidone (HYGROTON) 25 MG tablet, Take 1/2 (one-half) tablet by mouth once daily, Disp: 45 tablet, Rfl: 1  •  Cholecalciferol (VITAMIN D-3) 5000 units tablet, Take 1 tablet by mouth Daily. OTC, Disp: , Rfl:   •  dilTIAZem (TIAZAC) 240 MG 24 hr capsule, Take 1 capsule by mouth Daily., Disp: 30 capsule, Rfl: 5  •  Glucosamine HCl (GLUCOSAMINE PO), Take 2 tablets by mouth Daily. As directed, OTC, Disp: , Rfl:   •  metoprolol tartrate (LOPRESSOR) 25 MG tablet, Take 25 mg by mouth 2 (Two) Times a Day., Disp: , Rfl:   •  pantoprazole (PROTONIX) 40 MG EC tablet, Take 1 tablet by mouth once daily, Disp: 90 tablet, Rfl: 1  •  potassium chloride 10 MEQ CR tablet, Take 1 tablet by mouth once daily, Disp: 90 tablet, Rfl: 1  •  rivaroxaban (XARELTO) 15 MG tablet, Take 1 tablet by mouth Daily., Disp: 30 tablet, Rfl: 11  •  sertraline (ZOLOFT) 50 MG tablet, Take 1 tablet by mouth once daily, Disp: 30 tablet, Rfl: 0  •  simvastatin (ZOCOR) 40 MG tablet, TAKE 1 TABLET BY MOUTH AT NIGHT, Disp: 90 tablet, Rfl: 3  •  vitamin C (ASCORBIC ACID) 500 MG tablet, Take 1,000 mg by mouth Daily. OTC, Disp: , Rfl:   •  Zinc 40 MG tablet, Take  by mouth Daily. OTC, Disp: , Rfl:      Allergies    Allergies   Allergen Reactions   • Erythromycin Swelling     \"tongue swelling\".   • Codeine Sulfate Swelling   • Meperidine Swelling   • Morphine And Related Swelling   • Penicillins Swelling     \"swelling\" at site.   • Sulfa Antibiotics Swelling   • Sulfadiazine Swelling       Problem List    Patient Active Problem List   Diagnosis   • History of pulmonary embolism   • Hypertension   • Acute pulmonary embolism (HCC)   • DVT (deep venous thrombosis) (HCC)   • Circadian rhythm sleep disorder, delayed sleep phase type   • Psychophysiological insomnia   • Hyperlipidemia   • Calculus of kidney   • Cobalamin deficiency   • History of DVT (deep vein " thrombosis)   • Cytokine release syndrome, grade 2   • Acute hypoxemic respiratory failure due to COVID-19 (HCC)   • Pneumonia due to COVID-19 virus   • Chronic anticoagulation (Xarelto)       Medications, Allergies, Problems List and Past History were reviewed and updated.    Physical Examination    /66   Pulse 78   Temp 97.6 °F (36.4 °C)   Resp 17   Wt 69.9 kg (154 lb)   LMP  (LMP Unknown)   BMI 27.29 kg/m²     HEENT: Head- Normocephalic Atraumatic. Facies- Within normal limits. Pinnas- Normal texture and shape bilaterally. Canals- Normal bilaterally. TMs- Normal bilaterally. Nares- Patent bilaterally. Nasal Septum- is normal. There is no tenderness to palpation over the frontal or maxillary sinuses. Lids- Normal bilaterally. Conjunctiva- Clear bilaterally. Sclera- Anicteric bilaterally. Oropharynx- Moist with no lesions. Tonsils- No enlargement, erythema or exudate.    Neck: Thyroid- non enlarged, symmetric and has no nodules. No bruits are detected. ROM- Normal Range of Motion with no rigidity.    Lungs: Auscultation- Clear to auscultation bilaterally. There are no retractions, clubbing or cyanosis. The Expiratory to Inspiratory ratio is equal.    Lymph Nodes: Cervical- no enlarged lymph nodes noted.    Cardiovascular: There are no carotid bruits. Heart- Normal Rate with Regular rhythm and no murmurs. There are no gallops. There are no rubs. In the lower extremities there is no edema. The upper extremities do not have edema.    Abdomen: Soft, benign, non-tender with no masses, hernias, organomegaly or scars.    Impression and Assessment    Pneumonia due to Covid 19.    Essential Hypertension.    Gastroesophageal Reflux Disease.    Hyperlipidemia.    Plan    Gastroesophageal Reflux Disease Plan: The patient was instructed to continue the current medications.    Essential Hypertension Plan: The patient was instructed to continue the current medications.    Hyperlipidemia Plan: The patient was  instructed to exercise daily, eat a low fat diet and continue her medications.  Medication side effects reviewed.    Pneumonia due to Covid 19 Plan: She will follow-up with pulmonology. Continue nocturnal oxygen.    Diagnoses and all orders for this visit:    1. Pneumonia due to COVID-19 virus (Primary)    2. Essential hypertension    3. Gastroesophageal reflux disease without esophagitis    4. Hyperlipidemia, unspecified hyperlipidemia type        Return to Office    The patient was instructed to return for follow-up in 3 months. The patient was instructed to return sooner if the condition changes, worsens, or does not resolve.

## 2022-02-22 NOTE — TELEPHONE ENCOUNTER
Caller: Carol Jean    Relationship to patient: Self    Best call back number: [phone]    Patient is needing: PATIENT WAS JUST SEEN ON 02-21-22 AND HAS MEDICARE WELLNESS VISIT ON 03-01-22 AND WANTED TO KNOW IF IT WAS NECESSARY TO COME IN FOR THIS APPOINTMENT SINCE SHE CAME ON 02-21-22    PLEASE ADVISE

## 2022-02-22 NOTE — TELEPHONE ENCOUNTER
Attempted to call x3 with no answer and reached recording that states call cannot be completed at this time.

## 2022-02-23 NOTE — TELEPHONE ENCOUNTER
S/W pt, states Dr Cramer said to just schedule the appt in 3 months as that is when he needs to see her back.  States she is already scheduled for 5/23 at 8am with Dr Cramer.  Expl that will work fine.

## 2022-03-18 ENCOUNTER — OFFICE VISIT (OUTPATIENT)
Dept: PULMONOLOGY | Facility: CLINIC | Age: 77
End: 2022-03-18

## 2022-03-18 VITALS
HEART RATE: 59 BPM | WEIGHT: 154.5 LBS | RESPIRATION RATE: 16 BRPM | SYSTOLIC BLOOD PRESSURE: 126 MMHG | DIASTOLIC BLOOD PRESSURE: 70 MMHG | HEIGHT: 63 IN | OXYGEN SATURATION: 96 % | BODY MASS INDEX: 27.38 KG/M2 | TEMPERATURE: 97.7 F

## 2022-03-18 DIAGNOSIS — U07.1 PNEUMONIA DUE TO COVID-19 VIRUS: ICD-10-CM

## 2022-03-18 DIAGNOSIS — Z79.01 CHRONIC ANTICOAGULATION: ICD-10-CM

## 2022-03-18 DIAGNOSIS — U07.1 ACUTE HYPOXEMIC RESPIRATORY FAILURE DUE TO COVID-19: Primary | ICD-10-CM

## 2022-03-18 DIAGNOSIS — J12.82 PNEUMONIA DUE TO COVID-19 VIRUS: ICD-10-CM

## 2022-03-18 DIAGNOSIS — J96.01 ACUTE HYPOXEMIC RESPIRATORY FAILURE DUE TO COVID-19: Primary | ICD-10-CM

## 2022-03-18 PROCEDURE — 99214 OFFICE O/P EST MOD 30 MIN: CPT | Performed by: NURSE PRACTITIONER

## 2022-03-18 NOTE — PROGRESS NOTES
"[facility] Pulmonary Follow up      Chief Complaint  Acute Hypoxemic Respiratory Failue due to COVID-19 (F/u)    Subjective          Carol Jean presents to [facility] for follow-up after her visit with Dr. Greenberg.  She had COVID-19 in August and was quite ill at that time.  She had some persistent chest x-ray changes as well as shortness of breath and hypoxemia.    On her visit in February she no longer required oxygen with activity, and her 6-minute walk test did not show any evidence of desaturation.  She was still wearing her oxygen at night for some nocturnal hypoxemia, her overnight in February showed she was 88% or less around 50 minutes.    She had worn it nightly until the last 2 weeks, for 2 weeks she has not worn it at all.  She was having quite a bit of difficulty tolerating it, keeping it on, and resting well.  Since she stopped wearing it she is sleeping better.  She is waking well rested.  She has no headaches, fatigue in the morning, or any other issues.    She is wondering if she could go ahead and discontinue her oxygen at night.        Objective     Vital Signs:   /70 (BP Location: Left arm, Patient Position: Sitting, Cuff Size: Adult)   Pulse 59   Temp 97.7 °F (36.5 °C)   Resp 16   Ht 160 cm (62.99\")   Wt 70.1 kg (154 lb 8 oz)   SpO2 96% Comment: room air at rest  BMI 27.38 kg/m²       Immunization History   Administered Date(s) Administered   • COVID-19 (PFIZER) PURPLE CAP 01/25/2021, 02/15/2021, 12/09/2021   • Flu Vaccine Quad PF >36MO 12/21/2021   • FluMist 2-49yrs 01/15/2014   • Pneumococcal Conjugate 13-Valent (PCV13) 12/05/2019            Result Review :                Physical Exam  Vitals reviewed.   Constitutional:       Appearance: She is well-developed.   HENT:      Head: Normocephalic and atraumatic.   Eyes:      Pupils: Pupils are equal, round, and reactive to light.   Cardiovascular:      Rate and Rhythm: Normal " rate and regular rhythm.      Heart sounds: No murmur heard.  Pulmonary:      Effort: Pulmonary effort is normal. No respiratory distress.      Breath sounds: Normal breath sounds. No wheezing or rales.   Abdominal:      General: Bowel sounds are normal. There is no distension.      Palpations: Abdomen is soft.   Musculoskeletal:         General: Normal range of motion.      Cervical back: Normal range of motion and neck supple.   Skin:     General: Skin is warm and dry.      Findings: No erythema.   Neurological:      Mental Status: She is alert and oriented to person, place, and time.   Psychiatric:         Behavior: Behavior normal.                  Assessment and Plan    Problem List Items Addressed This Visit        Coag and Thromboembolic    Chronic anticoagulation (Xarelto)       Other    Acute hypoxemic respiratory failure due to COVID-19 (HCC) - Primary    Pneumonia due to COVID-19 virus          At this time it does appear she could stop her oxygen at night.  She rested very well the last 2 weeks without it.  She said no evidence of poor sleep quality or nocturnal hypoxemia.  We did discuss if any of her issues return, she will call back for reevaluation.    We did discuss following up with Dr. Greenberg in 1 more time with a chest x-ray, her last chest x-ray still showed some mild interstitial changes.    Follow Up     No follow-ups on file.  Patient was given instructions and counseling regarding her condition or for health maintenance advice. Please see specific information pulled into the AVS if appropriate.         Moderate level of Medical Decision Making complexity based on 2 or more chronic stable illnesses and prescription drug management.    CHANTELL Wood, ACNP  [facility]  Electronically signed

## 2022-03-21 ENCOUNTER — HOSPITAL ENCOUNTER (OUTPATIENT)
Dept: MAMMOGRAPHY | Facility: HOSPITAL | Age: 77
Discharge: HOME | End: 2022-03-21
Admitting: INTERNAL MEDICINE

## 2022-03-21 DIAGNOSIS — Z12.31 VISIT FOR SCREENING MAMMOGRAM: ICD-10-CM

## 2022-03-21 PROCEDURE — 77067 SCR MAMMO BI INCL CAD: CPT | Performed by: RADIOLOGY

## 2022-03-21 PROCEDURE — 77063 BREAST TOMOSYNTHESIS BI: CPT | Performed by: RADIOLOGY

## 2022-03-21 PROCEDURE — 77067 SCR MAMMO BI INCL CAD: CPT

## 2022-03-21 PROCEDURE — 77063 BREAST TOMOSYNTHESIS BI: CPT

## 2022-03-22 DIAGNOSIS — F32.9 REACTIVE DEPRESSION: ICD-10-CM

## 2022-05-23 ENCOUNTER — LAB (OUTPATIENT)
Dept: LAB | Facility: HOSPITAL | Age: 77
End: 2022-05-23

## 2022-05-23 ENCOUNTER — OFFICE VISIT (OUTPATIENT)
Dept: INTERNAL MEDICINE | Facility: CLINIC | Age: 77
End: 2022-05-23

## 2022-05-23 VITALS
RESPIRATION RATE: 16 BRPM | SYSTOLIC BLOOD PRESSURE: 118 MMHG | HEIGHT: 62 IN | TEMPERATURE: 98.6 F | DIASTOLIC BLOOD PRESSURE: 58 MMHG | BODY MASS INDEX: 28.52 KG/M2 | WEIGHT: 155 LBS | HEART RATE: 68 BPM

## 2022-05-23 DIAGNOSIS — E78.5 HYPERLIPIDEMIA, UNSPECIFIED HYPERLIPIDEMIA TYPE: ICD-10-CM

## 2022-05-23 DIAGNOSIS — K21.9 GASTROESOPHAGEAL REFLUX DISEASE WITHOUT ESOPHAGITIS: ICD-10-CM

## 2022-05-23 DIAGNOSIS — F32.9 REACTIVE DEPRESSION: ICD-10-CM

## 2022-05-23 DIAGNOSIS — R31.9 HEMATURIA, UNSPECIFIED TYPE: ICD-10-CM

## 2022-05-23 DIAGNOSIS — Z00.00 MEDICARE ANNUAL WELLNESS VISIT, SUBSEQUENT: Primary | ICD-10-CM

## 2022-05-23 DIAGNOSIS — Z23 IMMUNIZATION DUE: ICD-10-CM

## 2022-05-23 DIAGNOSIS — I10 ESSENTIAL HYPERTENSION: ICD-10-CM

## 2022-05-23 DIAGNOSIS — R82.998 LEUKOCYTES IN URINE: ICD-10-CM

## 2022-05-23 LAB
ALBUMIN SERPL-MCNC: 4.5 G/DL (ref 3.5–5.2)
ALBUMIN/GLOB SERPL: 1.8 G/DL
ALP SERPL-CCNC: 119 U/L (ref 39–117)
ALT SERPL W P-5'-P-CCNC: 10 U/L (ref 1–33)
ANION GAP SERPL CALCULATED.3IONS-SCNC: 13.4 MMOL/L (ref 5–15)
AST SERPL-CCNC: 19 U/L (ref 1–32)
BACTERIA UR QL AUTO: ABNORMAL /HPF
BASOPHILS # BLD AUTO: 0.04 10*3/MM3 (ref 0–0.2)
BASOPHILS NFR BLD AUTO: 0.6 % (ref 0–1.5)
BILIRUB BLD-MCNC: NEGATIVE MG/DL
BILIRUB SERPL-MCNC: 0.5 MG/DL (ref 0–1.2)
BUN SERPL-MCNC: 20 MG/DL (ref 8–23)
BUN/CREAT SERPL: 16.5 (ref 7–25)
CALCIUM SPEC-SCNC: 9.4 MG/DL (ref 8.6–10.5)
CHLORIDE SERPL-SCNC: 101 MMOL/L (ref 98–107)
CHOLEST SERPL-MCNC: 169 MG/DL (ref 0–200)
CLARITY, POC: ABNORMAL
CO2 SERPL-SCNC: 28.6 MMOL/L (ref 22–29)
COLOR UR: YELLOW
CREAT SERPL-MCNC: 1.21 MG/DL (ref 0.57–1)
DEPRECATED RDW RBC AUTO: 43.7 FL (ref 37–54)
EGFRCR SERPLBLD CKD-EPI 2021: 46.5 ML/MIN/1.73
EOSINOPHIL # BLD AUTO: 0.2 10*3/MM3 (ref 0–0.4)
EOSINOPHIL NFR BLD AUTO: 3 % (ref 0.3–6.2)
ERYTHROCYTE [DISTWIDTH] IN BLOOD BY AUTOMATED COUNT: 12.6 % (ref 12.3–15.4)
EXPIRATION DATE: ABNORMAL
GLOBULIN UR ELPH-MCNC: 2.5 GM/DL
GLUCOSE SERPL-MCNC: 97 MG/DL (ref 65–99)
GLUCOSE UR STRIP-MCNC: NEGATIVE MG/DL
HCT VFR BLD AUTO: 47.3 % (ref 34–46.6)
HDLC SERPL-MCNC: 71 MG/DL (ref 40–60)
HGB BLD-MCNC: 14.9 G/DL (ref 12–15.9)
HYALINE CASTS UR QL AUTO: ABNORMAL /LPF
IMM GRANULOCYTES # BLD AUTO: 0.02 10*3/MM3 (ref 0–0.05)
IMM GRANULOCYTES NFR BLD AUTO: 0.3 % (ref 0–0.5)
KETONES UR QL: NEGATIVE
LDLC SERPL CALC-MCNC: 80 MG/DL (ref 0–100)
LDLC/HDLC SERPL: 1.1 {RATIO}
LEUKOCYTE EST, POC: ABNORMAL
LYMPHOCYTES # BLD AUTO: 2.26 10*3/MM3 (ref 0.7–3.1)
LYMPHOCYTES NFR BLD AUTO: 33.9 % (ref 19.6–45.3)
Lab: ABNORMAL
MCH RBC QN AUTO: 29.6 PG (ref 26.6–33)
MCHC RBC AUTO-ENTMCNC: 31.5 G/DL (ref 31.5–35.7)
MCV RBC AUTO: 94 FL (ref 79–97)
MONOCYTES # BLD AUTO: 0.62 10*3/MM3 (ref 0.1–0.9)
MONOCYTES NFR BLD AUTO: 9.3 % (ref 5–12)
NEUTROPHILS NFR BLD AUTO: 3.53 10*3/MM3 (ref 1.7–7)
NEUTROPHILS NFR BLD AUTO: 52.9 % (ref 42.7–76)
NITRITE UR-MCNC: NEGATIVE MG/ML
NRBC BLD AUTO-RTO: 0 /100 WBC (ref 0–0.2)
PH UR: 6 [PH] (ref 5–8)
PLATELET # BLD AUTO: 334 10*3/MM3 (ref 140–450)
PMV BLD AUTO: 9.8 FL (ref 6–12)
POTASSIUM SERPL-SCNC: 3.6 MMOL/L (ref 3.5–5.2)
PROT SERPL-MCNC: 7 G/DL (ref 6–8.5)
PROT UR STRIP-MCNC: NEGATIVE MG/DL
RBC # BLD AUTO: 5.03 10*6/MM3 (ref 3.77–5.28)
RBC # UR STRIP: ABNORMAL /HPF
RBC # UR STRIP: ABNORMAL /UL
REF LAB TEST METHOD: ABNORMAL
SODIUM SERPL-SCNC: 143 MMOL/L (ref 136–145)
SP GR UR: 1.01 (ref 1–1.03)
SQUAMOUS #/AREA URNS HPF: ABNORMAL /HPF
TRIGL SERPL-MCNC: 98 MG/DL (ref 0–150)
TSH SERPL DL<=0.05 MIU/L-ACNC: 2.86 UIU/ML (ref 0.27–4.2)
UROBILINOGEN UR QL: NORMAL
VLDLC SERPL-MCNC: 18 MG/DL (ref 5–40)
WBC # UR STRIP: ABNORMAL /HPF
WBC NRBC COR # BLD: 6.67 10*3/MM3 (ref 3.4–10.8)

## 2022-05-23 PROCEDURE — 87086 URINE CULTURE/COLONY COUNT: CPT | Performed by: INTERNAL MEDICINE

## 2022-05-23 PROCEDURE — 85025 COMPLETE CBC W/AUTO DIFF WBC: CPT | Performed by: INTERNAL MEDICINE

## 2022-05-23 PROCEDURE — 80061 LIPID PANEL: CPT | Performed by: INTERNAL MEDICINE

## 2022-05-23 PROCEDURE — 90677 PCV20 VACCINE IM: CPT | Performed by: INTERNAL MEDICINE

## 2022-05-23 PROCEDURE — G0009 ADMIN PNEUMOCOCCAL VACCINE: HCPCS | Performed by: INTERNAL MEDICINE

## 2022-05-23 PROCEDURE — 81003 URINALYSIS AUTO W/O SCOPE: CPT | Performed by: INTERNAL MEDICINE

## 2022-05-23 PROCEDURE — 84443 ASSAY THYROID STIM HORMONE: CPT | Performed by: INTERNAL MEDICINE

## 2022-05-23 PROCEDURE — G0439 PPPS, SUBSEQ VISIT: HCPCS | Performed by: INTERNAL MEDICINE

## 2022-05-23 PROCEDURE — 81015 MICROSCOPIC EXAM OF URINE: CPT | Performed by: INTERNAL MEDICINE

## 2022-05-23 PROCEDURE — 1159F MED LIST DOCD IN RCRD: CPT | Performed by: INTERNAL MEDICINE

## 2022-05-23 PROCEDURE — 1170F FXNL STATUS ASSESSED: CPT | Performed by: INTERNAL MEDICINE

## 2022-05-23 PROCEDURE — 80053 COMPREHEN METABOLIC PANEL: CPT | Performed by: INTERNAL MEDICINE

## 2022-05-23 NOTE — PROGRESS NOTES
The ABCs of the Annual Wellness Visit  Subsequent Medicare Wellness Visit    Chief Complaint   Patient presents with   • Medicare Wellness-subsequent      Subjective    History of Present Illness:  Carol Jean is a 76 y.o. female who presents for a Subsequent Medicare Wellness Visit.    The following portions of the patient's history were reviewed and   updated as appropriate: allergies, current medications, past family history, past medical history, past social history, past surgical history and problem list.    Compared to one year ago, the patient feels her physical   health is the same.    Compared to one year ago, the patient feels her mental   health is the same.    Recent Hospitalizations:  This patient has had a [facility] admission record on file within the last 365 days.    Current Medical Providers:  Patient Care Team:  Flakito Chaudhary MD as PCP - General (Internal Medicine)  Noe Moreno MD as Consulting Physician (Nephrology)  Aisha Medina MD as Consulting Physician (Cardiology)  Amanda Ashby MD as Consulting Physician (Hematology and Oncology)    Outpatient Medications Prior to Visit   Medication Sig Dispense Refill   • acetaminophen (TYLENOL) 325 MG tablet Take 2 tablets by mouth Every 4 (Four) Hours As Needed for Mild Pain .     • chlorthalidone (HYGROTON) 25 MG tablet Take 1/2 (one-half) tablet by mouth once daily 45 tablet 1   • Cholecalciferol (VITAMIN D-3) 5000 units tablet Take 1 tablet by mouth Daily. OTC     • dilTIAZem (TIAZAC) 240 MG 24 hr capsule Take 1 capsule by mouth once daily 30 capsule 5   • Glucosamine HCl (GLUCOSAMINE PO) Take 2 tablets by mouth Daily. As directed, OTC     • metoprolol tartrate (LOPRESSOR) 25 MG tablet Take 25 mg by mouth 2 (Two) Times a Day.     • pantoprazole (PROTONIX) 40 MG EC tablet Take 1 tablet by mouth once daily 90 tablet 1   • potassium chloride 10 MEQ CR tablet Take 1 tablet by mouth once daily 90 tablet 1   •  "rivaroxaban (XARELTO) 15 MG tablet Take 1 tablet by mouth Daily. 30 tablet 11   • sertraline (ZOLOFT) 50 MG tablet Take 1 tablet by mouth once daily 90 tablet 1   • simvastatin (ZOCOR) 40 MG tablet TAKE 1 TABLET BY MOUTH AT NIGHT 90 tablet 3   • vitamin C (ASCORBIC ACID) 500 MG tablet Take 1,000 mg by mouth Daily. OTC     • Zinc 40 MG tablet Take  by mouth Daily. OTC       No facility-administered medications prior to visit.       No opioid medication identified on active medication list. I have reviewed chart for other potential  high risk medication/s and harmful drug interactions in the elderly.          Aspirin is not on active medication list.  Aspirin use is not indicated based on review of current medical condition/s. Risk of harm outweighs potential benefits.  .    Patient Active Problem List   Diagnosis   • History of pulmonary embolism   • Hypertension   • Acute pulmonary embolism (HCC)   • DVT (deep venous thrombosis) (HCC)   • Circadian rhythm sleep disorder, delayed sleep phase type   • Psychophysiological insomnia   • Hyperlipidemia   • Calculus of kidney   • Cobalamin deficiency   • History of DVT (deep vein thrombosis)   • Cytokine release syndrome, grade 2   • Acute hypoxemic respiratory failure due to COVID-19 (HCC)   • Pneumonia due to COVID-19 virus   • Chronic anticoagulation (Xarelto)     Advance Care Planning  Advance Directive is on file.  ACP discussion was held with the patient during this visit. Patient has an advance directive in EMR which is still valid.           Objective    Vitals:    05/23/22 0804   BP: 118/58   BP Location: Left arm   Patient Position: Sitting   Cuff Size: Adult   Pulse: 68   Resp: 16   Temp: 98.6 °F (37 °C)   TempSrc: Infrared   Weight: 70.3 kg (155 lb)   Height: 157.5 cm (62\")   PainSc: 0-No pain     BMI is >= 25 and < 30. (Overweight) The following options were offered after discussion: exercise counseling/recommendations and nutrition " counseling/recommendations  Does the patient have evidence of cognitive impairment? No    Physical Exam       Finger Rub Hearing{Test (right ear):passed  Finger Rub Hearing{Test (left ear):passed          HEALTH RISK ASSESSMENT    Smoking Status:  Social History     Tobacco Use   Smoking Status Never Smoker   Smokeless Tobacco Never Used     Alcohol Consumption:  Social History     Substance and Sexual Activity   Alcohol Use Yes   • Alcohol/week: 2.0 standard drinks   • Types: 2 Glasses of wine per week    Comment: occas     Fall Risk Screen:    QUENTIN Fall Risk Assessment was completed, and patient is at LOW risk for falls.Assessment completed on:5/23/2022    Depression Screening:  PHQ-2/PHQ-9 Depression Screening 5/23/2022   Retired PHQ-9 Total Score -   Retired Total Score -   Little Interest or Pleasure in Doing Things 0-->not at all   Feeling Down, Depressed or Hopeless 0-->not at all   PHQ-9: Brief Depression Severity Measure Score 0       Health Habits and Functional and Cognitive Screening:  Functional & Cognitive Status 5/23/2022   Do you have difficulty preparing food and eating? No   Do you have difficulty bathing yourself, getting dressed or grooming yourself? No   Do you have difficulty using the toilet? No   Do you have difficulty moving around from place to place? No   Do you have trouble with steps or getting out of a bed or a chair? Yes   Current Diet Well Balanced Diet   Dental Exam Up to date   Eye Exam Up to date   Exercise (times per week) 6 times per week   Current Exercises Include Walking   Current Exercise Activities Include -   Do you need help using the phone?  No   Are you deaf or do you have serious difficulty hearing?  No   Do you need help with transportation? No   Do you need help shopping? No   Do you need help preparing meals?  No   Do you need help with housework?  No   Do you need help with laundry? No   Do you need help taking your medications? No   Do you need help managing  money? No   Do you ever drive or ride in a car without wearing a seat belt? No   Have you felt unusual stress, anger or loneliness in the last month? No   Who do you live with? Spouse   If you need help, do you have trouble finding someone available to you? No   Have you been bothered in the last four weeks by sexual problems? No   Do you have difficulty concentrating, remembering or making decisions? Yes       Age-appropriate Screening Schedule:  Refer to the list below for future screening recommendations based on patient's age, sex and/or medical conditions. Orders for these recommended tests are listed in the plan section. The patient has been provided with a written plan.    Health Maintenance   Topic Date Due   • TDAP/TD VACCINES (1 - Tdap) Never done   • ZOSTER VACCINE (1 of 2) Never done   • INFLUENZA VACCINE  08/01/2022   • LIPID PANEL  08/25/2022   • DXA SCAN  05/13/2023   • MAMMOGRAM  03/21/2024              Assessment & Plan   CMS Preventative Services Quick Reference  Risk Factors Identified During Encounter  Immunizations Discussed/Encouraged (specific Immunizations; Prevnar 20 (Pneumococcal 20-valent conjugate), Shingrix and COVID19  Obesity/Overweight   The above risks/problems have been discussed with the patient.  Follow up actions/plans if indicated are seen below in the Assessment/Plan Section.  Pertinent information has been shared with the patient in the After Visit Summary.    Diagnoses and all orders for this visit:    1. Medicare annual wellness visit, subsequent (Primary)    2. Essential hypertension  -     CBC & Differential; Future  -     Comprehensive Metabolic Panel; Future  -     TSH; Future  -     POC Urinalysis Dipstick, Automated; Future    3. Gastroesophageal reflux disease without esophagitis  -     CBC & Differential; Future    4. Hyperlipidemia, unspecified hyperlipidemia type  -     Comprehensive Metabolic Panel; Future  -     Lipid Panel; Future    5. Reactive depression    6.  Immunization due  -     Pneumococcal Conjugate Vaccine 20-Valent All

## 2022-05-23 NOTE — PROGRESS NOTES
Chief Complaint   Patient presents with   • Medicare Wellness-subsequent       History of Present Illness      The patient presents for a follow-up related to hypertension. The patient reports that she has had no headaches, chest pain, dyspnea, edema, syncope, blurred vision or palpitations. She states that she is taking her medication as prescribed. She is not having medication side effects.    The patient presents for a follow-up related to GERD. The patient is on pantoprazole for her gastroesophageal reflux. The medication is taken on a regular basis and gives complete relief of the symptoms. She reports no abdominal pain, belching, diarrhea, dysphagia, early satiety, heartburn, hoarseness, nausea, odynophagia, rectal bleeding, vomiting or weight loss. The GERD has no known aggravating factors.    The patient presents for a follow-up related to hyperlipidemia. She is following a low fat diet. She reports that she is exercising. She is taking simvastatin. The patient is taking her medication as prescribed. She reports no medication side effects, including muscle cramps, abdominal pain, headaches or weakness. She denies orthopnea, paroxysmal nocturnal dyspnea or dyspnea on exertion.    The patient presents for a follow-up related to depression. She denies currently having depression symptoms. She denies suicidal ideation. Her energy level is good. She denies agorophobia. She sleeps well. She is not tearful. She states that her current depression symptoms are stable. She is currently taking a medication. The current medication regimen consists of sertraline. The patient denies having medication side effects including nausea, headaches, anxiety, increased depression or fatigue.    Review of Systems    CONSTITUTIONAL- Denies Fever, Chills, Sweats, Weakness or Malaise.    HENT- Denies Nasal Discharge, Sore Throat, Ear Pain, Ear Drainage, Sinus Pain, Nasal Congestion, Decreased Hearing or Tinnitus.    GASTROINTESTINAL-  "Denies Constipation.    PULMONARY- Denies Wheezing, Sputum Production, Cough, Hemoptysis or Pleuritic Chest Pain.    CARDIOVASCULAR- Denies Claudication or Irregular Heart Beat.    Medications      Current Outpatient Medications:   •  acetaminophen (TYLENOL) 325 MG tablet, Take 2 tablets by mouth Every 4 (Four) Hours As Needed for Mild Pain ., Disp: , Rfl:   •  chlorthalidone (HYGROTON) 25 MG tablet, Take 1/2 (one-half) tablet by mouth once daily, Disp: 45 tablet, Rfl: 1  •  Cholecalciferol (VITAMIN D-3) 5000 units tablet, Take 1 tablet by mouth Daily. OTC, Disp: , Rfl:   •  dilTIAZem (TIAZAC) 240 MG 24 hr capsule, Take 1 capsule by mouth once daily, Disp: 30 capsule, Rfl: 5  •  Glucosamine HCl (GLUCOSAMINE PO), Take 2 tablets by mouth Daily. As directed, OTC, Disp: , Rfl:   •  metoprolol tartrate (LOPRESSOR) 25 MG tablet, Take 25 mg by mouth 2 (Two) Times a Day., Disp: , Rfl:   •  pantoprazole (PROTONIX) 40 MG EC tablet, Take 1 tablet by mouth once daily, Disp: 90 tablet, Rfl: 1  •  potassium chloride 10 MEQ CR tablet, Take 1 tablet by mouth once daily, Disp: 90 tablet, Rfl: 1  •  rivaroxaban (XARELTO) 15 MG tablet, Take 1 tablet by mouth Daily., Disp: 30 tablet, Rfl: 11  •  sertraline (ZOLOFT) 50 MG tablet, Take 1 tablet by mouth once daily, Disp: 90 tablet, Rfl: 1  •  simvastatin (ZOCOR) 40 MG tablet, TAKE 1 TABLET BY MOUTH AT NIGHT, Disp: 90 tablet, Rfl: 3  •  vitamin C (ASCORBIC ACID) 500 MG tablet, Take 1,000 mg by mouth Daily. OTC, Disp: , Rfl:   •  Zinc 40 MG tablet, Take  by mouth Daily. OTC, Disp: , Rfl:      Allergies    Allergies   Allergen Reactions   • Erythromycin Swelling     \"tongue swelling\".   • Codeine Sulfate Swelling   • Meperidine Swelling   • Morphine And Related Swelling   • Penicillins Swelling     \"swelling\" at site.   • Sulfa Antibiotics Swelling   • Sulfadiazine Swelling       Problem List    Patient Active Problem List   Diagnosis   • History of pulmonary embolism   • Hypertension   • " "Acute pulmonary embolism (HCC)   • DVT (deep venous thrombosis) (HCC)   • Circadian rhythm sleep disorder, delayed sleep phase type   • Psychophysiological insomnia   • Hyperlipidemia   • Calculus of kidney   • Cobalamin deficiency   • History of DVT (deep vein thrombosis)   • Cytokine release syndrome, grade 2   • Acute hypoxemic respiratory failure due to COVID-19 (HCC)   • Pneumonia due to COVID-19 virus   • Chronic anticoagulation (Xarelto)       Medications, Allergies, Problems List and Past History were reviewed and updated.    Physical Examination    /58 (BP Location: Left arm, Patient Position: Sitting, Cuff Size: Adult)   Pulse 68   Temp 98.6 °F (37 °C) (Infrared)   Resp 16   Ht 157.5 cm (62\")   Wt 70.3 kg (155 lb)   LMP  (LMP Unknown)   Breastfeeding No   BMI 28.35 kg/m²     HEENT: Head- Normocephalic Atraumatic. Facies- Within normal limits. Pinnas- Normal texture and shape bilaterally. Canals- Normal bilaterally. TMs- Normal bilaterally. Nares- Patent bilaterally. Nasal Septum- is normal. There is no tenderness to palpation over the frontal or maxillary sinuses. Lids- Normal bilaterally. Conjunctiva- Clear bilaterally. Sclera- Anicteric bilaterally. Oropharynx- Moist with no lesions. Tonsils- No enlargement, erythema or exudate.    Neck: Thyroid- non enlarged, symmetric and has no nodules. No bruits are detected. ROM- Normal Range of Motion with no rigidity.    Lungs: Auscultation- Clear to auscultation bilaterally. There are no retractions, clubbing or cyanosis. The Expiratory to Inspiratory ratio is equal.    Lymph Nodes: Cervical- no enlarged lymph nodes noted. Clavicular- no enlarged supraclavicular lymph nodes noted. Axillary- no enlarged axillary lymph nodes noted. Inguinal- no enlarged inguinal lymph nodes noted.    Cardiovascular: There are no carotid bruits. Heart- Normal Rate with Regular rhythm and no murmurs. There are no gallops. There are no rubs. In the lower extremities " there is no edema. The upper extremities do not have edema.    Abdomen: Soft, benign, non-tender with no masses, hernias, organomegaly or scars.    Breast: Normal contours bilaterally with no masses, discharge, skin changes or lumps. There are no scars noted.    Impression and Assessment    Essential Hypertension.    Gastroesophageal Reflux Disease.    Hyperlipidemia.    Reactive Depression.    Plan    Gastroesophageal Reflux Disease Plan: The patient was instructed to continue the current medications.    Essential Hypertension Plan: The patient was instructed to continue the current medications.    Hyperlipidemia Plan: The patient was instructed to exercise daily, eat a low fat diet and continue her medications.    Reactive Depression Plan: The patient was instructed to continue the current medications.    Diagnoses and all orders for this visit:    1. Medicare annual wellness visit, subsequent (Primary)    2. Essential hypertension  -     CBC & Differential; Future  -     Comprehensive Metabolic Panel; Future  -     TSH; Future  -     POC Urinalysis Dipstick, Automated; Future    3. Gastroesophageal reflux disease without esophagitis  -     CBC & Differential; Future    4. Hyperlipidemia, unspecified hyperlipidemia type  -     Comprehensive Metabolic Panel; Future  -     Lipid Panel; Future    5. Reactive depression    6. Immunization due  -     Pneumococcal Conjugate Vaccine 20-Valent All          Return to Office    The patient was instructed to return for follow-up in 6 months. The patient was instructed to return sooner if the condition changes, worsens, or does not resolve.

## 2022-05-24 LAB — BACTERIA SPEC AEROBE CULT: NORMAL

## 2022-05-26 ENCOUNTER — OFFICE VISIT (OUTPATIENT)
Dept: PULMONOLOGY | Facility: CLINIC | Age: 77
End: 2022-05-26

## 2022-05-26 VITALS
OXYGEN SATURATION: 95 % | RESPIRATION RATE: 16 BRPM | TEMPERATURE: 98 F | SYSTOLIC BLOOD PRESSURE: 108 MMHG | WEIGHT: 154 LBS | HEIGHT: 62 IN | DIASTOLIC BLOOD PRESSURE: 76 MMHG | HEART RATE: 66 BPM | BODY MASS INDEX: 28.34 KG/M2

## 2022-05-26 DIAGNOSIS — J96.01 ACUTE HYPOXEMIC RESPIRATORY FAILURE DUE TO COVID-19: ICD-10-CM

## 2022-05-26 DIAGNOSIS — U07.1 ACUTE HYPOXEMIC RESPIRATORY FAILURE DUE TO COVID-19: ICD-10-CM

## 2022-05-26 DIAGNOSIS — J12.82 PNEUMONIA DUE TO COVID-19 VIRUS: Primary | ICD-10-CM

## 2022-05-26 DIAGNOSIS — Z86.718 HISTORY OF DVT (DEEP VEIN THROMBOSIS): ICD-10-CM

## 2022-05-26 DIAGNOSIS — Z86.711 HISTORY OF PULMONARY EMBOLISM: ICD-10-CM

## 2022-05-26 DIAGNOSIS — I26.99 OTHER ACUTE PULMONARY EMBOLISM WITHOUT ACUTE COR PULMONALE: ICD-10-CM

## 2022-05-26 DIAGNOSIS — Z79.01 CHRONIC ANTICOAGULATION: ICD-10-CM

## 2022-05-26 DIAGNOSIS — U07.1 PNEUMONIA DUE TO COVID-19 VIRUS: Primary | ICD-10-CM

## 2022-05-26 PROCEDURE — 99213 OFFICE O/P EST LOW 20 MIN: CPT | Performed by: INTERNAL MEDICINE

## 2022-05-26 NOTE — PROGRESS NOTES
"  PULMONARY  NOTE    Chief Complaint     Acute hypoxic respiratory failure, COVID-19 pneumonia, history of recurrent DVT, chronic anticoagulation    History of Present Illness     76-year-old female returns today for follow-up  I initially saw her on 1/21/2022    She is a non-smoker with no prior history of known lung disease    She was vaccinated when she experienced COVID-19 pneumonia in September 2021  She was hospitalized and required Decadron and remdesivir  She required supplemental oxygen on discharge    She has a past history of DVT and PE  She had a massive PE with shock in 2012  She received EKOS and an IVC filter at that time  She failed Coumadin therapy with recurrent lower extremity DVT  She remains fully anticoagulated on Xarelto  She has had no recurrent PTE on Xarelto    Since I saw her she feels that she is much better  She has been able to be weaned from supplemental oxygen both during the day and at night  She does not feel limited by dyspnea    Patient Active Problem List   Diagnosis   • History of pulmonary embolism   • Hypertension   • Acute pulmonary embolism (HCC)   • DVT (deep venous thrombosis) ([facility])   • Circadian rhythm sleep disorder, delayed sleep phase type   • Psychophysiological insomnia   • Hyperlipidemia   • Calculus of kidney   • Cobalamin deficiency   • History of DVT (deep vein thrombosis)   • Cytokine release syndrome, grade 2   • Acute hypoxemic respiratory failure due to COVID-19 (HCC)   • Pneumonia due to COVID-19 virus   • Chronic anticoagulation (Xarelto)     Allergies   Allergen Reactions   • Erythromycin Swelling     \"tongue swelling\".   • Codeine Sulfate Swelling   • Meperidine Swelling   • Morphine And Related Swelling   • Penicillins Swelling     \"swelling\" at site.   • Sulfa Antibiotics Swelling   • Sulfadiazine Swelling       Current Outpatient Medications:   •  acetaminophen (TYLENOL) 325 MG tablet, Take 2 tablets by mouth Every 4 (Four) Hours As Needed for Mild " Pain ., Disp: , Rfl:   •  chlorthalidone (HYGROTON) 25 MG tablet, Take 1/2 (one-half) tablet by mouth once daily, Disp: 45 tablet, Rfl: 1  •  Cholecalciferol (VITAMIN D-3) 5000 units tablet, Take 1 tablet by mouth Daily. OTC, Disp: , Rfl:   •  dilTIAZem (TIAZAC) 240 MG 24 hr capsule, Take 1 capsule by mouth once daily, Disp: 30 capsule, Rfl: 5  •  Glucosamine HCl (GLUCOSAMINE PO), Take 2 tablets by mouth Daily. As directed, OTC, Disp: , Rfl:   •  metoprolol tartrate (LOPRESSOR) 25 MG tablet, Take 25 mg by mouth 2 (Two) Times a Day., Disp: , Rfl:   •  pantoprazole (PROTONIX) 40 MG EC tablet, Take 1 tablet by mouth once daily, Disp: 90 tablet, Rfl: 1  •  potassium chloride 10 MEQ CR tablet, Take 1 tablet by mouth once daily, Disp: 90 tablet, Rfl: 1  •  rivaroxaban (XARELTO) 15 MG tablet, Take 1 tablet by mouth Daily., Disp: 30 tablet, Rfl: 11  •  sertraline (ZOLOFT) 50 MG tablet, Take 1 tablet by mouth once daily, Disp: 90 tablet, Rfl: 1  •  simvastatin (ZOCOR) 40 MG tablet, TAKE 1 TABLET BY MOUTH AT NIGHT, Disp: 90 tablet, Rfl: 3  •  vitamin C (ASCORBIC ACID) 500 MG tablet, Take 1,000 mg by mouth Daily. OTC, Disp: , Rfl:   •  Zinc 40 MG tablet, Take  by mouth Daily. OTC, Disp: , Rfl:   MEDICATION LIST AND ALLERGIES REVIEWED.    Family History   Problem Relation Age of Onset   • Stroke Mother    • Hypertension Mother    • Arthritis Mother    • Hyperlipidemia Mother    • Heart attack Father    • COPD Father    • Thyroid disease Father    • Heart disease Father    • Stroke Other    • Coronary artery disease Other    • COPD Other    • Stroke Other    • COPD Other    • Breast cancer Maternal Aunt    • Ovarian cancer Neg Hx      Social History     Tobacco Use   • Smoking status: Never Smoker   • Smokeless tobacco: Never Used   Substance Use Topics   • Alcohol use: Yes     Alcohol/week: 2.0 standard drinks     Types: 2 Glasses of wine per week     Comment: occas   • Drug use: No     Social History     Social History  "Narrative        Non-smoker    Drinks about 2 alcoholic beverages on a weekly basis     FAMILY AND SOCIAL HISTORY REVIEWED.    Review of Systems  ALSO REFER TO SCANNED ROS SHEET FROM SAME DATE.    /76 (BP Location: Left arm, Patient Position: Sitting, Cuff Size: Adult)   Pulse 66   Temp 98 °F (36.7 °C)   Resp 16   Ht 157.5 cm (62\")   Wt 69.9 kg (154 lb)   LMP  (LMP Unknown)   SpO2 95% Comment: room air at rest  BMI 28.17 kg/m²   Physical Exam  Vitals and nursing note reviewed.   Constitutional:       General: She is not in acute distress.     Appearance: She is well-developed. She is not diaphoretic.   HENT:      Head: Normocephalic and atraumatic.   Neck:      Thyroid: No thyromegaly.   Cardiovascular:      Rate and Rhythm: Normal rate and regular rhythm.      Heart sounds: Normal heart sounds. No murmur heard.  Pulmonary:      Effort: Pulmonary effort is normal.      Breath sounds: Normal breath sounds. No stridor.   Chest:   Breasts:      Right: No supraclavicular adenopathy.      Left: No supraclavicular adenopathy.       Abdominal:      General: Bowel sounds are normal.   Lymphadenopathy:      Cervical: No cervical adenopathy.      Upper Body:      Right upper body: No supraclavicular or epitrochlear adenopathy.      Left upper body: No supraclavicular or epitrochlear adenopathy.   Skin:     General: Skin is warm and dry.   Neurological:      Mental Status: She is alert and oriented to person, place, and time.   Psychiatric:         Behavior: Behavior normal.         Results     Chest x-ray today is clear without effusions infiltrates or consolidation  Improved in comparison to prior chest x-ray    Immunization History   Administered Date(s) Administered   • COVID-19 (PFIZER) PURPLE CAP 01/25/2021, 02/15/2021, 12/09/2021   • Flu Vaccine Quad PF >36MO 12/21/2021   • FluMist 2-49yrs 01/15/2014   • Pneumococcal Conjugate 13-Valent (PCV13) 12/05/2019   • Pneumococcal Conjugate 20-Valent (PCV20) " 05/23/2022     Problem List       ICD-10-CM ICD-9-CM   1. Pneumonia due to COVID-19 virus  U07.1 480.8    J12.82 079.89   2. Acute hypoxemic respiratory failure due to COVID-19 (HCC)  U07.1 518.81    J96.01 079.89     799.02   3. Other acute pulmonary embolism without acute cor pulmonale (HCC)  I26.99 415.19   4. Chronic anticoagulation (Xarelto)  Z79.01 V58.61   5. History of DVT (deep vein thrombosis)  Z86.718 V12.51   6. History of pulmonary embolism  Z86.711 V12.55       Discussion     She is improved from the standpoint of her COVID-19 pneumonia and pulmonary embolism  She is no longer requiring supplemental oxygen  Her chest x-ray is now clear    She remains fully anticoagulated on Xarelto  She continues to follow-up with Apolonia Ashby and Carol    I will plan to see her back on an as-needed basis    Edmund Greenberg MD  Note electronically signed    CC: Flakito Chaudhary MD

## 2022-06-06 RX ORDER — RIVAROXABAN 15 MG/1
TABLET, FILM COATED ORAL
Qty: 90 TABLET | Refills: 2 | Status: SHIPPED | OUTPATIENT
Start: 2022-06-06 | End: 2023-02-20

## 2022-06-20 RX ORDER — POTASSIUM CHLORIDE 750 MG/1
TABLET, FILM COATED, EXTENDED RELEASE ORAL
Qty: 90 TABLET | Refills: 1 | Status: SHIPPED | OUTPATIENT
Start: 2022-06-20 | End: 2022-11-28

## 2022-07-05 DIAGNOSIS — I10 ESSENTIAL HYPERTENSION: ICD-10-CM

## 2022-07-05 RX ORDER — DILTIAZEM HYDROCHLORIDE 240 MG/1
240 CAPSULE, EXTENDED RELEASE ORAL DAILY
Qty: 30 CAPSULE | Refills: 0 | Status: SHIPPED | OUTPATIENT
Start: 2022-07-05 | End: 2022-09-06

## 2022-07-18 DIAGNOSIS — K21.9 GASTROESOPHAGEAL REFLUX DISEASE: ICD-10-CM

## 2022-07-18 RX ORDER — PANTOPRAZOLE SODIUM 40 MG/1
TABLET, DELAYED RELEASE ORAL
Qty: 90 TABLET | Refills: 1 | Status: SHIPPED | OUTPATIENT
Start: 2022-07-18 | End: 2023-01-09

## 2022-08-30 DIAGNOSIS — I10 ESSENTIAL HYPERTENSION: ICD-10-CM

## 2022-09-01 DIAGNOSIS — I10 ESSENTIAL HYPERTENSION: ICD-10-CM

## 2022-09-01 DIAGNOSIS — E78.2 MIXED HYPERLIPIDEMIA: ICD-10-CM

## 2022-09-01 DIAGNOSIS — F32.9 REACTIVE DEPRESSION: ICD-10-CM

## 2022-09-01 RX ORDER — CHLORTHALIDONE 25 MG/1
TABLET ORAL
Qty: 45 TABLET | Refills: 0 | Status: SHIPPED | OUTPATIENT
Start: 2022-09-01 | End: 2022-09-02

## 2022-09-02 RX ORDER — CHLORTHALIDONE 25 MG/1
TABLET ORAL
Qty: 45 TABLET | Refills: 2 | Status: SHIPPED | OUTPATIENT
Start: 2022-09-02

## 2022-09-02 RX ORDER — SIMVASTATIN 40 MG
TABLET ORAL
Qty: 90 TABLET | Refills: 2 | Status: SHIPPED | OUTPATIENT
Start: 2022-09-02

## 2022-09-03 DIAGNOSIS — I10 ESSENTIAL HYPERTENSION: ICD-10-CM

## 2022-09-06 RX ORDER — DILTIAZEM HYDROCHLORIDE 240 MG/1
CAPSULE, EXTENDED RELEASE ORAL
Qty: 30 CAPSULE | Refills: 3 | Status: SHIPPED | OUTPATIENT
Start: 2022-09-06 | End: 2022-12-27 | Stop reason: SDUPTHER

## 2022-11-15 ENCOUNTER — LAB (OUTPATIENT)
Dept: LAB | Facility: HOSPITAL | Age: 77
End: 2022-11-15

## 2022-11-15 ENCOUNTER — OFFICE VISIT (OUTPATIENT)
Dept: INTERNAL MEDICINE | Facility: CLINIC | Age: 77
End: 2022-11-15

## 2022-11-15 VITALS
TEMPERATURE: 97.3 F | RESPIRATION RATE: 16 BRPM | BODY MASS INDEX: 29.08 KG/M2 | HEART RATE: 64 BPM | DIASTOLIC BLOOD PRESSURE: 66 MMHG | WEIGHT: 159 LBS | SYSTOLIC BLOOD PRESSURE: 122 MMHG

## 2022-11-15 DIAGNOSIS — I10 ESSENTIAL HYPERTENSION: ICD-10-CM

## 2022-11-15 DIAGNOSIS — F32.9 REACTIVE DEPRESSION: ICD-10-CM

## 2022-11-15 DIAGNOSIS — R20.2 PARESTHESIA: ICD-10-CM

## 2022-11-15 DIAGNOSIS — K21.9 GASTROESOPHAGEAL REFLUX DISEASE WITHOUT ESOPHAGITIS: ICD-10-CM

## 2022-11-15 DIAGNOSIS — E78.5 HYPERLIPIDEMIA, UNSPECIFIED HYPERLIPIDEMIA TYPE: ICD-10-CM

## 2022-11-15 DIAGNOSIS — I10 ESSENTIAL HYPERTENSION: Primary | ICD-10-CM

## 2022-11-15 DIAGNOSIS — Z23 IMMUNIZATION DUE: ICD-10-CM

## 2022-11-15 LAB
ALBUMIN SERPL-MCNC: 4.2 G/DL (ref 3.5–5.2)
ALBUMIN/GLOB SERPL: 1.5 G/DL
ALP SERPL-CCNC: 109 U/L (ref 39–117)
ALT SERPL W P-5'-P-CCNC: 10 U/L (ref 1–33)
ANION GAP SERPL CALCULATED.3IONS-SCNC: 6.6 MMOL/L (ref 5–15)
AST SERPL-CCNC: 20 U/L (ref 1–32)
BASOPHILS # BLD AUTO: 0.06 10*3/MM3 (ref 0–0.2)
BASOPHILS NFR BLD AUTO: 0.5 % (ref 0–1.5)
BILIRUB SERPL-MCNC: 0.7 MG/DL (ref 0–1.2)
BUN SERPL-MCNC: 16 MG/DL (ref 8–23)
BUN/CREAT SERPL: 12.9 (ref 7–25)
CALCIUM SPEC-SCNC: 9.7 MG/DL (ref 8.6–10.5)
CHLORIDE SERPL-SCNC: 101 MMOL/L (ref 98–107)
CHOLEST SERPL-MCNC: 154 MG/DL (ref 0–200)
CO2 SERPL-SCNC: 32.4 MMOL/L (ref 22–29)
CREAT SERPL-MCNC: 1.24 MG/DL (ref 0.57–1)
DEPRECATED RDW RBC AUTO: 40 FL (ref 37–54)
EGFRCR SERPLBLD CKD-EPI 2021: 44.9 ML/MIN/1.73
EOSINOPHIL # BLD AUTO: 0.16 10*3/MM3 (ref 0–0.4)
EOSINOPHIL NFR BLD AUTO: 1.4 % (ref 0.3–6.2)
ERYTHROCYTE [DISTWIDTH] IN BLOOD BY AUTOMATED COUNT: 12.1 % (ref 12.3–15.4)
FOLATE SERPL-MCNC: 15.6 NG/ML (ref 4.78–24.2)
GLOBULIN UR ELPH-MCNC: 2.8 GM/DL
GLUCOSE SERPL-MCNC: 96 MG/DL (ref 65–99)
HCT VFR BLD AUTO: 44.7 % (ref 34–46.6)
HDLC SERPL-MCNC: 74 MG/DL (ref 40–60)
HGB BLD-MCNC: 15 G/DL (ref 12–15.9)
IMM GRANULOCYTES # BLD AUTO: 0.03 10*3/MM3 (ref 0–0.05)
IMM GRANULOCYTES NFR BLD AUTO: 0.3 % (ref 0–0.5)
LDLC SERPL CALC-MCNC: 64 MG/DL (ref 0–100)
LDLC/HDLC SERPL: 0.84 {RATIO}
LYMPHOCYTES # BLD AUTO: 2.36 10*3/MM3 (ref 0.7–3.1)
LYMPHOCYTES NFR BLD AUTO: 21.3 % (ref 19.6–45.3)
MCH RBC QN AUTO: 30.4 PG (ref 26.6–33)
MCHC RBC AUTO-ENTMCNC: 33.6 G/DL (ref 31.5–35.7)
MCV RBC AUTO: 90.5 FL (ref 79–97)
MONOCYTES # BLD AUTO: 0.97 10*3/MM3 (ref 0.1–0.9)
MONOCYTES NFR BLD AUTO: 8.7 % (ref 5–12)
NEUTROPHILS NFR BLD AUTO: 67.8 % (ref 42.7–76)
NEUTROPHILS NFR BLD AUTO: 7.52 10*3/MM3 (ref 1.7–7)
NRBC BLD AUTO-RTO: 0 /100 WBC (ref 0–0.2)
PLATELET # BLD AUTO: 321 10*3/MM3 (ref 140–450)
PMV BLD AUTO: 9.7 FL (ref 6–12)
POTASSIUM SERPL-SCNC: 4 MMOL/L (ref 3.5–5.2)
PROT SERPL-MCNC: 7 G/DL (ref 6–8.5)
RBC # BLD AUTO: 4.94 10*6/MM3 (ref 3.77–5.28)
SODIUM SERPL-SCNC: 140 MMOL/L (ref 136–145)
TRIGL SERPL-MCNC: 89 MG/DL (ref 0–150)
TSH SERPL DL<=0.05 MIU/L-ACNC: 2.04 UIU/ML (ref 0.27–4.2)
VIT B12 BLD-MCNC: 362 PG/ML (ref 211–946)
VLDLC SERPL-MCNC: 16 MG/DL (ref 5–40)
WBC NRBC COR # BLD: 11.1 10*3/MM3 (ref 3.4–10.8)

## 2022-11-15 PROCEDURE — 99214 OFFICE O/P EST MOD 30 MIN: CPT | Performed by: INTERNAL MEDICINE

## 2022-11-15 PROCEDURE — 90662 IIV NO PRSV INCREASED AG IM: CPT | Performed by: INTERNAL MEDICINE

## 2022-11-15 PROCEDURE — 82746 ASSAY OF FOLIC ACID SERUM: CPT

## 2022-11-15 PROCEDURE — 84165 PROTEIN E-PHORESIS SERUM: CPT

## 2022-11-15 PROCEDURE — 85025 COMPLETE CBC W/AUTO DIFF WBC: CPT

## 2022-11-15 PROCEDURE — 82784 ASSAY IGA/IGD/IGG/IGM EACH: CPT

## 2022-11-15 PROCEDURE — 80053 COMPREHEN METABOLIC PANEL: CPT

## 2022-11-15 PROCEDURE — 86334 IMMUNOFIX E-PHORESIS SERUM: CPT

## 2022-11-15 PROCEDURE — 80061 LIPID PANEL: CPT

## 2022-11-15 PROCEDURE — G0008 ADMIN INFLUENZA VIRUS VAC: HCPCS | Performed by: INTERNAL MEDICINE

## 2022-11-15 PROCEDURE — 84443 ASSAY THYROID STIM HORMONE: CPT

## 2022-11-15 PROCEDURE — 82607 VITAMIN B-12: CPT

## 2022-11-15 NOTE — PROGRESS NOTES
Chief Complaint   Patient presents with   • Follow-up     6 month follow up chronic medical problems       History of Present Illness    The patient presents for a follow-up related to hypertension. The patient reports that she has had no headaches, chest pain, dyspnea, edema, syncope, blurred vision or palpitations. She states that she is taking her medication as prescribed. She is not having medication side effects.    The patient presents for an initial evaluation of numbness and tingling of the right leg and left leg. The symptoms have been present for 6 months. There has not been recent trauma to the affected area. The symptoms are constant.       There is no pain in the spine. There has been no trauma to the neck or back. The patient denies loss of bowel or bladder control. There is no weakness reported in the affected extremity.       The patient does not report foreign travel. There are no insect bites reported. The patient denies new sexual exposures. There are no high risk exposures. There have not been recent viral infections.       The patient does not report a dry cough, a wet cough, wheezing, fever, facial pain, eye drainage, ear pain, ear drainage, nausea, vomiting, diarrhea, myalgias, sore throat, abdominal pain, nasal discharge, weight loss, decreased appetite, chills, rash, joint pain, a breast lump or GI bleeding.    The patient presents for a follow-up related to depression. She denies currently having depression symptoms. She denies suicidal ideation. Her energy level is good. She denies agorophobia. She sleeps well. She is not tearful. She states that her current depression symptoms are stable. She is currently taking a medication. The current medication regimen consists of sertraline. The patient denies having medication side effects including nausea, headaches, anxiety, increased depression, anorgasmia or fatigue.    The patient presents for a follow-up related to GERD. The patient is on  pantoprazole for her gastroesophageal reflux. The medication is taken on a regular basis and gives complete relief of the symptoms. She reports no belching, dysphagia, early satiety, heartburn, hoarseness, odynophagia or rectal bleeding. The GERD has no known aggravating factors.    The patient presents for a follow-up related to hyperlipidemia. She is following a low fat diet. She reports that she is exercising. She is taking simvastatin. The patient is taking her medication as prescribed. She reports no medication side effects, including muscle cramps, abdominal pain, headaches or weakness. She denies orthopnea, paroxysmal nocturnal dyspnea or dyspnea on exertion.    Medications      Current Outpatient Medications:   •  acetaminophen (TYLENOL) 325 MG tablet, Take 2 tablets by mouth Every 4 (Four) Hours As Needed for Mild Pain ., Disp: , Rfl:   •  chlorthalidone (HYGROTON) 25 MG tablet, Take 1/2 (one-half) tablet by mouth once daily, Disp: 45 tablet, Rfl: 2  •  Cholecalciferol (VITAMIN D-3) 5000 units tablet, Take 1 tablet by mouth Daily. OTC, Disp: , Rfl:   •  dilTIAZem (TIAZAC) 240 MG 24 hr capsule, Take 1 capsule by mouth once daily, Disp: 30 capsule, Rfl: 3  •  Glucosamine HCl (GLUCOSAMINE PO), Take 2 tablets by mouth Daily. As directed, OTC, Disp: , Rfl:   •  metoprolol tartrate (LOPRESSOR) 25 MG tablet, Take 1 tablet by mouth 2 (Two) Times a Day., Disp: 180 tablet, Rfl: 3  •  pantoprazole (PROTONIX) 40 MG EC tablet, Take 1 tablet by mouth once daily, Disp: 90 tablet, Rfl: 1  •  potassium chloride 10 MEQ CR tablet, Take 1 tablet by mouth once daily, Disp: 90 tablet, Rfl: 1  •  sertraline (ZOLOFT) 50 MG tablet, Take 1 tablet by mouth once daily, Disp: 90 tablet, Rfl: 0  •  simvastatin (ZOCOR) 40 MG tablet, TAKE 1 TABLET BY MOUTH AT NIGHT, Disp: 90 tablet, Rfl: 2  •  vitamin C (ASCORBIC ACID) 500 MG tablet, Take 1,000 mg by mouth Daily. OTC, Disp: , Rfl:   •  Xarelto 15 MG tablet, Take 1 tablet by mouth once  "daily, Disp: 90 tablet, Rfl: 2  •  Zinc 40 MG tablet, Take  by mouth Daily. OTC, Disp: , Rfl:      Allergies    Allergies   Allergen Reactions   • Erythromycin Swelling     \"tongue swelling\".   • Codeine Sulfate Swelling   • Meperidine Swelling   • Morphine And Related Swelling   • Penicillins Swelling     \"swelling\" at site.   • Sulfa Antibiotics Swelling   • Sulfadiazine Swelling       Problem List    Patient Active Problem List   Diagnosis   • History of pulmonary embolism   • Hypertension   • Acute pulmonary embolism (HCC)   • DVT (deep venous thrombosis) (HCC)   • Circadian rhythm sleep disorder, delayed sleep phase type   • Psychophysiological insomnia   • Hyperlipidemia   • Calculus of kidney   • Cobalamin deficiency   • History of DVT (deep vein thrombosis)   • Cytokine release syndrome, grade 2   • Acute hypoxemic respiratory failure due to COVID-19 (HCC)   • Pneumonia due to COVID-19 virus   • Chronic anticoagulation (Xarelto)       Medications, Allergies, Problems List and Past History were reviewed and updated.    Physical Examination    /66 (BP Location: Right arm, Patient Position: Sitting, Cuff Size: Adult)   Pulse 64   Temp 97.3 °F (36.3 °C) (Infrared)   Resp 16   Wt 72.1 kg (159 lb)   LMP  (LMP Unknown)   BMI 29.08 kg/m²     HEENT: Facies- Within normal limits. Lids- Normal bilaterally. Conjunctiva- Clear bilaterally. Sclera- Anicteric bilaterally.    Neck: Thyroid- non enlarged, symmetric and has no nodules. No bruits are detected.    Lungs: Auscultation- Clear to auscultation bilaterally. There are no retractions, clubbing or cyanosis. The Expiratory to Inspiratory ratio is equal.    Lymph Nodes: Cervical- no enlarged lymph nodes noted.    Cardiovascular: There are no carotid bruits. Heart- Normal Rate with Regular rhythm and no murmurs. There are no gallops. There are no rubs. In the lower extremities there is no edema. The upper extremities do not have edema.    Abdomen: Soft, " benign, non-tender with no masses, hernias, organomegaly or scars.    Lower Extremity Neuro Exam: Muscle Strength is 5/5 in all major lower extremity muscle groups. Deep Tendon Reflexes are 2+ and equal in the patellar and Achilles distributions bilaterally. Sensation is normal in all distributions.    Impression and Assessment    Encounter for Immunization Administration.    Essential Hypertension.    Reactive Depression.    Gastroesophageal Reflux Disease.    Hyperlipidemia.    Paresthesias.    Plan    Gastroesophageal Reflux Disease Plan: The patient was instructed to continue the current medications.    Essential Hypertension Plan: The patient was instructed to continue the current medications.    Hyperlipidemia Plan: The patient was instructed to exercise daily, eat a low fat diet and continue her medications.    Reactive Depression Plan: The patient was instructed to continue the current medications.    Paresthesias Plan: Further plans will be made after test results are received and reviewed.    Counseled regarding immunizations and applicable VIS given.    Immunizations Ordered and Administered: Fluzone High Dose 65+ years.    Diagnoses and all orders for this visit:    1. Essential hypertension (Primary)  -     CBC & Differential; Future  -     Comprehensive Metabolic Panel; Future    2. Reactive depression    3. Gastroesophageal reflux disease without esophagitis    4. Hyperlipidemia, unspecified hyperlipidemia type  -     Comprehensive Metabolic Panel; Future  -     Lipid Panel; Future    5. Paresthesia  -     CBC & Differential; Future  -     Comprehensive Metabolic Panel; Future  -     Folate; Future  -     TSH; Future  -     Vitamin B12; Future  -     NEIL + PE; Future    6. Immunization due  -     Fluzone High-Dose 65+yrs        Return to Office    The patient was instructed to return for follow-up in 6 months. The patient was instructed to return sooner if the condition changes, worsens, or does not  resolve.

## 2022-11-16 LAB
ALBUMIN SERPL ELPH-MCNC: 4 G/DL (ref 2.9–4.4)
ALBUMIN/GLOB SERPL: 1.5 {RATIO} (ref 0.7–1.7)
ALPHA1 GLOB SERPL ELPH-MCNC: 0.3 G/DL (ref 0–0.4)
ALPHA2 GLOB SERPL ELPH-MCNC: 0.8 G/DL (ref 0.4–1)
B-GLOBULIN SERPL ELPH-MCNC: 1.1 G/DL (ref 0.7–1.3)
GAMMA GLOB SERPL ELPH-MCNC: 0.7 G/DL (ref 0.4–1.8)
GLOBULIN SER-MCNC: 2.8 G/DL (ref 2.2–3.9)
IGA SERPL-MCNC: 232 MG/DL (ref 64–422)
IGG SERPL-MCNC: 836 MG/DL (ref 586–1602)
IGM SERPL-MCNC: 36 MG/DL (ref 26–217)
INTERPRETATION SERPL IEP-IMP: NORMAL
LABORATORY COMMENT REPORT: NORMAL
M PROTEIN SERPL ELPH-MCNC: NORMAL G/DL
PROT SERPL-MCNC: 6.8 G/DL (ref 6–8.5)

## 2022-11-28 RX ORDER — POTASSIUM CHLORIDE 750 MG/1
TABLET, FILM COATED, EXTENDED RELEASE ORAL
Qty: 90 TABLET | Refills: 0 | Status: SHIPPED | OUTPATIENT
Start: 2022-11-28 | End: 2023-02-20

## 2022-12-07 DIAGNOSIS — F32.9 REACTIVE DEPRESSION: ICD-10-CM

## 2022-12-19 ENCOUNTER — TELEPHONE (OUTPATIENT)
Dept: GASTROENTEROLOGY | Facility: CLINIC | Age: 77
End: 2022-12-19

## 2022-12-19 NOTE — TELEPHONE ENCOUNTER
December 19, 2022 [address] [place] [address]  Phone: [phone]  Fax: [phone]     Carol Gerard  [place] [id number]  1945      Patient will be having aColonoscopy by  on 1/10/2023 . The patient is needing cardiac clearance due to being on blood thinners. Please complete the following and fax back to the office.     Patient's was last seen in the office on:_____________________    Procedure/Test Performed:    _____ Stress Test       _____ Echocardiogram    _____ EKG     _____ Heart Cath    Based on the above test results and/or clinical evaluation it is my recommendation:    ____ Patient may proceed with the scheduled surgical procedure with an acceptable cardiac risk.    ____ Patient CAN NOT stop Plavix, Effient, Ticlid, Aspirin, Coumadin, Pradaxa, Xarelto, Eliquis, Savaysa, or Brilinta prior to procedure.     ____ Patient CAN stop Plavix, Effient, Ticlid, Aspirin, Coumadin, Pradaxa, Xarelto, Eliquis, Savaysa, or Brilinta  ______ days prior to procedure.    Please sign and date below.    Signature________________________________________   Date:_________________     Thank You    Mark I. Brunner, M.D.  AMANDA Salguero M.D.  Isaak Park M.D.  Joselo Jordan M.D.  CHANTELL Cuenca APRN Katie Cecil, PA

## 2022-12-21 NOTE — TELEPHONE ENCOUNTER
Reviewed with Dr. Medina pt low risk for procedure from cardiac standpoint. She may hold Xarelto 3 days prior to procedure BUT must bridge with lovenox while off xarelto     Lm on vm to call back to discuss bridging protocol.

## 2022-12-22 ENCOUNTER — TELEPHONE (OUTPATIENT)
Dept: CARDIOLOGY | Facility: CLINIC | Age: 77
End: 2022-12-22

## 2022-12-22 NOTE — TELEPHONE ENCOUNTER
Spoke with pt regarding Xarelto and the use of Lovenox. She states that she does not want to hold Xarelto, especially with her history of clots. She is familiar with Lovenox use but just not want to take any chances. She is going to contact GI about cancelling her procedure.

## 2022-12-27 DIAGNOSIS — I10 ESSENTIAL HYPERTENSION: ICD-10-CM

## 2022-12-27 RX ORDER — DILTIAZEM HYDROCHLORIDE 240 MG/1
240 CAPSULE, EXTENDED RELEASE ORAL DAILY
Qty: 90 CAPSULE | Refills: 1 | Status: SHIPPED | OUTPATIENT
Start: 2022-12-27

## 2022-12-27 NOTE — TELEPHONE ENCOUNTER
SPOKE WITH PATIENT AND SHE DOESN'T WANT TO BRIDGE ON LOVENOX.  CONCHITA, PATIENT WOULD LIKE TO CANCEL PROCEDURE.

## 2023-01-09 DIAGNOSIS — K21.9 GASTROESOPHAGEAL REFLUX DISEASE: ICD-10-CM

## 2023-01-09 RX ORDER — PANTOPRAZOLE SODIUM 40 MG/1
TABLET, DELAYED RELEASE ORAL
Qty: 14 TABLET | Refills: 0 | Status: SHIPPED | OUTPATIENT
Start: 2023-01-09 | End: 2023-01-23

## 2023-01-22 DIAGNOSIS — K21.9 GASTROESOPHAGEAL REFLUX DISEASE: ICD-10-CM

## 2023-01-23 RX ORDER — PANTOPRAZOLE SODIUM 40 MG/1
TABLET, DELAYED RELEASE ORAL
Qty: 90 TABLET | Refills: 1 | Status: SHIPPED | OUTPATIENT
Start: 2023-01-23

## 2023-01-25 ENCOUNTER — OFFICE VISIT (OUTPATIENT)
Dept: ONCOLOGY | Facility: CLINIC | Age: 78
End: 2023-01-25
Payer: MEDICARE

## 2023-01-25 ENCOUNTER — LAB (OUTPATIENT)
Dept: LAB | Facility: HOSPITAL | Age: 78
End: 2023-01-25
Payer: MEDICARE

## 2023-01-25 ENCOUNTER — OFFICE VISIT (OUTPATIENT)
Dept: CARDIOLOGY | Facility: CLINIC | Age: 78
End: 2023-01-25
Payer: MEDICARE

## 2023-01-25 VITALS
WEIGHT: 161 LBS | HEART RATE: 65 BPM | SYSTOLIC BLOOD PRESSURE: 120 MMHG | DIASTOLIC BLOOD PRESSURE: 54 MMHG | OXYGEN SATURATION: 94 % | HEIGHT: 62 IN | BODY MASS INDEX: 29.63 KG/M2

## 2023-01-25 VITALS
TEMPERATURE: 97.1 F | BODY MASS INDEX: 29.63 KG/M2 | DIASTOLIC BLOOD PRESSURE: 49 MMHG | OXYGEN SATURATION: 95 % | RESPIRATION RATE: 16 BRPM | SYSTOLIC BLOOD PRESSURE: 108 MMHG | HEIGHT: 62 IN | HEART RATE: 81 BPM | WEIGHT: 161 LBS

## 2023-01-25 DIAGNOSIS — I10 ESSENTIAL HYPERTENSION: ICD-10-CM

## 2023-01-25 DIAGNOSIS — Z86.718 HISTORY OF DVT (DEEP VEIN THROMBOSIS): Primary | ICD-10-CM

## 2023-01-25 DIAGNOSIS — E78.2 MIXED HYPERLIPIDEMIA: ICD-10-CM

## 2023-01-25 DIAGNOSIS — I82.4Y9 DEEP VEIN THROMBOSIS (DVT) OF PROXIMAL LOWER EXTREMITY, UNSPECIFIED CHRONICITY, UNSPECIFIED LATERALITY: Primary | ICD-10-CM

## 2023-01-25 DIAGNOSIS — Z86.718 HISTORY OF DVT (DEEP VEIN THROMBOSIS): ICD-10-CM

## 2023-01-25 DIAGNOSIS — I82.4Y9 DEEP VEIN THROMBOSIS (DVT) OF PROXIMAL LOWER EXTREMITY, UNSPECIFIED CHRONICITY, UNSPECIFIED LATERALITY: ICD-10-CM

## 2023-01-25 DIAGNOSIS — Z86.711 HISTORY OF PULMONARY EMBOLISM: Primary | ICD-10-CM

## 2023-01-25 DIAGNOSIS — I26.99 OTHER ACUTE PULMONARY EMBOLISM WITHOUT ACUTE COR PULMONALE: ICD-10-CM

## 2023-01-25 LAB
ALBUMIN SERPL-MCNC: 4.3 G/DL (ref 3.5–5.2)
ALBUMIN/GLOB SERPL: 1.7 G/DL
ALP SERPL-CCNC: 104 U/L (ref 39–117)
ALT SERPL W P-5'-P-CCNC: 10 U/L (ref 1–33)
ANION GAP SERPL CALCULATED.3IONS-SCNC: 13 MMOL/L (ref 5–15)
AST SERPL-CCNC: 18 U/L (ref 1–32)
BASOPHILS # BLD AUTO: 0.02 10*3/MM3 (ref 0–0.2)
BASOPHILS NFR BLD AUTO: 0.3 % (ref 0–1.5)
BILIRUB SERPL-MCNC: 0.8 MG/DL (ref 0–1.2)
BUN SERPL-MCNC: 19 MG/DL (ref 8–23)
BUN/CREAT SERPL: 20 (ref 7–25)
CALCIUM SPEC-SCNC: 9.2 MG/DL (ref 8.6–10.5)
CHLORIDE SERPL-SCNC: 103 MMOL/L (ref 98–107)
CO2 SERPL-SCNC: 29 MMOL/L (ref 22–29)
CREAT SERPL-MCNC: 0.95 MG/DL (ref 0.57–1)
DEPRECATED RDW RBC AUTO: 43 FL (ref 37–54)
EGFRCR SERPLBLD CKD-EPI 2021: 61.8 ML/MIN/1.73
EOSINOPHIL # BLD AUTO: 0.19 10*3/MM3 (ref 0–0.4)
EOSINOPHIL NFR BLD AUTO: 2.6 % (ref 0.3–6.2)
ERYTHROCYTE [DISTWIDTH] IN BLOOD BY AUTOMATED COUNT: 12.6 % (ref 12.3–15.4)
GLOBULIN UR ELPH-MCNC: 2.6 GM/DL
GLUCOSE SERPL-MCNC: 101 MG/DL (ref 65–99)
HCT VFR BLD AUTO: 44.5 % (ref 34–46.6)
HGB BLD-MCNC: 14.6 G/DL (ref 12–15.9)
IMM GRANULOCYTES # BLD AUTO: 0.01 10*3/MM3 (ref 0–0.05)
IMM GRANULOCYTES NFR BLD AUTO: 0.1 % (ref 0–0.5)
LYMPHOCYTES # BLD AUTO: 2.75 10*3/MM3 (ref 0.7–3.1)
LYMPHOCYTES NFR BLD AUTO: 36.9 % (ref 19.6–45.3)
MCH RBC QN AUTO: 30.2 PG (ref 26.6–33)
MCHC RBC AUTO-ENTMCNC: 32.8 G/DL (ref 31.5–35.7)
MCV RBC AUTO: 91.9 FL (ref 79–97)
MONOCYTES # BLD AUTO: 0.7 10*3/MM3 (ref 0.1–0.9)
MONOCYTES NFR BLD AUTO: 9.4 % (ref 5–12)
NEUTROPHILS NFR BLD AUTO: 3.78 10*3/MM3 (ref 1.7–7)
NEUTROPHILS NFR BLD AUTO: 50.7 % (ref 42.7–76)
PLATELET # BLD AUTO: 284 10*3/MM3 (ref 140–450)
PMV BLD AUTO: 9.4 FL (ref 6–12)
POTASSIUM SERPL-SCNC: 3.5 MMOL/L (ref 3.5–5.2)
PROT SERPL-MCNC: 6.9 G/DL (ref 6–8.5)
RBC # BLD AUTO: 4.84 10*6/MM3 (ref 3.77–5.28)
SODIUM SERPL-SCNC: 145 MMOL/L (ref 136–145)
WBC NRBC COR # BLD: 7.45 10*3/MM3 (ref 3.4–10.8)

## 2023-01-25 PROCEDURE — 85025 COMPLETE CBC W/AUTO DIFF WBC: CPT

## 2023-01-25 PROCEDURE — 99213 OFFICE O/P EST LOW 20 MIN: CPT | Performed by: PHYSICIAN ASSISTANT

## 2023-01-25 PROCEDURE — 99214 OFFICE O/P EST MOD 30 MIN: CPT | Performed by: NURSE PRACTITIONER

## 2023-01-25 PROCEDURE — 36415 COLL VENOUS BLD VENIPUNCTURE: CPT

## 2023-01-25 PROCEDURE — 80053 COMPREHEN METABOLIC PANEL: CPT

## 2023-01-25 RX ORDER — TRAZODONE HYDROCHLORIDE 50 MG/1
50 TABLET ORAL NIGHTLY
Qty: 30 TABLET | Refills: 6 | Status: SHIPPED | OUTPATIENT
Start: 2023-01-25

## 2023-01-25 NOTE — PROGRESS NOTES
"[facility] Cardiology    Patient ID: Carol Jean is a 77 y.o. female.  : 1945   Contact: [phone]    Encounter date: 2023    PCP: Flakito Chaudhary MD      Chief complaint:   Chief Complaint   Patient presents with   • Hypertension   • Hyperlipidemia   • PE   • DVT       Problem List:  1. Acute pulmonary embolism, bilateral, 2012.  a. Initiation of CPR per family.  b. EMS transport to [facility] Emergency Room.  c. Restoration of rhythm and blood pressure at [facility] Room.  d. “Shock” manifest by hypotension and multiorgan failure:  Transient hepatic dysfunction, resolved.  Transient nonoliguric ATN, resolved.  Transient metabolic acidosis, resolved.  Pressor support.  e. Bilateral EKOS catheter placement/treatment:  Vena cava filter placed.  f. “Normal coronary arteries” by angiography, 2012.  g. Recurrent DVT of RLE, 2012:  Hematology workup with subsequent Xarelto dosing (followed by Dr. Ashby)  2. Hypertension.  3. Upper GI bleed/gastritis related to lytic therapy and heparins, resolved:  a. “Hemorrhagic gastritis.”  4. Right “groin” bleeding, 10/12/2012.  a. Probably “venous” related to heparins.  b. Nonsurgical treatment, resolved.  5. History of nephrolithiasis, inactive.  6. Methicillin-resistant Staphylococcus aureus - tracheobronchitis, resolved.  7. Renal insufficiency, followed by [facility]   8. Microscopic hematuria  a. Cystoscopy did not reveal source of bleeding, Xarelto was decreased to 10 mg daily (2020)  9. Surgeries:  a. Nephrolithiasis with lithotripsy.  b. Bilateral breast reduction.  c.  section x2.    Allergies   Allergen Reactions   • Erythromycin Swelling     \"tongue swelling\".   • Warfarin Other (See Comments)     Reoccurrence of clot while on warfarin    • Codeine Sulfate Swelling   • Meperidine Swelling   • Morphine And Related Swelling   • Penicillins Swelling     " "\"swelling\" at site.   • Sulfa Antibiotics Swelling   • Sulfadiazine Swelling       Current Medications:    Current Outpatient Medications:   •  acetaminophen (TYLENOL) 325 MG tablet, Take 2 tablets by mouth Every 4 (Four) Hours As Needed for Mild Pain ., Disp: , Rfl:   •  chlorthalidone (HYGROTON) 25 MG tablet, Take 1/2 (one-half) tablet by mouth once daily, Disp: 45 tablet, Rfl: 2  •  Cholecalciferol (VITAMIN D-3) 5000 units tablet, Take 1 tablet by mouth Daily. OTC, Disp: , Rfl:   •  dilTIAZem (TIAZAC) 240 MG 24 hr capsule, Take 1 capsule by mouth Daily., Disp: 90 capsule, Rfl: 1  •  Glucosamine HCl (GLUCOSAMINE PO), Take 2 tablets by mouth Daily. As directed, OTC, Disp: , Rfl:   •  metoprolol tartrate (LOPRESSOR) 25 MG tablet, Take 1 tablet by mouth 2 (Two) Times a Day., Disp: 180 tablet, Rfl: 3  •  pantoprazole (PROTONIX) 40 MG EC tablet, Take 1 tablet by mouth once daily, Disp: 90 tablet, Rfl: 1  •  potassium chloride 10 MEQ CR tablet, TAKE 1  BY MOUTH ONCE DAILY, Disp: 90 tablet, Rfl: 0  •  sertraline (ZOLOFT) 50 MG tablet, Take 1 tablet by mouth once daily, Disp: 90 tablet, Rfl: 1  •  simvastatin (ZOCOR) 40 MG tablet, TAKE 1 TABLET BY MOUTH AT NIGHT, Disp: 90 tablet, Rfl: 2  •  traZODone (DESYREL) 50 MG tablet, Take 1 tablet by mouth Every Night. Take one tablet one hour prior to bedtime, Disp: 30 tablet, Rfl: 6  •  vitamin C (ASCORBIC ACID) 500 MG tablet, Take 1,000 mg by mouth Daily. OTC, Disp: , Rfl:   •  Xarelto 15 MG tablet, Take 1 tablet by mouth once daily, Disp: 90 tablet, Rfl: 2  •  Zinc 40 MG tablet, Take  by mouth Daily. OTC, Disp: , Rfl:     HPI    Carol Jean is a 77 y.o. female who presents today for an annual follow up of pulmonary embolism, deep vein thrombosis, and cardiac risk factors. Since last visit, she is doing well.  She is tolerating her Xarelto well.  She was scheduled for a colonoscopy and was going to be transition to Lovenox shots however she did not wish to proceed with " "Lovenox shots and is going to go through a less invasive colon cancer screening.  Patient denies any chest pain, shortness of breath, dizziness or weakness.  She has had no swelling to her ankles.  Even she states when she had COVID she did well.  She is now off of oxygen after maddy COVID.      The following portions of the patient's history were reviewed and updated as appropriate: allergies, current medications and problem list.    Pertinent positives as listed in the HPI.  All other systems reviewed are negative.         Vitals:    01/25/23 1608   BP: 120/54   BP Location: Left arm   Patient Position: Sitting   Pulse: 65   SpO2: 94%   Weight: 73 kg (161 lb)   Height: 157.5 cm (62.01\")       Physical Exam:  General: Alert and oriented.  Neck: Jugular venous pressure is within normal limits. Carotids have normal upstrokes without bruits.   Cardiovascular: Heart has a nondisplaced focal PMI. Regular rate and rhythm. No murmur, gallop or rub.  Lungs: Clear, no rales or wheezes. Equal expansion is noted.   Extremities: Show no edema.  Skin: Warm and dry.  Neurologic: Nonfocal.     Diagnostic Data (reviewed with patient):  Lab Results   Component Value Date    GLUCOSE 101 (H) 01/25/2023    BUN 19 01/25/2023    CREATININE 0.95 01/25/2023    EGFRIFNONA 42 (L) 01/24/2022    BCR 20.0 01/25/2023     01/25/2023    K 3.5 01/25/2023     01/25/2023    CO2 29.0 01/25/2023    CALCIUM 9.2 01/25/2023    ALBUMIN 4.3 01/25/2023    ALKPHOS 104 01/25/2023    AST 18 01/25/2023    ALT 10 01/25/2023     Lab Results   Component Value Date    CHOL 154 11/15/2022    TRIG 89 11/15/2022    HDL 74 (H) 11/15/2022    LDL 64 11/15/2022      Lab Results   Component Value Date    WBC 7.45 01/25/2023    RBC 4.84 01/25/2023    HGB 14.6 01/25/2023    HCT 44.5 01/25/2023    MCV 91.9 01/25/2023     01/25/2023      Lab Results   Component Value Date    TSH 2.040 11/15/2022        Advance Care Planning   ACP discussion was " declined by the patient. Patient does not have an advance directive, declines further assistance.           Assessment:    ICD-10-CM ICD-9-CM   1. History of pulmonary embolism  Z86.711 V12.55   2. History of DVT (deep vein thrombosis)  Z86.718 V12.51   3. Essential hypertension  I10 401.9   4. Mixed hyperlipidemia  E78.2 272.2         Plan:  1. Continue on chlorthalidone 12.5 mg daily, metoprolol 25 mg BID, and diltiazem 240 mg daily for hypertension.   2. Continue on Xarelto 15 mg daily for PE/DVT prophylaxis she is not having any problems with acute bleeding.  3. Continue on simvastatin 40 mg daily for hyperlipidemia.   4. Continue all other current medications.  5. F/up in 12 months, sooner if needed.      Anju Boone PA-C

## 2023-01-25 NOTE — PROGRESS NOTES
DATE OF VISIT: 1/25/2023    REASON FOR VISIT:   1. DVT while on therapeutic Coumadin 11/23/2012.   2. Pulmonary embolism 09/30/2012.     PROBLEM LIST:  1. Acute right lower extremity DVT while on therapeutic Coumadin 11/23/2012.   2. Pulmonary embolism with cardiac arrest 09/30/2012.   3.  Hypertension  4.  Hypercholesterolemia  5.  Heartburn  6.  Arthritis    HISTORY OF PRESENT ILLNESS: The patient is a very pleasant 77 y.o.   female with past medical history significant for massive pulmonary  embolism September 2012 requiring cardiac resuscitation. The patient was placed on chronic anticoagulation with Coumadin since, however, she presented with acute DVT of the right lower extremity while on Coumadin 11/23/2012. The  patient was switched to Lovenox 100 micrograms subcu daily since. The patient  was switched from Lovenox to Xarelto 20 mg p.o. daily on 03/15/2013 secondary  to this would be more convenient to the patient in avoiding the daily  injections. The patient is here today in scheduled follow-up visit.     SUBJECTIVE: The patient is here today by herself. She has been doing fairly well since her last visit. She has finally recovered from COVID, and has been able to wean off oxygen after a year of use. She has been compliant with use of Xarelto, with no evidence of bleeding or bruising. She denies swelling in her legs, chest pain, or shortness of breath. She is being followed by Dr. Medina for cardiology care who has been refilling her Xarelto for her. She does complain of insomnia since being in the hospital. She is unable to go to sleep until 3 or 4:00 am and then sleeps later than she would like. She would like to try something for sleep if possible.     Review of Systems   Constitutional: Positive for fatigue.   Psychiatric/Behavioral: Positive for sleep disturbance.       Past History:  Medical History: has a past medical history of Acute deep vein thrombosis of lower extremity (HCC),  "Acute pulmonary embolism (HCC) (2016), Allergic (Yrs ago!), Arthritis, Bladder problem, Blood clotting disorder (HCC), Bone pain, Depression (Yrs ago), DVT (deep venous thrombosis) (HCC), Easy bruising, H/O bone density study (2012), H/O colonoscopy (2012), H/O mammogram (2012), HBP (high blood pressure), History of blood transfusion, Hypertension (2016), Kidney stones, Synovial cyst of popliteal space, Upper gastrointestinal bleeding, and Vitamin B12 deficiency.   Surgical History: has a past surgical history that includes  section ();  section (); Kidney stone surgery; Reduction mammaplasty (Bilateral, ); Tonsillectomy; Breast Reduction; Colonoscopy; Cataract extraction, bilateral; Other surgical history; and Eye surgery.   Family History: family history includes Arthritis in her mother; Breast cancer in her maternal aunt; COPD in her father and other family members; Coronary artery disease in an other family member; Heart attack in her father; Heart disease in her father; Hyperlipidemia in her mother; Hypertension in her mother; Stroke in her mother and other family members; Thyroid disease in her father.   Social History: reports that she has never smoked. She has never used smokeless tobacco. She reports current alcohol use of about 2.0 standard drinks per week. She reports that she does not use drugs.    (Not in a hospital admission)     Allergies: Erythromycin, Warfarin, Codeine sulfate, Meperidine, Morphine and related, Penicillins, Sulfa antibiotics, and Sulfadiazine    PHYSICAL EXAMINATION:   /49   Pulse 81   Temp 97.1 °F (36.2 °C) (Infrared)   Resp 16   Ht 157.5 cm (62.01\")   Wt 73 kg (161 lb)   LMP  (LMP Unknown)   SpO2 95%   BMI 29.44 kg/m²   Pain Score    23 1028   PainSc: 0-No pain       ECOG 0  GENERAL: Age appropriate. No acute distress.   LUNGS: Clear to auscultation bilaterally. No wheezing. No rhonchi.   HEART: Regular rate and " rhythm. S1, S2, no murmurs.   EXTREMITIES: No clubbing, cyanosis, or edema.   SKIN: No rashes or bruising. No purpura.   NEUROLOGIC: Awake and oriented x3.      Lab on 01/25/2023   Component Date Value Ref Range Status   • WBC 01/25/2023 7.45  3.40 - 10.80 10*3/mm3 Final   • RBC 01/25/2023 4.84  3.77 - 5.28 10*6/mm3 Final   • Hemoglobin 01/25/2023 14.6  12.0 - 15.9 g/dL Final   • Hematocrit 01/25/2023 44.5  34.0 - 46.6 % Final   • MCV 01/25/2023 91.9  79.0 - 97.0 fL Final   • MCH 01/25/2023 30.2  26.6 - 33.0 pg Final   • MCHC 01/25/2023 32.8  31.5 - 35.7 g/dL Final   • RDW 01/25/2023 12.6  12.3 - 15.4 % Final   • RDW-SD 01/25/2023 43.0  37.0 - 54.0 fl Final   • MPV 01/25/2023 9.4  6.0 - 12.0 fL Final   • Platelets 01/25/2023 284  140 - 450 10*3/mm3 Final   • Neutrophil % 01/25/2023 50.7  42.7 - 76.0 % Final   • Lymphocyte % 01/25/2023 36.9  19.6 - 45.3 % Final   • Monocyte % 01/25/2023 9.4  5.0 - 12.0 % Final   • Eosinophil % 01/25/2023 2.6  0.3 - 6.2 % Final   • Basophil % 01/25/2023 0.3  0.0 - 1.5 % Final   • Immature Grans % 01/25/2023 0.1  0.0 - 0.5 % Final   • Neutrophils, Absolute 01/25/2023 3.78  1.70 - 7.00 10*3/mm3 Final   • Lymphocytes, Absolute 01/25/2023 2.75  0.70 - 3.10 10*3/mm3 Final   • Monocytes, Absolute 01/25/2023 0.70  0.10 - 0.90 10*3/mm3 Final   • Eosinophils, Absolute 01/25/2023 0.19  0.00 - 0.40 10*3/mm3 Final   • Basophils, Absolute 01/25/2023 0.02  0.00 - 0.20 10*3/mm3 Final   • Immature Grans, Absolute 01/25/2023 0.01  0.00 - 0.05 10*3/mm3 Final        ASSESSMENT: The patient is a pleasant 77 y.o. female with DVT/PE.    PLAN:     1. History of DVT/PE:  A. We will continue Xarelto 15 mg daily indefinitely secondary to history of unprovoked severe PEs.   B. I reviewed the lab results from today with the patient. I reassured her that her blood counts are normal including hemoglobin, WBC, and platelet count. We will follow up on the CMP results.   C. We will continue annual  surveillance with plan to repeat her labs including CBC and CMP.   D. I advised she monitor for signs and symptoms of bleeding or clotting.   1. We will continue Xarelto however the patient could not afford Xarelto 10 mg daily.    E. I reviewed again our recommendation to stop Xarelto 2 days prior to surgical procedures and resume as soon as she is cleared by her surgeon.    2. Hypertension:  A. She will continue metoprolol and diltiazem for hypertension.   B. She will continue follow up with Dr. Medina for cardiology care.   C.  We'll continue Zocor 20 g daily for hypercholesterolemia.     3. Acid reflux:  A.  We'll continue Protonix 40 mg daily for heartburn.     4. Insomnia:  A. We will start her on Trazodone 50 mg at bedtime as needed for insomnia. She was given a new prescription for this today.   B. We reviewed good sleep hygiene practices including setting a planned bedtime and time for awakening.     FOLLOW UP: 1 year with repeat labs.     Cait Alcocer, APRN  1/25/2023

## 2023-02-20 RX ORDER — POTASSIUM CHLORIDE 750 MG/1
TABLET, FILM COATED, EXTENDED RELEASE ORAL
Qty: 90 TABLET | Refills: 0 | Status: SHIPPED | OUTPATIENT
Start: 2023-02-20

## 2023-02-20 RX ORDER — RIVAROXABAN 15 MG/1
TABLET, FILM COATED ORAL
Qty: 90 TABLET | Refills: 2 | Status: SHIPPED | OUTPATIENT
Start: 2023-02-20

## 2023-02-20 NOTE — TELEPHONE ENCOUNTER
Lab Results   Component Value Date    GLUCOSE 101 (H) 01/25/2023    BUN 19 01/25/2023    CREATININE 0.95 01/25/2023    EGFR 61.8 01/25/2023    BCR 20.0 01/25/2023    K 3.5 01/25/2023    CO2 29.0 01/25/2023    CALCIUM 9.2 01/25/2023    PROTENTOTREF 6.8 11/15/2022    ALBUMIN 4.3 01/25/2023    LABIL2 1.5 11/15/2022    AST 18 01/25/2023    ALT 10 01/25/2023

## 2023-03-22 DIAGNOSIS — F32.9 REACTIVE DEPRESSION: ICD-10-CM

## 2023-05-01 ENCOUNTER — TRANSCRIBE ORDERS (OUTPATIENT)
Dept: ADMINISTRATIVE | Facility: HOSPITAL | Age: 78
End: 2023-05-01
Payer: MEDICARE

## 2023-05-01 DIAGNOSIS — Z12.31 VISIT FOR SCREENING MAMMOGRAM: Primary | ICD-10-CM

## 2023-05-08 ENCOUNTER — HOSPITAL ENCOUNTER (OUTPATIENT)
Dept: MAMMOGRAPHY | Facility: HOSPITAL | Age: 78
Discharge: HOME | End: 2023-05-08
Admitting: INTERNAL MEDICINE
Payer: MEDICARE

## 2023-05-08 DIAGNOSIS — Z12.31 VISIT FOR SCREENING MAMMOGRAM: ICD-10-CM

## 2023-05-08 PROCEDURE — 77067 SCR MAMMO BI INCL CAD: CPT | Performed by: RADIOLOGY

## 2023-05-08 PROCEDURE — 77063 BREAST TOMOSYNTHESIS BI: CPT

## 2023-05-08 PROCEDURE — 77063 BREAST TOMOSYNTHESIS BI: CPT | Performed by: RADIOLOGY

## 2023-05-08 PROCEDURE — 77067 SCR MAMMO BI INCL CAD: CPT

## 2023-05-17 DIAGNOSIS — E78.2 MIXED HYPERLIPIDEMIA: ICD-10-CM

## 2023-05-17 RX ORDER — POTASSIUM CHLORIDE 750 MG/1
TABLET, FILM COATED, EXTENDED RELEASE ORAL
Qty: 90 TABLET | Refills: 1 | Status: SHIPPED | OUTPATIENT
Start: 2023-05-17

## 2023-05-17 RX ORDER — SIMVASTATIN 40 MG
TABLET ORAL
Qty: 90 TABLET | Refills: 2 | Status: SHIPPED | OUTPATIENT
Start: 2023-05-17

## 2023-05-17 NOTE — TELEPHONE ENCOUNTER
Lab Results   Component Value Date    CHOL 154 11/15/2022    CHLPL 156 06/04/2020    TRIG 89 11/15/2022    HDL 74 (H) 11/15/2022    LDL 64 11/15/2022     Lab Results   Component Value Date    GLUCOSE 101 (H) 01/25/2023    BUN 19 01/25/2023    CREATININE 0.95 01/25/2023    EGFR 61.8 01/25/2023    BCR 20.0 01/25/2023    K 3.5 01/25/2023    CO2 29.0 01/25/2023    CALCIUM 9.2 01/25/2023    PROTENTOTREF 6.8 11/15/2022    ALBUMIN 4.3 01/25/2023    BILITOT 0.8 01/25/2023    AST 18 01/25/2023    ALT 10 01/25/2023

## 2023-05-30 ENCOUNTER — OFFICE VISIT (OUTPATIENT)
Dept: INTERNAL MEDICINE | Facility: CLINIC | Age: 78
End: 2023-05-30

## 2023-05-30 ENCOUNTER — TELEPHONE (OUTPATIENT)
Dept: INTERNAL MEDICINE | Facility: CLINIC | Age: 78
End: 2023-05-30

## 2023-05-30 VITALS
TEMPERATURE: 98.9 F | DIASTOLIC BLOOD PRESSURE: 68 MMHG | HEIGHT: 62 IN | HEART RATE: 68 BPM | BODY MASS INDEX: 29.44 KG/M2 | WEIGHT: 160 LBS | RESPIRATION RATE: 16 BRPM | SYSTOLIC BLOOD PRESSURE: 128 MMHG

## 2023-05-30 DIAGNOSIS — Z00.00 MEDICARE ANNUAL WELLNESS VISIT, SUBSEQUENT: Primary | ICD-10-CM

## 2023-05-30 DIAGNOSIS — G47.21 CIRCADIAN RHYTHM SLEEP DISORDER, DELAYED SLEEP PHASE TYPE: ICD-10-CM

## 2023-05-30 DIAGNOSIS — I10 PRIMARY HYPERTENSION: ICD-10-CM

## 2023-05-30 DIAGNOSIS — E78.2 MIXED HYPERLIPIDEMIA: ICD-10-CM

## 2023-05-30 DIAGNOSIS — F51.04 PSYCHOPHYSIOLOGICAL INSOMNIA: ICD-10-CM

## 2023-05-30 DIAGNOSIS — F32.9 REACTIVE DEPRESSION: ICD-10-CM

## 2023-05-30 DIAGNOSIS — M85.852 OSTEOPENIA OF NECK OF LEFT FEMUR: ICD-10-CM

## 2023-05-30 DIAGNOSIS — Z12.11 COLON CANCER SCREENING: ICD-10-CM

## 2023-05-30 PROBLEM — U07.1 PNEUMONIA DUE TO COVID-19 VIRUS: Status: RESOLVED | Noted: 2022-01-21 | Resolved: 2023-05-30

## 2023-05-30 PROBLEM — J12.82 PNEUMONIA DUE TO COVID-19 VIRUS: Status: RESOLVED | Noted: 2022-01-21 | Resolved: 2023-05-30

## 2023-05-30 PROBLEM — Z86.718 HISTORY OF DVT (DEEP VEIN THROMBOSIS): Status: RESOLVED | Noted: 2019-12-19 | Resolved: 2023-05-30

## 2023-05-30 PROBLEM — D89.832 CYTOKINE RELEASE SYNDROME, GRADE 2: Status: RESOLVED | Noted: 2021-09-09 | Resolved: 2023-05-30

## 2023-05-30 PROBLEM — J96.01 ACUTE HYPOXEMIC RESPIRATORY FAILURE DUE TO COVID-19: Status: RESOLVED | Noted: 2022-01-21 | Resolved: 2023-05-30

## 2023-05-30 PROBLEM — U07.1 ACUTE HYPOXEMIC RESPIRATORY FAILURE DUE TO COVID-19: Status: RESOLVED | Noted: 2022-01-21 | Resolved: 2023-05-30

## 2023-05-30 LAB
ALBUMIN SERPL-MCNC: 4.4 G/DL (ref 3.5–5.2)
ALBUMIN/GLOB SERPL: 1.8 G/DL
ALP SERPL-CCNC: 98 U/L (ref 39–117)
ALT SERPL W P-5'-P-CCNC: 10 U/L (ref 1–33)
ANION GAP SERPL CALCULATED.3IONS-SCNC: 12 MMOL/L (ref 5–15)
AST SERPL-CCNC: 21 U/L (ref 1–32)
BASOPHILS # BLD AUTO: 0.06 10*3/MM3 (ref 0–0.2)
BASOPHILS NFR BLD AUTO: 0.7 % (ref 0–1.5)
BILIRUB SERPL-MCNC: 0.7 MG/DL (ref 0–1.2)
BUN SERPL-MCNC: 17 MG/DL (ref 8–23)
BUN/CREAT SERPL: 13.1 (ref 7–25)
CALCIUM SPEC-SCNC: 9.5 MG/DL (ref 8.6–10.5)
CHLORIDE SERPL-SCNC: 101 MMOL/L (ref 98–107)
CHOLEST SERPL-MCNC: 152 MG/DL (ref 0–200)
CO2 SERPL-SCNC: 31 MMOL/L (ref 22–29)
CREAT SERPL-MCNC: 1.3 MG/DL (ref 0.57–1)
DEPRECATED RDW RBC AUTO: 40 FL (ref 37–54)
EGFRCR SERPLBLD CKD-EPI 2021: 42.4 ML/MIN/1.73
EOSINOPHIL # BLD AUTO: 0.23 10*3/MM3 (ref 0–0.4)
EOSINOPHIL NFR BLD AUTO: 2.6 % (ref 0.3–6.2)
ERYTHROCYTE [DISTWIDTH] IN BLOOD BY AUTOMATED COUNT: 12.2 % (ref 12.3–15.4)
GLOBULIN UR ELPH-MCNC: 2.5 GM/DL
GLUCOSE SERPL-MCNC: 96 MG/DL (ref 65–99)
HCT VFR BLD AUTO: 45.3 % (ref 34–46.6)
HDLC SERPL-MCNC: 62 MG/DL (ref 40–60)
HGB BLD-MCNC: 15.1 G/DL (ref 12–15.9)
IMM GRANULOCYTES # BLD AUTO: 0.01 10*3/MM3 (ref 0–0.05)
IMM GRANULOCYTES NFR BLD AUTO: 0.1 % (ref 0–0.5)
LDLC SERPL CALC-MCNC: 64 MG/DL (ref 0–100)
LDLC/HDLC SERPL: 0.96 {RATIO}
LYMPHOCYTES # BLD AUTO: 2.36 10*3/MM3 (ref 0.7–3.1)
LYMPHOCYTES NFR BLD AUTO: 26.7 % (ref 19.6–45.3)
MCH RBC QN AUTO: 30.3 PG (ref 26.6–33)
MCHC RBC AUTO-ENTMCNC: 33.3 G/DL (ref 31.5–35.7)
MCV RBC AUTO: 90.8 FL (ref 79–97)
MONOCYTES # BLD AUTO: 0.74 10*3/MM3 (ref 0.1–0.9)
MONOCYTES NFR BLD AUTO: 8.4 % (ref 5–12)
NEUTROPHILS NFR BLD AUTO: 5.44 10*3/MM3 (ref 1.7–7)
NEUTROPHILS NFR BLD AUTO: 61.5 % (ref 42.7–76)
NRBC BLD AUTO-RTO: 0 /100 WBC (ref 0–0.2)
PLATELET # BLD AUTO: 353 10*3/MM3 (ref 140–450)
PMV BLD AUTO: 9.9 FL (ref 6–12)
POTASSIUM SERPL-SCNC: 4 MMOL/L (ref 3.5–5.2)
PROT SERPL-MCNC: 6.9 G/DL (ref 6–8.5)
RBC # BLD AUTO: 4.99 10*6/MM3 (ref 3.77–5.28)
SODIUM SERPL-SCNC: 144 MMOL/L (ref 136–145)
TRIGL SERPL-MCNC: 151 MG/DL (ref 0–150)
VLDLC SERPL-MCNC: 26 MG/DL (ref 5–40)
WBC NRBC COR # BLD: 8.84 10*3/MM3 (ref 3.4–10.8)

## 2023-05-30 PROCEDURE — 85025 COMPLETE CBC W/AUTO DIFF WBC: CPT | Performed by: INTERNAL MEDICINE

## 2023-05-30 PROCEDURE — 36415 COLL VENOUS BLD VENIPUNCTURE: CPT | Performed by: INTERNAL MEDICINE

## 2023-05-30 PROCEDURE — 3078F DIAST BP <80 MM HG: CPT | Performed by: INTERNAL MEDICINE

## 2023-05-30 PROCEDURE — 1170F FXNL STATUS ASSESSED: CPT | Performed by: INTERNAL MEDICINE

## 2023-05-30 PROCEDURE — 80061 LIPID PANEL: CPT | Performed by: INTERNAL MEDICINE

## 2023-05-30 PROCEDURE — G0439 PPPS, SUBSEQ VISIT: HCPCS | Performed by: INTERNAL MEDICINE

## 2023-05-30 PROCEDURE — 3074F SYST BP LT 130 MM HG: CPT | Performed by: INTERNAL MEDICINE

## 2023-05-30 PROCEDURE — 80053 COMPREHEN METABOLIC PANEL: CPT | Performed by: INTERNAL MEDICINE

## 2023-05-30 RX ORDER — TRAZODONE HYDROCHLORIDE 50 MG/1
50-100 TABLET ORAL NIGHTLY
Qty: 90 TABLET | Refills: 6 | Status: SHIPPED | OUTPATIENT
Start: 2023-05-30

## 2023-05-30 RX ORDER — SERTRALINE HYDROCHLORIDE 100 MG/1
100 TABLET, FILM COATED ORAL DAILY
Qty: 90 TABLET | Refills: 2 | Status: SHIPPED | OUTPATIENT
Start: 2023-05-30

## 2023-05-30 NOTE — PROGRESS NOTES
The ABCs of the Annual Wellness Visit  Subsequent Medicare Wellness Visit    Subjective    Carol Jean is a 77 y.o. female who presents for a Subsequent Medicare Wellness Visit.    The following portions of the patient's history were reviewed and   updated as appropriate: allergies, current medications, past family history, past medical history, past social history, past surgical history and problem list.    Compared to one year ago, the patient feels her physical   health is the same.    Compared to one year ago, the patient feels her mental   health is the same.    Recent Hospitalizations:  She was not admitted to the hospital during the last year.       Current Medical Providers:  Patient Care Team:  Flakito Chaudhary MD as PCP - General (Internal Medicine)  Josh, Noe Garnica MD as Consulting Physician (Nephrology)  Aisha Medina MD as Consulting Physician (Cardiology)  Amanda Ashby MD as Consulting Physician (Hematology and Oncology)  Edmund Greenberg MD as Emergency Attending (Pulmonary Disease)  Cait Alcocer APRN as Nurse Practitioner (Hematology and Oncology)    Outpatient Medications Prior to Visit   Medication Sig Dispense Refill   • acetaminophen (TYLENOL) 325 MG tablet Take 2 tablets by mouth Every 4 (Four) Hours As Needed for Mild Pain .     • chlorthalidone (HYGROTON) 25 MG tablet Take 1/2 (one-half) tablet by mouth once daily 45 tablet 2   • Cholecalciferol (VITAMIN D-3) 5000 units tablet Take 1 tablet by mouth Daily. OTC     • dilTIAZem (TIAZAC) 240 MG 24 hr capsule Take 1 capsule by mouth Daily. 90 capsule 1   • Glucosamine HCl (GLUCOSAMINE PO) Take 2 tablets by mouth Daily. As directed, OTC     • metoprolol tartrate (LOPRESSOR) 25 MG tablet Take 1 tablet by mouth 2 (Two) Times a Day. 180 tablet 3   • pantoprazole (PROTONIX) 40 MG EC tablet Take 1 tablet by mouth once daily 90 tablet 1   • potassium chloride 10 MEQ CR tablet Take 1 tablet by mouth once daily  "90 tablet 1   • sertraline (ZOLOFT) 50 MG tablet Take 1 tablet by mouth Daily. 90 tablet 1   • simvastatin (ZOCOR) 40 MG tablet TAKE 1 TABLET BY MOUTH AT NIGHT 90 tablet 2   • traZODone (DESYREL) 50 MG tablet Take 1 tablet by mouth Every Night. Take one tablet one hour prior to bedtime 30 tablet 6   • vitamin C (ASCORBIC ACID) 500 MG tablet Take 2 tablets by mouth Daily. OTC     • Xarelto 15 MG tablet Take 1 tablet by mouth once daily 90 tablet 2   • Zinc 40 MG tablet Take  by mouth Daily. OTC       No facility-administered medications prior to visit.       No opioid medication identified on active medication list. I have reviewed chart for other potential  high risk medication/s and harmful drug interactions in the elderly.          Aspirin is not on active medication list.  Aspirin use is not indicated based on review of current medical condition/s. Risk of harm outweighs potential benefits.  .    Patient Active Problem List   Diagnosis   • History of pulmonary embolism   • Hypertension   • Acute pulmonary embolism   • DVT (deep venous thrombosis)   • Circadian rhythm sleep disorder, delayed sleep phase type   • Psychophysiological insomnia   • Hyperlipidemia   • Calculus of kidney   • Cobalamin deficiency   • History of DVT (deep vein thrombosis)   • Cytokine release syndrome, grade 2   • Acute hypoxemic respiratory failure due to COVID-19   • Pneumonia due to COVID-19 virus   • Chronic anticoagulation (Xarelto)     Advance Care Planning   Advance Care Planning     Advance Directive is on file.  ACP discussion was held with the patient during this visit. Patient has an advance directive in EMR which is still valid.      Objective    Vitals:    05/30/23 0926   BP: 128/68   BP Location: Left arm   Patient Position: Sitting   Cuff Size: Adult   Pulse: 68   Resp: 16   Temp: 98.9 °F (37.2 °C)   TempSrc: Infrared   Weight: 72.6 kg (160 lb)   Height: 156.2 cm (61.5\")   PainSc: 0-No pain     Estimated body mass index is " "29.74 kg/m² as calculated from the following:    Height as of this encounter: 156.2 cm (61.5\").    Weight as of this encounter: 72.6 kg (160 lb).    BMI is >= 25 and <30. (Overweight) The following options were offered after discussion;: weight loss educational material (shared in after visit summary), exercise counseling/recommendations and nutrition counseling/recommendations    Finger Rub Hearing{Test (right ear):passed  Finger Rub Hearing{Test (left ear):passed      Does the patient have evidence of cognitive impairment?   No            HEALTH RISK ASSESSMENT    Smoking Status:  Social History     Tobacco Use   Smoking Status Never   Smokeless Tobacco Never   Tobacco Comments    None     Alcohol Consumption:  Social History     Substance and Sexual Activity   Alcohol Use Yes   • Alcohol/week: 2.0 standard drinks   • Types: 2 Glasses of wine per week    Comment: occas     Fall Risk Screen:    QUENTIN Fall Risk Assessment was completed, and patient is at LOW risk for falls.Assessment completed on:2023    Depression Screenin/30/2023     9:30 AM   PHQ-2/PHQ-9 Depression Screening   Little Interest or Pleasure in Doing Things 0-->not at all   Feeling Down, Depressed or Hopeless 0-->not at all   PHQ-9: Brief Depression Severity Measure Score 0       Health Habits and Functional and Cognitive Screenin/30/2023     9:29 AM   Functional & Cognitive Status   Do you have difficulty preparing food and eating? No   Do you have difficulty bathing yourself, getting dressed or grooming yourself? No   Do you have difficulty using the toilet? No   Do you have difficulty moving around from place to place? No   Do you have trouble with steps or getting out of a bed or a chair? No   Current Diet Well Balanced Diet   Dental Exam Not up to date   Eye Exam Up to date   Exercise (times per week) 2 times per week   Current Exercises Include Walking   Do you need help using the phone?  No   Are you deaf or do you have " serious difficulty hearing?  No   Do you need help with transportation? No   Do you need help shopping? No   Do you need help preparing meals?  No   Do you need help with housework?  No   Do you need help with laundry? No   Do you need help taking your medications? No   Do you need help managing money? No   Do you ever drive or ride in a car without wearing a seat belt? No   Have you felt unusual stress, anger or loneliness in the last month? No   Who do you live with? Spouse   If you need help, do you have trouble finding someone available to you? No   Have you been bothered in the last four weeks by sexual problems? No   Do you have difficulty concentrating, remembering or making decisions? Yes       Age-appropriate Screening Schedule:  Refer to the list below for future screening recommendations based on patient's age, sex and/or medical conditions. Orders for these recommended tests are listed in the plan section. The patient has been provided with a written plan.    Health Maintenance   Topic Date Due   • TDAP/TD VACCINES (1 - Tdap) Never done   • ZOSTER VACCINE (1 of 2) Never done   • COVID-19 Vaccine (5 - Booster for Pfizer series) 07/31/2022   • COLORECTAL CANCER SCREENING  11/30/2022   • DXA SCAN  05/13/2023   • ANNUAL WELLNESS VISIT  05/23/2023   • INFLUENZA VACCINE  08/01/2023   • LIPID PANEL  11/15/2023   • MAMMOGRAM  05/08/2025   • HEPATITIS C SCREENING  Completed   • Pneumococcal Vaccine 65+  Completed                  CMS Preventative Services Quick Reference  Risk Factors Identified During Encounter:    Immunizations Discussed/Encouraged: Tdap, Shingrix and COVID19    The above risks/problems have been discussed with the patient.  Pertinent information has been shared with the patient in the After Visit Summary.    Diagnoses and all orders for this visit:    1. Medicare annual wellness visit, subsequent (Primary)    2. Colon cancer screening  -     Cologuard - Stool, Per Rectum; Future    3. Primary  hypertension  -     Comprehensive metabolic panel; Future  -     CBC w AUTO Differential; Future    4. Mixed hyperlipidemia  -     Lipid panel; Future  -     Comprehensive metabolic panel; Future    5. Psychophysiological insomnia  -     traZODone (DESYREL) 50 MG tablet; Take 1-2 tablets by mouth Every Night. Take one tablet one hour prior to bedtime  Dispense: 90 tablet; Refill: 6    6. Circadian rhythm sleep disorder, delayed sleep phase type  -     traZODone (DESYREL) 50 MG tablet; Take 1-2 tablets by mouth Every Night. Take one tablet one hour prior to bedtime  Dispense: 90 tablet; Refill: 6    7. Reactive depression  -     sertraline (ZOLOFT) 100 MG tablet; Take 1 tablet by mouth Daily.  Dispense: 90 tablet; Refill: 2    8. Osteopenia of neck of left femur  -     DEXA Bone Density Axial; Future        Follow Up:   Next Medicare Wellness visit to be scheduled in 1 year.      An After Visit Summary and PPPS were made available to the patient.

## 2023-05-30 NOTE — PATIENT INSTRUCTIONS
Medicare Wellness  Personal Prevention Plan of Service     Date of Office Visit:    Encounter Provider:  Flakito Chaudhary MD  Place of Service:  [facility]  Patient Name: Carol Jean  :  1945    As part of the Medicare Wellness portion of your visit today, we are providing you with this personalized preventive plan of services (PPPS). This plan is based upon recommendations of the [place] Preventive Services Task Force (USPSTF) and the Advisory Committee on Immunization Practices (ACIP).    This lists the preventive care services that should be considered, and provides dates of when you are due. Items listed as completed are up-to-date and do not require any further intervention.    Health Maintenance   Topic Date Due    TDAP/TD VACCINES (1 - Tdap) Never done    ZOSTER VACCINE (1 of 2) Never done    COVID-19 Vaccine (5 - Booster for Pfizer series) 2022    COLORECTAL CANCER SCREENING  2022    DXA SCAN  2023    INFLUENZA VACCINE  2023    LIPID PANEL  11/15/2023    ANNUAL WELLNESS VISIT  2024    MAMMOGRAM  2025    HEPATITIS C SCREENING  Completed    Pneumococcal Vaccine 65+  Completed       Orders Placed This Encounter   Procedures    DEXA Bone Density Axial     Standing Status:   Future     Standing Expiration Date:   2024     Order Specific Question:   Is patient taking or have taken long term Glucocorticoid (steroids)?     Answer:   No     Order Specific Question:   Does the patient have rheumatoid arthritis?     Answer:   No     Order Specific Question:   Does the patient have secondary osteoporosis?     Answer:   No     Order Specific Question:   Reason for Exam:     Answer:   left hip osteopenia, follow up     Order Specific Question:   Does this patient have a diabetic monitoring/medication delivering device on?     Answer:   No     Order Specific Question:   Release to patient     Answer:    Routine Release    Cologuard - Stool, Per Rectum     Standing Status:   Future     Standing Expiration Date:   5/30/2024     Order Specific Question:   Release to patient     Answer:   Routine Release    Lipid panel     Standing Status:   Future     Standing Expiration Date:   5/30/2024     Order Specific Question:   Release to patient     Answer:   Routine Release    Comprehensive metabolic panel     Standing Status:   Future     Standing Expiration Date:   5/30/2024     Order Specific Question:   Release to patient     Answer:   Routine Release    CBC w AUTO Differential     Standing Status:   Future     Standing Expiration Date:   5/30/2024     Order Specific Question:   Manual Differential     Answer:   No       Return in about 6 months (around 11/30/2023) for Follow-up.

## 2023-05-30 NOTE — TELEPHONE ENCOUNTER
Caller: [facility] [address] [place] [address] [phone] [facility] [phone] FX    Relationship: Pharmacy        What was the call regarding: traZODone (DESYREL) 50 MG tableT    1-2 AT NIGHT, OR JUST ONE A DAY. PLEASE CLARIFY.

## 2023-05-30 NOTE — PROGRESS NOTES
"Med-Peds Progress Note     Subjective    Chief Complaint: follow up, annual.   Independent Historian(s): Patient.    Used: N/A.     HPI    has depression. She has been on sertraline >4 months. It provided mild benefit but she states her interest and motivation are poor especially since she went through COVID a little over a year ago. She denies helplessness, hopelessness, SI/HI. She denies worrying, running thoughts, panic attacks, or other anxiety problems. She takes the sertraline as prescribed and denies any adverse effects. She also has a lot of trouble sleeping. The trazodone helps maybe 50-75% of the time. She just stays awake some nights. COVID seemed to mess with her sleep schedule. She does not snore. She does not have nightmares, sleepwalk, or waking in the middle of the night.    She has high blood pressure. She takes her medication as prescribed. She checks it at home. It ranges 110s/70s. She denies medication concerns. She no longer is seeing cardiology. She denies headaches, dizziness, light headedness, tinnitus, vision changes, palpitations, chest pain, dyspnea, cough, abdominal pain, nausea, vomiting, diarrhea, or constipation.    She has high cholesterol. She takes her medication as prescribed. She denies fatigue, memory loss, weakness, cramps, or muscle aches. She exercises 3-4 days a week. She eats a diverse diet.     ROS  Refer to HPI above for pertinent positives and negatives.     The following components of the patient's history have been personally reviewed and updated where appropriate: past medical history, allergies, surgical history, social history, family history, and medications. Pertinent findings or changes can be found in the HPI or A/P in this note.        Objective     /68 (BP Location: Left arm, Patient Position: Sitting, Cuff Size: Adult)   Pulse 68   Temp 98.9 °F (37.2 °C) (Infrared)   Resp 16   Ht 156.2 cm (61.5\")   Wt 72.6 kg (160 lb)   LMP  (LMP " Unknown)   BMI 29.74 kg/m²       Physical Exam  General: no acute distress, overweight   Neuro: awake, alert, moves all 4 extremities   Psych: euthymic, full affect   HEENT: moist mucous membranes, conjunctivae moist and pink   Cardiac: audible S1 and S2, no murmur, RRR   Respiratory: equal chest rise and fall, lungs clear bilaterally   GI: soft, non-distended, normoactive BS   Msk: no lower extremity edema, normal tone   Skin: warm, dry, numerous seborrheic keratoses, a single irregular erythematous patchy scaly lesion on left chest   Breast exam: no palpable mass or nodularity, skin normal, no nipple discharge expressed, no axillary lymphadenopathy bilaterally       Assessment & Plan    Summary:  is a 77 y.o. female with primary hypertension, mixed hyperlipidemia, hx recurrent DVT and massive PE, depression with insomnia.    Reactive depression with insomnia  Chronic. Uncontrolled. Moderate. No SI/HI. Main features of anhedonia and no motivation.  PLAN   Increase sertraline to 100 mg PO daily.  After the sertraline increase of 2 weeks, increase trazodone to 100 mg PO nightly.    Chronic, stable, controlled conditions  # Mixed hyperlipidemia: check lipid panel, simvastatin 40 mg PO daily.  # Recurrent DVT and PE: on lifelong Xarelto 15 mg PO daily.  # GERD: pantoprazole 40 mg PO daily.  # Primary hypertension: check CBCd and CMP, chlorthalidone 12.5 mg PO daily, diltiazem 250 mg PO daily, and metoprolol tartrate 25 mg PO BID    Healthcare Maintenance   Denies smoking, alcohol, or other substance use.  Immunizations personally reviewed. Shingrix #1 and TDaP at pharmacy.  Hx left femoral neck osteopenia. Ordered follow up DEXA. Continue vitamin D supplement.  Ordered Cologuard. Mammogram up to date. Pap smears no longer indicated.  BMI is >= 25 and <30. (Overweight) The following options were offered after discussion;: weight loss educational material (shared in after visit summary), exercise  counseling/recommendations and nutrition counseling/recommendations    Follow up in 6 months.          Gabby Kahler, MD  Med Peds PGY3

## 2023-05-30 NOTE — TELEPHONE ENCOUNTER
It is  mg nightly    She can take 1 and if no relief can take a second.  Flakito Chaudhary MD  13:47 EDT  05/30/23

## 2023-05-30 NOTE — TELEPHONE ENCOUNTER
"Spoke with Alberto, Pharm Intern, informed that Dr Chaudhary said \"It is  mg nightly  She can take 1 and if no relief can take a second.\"  Verbalized understanding and agreement.  States he will update the sig.   "

## 2023-06-11 DIAGNOSIS — I10 ESSENTIAL HYPERTENSION: ICD-10-CM

## 2023-06-12 RX ORDER — DILTIAZEM HYDROCHLORIDE 240 MG/1
240 CAPSULE, EXTENDED RELEASE ORAL DAILY
Qty: 30 CAPSULE | Refills: 0 | Status: SHIPPED | OUTPATIENT
Start: 2023-06-12

## 2023-07-26 ENCOUNTER — HOSPITAL ENCOUNTER (OUTPATIENT)
Dept: BONE DENSITY | Facility: HOSPITAL | Age: 78
Discharge: HOME | End: 2023-07-26
Admitting: INTERNAL MEDICINE
Payer: MEDICARE

## 2023-07-26 DIAGNOSIS — M85.852 OSTEOPENIA OF NECK OF LEFT FEMUR: ICD-10-CM

## 2023-07-26 PROCEDURE — 77080 DXA BONE DENSITY AXIAL: CPT

## 2023-07-31 RX ORDER — ALENDRONATE SODIUM 70 MG/1
70 TABLET ORAL
Qty: 4 TABLET | Refills: 11 | Status: SHIPPED | OUTPATIENT
Start: 2023-07-31

## 2023-08-10 DIAGNOSIS — I10 ESSENTIAL HYPERTENSION: ICD-10-CM

## 2023-08-10 RX ORDER — CHLORTHALIDONE 25 MG/1
TABLET ORAL
Qty: 45 TABLET | Refills: 3 | Status: SHIPPED | OUTPATIENT
Start: 2023-08-10

## 2023-08-10 NOTE — TELEPHONE ENCOUNTER
Lab Results   Component Value Date    GLUCOSE 96 05/30/2023    BUN 17 05/30/2023    CREATININE 1.30 (H) 05/30/2023    EGFR 42.4 (L) 05/30/2023    BCR 13.1 05/30/2023    K 4.0 05/30/2023    CO2 31.0 (H) 05/30/2023    CALCIUM 9.5 05/30/2023    PROTENTOTREF 6.8 11/15/2022    ALBUMIN 4.4 05/30/2023    BILITOT 0.7 05/30/2023    AST 21 05/30/2023    ALT 10 05/30/2023

## 2023-08-21 ENCOUNTER — OFFICE VISIT (OUTPATIENT)
Dept: ORTHOPEDIC SURGERY | Facility: CLINIC | Age: 78
End: 2023-08-21
Payer: MEDICARE

## 2023-08-21 VITALS
BODY MASS INDEX: 28.52 KG/M2 | DIASTOLIC BLOOD PRESSURE: 80 MMHG | WEIGHT: 155 LBS | HEIGHT: 62 IN | SYSTOLIC BLOOD PRESSURE: 128 MMHG

## 2023-08-21 DIAGNOSIS — M17.12 PRIMARY OSTEOARTHRITIS OF LEFT KNEE: Primary | ICD-10-CM

## 2023-08-21 PROCEDURE — 20610 DRAIN/INJ JOINT/BURSA W/O US: CPT | Performed by: ORTHOPAEDIC SURGERY

## 2023-08-21 PROCEDURE — 3074F SYST BP LT 130 MM HG: CPT | Performed by: ORTHOPAEDIC SURGERY

## 2023-08-21 PROCEDURE — 99214 OFFICE O/P EST MOD 30 MIN: CPT | Performed by: ORTHOPAEDIC SURGERY

## 2023-08-21 PROCEDURE — 1159F MED LIST DOCD IN RCRD: CPT | Performed by: ORTHOPAEDIC SURGERY

## 2023-08-21 PROCEDURE — 3079F DIAST BP 80-89 MM HG: CPT | Performed by: ORTHOPAEDIC SURGERY

## 2023-08-21 PROCEDURE — 1160F RVW MEDS BY RX/DR IN RCRD: CPT | Performed by: ORTHOPAEDIC SURGERY

## 2023-08-21 RX ORDER — CITALOPRAM 20 MG/1
TABLET ORAL
COMMUNITY

## 2023-08-21 RX ORDER — TRIAMCINOLONE ACETONIDE 40 MG/ML
40 INJECTION, SUSPENSION INTRA-ARTICULAR; INTRAMUSCULAR
Status: COMPLETED | OUTPATIENT
Start: 2023-08-21 | End: 2023-08-21

## 2023-08-21 RX ORDER — ROPIVACAINE HYDROCHLORIDE 5 MG/ML
4 INJECTION, SOLUTION EPIDURAL; INFILTRATION; PERINEURAL
Status: COMPLETED | OUTPATIENT
Start: 2023-08-21 | End: 2023-08-21

## 2023-08-21 RX ORDER — DILTIAZEM HYDROCHLORIDE 240 MG/1
CAPSULE, COATED, EXTENDED RELEASE ORAL
COMMUNITY
End: 2023-08-21 | Stop reason: SDUPTHER

## 2023-08-21 RX ADMIN — TRIAMCINOLONE ACETONIDE 40 MG: 40 INJECTION, SUSPENSION INTRA-ARTICULAR; INTRAMUSCULAR at 09:55

## 2023-08-21 RX ADMIN — ROPIVACAINE HYDROCHLORIDE 4 ML: 5 INJECTION, SOLUTION EPIDURAL; INFILTRATION; PERINEURAL at 09:55

## 2023-08-21 NOTE — PROGRESS NOTES
Procedure   - Large Joint Arthrocentesis: L knee on 8/21/2023 9:55 AM  Indications: pain  Details: 21 G needle, anterolateral approach  Medications: 40 mg triamcinolone acetonide 40 MG/ML; 4 mL ropivacaine 0.5 %  Outcome: tolerated well, no immediate complications  Procedure, treatment alternatives, risks and benefits explained, specific risks discussed. Consent was given by the patient. Immediately prior to procedure a time out was called to verify the correct patient, procedure, equipment, support staff and site/side marked as required. Patient was prepped and draped in the usual sterile fashion.

## 2023-08-21 NOTE — PROGRESS NOTES
"    [facility] Note    Subjective     Chief Complaint   Patient presents with    Follow-up     2 year follow up - Primary osteoarthritis of left knee        HPI    It has been 2  year(s) since Ms. Jean's last visit. She returns to clinic today for follow-up of left knee pain. The issue has been ongoing for 3 week(s). She rates her pain a 5/10 on the pain scale. Previous/current treatments: NSAIDS. Current symptoms: pain, popping, and giving way/buckling. The pain is worse with walking; resting and pain medication and/or NSAID improve the pain. Overall, she is doing the same.  She is not interested in knee replacement surgery.    I have reviewed the following portions of the patient's history and agree with: History of Present Illness and Review of Systems    Patient Active Problem List   Diagnosis    Primary hypertension    Healed or old pulmonary embolism    Circadian rhythm sleep disorder, delayed sleep phase type    Psychophysiological insomnia    Mixed hyperlipidemia    Calculus of kidney    Cobalamin deficiency    Chronic anticoagulation (Xarelto)    Reactive depression    Osteopenia of neck of left femur     Past Medical History:   Diagnosis Date    Acute deep vein thrombosis of lower extremity     Acute pulmonary embolism 11/22/2016    Bilateral, 09/30/2012 Initiation of CPR per family. EMS transport to [facility]. Restoration of rhythm and blood pressure at [facility]. "Shock" manifest by hypotension and multiorgan failure:  Transient hepatic dysfunction, resolved.  Transient nonoliguric ATN, resolved.  Transient metabolic acidosis, resolved.  Pressor support. Bilateral     Allergic Yrs ago!    To.pain meds u have a list!    Arthritis     Arthritis of back Bone scan few wks ago    Bladder problem     Blood clotting disorder     Bone pain     Depression Yrs ago    DVT (deep venous thrombosis)     Easy bruising     H/O bone " "density study 2012    H/O colonoscopy 2012    H/O mammogram 2012    HBP (high blood pressure)     History of blood transfusion     Hypertension 2016    Kidney stones     Synovial cyst of popliteal space     Upper gastrointestinal bleeding     gastritis related to lytic therapy and heparins, resolved: a. "Hemorrhagic gastritis."    Vitamin B12 deficiency       Past Surgical History:   Procedure Laterality Date    BILATERAL BREAST REDUCTION      CATARACT EXTRACTION, BILATERAL       SECTION  1971     SECTION  1975    COLONOSCOPY      EYE SURGERY      Cataracts  2018    KIDNEY STONE SURGERY      Nephrolithiasis with lithotripsy.    OTHER SURGICAL HISTORY      barry filter placement in [place]    REDUCTION MAMMAPLASTY Bilateral     TONSILLECTOMY      TRIGGER POINT INJECTION  2yrs ago      Family History   Problem Relation Age of Onset    Stroke Mother     Hypertension Mother     Arthritis Mother     Hyperlipidemia Mother     Heart attack Father     COPD Father     Thyroid disease Father     Heart disease Father     Stroke Other     Coronary artery disease Other     COPD Other     Stroke Other     COPD Other     Breast cancer Maternal Aunt     Ovarian cancer Neg Hx      Social History     Socioeconomic History    Marital status:    Tobacco Use    Smoking status: Never    Smokeless tobacco: Never    Tobacco comments:     None   Vaping Use    Vaping Use: Never used   Substance and Sexual Activity    Alcohol use: Yes     Alcohol/week: 2.0 standard drinks     Types: 2 Glasses of wine per week     Comment: occas    Drug use: No    Sexual activity: Not Currently     Birth control/protection: Surgical, Other     Comment: Tubes tied      Current Outpatient Medications on File Prior to Visit   Medication Sig Dispense Refill    acetaminophen (TYLENOL) 325 MG tablet Take 2 tablets by mouth Every 4 (Four) Hours As Needed for Mild Pain .      alendronate (Fosamax) 70 MG tablet Take 1 " "tablet by mouth Every 7 (Seven) Days. 4 tablet 11    chlorthalidone (HYGROTON) 25 MG tablet Take 1/2 (one-half) tablet by mouth once daily 45 tablet 3    citalopram (CeleXA) 20 MG tablet       dilTIAZem (TIAZAC) 240 MG 24 hr capsule Take 1 capsule by mouth once daily 30 capsule 0    Glucosamine HCl (GLUCOSAMINE PO) Take 2 tablets by mouth Daily. As directed, OTC      metoprolol tartrate (LOPRESSOR) 25 MG tablet Take 1 tablet by mouth 2 (Two) Times a Day. 180 tablet 3    pantoprazole (PROTONIX) 40 MG EC tablet Take 1 tablet by mouth once daily 90 tablet 0    potassium chloride 10 MEQ CR tablet Take 1 tablet by mouth once daily 90 tablet 1    sertraline (ZOLOFT) 100 MG tablet Take 1 tablet by mouth Daily. 90 tablet 2    simvastatin (ZOCOR) 40 MG tablet TAKE 1 TABLET BY MOUTH AT NIGHT 90 tablet 2    traZODone (DESYREL) 50 MG tablet Take 1-2 tablets by mouth Every Night. Take one tablet one hour prior to bedtime 90 tablet 6    vitamin C (ASCORBIC ACID) 500 MG tablet Take 2 tablets by mouth Daily. OTC      vitamin D3 125 MCG (5000 UT) capsule capsule Take by oral route.      Xarelto 15 MG tablet Take 1 tablet by mouth once daily 90 tablet 2    Zinc 40 MG tablet Take  by mouth Daily. OTC      [DISCONTINUED] Cholecalciferol (VITAMIN D-3) 5000 units tablet Take 1 tablet by mouth Daily. OTC      [DISCONTINUED] dilTIAZem CD (Cartia XT) 240 MG 24 hr capsule        No current facility-administered medications on file prior to visit.      Allergies   Allergen Reactions    Erythromycin Swelling     \"tongue swelling\".    Warfarin Other (See Comments)     Reoccurrence of clot while on warfarin     Codeine Sulfate Swelling    Meperidine Swelling    Morphine And Related Swelling    Penicillins Swelling     \"swelling\" at site.    Sulfa Antibiotics Swelling    Sulfadiazine Swelling        Review of Systems   Constitutional:  Negative for activity change, appetite change, chills, diaphoresis, fatigue, fever and unexpected weight change. " "  HENT:  Negative for congestion, dental problem, drooling, ear discharge, ear pain, facial swelling, hearing loss, mouth sores, nosebleeds, postnasal drip, rhinorrhea, sinus pressure, sneezing, sore throat, tinnitus, trouble swallowing and voice change.    Eyes:  Negative for photophobia, pain, discharge, redness, itching and visual disturbance.   Respiratory:  Negative for apnea, cough, choking, chest tightness, shortness of breath, wheezing and stridor.    Cardiovascular:  Negative for chest pain, palpitations and leg swelling.   Gastrointestinal:  Negative for abdominal distention, abdominal pain, anal bleeding, blood in stool, constipation, diarrhea, nausea, rectal pain and vomiting.   Endocrine: Negative for cold intolerance, heat intolerance, polydipsia, polyphagia and polyuria.   Genitourinary:  Negative for decreased urine volume, difficulty urinating, dysuria, enuresis, flank pain, frequency, genital sores, hematuria and urgency.   Musculoskeletal:  Positive for arthralgias. Negative for back pain, gait problem, joint swelling, myalgias, neck pain and neck stiffness.   Skin:  Negative for color change, pallor, rash and wound.   Allergic/Immunologic: Negative for environmental allergies, food allergies and immunocompromised state.   Neurological:  Negative for dizziness, tremors, seizures, syncope, facial asymmetry, speech difficulty, weakness, light-headedness, numbness and headaches.   Hematological:  Negative for adenopathy. Does not bruise/bleed easily.   Psychiatric/Behavioral:  Negative for agitation, behavioral problems, confusion, decreased concentration, dysphoric mood, hallucinations, self-injury, sleep disturbance and suicidal ideas. The patient is not nervous/anxious and is not hyperactive.       Objective      Physical Exam  /80   Ht 157.5 cm (62\")   Wt 70.3 kg (155 lb)   LMP  (LMP Unknown)   BMI 28.35 kg/mý     Body mass index is 28.35 kg/mý.    General:   Mental Status:  " Alert   Appearance: Cooperative, in no acute distress   Build and Nutrition: Well-nourished well-developed female   Orientation: Alert and oriented to person, place and time   Posture: Normal   Gait: Nonantalgic/normal    Integument:   Left knee: No skin lesions, no rash, no ecchymosis    Lower Extremities:   Left Knee:    Tenderness:  None    Effusion:  None    Swelling:  None    Crepitus: Positive    Range of motion:  Extension: 0ø       Flexion: 110ø  Instability: No varus laxity, no valgus laxity, negative anterior drawer  Deformities:  Valgus      Imaging/Studies  Imaging Results (Last 24 Hours)       Procedure Component Value Units Date/Time    XR Knee 4+ View Left [[id number]] Resulted: 08/21/23 0948     Updated: 08/21/23 0949    Narrative:      Left Knee Radiographs  Indication: left knee pain  Views: Standing AP's and skiers of both knees, with lateral and sunrise   views of the left knee    Comparison: 6/21/2021    Findings:    Advanced arthritis, bone-on-bone contact and patellofemoral joint, with   large osteophytes in the patellofemoral joint, bone-on-bone contact   lateral compartment, tricompartmental osteophytes, no acute bony   abnormalities.  Mild worsening compared to previous imaging.                Assessment and Plan     Diagnoses and all orders for this visit:    1. Primary osteoarthritis of left knee (Primary)  -     XR Knee 4+ View Left  -     - Large Joint Arthrocentesis: L knee        1. Primary osteoarthritis of left knee        I reviewed my findings with the patient.  She does have advanced left knee arthritis, and is not interested in surgical intervention.  She would like to proceed with an intra-articular knee injection today.  This was provided, and I will see her back in 4 months, but sooner for any problems.    Procedure Note:  The potential benefits of performing a therapeutic left knee joint injection, as well as potential risks (including, but not limited to infection, swelling,  pain, bleeding, bruising, nerve/blood vessel damage, skin color changes, transient elevation in blood glucose levels, and fat atrophy) were discussed with the patient.  After informed consent, timeout procedure was performed, and the skin on the left knee was prepped with chlorhexidine soap and alcohol, after which ethyl chloride was applied to the skin at the injection site. Via the anterolateral approach, 1ml of Kenalog 40mg/ml mixed with 4ml 0.5% ropivacaine plain was injected into the knee joint.  The patient tolerated the procedure well, experiencing 100% improvement a few minutes following the injection. There were no complications.  Band-Aid was applied to the injection site. Post-procedural instructions were given to the patient and/or their caregiver.      Return in about 4 months (around 12/21/2023).      Flakito Chase MD  08/21/23  17:10 EDT

## 2023-09-10 DIAGNOSIS — I10 ESSENTIAL HYPERTENSION: ICD-10-CM

## 2023-09-11 RX ORDER — DILTIAZEM HYDROCHLORIDE 240 MG/1
240 CAPSULE, EXTENDED RELEASE ORAL DAILY
Qty: 30 CAPSULE | Refills: 0 | Status: SHIPPED | OUTPATIENT
Start: 2023-09-11

## 2023-09-28 DIAGNOSIS — K21.9 GASTROESOPHAGEAL REFLUX DISEASE: ICD-10-CM

## 2023-09-28 RX ORDER — PANTOPRAZOLE SODIUM 40 MG/1
TABLET, DELAYED RELEASE ORAL
Qty: 90 TABLET | Refills: 0 | Status: SHIPPED | OUTPATIENT
Start: 2023-09-28

## 2023-10-01 DIAGNOSIS — I10 ESSENTIAL HYPERTENSION: ICD-10-CM

## 2023-10-02 RX ORDER — DILTIAZEM HYDROCHLORIDE 240 MG/1
240 CAPSULE, EXTENDED RELEASE ORAL DAILY
Qty: 30 CAPSULE | Refills: 0 | Status: SHIPPED | OUTPATIENT
Start: 2023-10-02

## 2023-10-24 DIAGNOSIS — I10 ESSENTIAL HYPERTENSION: ICD-10-CM

## 2023-10-24 RX ORDER — DILTIAZEM HYDROCHLORIDE 240 MG/1
240 CAPSULE, EXTENDED RELEASE ORAL DAILY
Qty: 30 CAPSULE | Refills: 0 | Status: SHIPPED | OUTPATIENT
Start: 2023-10-24

## 2023-11-01 RX ORDER — POTASSIUM CHLORIDE 750 MG/1
TABLET, FILM COATED, EXTENDED RELEASE ORAL
Qty: 90 TABLET | Refills: 0 | Status: SHIPPED | OUTPATIENT
Start: 2023-11-01

## 2023-11-07 RX ORDER — RIVAROXABAN 15 MG/1
TABLET, FILM COATED ORAL
Qty: 30 TABLET | Refills: 1 | Status: SHIPPED | OUTPATIENT
Start: 2023-11-07 | End: 2023-11-09 | Stop reason: SDUPTHER

## 2023-11-07 NOTE — TELEPHONE ENCOUNTER
Lab Results   Component Value Date    WBC 8.84 05/30/2023    HGB 15.1 05/30/2023    HCT 45.3 05/30/2023    MCV 90.8 05/30/2023     05/30/2023

## 2023-11-08 RX ORDER — RIVAROXABAN 15 MG/1
TABLET, FILM COATED ORAL
Qty: 90 TABLET | Refills: 0 | OUTPATIENT
Start: 2023-11-08

## 2023-11-22 DIAGNOSIS — I10 ESSENTIAL HYPERTENSION: ICD-10-CM

## 2023-11-22 RX ORDER — DILTIAZEM HYDROCHLORIDE 240 MG/1
240 CAPSULE, EXTENDED RELEASE ORAL DAILY
Qty: 30 CAPSULE | Refills: 5 | Status: SHIPPED | OUTPATIENT
Start: 2023-11-22

## 2023-11-30 ENCOUNTER — OFFICE VISIT (OUTPATIENT)
Dept: INTERNAL MEDICINE | Facility: CLINIC | Age: 78
End: 2023-11-30
Payer: MEDICARE

## 2023-11-30 VITALS
HEART RATE: 68 BPM | RESPIRATION RATE: 16 BRPM | SYSTOLIC BLOOD PRESSURE: 110 MMHG | BODY MASS INDEX: 29.08 KG/M2 | DIASTOLIC BLOOD PRESSURE: 58 MMHG | WEIGHT: 159 LBS | TEMPERATURE: 98.1 F

## 2023-11-30 DIAGNOSIS — K21.9 GASTROESOPHAGEAL REFLUX DISEASE WITHOUT ESOPHAGITIS: ICD-10-CM

## 2023-11-30 DIAGNOSIS — I10 ESSENTIAL HYPERTENSION: Primary | ICD-10-CM

## 2023-11-30 DIAGNOSIS — M85.89 OSTEOPENIA OF MULTIPLE SITES: ICD-10-CM

## 2023-11-30 DIAGNOSIS — R31.9 HEMATURIA, UNSPECIFIED TYPE: ICD-10-CM

## 2023-11-30 DIAGNOSIS — R82.998 LEUKOCYTES IN URINE: ICD-10-CM

## 2023-11-30 LAB
ALBUMIN SERPL-MCNC: 4.6 G/DL (ref 3.5–5.2)
ALBUMIN/GLOB SERPL: 2 G/DL
ALP SERPL-CCNC: 88 U/L (ref 39–117)
ALT SERPL W P-5'-P-CCNC: 12 U/L (ref 1–33)
ANION GAP SERPL CALCULATED.3IONS-SCNC: 13 MMOL/L (ref 5–15)
AST SERPL-CCNC: 18 U/L (ref 1–32)
BACTERIA UR QL AUTO: ABNORMAL /HPF
BASOPHILS # BLD AUTO: 0.05 10*3/MM3 (ref 0–0.2)
BASOPHILS NFR BLD AUTO: 0.5 % (ref 0–1.5)
BILIRUB BLD-MCNC: ABNORMAL MG/DL
BILIRUB SERPL-MCNC: 0.6 MG/DL (ref 0–1.2)
BUN SERPL-MCNC: 26 MG/DL (ref 8–23)
BUN/CREAT SERPL: 17.2 (ref 7–25)
CALCIUM SPEC-SCNC: 9.8 MG/DL (ref 8.6–10.5)
CHLORIDE SERPL-SCNC: 105 MMOL/L (ref 98–107)
CLARITY, POC: CLEAR
CO2 SERPL-SCNC: 31 MMOL/L (ref 22–29)
COLOR UR: YELLOW
CREAT SERPL-MCNC: 1.51 MG/DL (ref 0.57–1)
DEPRECATED RDW RBC AUTO: 40.9 FL (ref 37–54)
EGFRCR SERPLBLD CKD-EPI 2021: 35.2 ML/MIN/1.73
EOSINOPHIL # BLD AUTO: 0.24 10*3/MM3 (ref 0–0.4)
EOSINOPHIL NFR BLD AUTO: 2.5 % (ref 0.3–6.2)
ERYTHROCYTE [DISTWIDTH] IN BLOOD BY AUTOMATED COUNT: 12.4 % (ref 12.3–15.4)
EXPIRATION DATE: ABNORMAL
GLOBULIN UR ELPH-MCNC: 2.3 GM/DL
GLUCOSE SERPL-MCNC: 97 MG/DL (ref 65–99)
GLUCOSE UR STRIP-MCNC: NEGATIVE MG/DL
HCT VFR BLD AUTO: 45.4 % (ref 34–46.6)
HGB BLD-MCNC: 15.1 G/DL (ref 12–15.9)
HYALINE CASTS UR QL AUTO: ABNORMAL /LPF
IMM GRANULOCYTES # BLD AUTO: 0.03 10*3/MM3 (ref 0–0.05)
IMM GRANULOCYTES NFR BLD AUTO: 0.3 % (ref 0–0.5)
KETONES UR QL: NEGATIVE
LEUKOCYTE EST, POC: ABNORMAL
LYMPHOCYTES # BLD AUTO: 2.04 10*3/MM3 (ref 0.7–3.1)
LYMPHOCYTES NFR BLD AUTO: 21.5 % (ref 19.6–45.3)
Lab: ABNORMAL
MCH RBC QN AUTO: 30.1 PG (ref 26.6–33)
MCHC RBC AUTO-ENTMCNC: 33.3 G/DL (ref 31.5–35.7)
MCV RBC AUTO: 90.6 FL (ref 79–97)
MONOCYTES # BLD AUTO: 0.89 10*3/MM3 (ref 0.1–0.9)
MONOCYTES NFR BLD AUTO: 9.4 % (ref 5–12)
NEUTROPHILS NFR BLD AUTO: 6.24 10*3/MM3 (ref 1.7–7)
NEUTROPHILS NFR BLD AUTO: 65.8 % (ref 42.7–76)
NITRITE UR-MCNC: NEGATIVE MG/ML
NRBC BLD AUTO-RTO: 0 /100 WBC (ref 0–0.2)
PH UR: 5 [PH] (ref 5–8)
PLATELET # BLD AUTO: 319 10*3/MM3 (ref 140–450)
PMV BLD AUTO: 10.2 FL (ref 6–12)
POTASSIUM SERPL-SCNC: 4 MMOL/L (ref 3.5–5.2)
PROT SERPL-MCNC: 6.9 G/DL (ref 6–8.5)
PROT UR STRIP-MCNC: NEGATIVE MG/DL
RBC # BLD AUTO: 5.01 10*6/MM3 (ref 3.77–5.28)
RBC # UR STRIP: ABNORMAL /HPF
RBC # UR STRIP: ABNORMAL /UL
REF LAB TEST METHOD: ABNORMAL
SODIUM SERPL-SCNC: 149 MMOL/L (ref 136–145)
SP GR UR: 1.02 (ref 1–1.03)
SQUAMOUS #/AREA URNS HPF: ABNORMAL /HPF
TSH SERPL DL<=0.05 MIU/L-ACNC: 3.91 UIU/ML (ref 0.27–4.2)
UROBILINOGEN UR QL: NORMAL
WBC # UR STRIP: ABNORMAL /HPF
WBC NRBC COR # BLD AUTO: 9.49 10*3/MM3 (ref 3.4–10.8)

## 2023-11-30 PROCEDURE — 3078F DIAST BP <80 MM HG: CPT | Performed by: INTERNAL MEDICINE

## 2023-11-30 PROCEDURE — 80053 COMPREHEN METABOLIC PANEL: CPT | Performed by: INTERNAL MEDICINE

## 2023-11-30 PROCEDURE — 3074F SYST BP LT 130 MM HG: CPT | Performed by: INTERNAL MEDICINE

## 2023-11-30 PROCEDURE — 87086 URINE CULTURE/COLONY COUNT: CPT | Performed by: INTERNAL MEDICINE

## 2023-11-30 PROCEDURE — 81015 MICROSCOPIC EXAM OF URINE: CPT | Performed by: INTERNAL MEDICINE

## 2023-11-30 PROCEDURE — 85025 COMPLETE CBC W/AUTO DIFF WBC: CPT | Performed by: INTERNAL MEDICINE

## 2023-11-30 PROCEDURE — 84443 ASSAY THYROID STIM HORMONE: CPT | Performed by: INTERNAL MEDICINE

## 2023-11-30 NOTE — PROGRESS NOTES
Chief Complaint   Patient presents with    Follow-up     6 month follow up chronic medical problems       History of Present Illness    The patient presents for a follow-up related to hypertension. The patient reports that she has had no headaches, chest pain, dyspnea, edema, syncope, blurred vision or palpitations. She states that she is taking her medication as prescribed. She is not having medication side effects.    The patient presents for a follow-up related to GERD. The patient is on pantoprazole for her gastroesophageal reflux. The medication is taken on a regular basis and gives complete relief of the symptoms. She reports no abdominal pain, belching, diarrhea, dysphagia, early satiety, heartburn, hoarseness, nausea, odynophagia, rectal bleeding, vomiting or weight loss. The GERD has no known aggravating factors.    The patient presents for follow-up of osteopenia. She reports no excessive caffeine intake. The patient does not smoke. The patient has been on medications in the past but not currently.  The last DEXA scan was in July of 2023 and revealed osteopenia.    Review of Systems    MUSCULOSKELETAL- Denies Joint Pain, Joint Stiffness, Decreased Range of Motion, Joint Swelling or Erythema of Joints.    Medications      Current Outpatient Medications:     acetaminophen (TYLENOL) 325 MG tablet, Take 2 tablets by mouth Every 4 (Four) Hours As Needed for Mild Pain ., Disp: , Rfl:     chlorthalidone (HYGROTON) 25 MG tablet, Take 1/2 (one-half) tablet by mouth once daily, Disp: 45 tablet, Rfl: 3    dilTIAZem (TIAZAC) 240 MG 24 hr capsule, Take 1 capsule by mouth once daily, Disp: 30 capsule, Rfl: 5    Glucosamine HCl (GLUCOSAMINE PO), Take 2 tablets by mouth Daily. As directed, OTC, Disp: , Rfl:     metoprolol tartrate (LOPRESSOR) 25 MG tablet, Take 1 tablet by mouth 2 (Two) Times a Day., Disp: 180 tablet, Rfl: 3    pantoprazole (PROTONIX) 40 MG EC tablet, Take 1 tablet by mouth once daily, Disp: 90 tablet,  "Rfl: 0    potassium chloride 10 MEQ CR tablet, Take 1 tablet by mouth once daily, Disp: 90 tablet, Rfl: 0    rivaroxaban (Xarelto) 15 MG tablet, Take 1 tablet by mouth Daily., Disp: 30 tablet, Rfl: 1    sertraline (ZOLOFT) 100 MG tablet, Take 1 tablet by mouth Daily., Disp: 90 tablet, Rfl: 2    simvastatin (ZOCOR) 40 MG tablet, TAKE 1 TABLET BY MOUTH AT NIGHT, Disp: 90 tablet, Rfl: 2    vitamin C (ASCORBIC ACID) 500 MG tablet, Take 2 tablets by mouth Daily. OTC, Disp: , Rfl:     vitamin D3 125 MCG (5000 UT) capsule capsule, Take by oral route., Disp: , Rfl:     Zinc 40 MG tablet, Take  by mouth Daily. OTC, Disp: , Rfl:      Allergies    Allergies   Allergen Reactions    Erythromycin Swelling     \"tongue swelling\".    Warfarin Other (See Comments)     Reoccurrence of clot while on warfarin     Codeine Sulfate Swelling    Meperidine Swelling    Morphine And Related Swelling    Penicillins Swelling     \"swelling\" at site.    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    Physical Examination    /58 (BP Location: Right arm, Patient Position: Sitting, Cuff Size: Adult)   Pulse 68   Temp 98.1 °F (36.7 °C) (Infrared)   Resp 16   Wt 72.1 kg (159 lb)   LMP  (LMP Unknown)   BMI 29.08 kg/m²       HEENT: Head- Normocephalic Atraumatic. Facies- Within normal limits. Pinnas- Normal texture and shape bilaterally. Canals- Normal bilaterally. TMs- Normal bilaterally. Nares- Patent bilaterally. Nasal Septum- is normal. There is no tenderness to palpation " over the frontal or maxillary sinuses. Lids- Normal bilaterally. Conjunctiva- Clear bilaterally. Sclera- Anicteric bilaterally. Oropharynx- Moist with no lesions. Tonsils- No enlargement, erythema or exudate.    Neck: Thyroid- non enlarged, symmetric and has no nodules. No bruits are detected. ROM- Normal Range of Motion with no rigidity.    Lungs: Auscultation- Clear to auscultation bilaterally. There are no retractions, clubbing or cyanosis. The Expiratory to Inspiratory ratio is equal.    Cardiovascular: There are no carotid bruits. Heart- Normal Rate with Regular rhythm and no murmurs. There are no gallops. There are no rubs. In the lower extremities there is no edema. The upper extremities do not have edema.    Abdomen: Soft, benign, non-tender with no masses, hernias, organomegaly or scars.    Impression and Assessment    Essential Hypertension.    Gastroesophageal Reflux Disease.    Osteopenia.    Plan    Gastroesophageal Reflux Disease Plan: The patient was instructed to continue the current medications.    Essential Hypertension Plan: The patient was instructed to continue the current medications.    Osteopenia Plan: Medication will be added as noted.    Diagnoses and all orders for this visit:    1. Essential hypertension (Primary)  -     CBC & Differential; Future  -     Comprehensive Metabolic Panel; Future  -     TSH; Future  -     POC Urinalysis Dipstick, Automated; Future    2. Gastroesophageal reflux disease without esophagitis    3. Osteopenia of multiple sites  -     Comprehensive Metabolic Panel; Future  -     TSH; Future          Return to Office    The patient was instructed to return for follow-up in 6 months. The patient was instructed to return sooner if the condition changes, worsens, or does not resolve.      BMI is >= 25 and <30. (Overweight) The following options were offered after discussion;: weight loss educational material (shared in after visit summary), exercise  counseling/recommendations, nutrition counseling/recommendations, and Information on healthy weight added to patient's after visit summary.

## 2023-12-02 LAB — BACTERIA SPEC AEROBE CULT: NORMAL

## 2023-12-18 ENCOUNTER — OFFICE VISIT (OUTPATIENT)
Dept: ORTHOPEDIC SURGERY | Facility: CLINIC | Age: 78
End: 2023-12-18
Payer: MEDICARE

## 2023-12-18 VITALS
WEIGHT: 152.6 LBS | SYSTOLIC BLOOD PRESSURE: 124 MMHG | DIASTOLIC BLOOD PRESSURE: 72 MMHG | HEIGHT: 62 IN | BODY MASS INDEX: 28.08 KG/M2

## 2023-12-18 DIAGNOSIS — M17.12 PRIMARY OSTEOARTHRITIS OF LEFT KNEE: Primary | ICD-10-CM

## 2023-12-18 PROCEDURE — 99212 OFFICE O/P EST SF 10 MIN: CPT | Performed by: ORTHOPAEDIC SURGERY

## 2023-12-18 NOTE — PROGRESS NOTES
[facility] Note    Subjective     Chief Complaint   Patient presents with    Follow-up     4 month follow up -- Primary osteoarthritis of left knee        HPI    It has been 4  month(s) since Ms. Jean's last visit. She returns to clinic today for follow-up of left knee pain. The issue has been ongoing for 6 month(s). She rates her pain a 0/10 on the pain scale. Previous/current treatments: NSAIDS. Current symptoms: pain. The pain is worse with climbing stairs and bending ; sitting improve the pain. Overall, she is doing better.  Last injection provided relief.  No new complaints.    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 EMS transport to [facility] Emergency Room. Restoration of rhythm and blood pressure at [facility] Emergency Room. “Shock” manifest by hypotension and multiorgan failure:  Transient hepatic dysfunction, resolved.  Transient nonoliguric ATN, resolved.  Transient metabolic acidosis, resolved.  Pressor support. Bilateral     Allergic Yrs ago!    To.pain meds u have a list!    Arthritis     Arthritis of back Bone scan few wks ago    Bladder problem     Blood clotting disorder     Bone pain     Depression Yrs ago    DVT (deep venous thrombosis)     Easy bruising     H/O bone density study 08/2012    H/O colonoscopy 08/2012     H/O mammogram 2012    HBP (high blood pressure)     History of blood transfusion     Hypertension 2016    Kidney stones     Synovial cyst of popliteal space     Upper gastrointestinal bleeding     gastritis related to lytic therapy and heparins, resolved: a. “Hemorrhagic gastritis.”    Vitamin B12 deficiency       Past Surgical History:   Procedure Laterality Date    BILATERAL BREAST REDUCTION      CATARACT EXTRACTION, BILATERAL       SECTION  1971     SECTION      COLONOSCOPY      EYE SURGERY      Cataracts  2018    KIDNEY STONE SURGERY      Nephrolithiasis with lithotripsy.    OTHER SURGICAL HISTORY      barry filter placement in [place]    REDUCTION MAMMAPLASTY Bilateral     TONSILLECTOMY      TRIGGER POINT INJECTION  2yrs ago      Family History   Problem Relation Age of Onset    Stroke Mother     Hypertension Mother     Arthritis Mother     Hyperlipidemia Mother     Heart attack Father     COPD Father     Thyroid disease Father     Heart disease Father     Stroke Other     Coronary artery disease Other     COPD Other     Stroke Other     COPD Other     Breast cancer Maternal Aunt     Ovarian cancer Neg Hx      Social History     Socioeconomic History    Marital status:    Tobacco Use    Smoking status: Never    Smokeless tobacco: Never    Tobacco comments:     None   Vaping Use    Vaping Use: Never used   Substance and Sexual Activity    Alcohol use: Yes     Alcohol/week: 2.0 standard drinks of alcohol     Types: 2 Glasses of wine per week     Comment: occas    Drug use: No    Sexual activity: Not Currently     Birth control/protection: Surgical, Other     Comment: Tubes tied      Current Outpatient Medications on File Prior to Visit   Medication Sig Dispense Refill    acetaminophen (TYLENOL) 325 MG tablet Take 2 tablets by mouth Every 4 (Four) Hours As Needed for Mild Pain .      chlorthalidone (HYGROTON) 25 MG tablet Take 1/2 (one-half) tablet by mouth once daily  "45 tablet 3    dilTIAZem (TIAZAC) 240 MG 24 hr capsule Take 1 capsule by mouth once daily 30 capsule 5    Glucosamine HCl (GLUCOSAMINE PO) Take 2 tablets by mouth Daily. As directed, OTC      metoprolol tartrate (LOPRESSOR) 25 MG tablet Take 1 tablet by mouth 2 (Two) Times a Day. 180 tablet 3    pantoprazole (PROTONIX) 40 MG EC tablet Take 1 tablet by mouth once daily 90 tablet 0    potassium chloride 10 MEQ CR tablet Take 1 tablet by mouth once daily 90 tablet 0    rivaroxaban (Xarelto) 15 MG tablet Take 1 tablet by mouth Daily. 30 tablet 1    sertraline (ZOLOFT) 100 MG tablet Take 1 tablet by mouth Daily. 90 tablet 2    simvastatin (ZOCOR) 40 MG tablet TAKE 1 TABLET BY MOUTH AT NIGHT 90 tablet 2    vitamin C (ASCORBIC ACID) 500 MG tablet Take 2 tablets by mouth Daily. OTC      vitamin D3 125 MCG (5000 UT) capsule capsule Take by oral route.      Zinc 40 MG tablet Take  by mouth Daily. OTC       Current Facility-Administered Medications on File Prior to Visit   Medication Dose Route Frequency Provider Last Rate Last Admin    denosumab (PROLIA) syringe 60 mg  60 mg Subcutaneous Q6 Months Flakito Chaudhary MD          Allergies   Allergen Reactions    Erythromycin Swelling     \"tongue swelling\".    Warfarin Other (See Comments)     Reoccurrence of clot while on warfarin     Codeine Sulfate Swelling    Meperidine Swelling    Morphine And Related Swelling    Penicillins Swelling     \"swelling\" at site.    Sulfa Antibiotics Swelling    Sulfadiazine Swelling        Review of Systems   Constitutional:  Negative for activity change, appetite change, chills, diaphoresis, fatigue, fever and unexpected weight change.   HENT:  Negative for congestion, dental problem, drooling, ear discharge, ear pain, facial swelling, hearing loss, mouth sores, nosebleeds, postnasal drip, rhinorrhea, sinus pressure, sneezing, sore throat, tinnitus, trouble swallowing and voice change.    Eyes:  Negative for photophobia, pain, discharge, " "redness, itching and visual disturbance.   Respiratory:  Negative for apnea, cough, choking, chest tightness, shortness of breath, wheezing and stridor.    Cardiovascular:  Negative for chest pain, palpitations and leg swelling.   Gastrointestinal:  Negative for abdominal distention, abdominal pain, anal bleeding, blood in stool, constipation, diarrhea, nausea, rectal pain and vomiting.   Endocrine: Negative for cold intolerance, heat intolerance, polydipsia, polyphagia and polyuria.   Genitourinary:  Negative for decreased urine volume, difficulty urinating, dysuria, enuresis, flank pain, frequency, genital sores, hematuria and urgency.   Musculoskeletal:  Positive for arthralgias. Negative for back pain, gait problem, joint swelling, myalgias, neck pain and neck stiffness.   Skin:  Negative for color change, pallor, rash and wound.   Allergic/Immunologic: Negative for environmental allergies, food allergies and immunocompromised state.   Neurological:  Negative for dizziness, tremors, seizures, syncope, facial asymmetry, speech difficulty, weakness, light-headedness, numbness and headaches.   Hematological:  Negative for adenopathy. Does not bruise/bleed easily.   Psychiatric/Behavioral:  Negative for agitation, behavioral problems, confusion, decreased concentration, dysphoric mood, hallucinations, self-injury, sleep disturbance and suicidal ideas. The patient is not nervous/anxious and is not hyperactive.         Objective      Physical Exam  /72   Ht 157.5 cm (62\")   Wt 69.2 kg (152 lb 9.6 oz)   LMP  (LMP Unknown)   BMI 27.91 kg/m²     Body mass index is 27.91 kg/m².    General:   Mental Status:  Alert   Appearance: Cooperative, in no acute distress   Build and Nutrition: Well-nourished well-developed female   Orientation: Alert and oriented to person, place and time   Posture: Normal   Gait: Nonantalgic    Integument:              Left knee: No skin lesions, no rash, no ecchymosis     Lower " Extremities:              Left Knee:                          Tenderness:    None                          Effusion:          None                          Swelling:          None                          Crepitus:          Positive                          Range of motion:        Extension:       0°                                                              Flexion:           110°  Instability:        No varus laxity, no valgus laxity, negative anterior drawer  Deformities:     Valgus    Imaging/Studies  Imaging Results (Last 24 Hours)       ** No results found for the last 24 hours. **          No new imaging today.    Assessment and Plan     Diagnoses and all orders for this visit:    1. Primary osteoarthritis of left knee (Primary)        1. Primary osteoarthritis of left knee        Reviewed my findings with the patient.  Her left knee is doing well today.  I will see her back in 6 months, but I will be happy to see her back sooner for any problems.    Return in about 6 months (around 6/18/2024).      Flakito Chase MD  12/18/23  09:18 EST

## 2023-12-28 DIAGNOSIS — K21.9 GASTROESOPHAGEAL REFLUX DISEASE: ICD-10-CM

## 2023-12-28 RX ORDER — PANTOPRAZOLE SODIUM 40 MG/1
TABLET, DELAYED RELEASE ORAL
Qty: 90 TABLET | Refills: 1 | Status: SHIPPED | OUTPATIENT
Start: 2023-12-28

## 2024-01-25 RX ORDER — POTASSIUM CHLORIDE 750 MG/1
TABLET, FILM COATED, EXTENDED RELEASE ORAL
Qty: 90 TABLET | Refills: 1 | Status: SHIPPED | OUTPATIENT
Start: 2024-01-25

## 2024-01-30 ENCOUNTER — OFFICE VISIT (OUTPATIENT)
Dept: ONCOLOGY | Facility: CLINIC | Age: 79
End: 2024-01-30
Payer: MEDICARE

## 2024-01-30 VITALS
BODY MASS INDEX: 27.79 KG/M2 | DIASTOLIC BLOOD PRESSURE: 57 MMHG | TEMPERATURE: 97.9 F | OXYGEN SATURATION: 94 % | HEART RATE: 78 BPM | SYSTOLIC BLOOD PRESSURE: 114 MMHG | HEIGHT: 62 IN | RESPIRATION RATE: 16 BRPM | WEIGHT: 151 LBS

## 2024-01-30 DIAGNOSIS — Z79.01 CHRONIC ANTICOAGULATION: Primary | ICD-10-CM

## 2024-01-30 NOTE — PROGRESS NOTES
DATE OF VISIT: 1/30/2024    REASON FOR VISIT:   1. DVT while on therapeutic Coumadin 11/23/2012.   2. Pulmonary embolism 09/30/2012.     PROBLEM LIST:  1. Acute right lower extremity DVT while on therapeutic Coumadin 11/23/2012.   2. Pulmonary embolism with cardiac arrest 09/30/2012.   3.  Hypertension  4.  Hypercholesterolemia  5.  Heartburn  6.  Arthritis    HISTORY OF PRESENT ILLNESS: The patient is a very pleasant 78 y.o.   female with past medical history significant for massive pulmonary  embolism September 2012 requiring cardiac resuscitation. The patient was placed on chronic anticoagulation with Coumadin since, however, she presented with acute DVT of the right lower extremity while on Coumadin 11/23/2012. The  patient was switched to Lovenox 100 micrograms subcu daily since. The patient  was switched from Lovenox to Xarelto 20 mg p.o. daily on 03/15/2013 secondary  to this would be more convenient to the patient in avoiding the daily  injections. The patient is here today in scheduled follow-up visit.     SUBJECTIVE:  is here today by herself.  All in all she is doing fairly.  She had any fever chills or night sweats.  Denies any active bleeding.  She has been compliant with her Xarelto.    Review of Systems   Constitutional:  Positive for fatigue.   Psychiatric/Behavioral:  Positive for sleep disturbance.        Past History:  Medical History: has a past medical history of Acute deep vein thrombosis of lower extremity, Acute pulmonary embolism (11/22/2016), Allergic (Yrs ago!), Arthritis, Arthritis of back (Bone scan few wks ago), Bladder problem, Blood clotting disorder, Bone pain, Depression (Yrs ago), DVT (deep venous thrombosis), Easy bruising, H/O bone density study (08/2012), H/O colonoscopy (08/2012), H/O mammogram (08/2012), HBP (high blood pressure), History of blood transfusion, Hypertension (11/22/2016), Kidney stones, Synovial cyst of popliteal space, Upper gastrointestinal  "bleeding, and Vitamin B12 deficiency.   Surgical History: has a past surgical history that includes  section ();  section (); Kidney stone surgery; Reduction mammaplasty (Bilateral, ); Tonsillectomy; Breast Reduction; Colonoscopy; Cataract extraction, bilateral; Other surgical history; Eye surgery; and Trigger point injection (2yrs ago).   Family History: family history includes Arthritis in her mother; Breast cancer in her maternal aunt; COPD in her father and other family members; Coronary artery disease in an other family member; Heart attack in her father; Heart disease in her father; Hyperlipidemia in her mother; Hypertension in her mother; Stroke in her mother and other family members; Thyroid disease in her father.   Social History: reports that she has never smoked. She has never used smokeless tobacco. She reports current alcohol use of about 2.0 standard drinks of alcohol per week. She reports that she does not use drugs.    (Not in a hospital admission)     Allergies: Erythromycin, Warfarin, Codeine sulfate, Meperidine, Morphine and related, Penicillins, Sulfa antibiotics, and Sulfadiazine    PHYSICAL EXAMINATION:   /57   Pulse 78   Temp 97.9 °F (36.6 °C) (Temporal)   Resp 16   Ht 157.5 cm (62.01\")   Wt 68.5 kg (151 lb)   LMP  (LMP Unknown)   SpO2 94%   BMI 27.61 kg/m²   Pain Score    24 1130   PainSc: 0-No pain         ECOG 1  GENERAL: Age appropriate. No acute distress.   LUNGS: Clear to auscultation bilaterally no wheezing or rhonchi  HEART: Regular rate rhythm S1-S2 no murmurs.   EXTREMITIES: No clubbing, cyanosis, or edema.   SKIN: No rashes or bruising. No purpura.   NEUROLOGIC: Awake and oriented x3.      No visits with results within 2 Week(s) from this visit.   Latest known visit with results is:   Office Visit on 2023   Component Date Value Ref Range Status    Color 2023 Yellow  Yellow, Straw, Dark Yellow, Rosa Elena Final    Clarity, UA " 11/30/2023 Clear  Clear Final    Specific Gravity  11/30/2023 1.020  1.005 - 1.030 Final    pH, Urine 11/30/2023 5.0  5.0 - 8.0 Final    Leukocytes 11/30/2023 500 Herb/ul (A)  Negative Final    Nitrite, UA 11/30/2023 Negative  Negative Final    Protein, POC 11/30/2023 Negative  Negative mg/dL Final    Glucose, UA 11/30/2023 Negative  Negative mg/dL Final    Ketones, UA 11/30/2023 Negative  Negative Final    Urobilinogen, UA 11/30/2023 Normal  Normal, 0.2 E.U./dL Final    Bilirubin 11/30/2023 1 mg/dL (A)  Negative Final    Blood, UA 11/30/2023 50 Paul/ul (A)  Negative Final    Lot Number 11/30/2023 [id number]   Final    Expiration Date 11/30/2023 07/31/2024   Final    Glucose 11/30/2023 97  65 - 99 mg/dL Final    BUN 11/30/2023 26 (H)  8 - 23 mg/dL Final    Creatinine 11/30/2023 1.51 (H)  0.57 - 1.00 mg/dL Final    Sodium 11/30/2023 149 (H)  136 - 145 mmol/L Final    Potassium 11/30/2023 4.0  3.5 - 5.2 mmol/L Final    Chloride 11/30/2023 105  98 - 107 mmol/L Final    CO2 11/30/2023 31.0 (H)  22.0 - 29.0 mmol/L Final    Calcium 11/30/2023 9.8  8.6 - 10.5 mg/dL Final    Total Protein 11/30/2023 6.9  6.0 - 8.5 g/dL Final    Albumin 11/30/2023 4.6  3.5 - 5.2 g/dL Final    ALT (SGPT) 11/30/2023 12  1 - 33 U/L Final    AST (SGOT) 11/30/2023 18  1 - 32 U/L Final    Alkaline Phosphatase 11/30/2023 88  39 - 117 U/L Final    Total Bilirubin 11/30/2023 0.6  0.0 - 1.2 mg/dL Final    Globulin 11/30/2023 2.3  gm/dL Final    A/G Ratio 11/30/2023 2.0  g/dL Final    BUN/Creatinine Ratio 11/30/2023 17.2  7.0 - 25.0 Final    Anion Gap 11/30/2023 13.0  5.0 - 15.0 mmol/L Final    eGFR 11/30/2023 35.2 (L)  >60.0 mL/min/1.73 Final    TSH 11/30/2023 3.910  0.270 - 4.200 uIU/mL Final    WBC 11/30/2023 9.49  3.40 - 10.80 10*3/mm3 Final    RBC 11/30/2023 5.01  3.77 - 5.28 10*6/mm3 Final    Hemoglobin 11/30/2023 15.1  12.0 - 15.9 g/dL Final    Hematocrit 11/30/2023 45.4  34.0 - 46.6 % Final    MCV 11/30/2023 90.6  79.0 - 97.0 fL Final    MCH  11/30/2023 30.1  26.6 - 33.0 pg Final    MCHC 11/30/2023 33.3  31.5 - 35.7 g/dL Final    RDW 11/30/2023 12.4  12.3 - 15.4 % Final    RDW-SD 11/30/2023 40.9  37.0 - 54.0 fl Final    MPV 11/30/2023 10.2  6.0 - 12.0 fL Final    Platelets 11/30/2023 319  140 - 450 10*3/mm3 Final    Neutrophil % 11/30/2023 65.8  42.7 - 76.0 % Final    Lymphocyte % 11/30/2023 21.5  19.6 - 45.3 % Final    Monocyte % 11/30/2023 9.4  5.0 - 12.0 % Final    Eosinophil % 11/30/2023 2.5  0.3 - 6.2 % Final    Basophil % 11/30/2023 0.5  0.0 - 1.5 % Final    Immature Grans % 11/30/2023 0.3  0.0 - 0.5 % Final    Neutrophils, Absolute 11/30/2023 6.24  1.70 - 7.00 10*3/mm3 Final    Lymphocytes, Absolute 11/30/2023 2.04  0.70 - 3.10 10*3/mm3 Final    Monocytes, Absolute 11/30/2023 0.89  0.10 - 0.90 10*3/mm3 Final    Eosinophils, Absolute 11/30/2023 0.24  0.00 - 0.40 10*3/mm3 Final    Basophils, Absolute 11/30/2023 0.05  0.00 - 0.20 10*3/mm3 Final    Immature Grans, Absolute 11/30/2023 0.03  0.00 - 0.05 10*3/mm3 Final    nRBC 11/30/2023 0.0  0.0 - 0.2 /100 WBC Final    Urine Culture 11/30/2023 <25,000 CFU/mL Normal Urogenital Giulia   Final    RBC, UA 11/30/2023 0-2  None Seen, 0-2 /HPF Final    WBC, UA 11/30/2023 3-5 (A)  None Seen, 0-2 /HPF Final    Bacteria, UA 11/30/2023 None Seen  None Seen /HPF Final    Squamous Epithelial Cells, UA 11/30/2023 3-6 (A)  None Seen, 0-2 /HPF Final    Hyaline Casts, UA 11/30/2023 13-20  None Seen /LPF Final    Methodology 11/30/2023 Manual Light Microscopy   Final        ASSESSMENT: The patient is a pleasant 78 y.o. female with DVT/PE.    PLAN:     1. History of DVT/PE:  A. We will continue Xarelto 15 mg daily indefinitely secondary to history of unprovoked severe PEs.   B.  I did go over the CBC result with the patient from November 30, 2023 and reassured her it looked normal.  C. We will continue annual surveillance with plan to repeat her labs including CBC and CMP.   D. I advised she monitor for signs and  symptoms of bleeding or clotting.   1. We will continue Xarelto however the patient could not afford Xarelto 10 mg daily.    E. I reviewed again our recommendation to stop Xarelto 2 days prior to surgical procedures and resume as soon as she is cleared by her surgeon.    2. Hypertension:  A. She will continue metoprolol and diltiazem for hypertension.   B. She will continue follow up with Dr. Medina for cardiology care.   C.  We'll continue Zocor 20 g daily for hypercholesterolemia.     3. Acid reflux:  A.  We'll continue Protonix 40 mg daily for heartburn.     4. Insomnia:  A.  I will continue trazodone 50 mg at bedtime as needed for insomnia. She was given a new prescription for this today.   B. We reviewed good sleep hygiene practices including setting a planned bedtime and time for awakening.     FOLLOW UP: 1 year with repeat labs.     Amanda Ashby MD  1/30/2024

## 2024-03-04 NOTE — TELEPHONE ENCOUNTER
Caller: Carol Jean    Relationship: Self    Best call back number: [phone]     Requested Prescriptions:   Requested Prescriptions     Pending Prescriptions Disp Refills    rivaroxaban (Xarelto) 15 MG tablet 30 tablet 1     Sig: Take 1 tablet by mouth Daily.        Pharmacy where request should be sent: [facility] [address] [place] [address] [phone] [facility] [phone] FX     Last office visit with prescribing clinician: 1/30/2024   Last telemedicine visit with prescribing clinician: Visit date not found   Next office visit with prescribing clinician: Visit date not found       Does the patient have less than a 3 day supply:  [x] Yes  [] No    Would you like a call back once the refill request has been completed: [] Yes [x] No    If the office needs to give you a call back, can they leave a voicemail: [] Yes [x] No    Joaquín Palmer Rep   03/04/24 15:30 EST

## 2024-03-06 NOTE — TELEPHONE ENCOUNTER
Caller: Carol Jean    Relationship: Self    Best call back number: [phone]    What is the best time to reach you: ANYTIME    Who are you requesting to speak with (clinical staff, provider,  specific staff member): CLINICAL     Do you know the name of the person who called:     What was the call regarding: PATIENT PICKED UP HER XARELTO BUT IT WAS ONLY A 30 DAY SUPPLY IT SHOULD HAVE BEEN 90 DAYS, SHE PAYS OVER $600.00.    CALL TO ADVISE       Is it okay if the provider responds through MyChart:

## 2024-03-07 ENCOUNTER — TELEPHONE (OUTPATIENT)
Dept: ONCOLOGY | Facility: CLINIC | Age: 79
End: 2024-03-07
Payer: MEDICARE

## 2024-03-07 NOTE — TELEPHONE ENCOUNTER
Caller: Carol Jean    Relationship: Self    Best call back number: [phone]    What was the call regarding: CARLO CALLED REGARDING CALL FROM YESTERDAY ON HER XARELTO. SHE IS NEEDING A CALLBACK TO DISCUSS WHY IT WAS FOR A 30 DAY SUPPLY INSTEAD OF 90.

## 2024-03-08 NOTE — TELEPHONE ENCOUNTER
RN called patient again and discussed with her. She is requesting a 90-day supply because it is $400 for 90 days compared to $600 for a one-month supply.     RN let patient know that we can get a 90-day supply called in for the future. Patient has already picked up her 30-month supply for this month.

## 2024-03-21 DIAGNOSIS — E78.2 MIXED HYPERLIPIDEMIA: ICD-10-CM

## 2024-03-21 RX ORDER — SIMVASTATIN 40 MG
40 TABLET ORAL
Qty: 90 TABLET | Refills: 2 | Status: SHIPPED | OUTPATIENT
Start: 2024-03-21

## 2024-03-21 NOTE — TELEPHONE ENCOUNTER
Caller: Carol Jean    Relationship: Self    Best call back number: [phone]     Requested Prescriptions:   Requested Prescriptions     Pending Prescriptions Disp Refills    simvastatin (ZOCOR) 40 MG tablet 90 tablet 2     Sig: Take 1 tablet by mouth every night at bedtime.      Pharmacy where request should be sent: [facility] [address] [place] [address] [phone] [facility] [phone]      Last office visit with prescribing clinician: 11/30/2023   Last telemedicine visit with prescribing clinician: Visit date not found   Next office visit with prescribing clinician: 6/4/2024     Additional details provided by patient: THE PATIENT HAS LESS THAN 3 DAYS LEFT.    Does the patient have less than a 3 day supply:  [x] Yes  [] No    Would you like a call back once the refill request has been completed: [] Yes [x] No    If the office needs to give you a call back, can they leave a voicemail: [] Yes [x] No    Joaquín Akers   03/21/24 16:00 EDT

## 2024-04-01 DIAGNOSIS — F32.9 REACTIVE DEPRESSION: ICD-10-CM

## 2024-04-01 RX ORDER — SERTRALINE HYDROCHLORIDE 100 MG/1
100 TABLET, FILM COATED ORAL DAILY
Qty: 90 TABLET | Refills: 1 | Status: SHIPPED | OUTPATIENT
Start: 2024-04-01

## 2024-05-06 DIAGNOSIS — I10 ESSENTIAL HYPERTENSION: ICD-10-CM

## 2024-05-06 RX ORDER — DILTIAZEM HYDROCHLORIDE 240 MG/1
240 CAPSULE, EXTENDED RELEASE ORAL DAILY
Qty: 30 CAPSULE | Refills: 0 | Status: SHIPPED | OUTPATIENT
Start: 2024-05-06

## 2024-06-04 ENCOUNTER — OFFICE VISIT (OUTPATIENT)
Dept: INTERNAL MEDICINE | Facility: CLINIC | Age: 79
End: 2024-06-04
Payer: MEDICARE

## 2024-06-04 VITALS
HEIGHT: 62 IN | DIASTOLIC BLOOD PRESSURE: 62 MMHG | RESPIRATION RATE: 16 BRPM | WEIGHT: 155 LBS | HEART RATE: 64 BPM | SYSTOLIC BLOOD PRESSURE: 116 MMHG | TEMPERATURE: 97.8 F | BODY MASS INDEX: 28.52 KG/M2

## 2024-06-04 DIAGNOSIS — R31.9 HEMATURIA, UNSPECIFIED TYPE: ICD-10-CM

## 2024-06-04 DIAGNOSIS — Z00.00 MEDICARE ANNUAL WELLNESS VISIT, SUBSEQUENT: Primary | ICD-10-CM

## 2024-06-04 DIAGNOSIS — I10 ESSENTIAL HYPERTENSION: ICD-10-CM

## 2024-06-04 DIAGNOSIS — R82.998 LEUKOCYTES IN URINE: ICD-10-CM

## 2024-06-04 DIAGNOSIS — E78.2 MIXED HYPERLIPIDEMIA: ICD-10-CM

## 2024-06-04 DIAGNOSIS — Z12.31 ENCOUNTER FOR SCREENING MAMMOGRAM FOR BREAST CANCER: ICD-10-CM

## 2024-06-04 DIAGNOSIS — F32.9 REACTIVE DEPRESSION: ICD-10-CM

## 2024-06-04 DIAGNOSIS — K21.9 GASTROESOPHAGEAL REFLUX DISEASE WITHOUT ESOPHAGITIS: ICD-10-CM

## 2024-06-04 LAB
ALBUMIN SERPL-MCNC: 4.1 G/DL (ref 3.5–5.2)
ALBUMIN/GLOB SERPL: 1.5 G/DL
ALP SERPL-CCNC: 95 U/L (ref 39–117)
ALT SERPL W P-5'-P-CCNC: 10 U/L (ref 1–33)
ANION GAP SERPL CALCULATED.3IONS-SCNC: 11.2 MMOL/L (ref 5–15)
AST SERPL-CCNC: 16 U/L (ref 1–32)
BACTERIA UR QL AUTO: ABNORMAL /HPF
BASOPHILS # BLD AUTO: 0.05 10*3/MM3 (ref 0–0.2)
BASOPHILS NFR BLD AUTO: 0.7 % (ref 0–1.5)
BILIRUB BLD-MCNC: NEGATIVE MG/DL
BILIRUB SERPL-MCNC: 0.4 MG/DL (ref 0–1.2)
BUN SERPL-MCNC: 21 MG/DL (ref 8–23)
BUN/CREAT SERPL: 18.4 (ref 7–25)
CALCIUM SPEC-SCNC: 9.4 MG/DL (ref 8.6–10.5)
CHLORIDE SERPL-SCNC: 102 MMOL/L (ref 98–107)
CHOLEST SERPL-MCNC: 145 MG/DL (ref 0–200)
CLARITY, POC: ABNORMAL
CO2 SERPL-SCNC: 28.8 MMOL/L (ref 22–29)
COLOR UR: ABNORMAL
CREAT SERPL-MCNC: 1.14 MG/DL (ref 0.57–1)
DEPRECATED RDW RBC AUTO: 40.6 FL (ref 37–54)
EGFRCR SERPLBLD CKD-EPI 2021: 49.4 ML/MIN/1.73
EOSINOPHIL # BLD AUTO: 0.22 10*3/MM3 (ref 0–0.4)
EOSINOPHIL NFR BLD AUTO: 2.9 % (ref 0.3–6.2)
ERYTHROCYTE [DISTWIDTH] IN BLOOD BY AUTOMATED COUNT: 12.3 % (ref 12.3–15.4)
EXPIRATION DATE: ABNORMAL
GLOBULIN UR ELPH-MCNC: 2.7 GM/DL
GLUCOSE SERPL-MCNC: 101 MG/DL (ref 65–99)
GLUCOSE UR STRIP-MCNC: NEGATIVE MG/DL
HCT VFR BLD AUTO: 43.5 % (ref 34–46.6)
HDLC SERPL-MCNC: 72 MG/DL (ref 40–60)
HGB BLD-MCNC: 14.3 G/DL (ref 12–15.9)
HYALINE CASTS UR QL AUTO: ABNORMAL /LPF
IMM GRANULOCYTES # BLD AUTO: 0.01 10*3/MM3 (ref 0–0.05)
IMM GRANULOCYTES NFR BLD AUTO: 0.1 % (ref 0–0.5)
KETONES UR QL: NEGATIVE
LDLC SERPL CALC-MCNC: 56 MG/DL (ref 0–100)
LDLC/HDLC SERPL: 0.76 {RATIO}
LEUKOCYTE EST, POC: ABNORMAL
LYMPHOCYTES # BLD AUTO: 2.32 10*3/MM3 (ref 0.7–3.1)
LYMPHOCYTES NFR BLD AUTO: 30.9 % (ref 19.6–45.3)
Lab: ABNORMAL
MCH RBC QN AUTO: 30.1 PG (ref 26.6–33)
MCHC RBC AUTO-ENTMCNC: 32.9 G/DL (ref 31.5–35.7)
MCV RBC AUTO: 91.6 FL (ref 79–97)
MONOCYTES # BLD AUTO: 0.81 10*3/MM3 (ref 0.1–0.9)
MONOCYTES NFR BLD AUTO: 10.8 % (ref 5–12)
NEUTROPHILS NFR BLD AUTO: 4.09 10*3/MM3 (ref 1.7–7)
NEUTROPHILS NFR BLD AUTO: 54.6 % (ref 42.7–76)
NITRITE UR-MCNC: NEGATIVE MG/ML
NRBC BLD AUTO-RTO: 0 /100 WBC (ref 0–0.2)
PH UR: 6 [PH] (ref 5–8)
PLATELET # BLD AUTO: 274 10*3/MM3 (ref 140–450)
PMV BLD AUTO: 10.1 FL (ref 6–12)
POTASSIUM SERPL-SCNC: 3.3 MMOL/L (ref 3.5–5.2)
PROT SERPL-MCNC: 6.8 G/DL (ref 6–8.5)
PROT UR STRIP-MCNC: NEGATIVE MG/DL
RBC # BLD AUTO: 4.75 10*6/MM3 (ref 3.77–5.28)
RBC # UR STRIP: ABNORMAL /HPF
RBC # UR STRIP: ABNORMAL /UL
REF LAB TEST METHOD: ABNORMAL
SODIUM SERPL-SCNC: 142 MMOL/L (ref 136–145)
SP GR UR: 1.01 (ref 1–1.03)
SQUAMOUS #/AREA URNS HPF: ABNORMAL /HPF
TRIGL SERPL-MCNC: 90 MG/DL (ref 0–150)
TSH SERPL DL<=0.05 MIU/L-ACNC: 4.14 UIU/ML (ref 0.27–4.2)
UROBILINOGEN UR QL: NORMAL
VLDLC SERPL-MCNC: 17 MG/DL (ref 5–40)
WBC # UR STRIP: ABNORMAL /HPF
WBC NRBC COR # BLD AUTO: 7.5 10*3/MM3 (ref 3.4–10.8)

## 2024-06-04 PROCEDURE — 1159F MED LIST DOCD IN RCRD: CPT | Performed by: INTERNAL MEDICINE

## 2024-06-04 PROCEDURE — 1160F RVW MEDS BY RX/DR IN RCRD: CPT | Performed by: INTERNAL MEDICINE

## 2024-06-04 PROCEDURE — 80053 COMPREHEN METABOLIC PANEL: CPT | Performed by: INTERNAL MEDICINE

## 2024-06-04 PROCEDURE — 1170F FXNL STATUS ASSESSED: CPT | Performed by: INTERNAL MEDICINE

## 2024-06-04 PROCEDURE — 84443 ASSAY THYROID STIM HORMONE: CPT | Performed by: INTERNAL MEDICINE

## 2024-06-04 PROCEDURE — 81015 MICROSCOPIC EXAM OF URINE: CPT | Performed by: INTERNAL MEDICINE

## 2024-06-04 PROCEDURE — 99214 OFFICE O/P EST MOD 30 MIN: CPT | Performed by: INTERNAL MEDICINE

## 2024-06-04 PROCEDURE — 1126F AMNT PAIN NOTED NONE PRSNT: CPT | Performed by: INTERNAL MEDICINE

## 2024-06-04 PROCEDURE — 3078F DIAST BP <80 MM HG: CPT | Performed by: INTERNAL MEDICINE

## 2024-06-04 PROCEDURE — 85025 COMPLETE CBC W/AUTO DIFF WBC: CPT | Performed by: INTERNAL MEDICINE

## 2024-06-04 PROCEDURE — 3074F SYST BP LT 130 MM HG: CPT | Performed by: INTERNAL MEDICINE

## 2024-06-04 PROCEDURE — 87086 URINE CULTURE/COLONY COUNT: CPT | Performed by: INTERNAL MEDICINE

## 2024-06-04 PROCEDURE — G0439 PPPS, SUBSEQ VISIT: HCPCS | Performed by: INTERNAL MEDICINE

## 2024-06-04 PROCEDURE — 81003 URINALYSIS AUTO W/O SCOPE: CPT | Performed by: INTERNAL MEDICINE

## 2024-06-04 PROCEDURE — 80061 LIPID PANEL: CPT | Performed by: INTERNAL MEDICINE

## 2024-06-04 PROCEDURE — 36415 COLL VENOUS BLD VENIPUNCTURE: CPT | Performed by: INTERNAL MEDICINE

## 2024-06-04 RX ORDER — DILTIAZEM HYDROCHLORIDE 240 MG/1
240 CAPSULE, EXTENDED RELEASE ORAL DAILY
Qty: 90 CAPSULE | Refills: 3 | Status: SHIPPED | OUTPATIENT
Start: 2024-06-04

## 2024-06-04 NOTE — PROGRESS NOTES
The ABCs of the Annual Wellness Visit  Subsequent Medicare Wellness Visit    Subjective      Carol Jean is a 78 y.o. female who presents for a Subsequent Medicare Wellness Visit.    The following portions of the patient's history were reviewed and   updated as appropriate: allergies, current medications, past family history, past medical history, past social history, past surgical history, and problem list.    Compared to one year ago, the patient feels her physical   health is the same.    Compared to one year ago, the patient feels her mental   health is the same.    Recent Hospitalizations:  She was not admitted to the hospital during the last year.       Current Medical Providers:  Patient Care Team:  Flakito Chaudhary MD as PCP - General (Internal Medicine)  Josh, Noe Garnica MD as Consulting Physician (Nephrology)  Aisha Medina MD as Consulting Physician (Cardiology)  Amanda Ashby MD as Consulting Physician (Hematology and Oncology)  Edmund Greenberg MD as Emergency Attending (Pulmonary Disease)  Cait Alcocer APRN as Nurse Practitioner (Hematology and Oncology)    Outpatient Medications Prior to Visit   Medication Sig Dispense Refill    acetaminophen (TYLENOL) 325 MG tablet Take 2 tablets by mouth Every 4 (Four) Hours As Needed for Mild Pain .      chlorthalidone (HYGROTON) 25 MG tablet Take 1/2 (one-half) tablet by mouth once daily 45 tablet 3    Glucosamine HCl (GLUCOSAMINE PO) Take 2 tablets by mouth Daily. As directed, OTC      metoprolol tartrate (LOPRESSOR) 25 MG tablet Take 1 tablet by mouth 2 (Two) Times a Day. 180 tablet 3    pantoprazole (PROTONIX) 40 MG EC tablet Take 1 tablet by mouth once daily 90 tablet 1    potassium chloride 10 MEQ CR tablet Take 1 tablet by mouth once daily 90 tablet 1    rivaroxaban (Xarelto) 15 MG tablet Take 1 tablet by mouth Daily. 90 tablet 0    sertraline (ZOLOFT) 100 MG tablet Take 1 tablet by mouth once daily 90 tablet 1     "simvastatin (ZOCOR) 40 MG tablet Take 1 tablet by mouth every night at bedtime. 90 tablet 2    vitamin C (ASCORBIC ACID) 500 MG tablet Take 2 tablets by mouth Daily. OTC      vitamin D3 125 MCG (5000 UT) capsule capsule Take by oral route.      Zinc 40 MG tablet Take  by mouth Daily. OTC      dilTIAZem (TIAZAC) 240 MG 24 hr capsule Take 1 capsule by mouth once daily 30 capsule 0     Facility-Administered Medications Prior to Visit   Medication Dose Route Frequency Provider Last Rate Last Admin    denosumab (PROLIA) syringe 60 mg  60 mg Subcutaneous Q6 Months Flakito Chaudhary MD           No opioid medication identified on active medication list. I have reviewed chart for other potential  high risk medication/s and harmful drug interactions in the elderly.        Aspirin is not on active medication list.  Aspirin use is not indicated based on review of current medical condition/s. Risk of harm outweighs potential benefits.  .    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  ACP discussion was held with the patient during this visit. Patient has an advance directive in EMR which is still valid.      Objective    Vitals:    06/04/24 0801   BP: 116/62   BP Location: Left arm   Patient Position: Sitting   Cuff Size: Adult   Pulse: 64   Resp: 16   Temp: 97.8 °F (36.6 °C)   TempSrc: Infrared   Weight: 70.3 kg (155 lb)   Height: 156.2 cm (61.5\")   PainSc: 0-No pain     Estimated body mass index is 28.81 kg/m² as calculated from the following:    Height as of this encounter: 156.2 cm (61.5\").    Weight as of this encounter: 70.3 kg " (155 lb).     Finger Rub Hearing{Test (right ear):passed  Finger Rub Hearing{Test (left ear):passed       Does the patient have evidence of cognitive impairment?   No            HEALTH RISK ASSESSMENT    Smoking Status:  Social History     Tobacco Use   Smoking Status Never    Passive exposure: Past   Smokeless Tobacco Never   Tobacco Comments    None     Alcohol Consumption:  Social History     Substance and Sexual Activity   Alcohol Use Yes    Alcohol/week: 2.0 standard drinks of alcohol    Types: 2 Glasses of wine per week    Comment: occas     Fall Risk Screen:    QUENTIN Fall Risk Assessment was completed, and patient is at LOW risk for falls.Assessment completed on:2024    Depression Screenin/4/2024     8:09 AM   PHQ-2/PHQ-9 Depression Screening   Little Interest or Pleasure in Doing Things 0-->not at all   Feeling Down, Depressed or Hopeless 0-->not at all   PHQ-9: Brief Depression Severity Measure Score 0       Health Habits and Functional and Cognitive Screenin/4/2024     8:08 AM   Functional & Cognitive Status   Do you have difficulty preparing food and eating? No   Do you have difficulty bathing yourself, getting dressed or grooming yourself? No   Do you have difficulty using the toilet? No   Do you have difficulty moving around from place to place? No   Do you have trouble with steps or getting out of a bed or a chair? Yes   Current Diet Limited Junk Food   Dental Exam Up to date   Eye Exam Up to date   Exercise (times per week) 2 times per week   Current Exercises Include Walking   Do you need help using the phone?  No   Are you deaf or do you have serious difficulty hearing?  No   Do you need help to go to places out of walking distance? No   Do you need help shopping? No   Do you need help preparing meals?  No   Do you need help with housework?  No   Do you need help with laundry? No   Do you need help taking your medications? No   Do you need help managing money? No   Do you ever  drive or ride in a car without wearing a seat belt? No   Have you felt unusual stress, anger or loneliness in the last month? No   Who do you live with? Spouse   If you need help, do you have trouble finding someone available to you? No   Have you been bothered in the last four weeks by sexual problems? No   Do you have difficulty concentrating, remembering or making decisions? Yes       Age-appropriate Screening Schedule:  Refer to the list below for future screening recommendations based on patient's age, sex and/or medical conditions. Orders for these recommended tests are listed in the plan section. The patient has been provided with a written plan.    Health Maintenance   Topic Date Due    ANNUAL WELLNESS VISIT  05/30/2024    LIPID PANEL  05/30/2024    COVID-19 Vaccine (5 - 2023-24 season) 12/30/2024 (Originally 9/1/2023)    RSV Vaccine - Adults (1 - 1-dose 60+ series) 10/27/2025 (Originally 10/22/2005)    TDAP/TD VACCINES (1 - Tdap) 10/27/2025 (Originally 10/22/1964)    INFLUENZA VACCINE  08/01/2024    BMI FOLLOWUP  11/30/2024    DXA SCAN  07/26/2025    COLORECTAL CANCER SCREENING  06/06/2028    HEPATITIS C SCREENING  Completed    Pneumococcal Vaccine 65+  Completed    ZOSTER VACCINE  Completed                  CMS Preventative Services Quick Reference  Risk Factors Identified During Encounter:    None Identified    The above risks/problems have been discussed with the patient.  Pertinent information has been shared with the patient in the After Visit Summary.  Diagnoses and all orders for this visit:    1. Medicare annual wellness visit, subsequent (Primary)    2. Essential hypertension  -     dilTIAZem (TIAZAC) 240 MG 24 hr capsule; Take 1 capsule by mouth Daily.  Dispense: 90 capsule; Refill: 3  -     CBC & Differential; Future  -     Comprehensive Metabolic Panel; Future  -     TSH; Future  -     POC Urinalysis Dipstick, Automated; Future    3. Reactive depression    4. Mixed hyperlipidemia  -      Comprehensive Metabolic Panel; Future  -     Lipid Panel; Future    5. Gastroesophageal reflux disease without esophagitis    6. Encounter for screening mammogram for breast cancer  -     Mammo Screening Digital Tomosynthesis Bilateral With CAD; Future          Follow Up:   Next Medicare Wellness visit to be scheduled in 1 year.      An After Visit Summary and PPPS were made available to the patient.

## 2024-06-04 NOTE — PATIENT INSTRUCTIONS
Medicare Wellness  Personal Prevention Plan of Service     Date of Office Visit:    Encounter Provider:  Flakito Chaudhary MD  Place of Service:  [facility]  Patient Name: Carol Jean  :  1945    As part of the Medicare Wellness portion of your visit today, we are providing you with this personalized preventive plan of services (PPPS). This plan is based upon recommendations of the [place] Preventive Services Task Force (USPSTF) and the Advisory Committee on Immunization Practices (ACIP).    This lists the preventive care services that should be considered, and provides dates of when you are due. Items listed as completed are up-to-date and do not require any further intervention.    Health Maintenance   Topic Date Due    LIPID PANEL  2024    COVID-19 Vaccine (2023- season) 2024 (Originally 2023)    RSV Vaccine - Adults (1 - 1-dose 60+ series) 10/27/2025 (Originally 10/22/2005)    TDAP/TD VACCINES (1 - Tdap) 10/27/2025 (Originally 10/22/1964)    INFLUENZA VACCINE  2024    BMI FOLLOWUP  2024    ANNUAL WELLNESS VISIT  2025    DXA SCAN  2025    COLORECTAL CANCER SCREENING  2028    HEPATITIS C SCREENING  Completed    Pneumococcal Vaccine 65+  Completed    ZOSTER VACCINE  Completed       Orders Placed This Encounter   Procedures    Comprehensive Metabolic Panel     Standing Status:   Future     Standing Expiration Date:   2025     Order Specific Question:   Release to patient     Answer:   Routine Release [[id number]]    Lipid Panel     Standing Status:   Future     Standing Expiration Date:   2025     Order Specific Question:   Release to patient     Answer:   Routine Release [[id number]]    TSH     Standing Status:   Future     Standing Expiration Date:   2025     Order Specific Question:   Release to patient     Answer:   Routine Release [[id number]]    POC Urinalysis Dipstick,  Automated     Standing Status:   Future     Order Specific Question:   Release to patient     Answer:   Routine Release [[id number]]    CBC & Differential     Standing Status:   Future     Standing Expiration Date:   6/5/2025     Order Specific Question:   Manual Differential     Answer:   No     Order Specific Question:   Release to patient     Answer:   Routine Release [[id number]]       No follow-ups on file.        Health Maintenance for Postmenopausal Women  Menopause is a normal process in which your ability to get pregnant comes to an end. This process happens slowly over many months or years, usually between the ages of 48 and 55. Menopause is complete when you have missed your menstrual period for 12 months.  It is important to talk with your health care provider about some of the most common conditions that affect women after menopause (postmenopausal women). These include heart disease, cancer, and bone loss (osteoporosis). Adopting a healthy lifestyle and getting preventive care can help to promote your health and wellness. The actions you take can also lower your chances of developing some of these common conditions.  What are the signs and symptoms of menopause?  During menopause, you may have the following symptoms:  Hot flashes. These can be moderate or severe.  Night sweats.  Decrease in sex drive.  Mood swings.  Headaches.  Tiredness (fatigue).  Irritability.  Memory problems.  Problems falling asleep or staying asleep.  Talk with your health care provider about treatment options for your symptoms.  Do I need hormone replacement therapy?  Hormone replacement therapy is effective in treating symptoms that are caused by menopause, such as hot flashes and night sweats.  Hormone replacement carries certain risks, especially as you become older. If you are thinking about using estrogen or estrogen with progestin, discuss the benefits and risks with your health care provider.  How can I reduce my risk  for heart disease and stroke?  The risk of heart disease, heart attack, and stroke increases as you age. One of the causes may be a change in the body's hormones during menopause. This can affect how your body uses dietary fats, triglycerides, and cholesterol. Heart attack and stroke are medical emergencies. There are many things that you can do to help prevent heart disease and stroke.  Watch your blood pressure  High blood pressure causes heart disease and increases the risk of stroke. This is more likely to develop in people who have high blood pressure readings or are overweight.  Have your blood pressure checked:  Every 3-5 years if you are 18-39 years of age.  Every year if you are 40 years old or older.  Eat a healthy diet    Eat a diet that includes plenty of vegetables, fruits, low-fat dairy products, and lean protein.  Do not eat a lot of foods that are high in solid fats, added sugars, or sodium.  Get regular exercise  Get regular exercise. This is one of the most important things you can do for your health. Most adults should:  Try to exercise for at least 150 minutes each week. The exercise should increase your heart rate and make you sweat (moderate-intensity exercise).  Try to do strengthening exercises at least twice each week. Do these in addition to the moderate-intensity exercise.  Spend less time sitting. Even light physical activity can be beneficial.  Other tips  Work with your health care provider to achieve or maintain a healthy weight.  Do not use any products that contain nicotine or tobacco. These products include cigarettes, chewing tobacco, and vaping devices, such as e-cigarettes. If you need help quitting, ask your health care provider.  Know your numbers. Ask your health care provider to check your cholesterol and your blood sugar (glucose). Continue to have your blood tested as directed by your health care provider.  Do I need screening for cancer?  Depending on your health history  and family history, you may need to have cancer screenings at different stages of your life. This may include screening for:  Breast cancer.  Cervical cancer.  Lung cancer.  Colorectal cancer.  What is my risk for osteoporosis?  After menopause, you may be at increased risk for osteoporosis. Osteoporosis is a condition in which bone destruction happens more quickly than new bone creation. To help prevent osteoporosis or the bone fractures that can happen because of osteoporosis, you may take the following actions:  If you are 19-50 years old, get at least 1,000 mg of calcium and at least 600 international units (IU) of vitamin D per day.  If you are older than age 50 but younger than age 70, get at least 1,200 mg of calcium and at least 600 international units (IU) of vitamin D per day.  If you are older than age 70, get at least 1,200 mg of calcium and at least 800 international units (IU) of vitamin D per day.  Smoking and drinking excessive alcohol increase the risk of osteoporosis. Eat foods that are rich in calcium and vitamin D, and do weight-bearing exercises several times each week as directed by your health care provider.  How does menopause affect my mental health?  Depression may occur at any age, but it is more common as you become older. Common symptoms of depression include:  Feeling depressed.  Changes in sleep patterns.  Changes in appetite or eating patterns.  Feeling an overall lack of motivation or enjoyment of activities that you previously enjoyed.  Frequent crying spells.  Talk with your health care provider if you think that you are experiencing any of these symptoms.  General instructions  See your health care provider for regular wellness exams and vaccines. This may include:  Scheduling regular health, dental, and eye exams.  Getting and maintaining your vaccines. These include:  Influenza vaccine. Get this vaccine each year before the flu season begins.  Pneumonia vaccine.  Shingles  vaccine.  Tetanus, diphtheria, and pertussis (Tdap) booster vaccine.  Your health care provider may also recommend other immunizations.  Tell your health care provider if you have ever been abused or do not feel safe at home.  Summary  Menopause is a normal process in which your ability to get pregnant comes to an end.  This condition causes hot flashes, night sweats, decreased interest in sex, mood swings, headaches, or lack of sleep.  Treatment for this condition may include hormone replacement therapy.  Take actions to keep yourself healthy, including exercising regularly, eating a healthy diet, watching your weight, and checking your blood pressure and blood sugar levels.  Get screened for cancer and depression. Make sure that you are up to date with all your vaccines.  This information is not intended to replace advice given to you by your health care provider. Make sure you discuss any questions you have with your health care provider.  Document Revised: 05/09/2022 Document Reviewed: 05/09/2022  [employer] © 2023 [employer]

## 2024-06-04 NOTE — PROGRESS NOTES
Chief Complaint   Patient presents with    Medicare Wellness-subsequent       History of Present Illness      The patient presents for a follow-up related to hypertension. The patient reports that she has had no headaches, chest pain, dyspnea, edema, syncope, blurred vision or palpitations. She states that she is taking her medication as prescribed. She is not having medication side effects.    The patient presents for a follow-up related to hyperlipidemia. She is following a low fat diet. She reports that she is exercising. She is taking simvastatin. The patient is taking her medication as prescribed. She reports no medication side effects, including muscle cramps, abdominal pain, headaches or weakness. She denies orthopnea, paroxysmal nocturnal dyspnea or dyspnea on exertion.    The patient presents for a follow-up related to GERD. The patient is on pantoprazole for her gastroesophageal reflux. The medication is taken on a regular basis and gives complete relief of the symptoms. She reports no abdominal pain, belching, diarrhea, dysphagia, early satiety, heartburn, hoarseness, nausea, odynophagia, rectal bleeding, vomiting or weight loss. The GERD has no known aggravating factors.    The patient presents for a follow-up related to depression. She denies currently having depression symptoms. She denies suicidal ideation. Her energy level is good. She denies agorophobia. She sleeps well. She is not tearful. She states that her current depression symptoms are stable. She is currently taking a medication. The current medication regimen consists of sertraline. The patient denies having medication side effects including nausea, headaches, anxiety, increased depression or fatigue.    Medications      Current Outpatient Medications:     acetaminophen (TYLENOL) 325 MG tablet, Take 2 tablets by mouth Every 4 (Four) Hours As Needed for Mild Pain ., Disp: , Rfl:     chlorthalidone (HYGROTON) 25 MG tablet, Take 1/2 (one-half)  "tablet by mouth once daily, Disp: 45 tablet, Rfl: 3    dilTIAZem (TIAZAC) 240 MG 24 hr capsule, Take 1 capsule by mouth Daily., Disp: 90 capsule, Rfl: 3    Glucosamine HCl (GLUCOSAMINE PO), Take 2 tablets by mouth Daily. As directed, OTC, Disp: , Rfl:     metoprolol tartrate (LOPRESSOR) 25 MG tablet, Take 1 tablet by mouth 2 (Two) Times a Day., Disp: 180 tablet, Rfl: 3    pantoprazole (PROTONIX) 40 MG EC tablet, Take 1 tablet by mouth once daily, Disp: 90 tablet, Rfl: 1    potassium chloride 10 MEQ CR tablet, Take 1 tablet by mouth once daily, Disp: 90 tablet, Rfl: 1    rivaroxaban (Xarelto) 15 MG tablet, Take 1 tablet by mouth Daily., Disp: 90 tablet, Rfl: 0    sertraline (ZOLOFT) 100 MG tablet, Take 1 tablet by mouth once daily, Disp: 90 tablet, Rfl: 1    simvastatin (ZOCOR) 40 MG tablet, Take 1 tablet by mouth every night at bedtime., Disp: 90 tablet, Rfl: 2    vitamin C (ASCORBIC ACID) 500 MG tablet, Take 2 tablets by mouth Daily. OTC, Disp: , Rfl:     vitamin D3 125 MCG (5000 UT) capsule capsule, Take by oral route., Disp: , Rfl:     Zinc 40 MG tablet, Take  by mouth Daily. OTC, Disp: , Rfl:     Current Facility-Administered Medications:     denosumab (PROLIA) syringe 60 mg, 60 mg, Subcutaneous, Q6 Months, Flakito Chaudhary MD     Allergies    Allergies   Allergen Reactions    Erythromycin Swelling     \"tongue swelling\".    Warfarin Other (See Comments)     Reoccurrence of clot while on warfarin     Codeine Sulfate Swelling    Meperidine Swelling    Morphine And Codeine Swelling    Penicillins Swelling     \"swelling\" at site.    Sulfa Antibiotics Swelling    Sulfadiazine Swelling       Problem List    Patient Active Problem List   Diagnosis    Primary hypertension    Healed or old pulmonary embolism    Circadian rhythm sleep disorder, delayed sleep phase type    Psychophysiological insomnia    Mixed hyperlipidemia    Calculus of kidney    Cobalamin deficiency    Chronic anticoagulation (Xarelto)    Reactive " "depression    Osteopenia of multiple sites       Medications, Allergies, Problems List and Past History were reviewed and updated.    Physical Examination    /62 (BP Location: Left arm, Patient Position: Sitting, Cuff Size: Adult)   Pulse 64   Temp 97.8 °F (36.6 °C) (Infrared)   Resp 16   Ht 156.2 cm (61.5\")   Wt 70.3 kg (155 lb)   LMP  (LMP Unknown)   BMI 28.81 kg/m²       HEENT: Head- Normocephalic Atraumatic. Facies- Within normal limits. Pinnas- Normal texture and shape bilaterally. Canals- Normal bilaterally. TMs- Normal bilaterally. Nares- Patent bilaterally. Nasal Septum- is normal. There is no tenderness to palpation over the frontal or maxillary sinuses. Lids- Normal bilaterally. Conjunctiva- Clear bilaterally. Sclera- Anicteric bilaterally. Oropharynx- Moist with no lesions. Tonsils- No enlargement, erythema or exudate.    Neck: Thyroid- non enlarged, symmetric and has no nodules. No bruits are detected. ROM- Normal Range of Motion with no rigidity.    Lungs: Auscultation- Clear to auscultation bilaterally. There are no retractions, clubbing or cyanosis. The Expiratory to Inspiratory ratio is equal.    Lymph Nodes: Cervical- no enlarged lymph nodes noted. Clavicular- no enlarged supraclavicular lymph nodes noted. Axillary- no enlarged axillary lymph nodes noted. Inguinal- no enlarged inguinal lymph nodes noted.    Cardiovascular: There are no carotid bruits. Heart- Normal Rate with Regular rhythm and no murmurs. There are no gallops. There are no rubs. In the lower extremities there is no edema. The upper extremities do not have edema.    Abdomen: Soft, benign, non-tender with no masses, hernias, organomegaly or scars.    Breast: Normal contours bilaterally with no masses, discharge, skin changes or lumps. There are scars seen in the right periareolar area, in the left periareolar area, beneath the right breast and beneath the left breast.    Impression and Assessment    Encounter for " Screening Mammogram for Malignant Neoplasm of the Breast.    Essential Hypertension.    Hyperlipidemia.    Gastroesophageal Reflux Disease.    Reactive Depression.    Plan    Gastroesophageal Reflux Disease Plan: The patient was instructed to continue the current medications.    Essential Hypertension Plan: The patient was instructed to continue the current medications.    Hyperlipidemia Plan: The patient was instructed to exercise daily, eat a low fat diet and continue her medications.    Reactive Depression Plan: The patient was instructed to continue the current medications.    The following was ordered for screening and health maintenance: Screening Mammogram.    Diagnoses and all orders for this visit:    1. Medicare annual wellness visit, subsequent (Primary)    2. Essential hypertension  -     dilTIAZem (TIAZAC) 240 MG 24 hr capsule; Take 1 capsule by mouth Daily.  Dispense: 90 capsule; Refill: 3  -     CBC & Differential; Future  -     Comprehensive Metabolic Panel; Future  -     TSH; Future  -     POC Urinalysis Dipstick, Automated; Future    3. Reactive depression    4. Mixed hyperlipidemia  -     Comprehensive Metabolic Panel; Future  -     Lipid Panel; Future    5. Gastroesophageal reflux disease without esophagitis    6. Encounter for screening mammogram for breast cancer  -     Mammo Screening Digital Tomosynthesis Bilateral With CAD; Future        Return to Office    The patient was instructed to return for follow-up in 6 months. The patient was instructed to return sooner if the condition changes, worsens, or does not resolve.

## 2024-06-05 LAB — BACTERIA SPEC AEROBE CULT: NORMAL

## 2024-06-16 DIAGNOSIS — K21.9 GASTROESOPHAGEAL REFLUX DISEASE: ICD-10-CM

## 2024-06-17 RX ORDER — PANTOPRAZOLE SODIUM 40 MG/1
TABLET, DELAYED RELEASE ORAL
Qty: 90 TABLET | Refills: 1 | Status: SHIPPED | OUTPATIENT
Start: 2024-06-17

## 2024-06-19 ENCOUNTER — OFFICE VISIT (OUTPATIENT)
Dept: ORTHOPEDIC SURGERY | Facility: CLINIC | Age: 79
End: 2024-06-19
Payer: MEDICARE

## 2024-06-19 VITALS
DIASTOLIC BLOOD PRESSURE: 84 MMHG | WEIGHT: 154 LBS | SYSTOLIC BLOOD PRESSURE: 120 MMHG | HEIGHT: 61 IN | BODY MASS INDEX: 29.07 KG/M2

## 2024-06-19 DIAGNOSIS — M17.11 PRIMARY OSTEOARTHRITIS OF RIGHT KNEE: ICD-10-CM

## 2024-06-19 DIAGNOSIS — M17.12 PRIMARY OSTEOARTHRITIS OF LEFT KNEE: Primary | ICD-10-CM

## 2024-06-19 NOTE — PROGRESS NOTES
Procedure   - Large Joint Arthrocentesis: bilateral knee on 6/19/2024 9:17 AM  Indications: pain  Details: 21 G needle, anterolateral approach  Medications (Right): 30 mg Hyaluronan 30 MG/2ML  Medications (Left): 30 mg Hyaluronan 30 MG/2ML  Outcome: tolerated well, no immediate complications  Procedure, treatment alternatives, risks and benefits explained, specific risks discussed. Consent was given by the patient. Immediately prior to procedure a time out was called to verify the correct patient, procedure, equipment, support staff and site/side marked as required. Patient was prepped and draped in the usual sterile fashion.

## 2024-06-19 NOTE — PROGRESS NOTES
[facility] [place] [facility] Note    Subjective     Chief Complaint   Patient presents with    Follow-up     6 month recheck- Primary osteoarthritis of left knee  3 year recheck- Primary osteoarthritis of right knee- last seen 08/2021        HPI    It has been 6  month(s) since Ms. Jean's last visit. She returns to clinic today for follow-up of bilateral knee pain. The issue has been ongoing for 1 year(s) on left knee and 3 years on Right knee. She rates her pain a 7/10 on the pain scale. Previous/current treatments: steroid injection (last injection 12/18/2023). Current symptoms: same as prior visit. The pain is worse with walking; resting improve the pain. Overall, she is doing better.  She is inquiring about viscosupplementation injections today.  She is not interested in surgical intervention secondary to history of blood clots in the past.    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 EMS transport to [facility]. Restoration of rhythm and blood pressure at [facility]. “Shock” manifest by hypotension and multiorgan failure:  Transient hepatic dysfunction, resolved.  Transient nonoliguric ATN, resolved.  Transient metabolic acidosis, resolved.  Pressor support. Bilateral     Allergic Yrs ago!    To.pain meds u have a list!     Arthritis     Arthritis of back Bone scan few wks ago    Bladder problem     Blood clotting disorder     Bone pain     Depression Yrs ago    DVT (deep venous thrombosis)     Easy bruising     H/O bone density study 2012    H/O colonoscopy 2012    H/O mammogram 2012    HBP (high blood pressure)     History of blood transfusion     Hypertension 2016    Kidney stones     Synovial cyst of popliteal space     Upper gastrointestinal bleeding     gastritis related to lytic therapy and heparins, resolved: a. “Hemorrhagic gastritis.”    Vitamin B12 deficiency       Past Surgical History:   Procedure Laterality Date    BILATERAL BREAST REDUCTION      CATARACT EXTRACTION, BILATERAL       SECTION  1971     SECTION      COLONOSCOPY      EYE SURGERY      Cataracts  2018    KIDNEY STONE SURGERY      Nephrolithiasis with lithotripsy.    OTHER SURGICAL HISTORY      barry filter placement in [place]    REDUCTION MAMMAPLASTY Bilateral     TONSILLECTOMY      TRIGGER POINT INJECTION  2yrs ago      Family History   Problem Relation Age of Onset    Stroke Mother     Hypertension Mother     Arthritis Mother     Hyperlipidemia Mother     Heart attack Father     COPD Father     Thyroid disease Father     Heart disease Father     Stroke Other     Coronary artery disease Other     COPD Other     Stroke Other     COPD Other     Breast cancer Maternal Aunt     Ovarian cancer Neg Hx      Social History     Socioeconomic History    Marital status:    Tobacco Use    Smoking status: Never     Passive exposure: Past    Smokeless tobacco: Never    Tobacco comments:     None   Vaping Use    Vaping status: Never Used   Substance and Sexual Activity    Alcohol use: Yes     Alcohol/week: 2.0 standard drinks of alcohol     Types: 2 Glasses of wine per week     Comment: occas    Drug use: No    Sexual activity: Not Currently     Birth control/protection: Surgical, Other     Comment: Tubes tied     "  Current Outpatient Medications on File Prior to Visit   Medication Sig Dispense Refill    acetaminophen (TYLENOL) 325 MG tablet Take 2 tablets by mouth Every 4 (Four) Hours As Needed for Mild Pain .      chlorthalidone (HYGROTON) 25 MG tablet Take 1/2 (one-half) tablet by mouth once daily 45 tablet 3    dilTIAZem (TIAZAC) 240 MG 24 hr capsule Take 1 capsule by mouth Daily. 90 capsule 3    Glucosamine HCl (GLUCOSAMINE PO) Take 2 tablets by mouth Daily. As directed, OTC      metoprolol tartrate (LOPRESSOR) 25 MG tablet Take 1 tablet by mouth 2 (Two) Times a Day. 180 tablet 3    pantoprazole (PROTONIX) 40 MG EC tablet Take 1 tablet by mouth once daily 90 tablet 1    potassium chloride 10 MEQ CR tablet Take 1 tablet by mouth once daily 90 tablet 1    rivaroxaban (Xarelto) 15 MG tablet Take 1 tablet by mouth Daily. 90 tablet 0    sertraline (ZOLOFT) 100 MG tablet Take 1 tablet by mouth once daily 90 tablet 1    simvastatin (ZOCOR) 40 MG tablet Take 1 tablet by mouth every night at bedtime. 90 tablet 2    vitamin C (ASCORBIC ACID) 500 MG tablet Take 2 tablets by mouth Daily. OTC      vitamin D3 125 MCG (5000 UT) capsule capsule Take by oral route.      Zinc 40 MG tablet Take  by mouth Daily. OTC       Current Facility-Administered Medications on File Prior to Visit   Medication Dose Route Frequency Provider Last Rate Last Admin    denosumab (PROLIA) syringe 60 mg  60 mg Subcutaneous Q6 Months Flakito Chaudhary MD          Allergies   Allergen Reactions    Erythromycin Swelling     \"tongue swelling\".    Warfarin Other (See Comments)     Reoccurrence of clot while on warfarin     Codeine Sulfate Swelling    Meperidine Swelling    Morphine And Codeine Swelling    Penicillins Swelling     \"swelling\" at site.    Sulfa Antibiotics Swelling    Sulfadiazine Swelling        Review of Systems   Constitutional:  Negative for activity change, appetite change, chills, diaphoresis, fatigue, fever and unexpected weight change. " "  HENT:  Negative for congestion, dental problem, drooling, ear discharge, ear pain, facial swelling, hearing loss, mouth sores, nosebleeds, postnasal drip, rhinorrhea, sinus pressure, sneezing, sore throat, tinnitus, trouble swallowing and voice change.    Eyes:  Negative for photophobia, pain, discharge, redness, itching and visual disturbance.   Respiratory:  Negative for apnea, cough, choking, chest tightness, shortness of breath, wheezing and stridor.    Cardiovascular:  Negative for chest pain, palpitations and leg swelling.   Gastrointestinal:  Negative for abdominal distention, abdominal pain, anal bleeding, blood in stool, constipation, diarrhea, nausea, rectal pain and vomiting.   Endocrine: Negative for cold intolerance, heat intolerance, polydipsia, polyphagia and polyuria.   Genitourinary:  Negative for decreased urine volume, difficulty urinating, dysuria, enuresis, flank pain, frequency, genital sores, hematuria and urgency.   Musculoskeletal:  Positive for arthralgias. Negative for back pain, gait problem, joint swelling, myalgias, neck pain and neck stiffness.   Skin:  Negative for color change, pallor, rash and wound.   Allergic/Immunologic: Negative for environmental allergies, food allergies and immunocompromised state.   Neurological:  Negative for dizziness, tremors, seizures, syncope, facial asymmetry, speech difficulty, weakness, light-headedness, numbness and headaches.   Hematological:  Negative for adenopathy. Does not bruise/bleed easily.   Psychiatric/Behavioral:  Negative for agitation, behavioral problems, confusion, decreased concentration, dysphoric mood, hallucinations, self-injury, sleep disturbance and suicidal ideas. The patient is not nervous/anxious and is not hyperactive.         Objective      Physical Exam  /84   Ht 156.2 cm (61.5\")   Wt 69.9 kg (154 lb)   LMP  (LMP Unknown)   BMI 28.63 kg/m²     Body mass index is 28.63 kg/m².         General:   Mental Status:  " Alert   Appearance: Cooperative, in no acute distress   Build and Nutrition: Well-nourished well-developed female   Orientation: Alert and oriented to person, place and time   Posture: Normal   Gait: Nonantalgic    Integument:   Right knee: No skin lesions, no rash, no ecchymosis              Left knee: No skin lesions, no rash, no ecchymosis     Lower Extremities:   Right Knee:    Tenderness:  None    Effusion:  Trace    Swelling: None    Crepitus:  Positive    Range of motion:  Extension: 5°       Flexion: 110°  Instability:  No varus laxity, no valgus laxity, negative anterior drawer  Deformities:  Valgus              Left Knee:                          Tenderness:    None                          Effusion:          Trace                          Swelling:          None                          Crepitus:          Positive                          Range of motion:        Extension:       0°                                                              Flexion:           110°  Instability:        No varus laxity, no valgus laxity, negative anterior drawer  Deformities:     Valgus    Imaging/Studies  Imaging Results (Last 24 Hours)       Procedure Component Value Units Date/Time    XR Knee 4+ View Right [[id number]] Resulted: 06/19/24 0853     Updated: 06/19/24 0854    Narrative:      Right Knee Radiographs  Indication: right knee pain  Views: Standing AP's and skiers of both knees, with lateral and sunrise   views of the right knee    Comparison: 11/4/2020    Findings:    Bone-on-bone contact lateral compartment, tricompartmental osteophytes,   mild valgus alignment, no acute bony abnormalities.  No unusual bony   features.  Advanced knee arthritis.  Worsening compared to the previous   imaging.                Assessment and Plan     Diagnoses and all orders for this visit:    1. Primary osteoarthritis of left knee (Primary)  -     - Large Joint Arthrocentesis: bilateral knee    2. Primary osteoarthritis of right  knee  -     XR Knee 4+ View Right  -     - Large Joint Arthrocentesis: bilateral knee        1. Primary osteoarthritis of left knee    2. Primary osteoarthritis of right knee        I reviewed my findings with the patient.  She would like to try a viscosupplementation injection series, and the series was started today.  She is not interested in surgical intervention secondary to her previous history of blood clots.    Procedure Note:  The potential benefits of performing a therapeutic knee joint visco supplementation injection, as well as potential risks (including, but not limited to infection, swelling, pain, bleeding, bruising, nerve/blood vessel damage, and pseudoseptic reaction) have been discussed with the patient.  After informed consent, timeout procedure was performed, and the skin on the right and left knee was prepped with chlorhexidine soap and alcohol, after which ethyl chloride was applied to the skin at the injection site. Via the anterolateral approach, Orthovisc was injected into the knee joint.  The patient tolerated the procedure well. There were no complications.  Band-Aid was applied to the injection site. Post-procedural instructions discussed with the patient and/or their caregiver.      Return in about 1 week (around 6/26/2024) for injection.      Flakito Chase MD  06/19/24  09:19 EDT    Dictated Utilizing Dragon Dictation

## 2024-06-26 ENCOUNTER — CLINICAL SUPPORT (OUTPATIENT)
Dept: ORTHOPEDIC SURGERY | Facility: CLINIC | Age: 79
End: 2024-06-26
Payer: MEDICARE

## 2024-06-26 DIAGNOSIS — M17.12 PRIMARY OSTEOARTHRITIS OF LEFT KNEE: Primary | ICD-10-CM

## 2024-06-26 DIAGNOSIS — M17.11 PRIMARY OSTEOARTHRITIS OF RIGHT KNEE: ICD-10-CM

## 2024-06-26 PROCEDURE — 20610 DRAIN/INJ JOINT/BURSA W/O US: CPT | Performed by: ORTHOPAEDIC SURGERY

## 2024-06-26 NOTE — PROGRESS NOTES
[facility] Note    Subjective     Chief Complaint   Patient presents with    Follow-up     Bilateral knees orthovisc #2 injections        HPI    Carol Jena is a 78 y.o. female who presents for the second Orthovisc injections into both knees.  Of note, some improvement so far.    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 EMS transport to [facility] Emergency Room. Restoration of rhythm and blood pressure at [facility] Emergency Room. “Shock” manifest by hypotension and multiorgan failure:  Transient hepatic dysfunction, resolved.  Transient nonoliguric ATN, resolved.  Transient metabolic acidosis, resolved.  Pressor support. Bilateral     Allergic Yrs ago!    To.pain meds u have a list!    Arthritis     Arthritis of back Bone scan few wks ago    Bladder problem     Blood clotting disorder     Bone pain     Depression Yrs ago    DVT (deep venous thrombosis)     Easy bruising     H/O bone density study 08/2012    H/O colonoscopy 08/2012    H/O mammogram 08/2012    HBP (high blood pressure)     History of blood transfusion     Hypertension 11/22/2016    Kidney stones     Synovial cyst of popliteal space     Upper gastrointestinal bleeding     gastritis related to lytic therapy and heparins, resolved: a. “Hemorrhagic gastritis.”    Vitamin B12 deficiency       Past Surgical History:   Procedure Laterality Date    BILATERAL BREAST REDUCTION      CATARACT EXTRACTION,  BILATERAL       SECTION  1971     SECTION  1975    COLONOSCOPY      EYE SURGERY      Cataracts  2018    KIDNEY STONE SURGERY      Nephrolithiasis with lithotripsy.    OTHER SURGICAL HISTORY      barry filter placement in [place]    REDUCTION MAMMAPLASTY Bilateral     TONSILLECTOMY      TRIGGER POINT INJECTION  2yrs ago      Family History   Problem Relation Age of Onset    Stroke Mother     Hypertension Mother     Arthritis Mother     Hyperlipidemia Mother     Heart attack Father     COPD Father     Thyroid disease Father     Heart disease Father     Stroke Other     Coronary artery disease Other     COPD Other     Stroke Other     COPD Other     Breast cancer Maternal Aunt     Ovarian cancer Neg Hx      Social History     Socioeconomic History    Marital status:    Tobacco Use    Smoking status: Never     Passive exposure: Past    Smokeless tobacco: Never    Tobacco comments:     None   Vaping Use    Vaping status: Never Used   Substance and Sexual Activity    Alcohol use: Yes     Alcohol/week: 2.0 standard drinks of alcohol     Types: 2 Glasses of wine per week     Comment: occas    Drug use: No    Sexual activity: Not Currently     Birth control/protection: Surgical, Other     Comment: Tubes tied      Current Outpatient Medications on File Prior to Visit   Medication Sig Dispense Refill    acetaminophen (TYLENOL) 325 MG tablet Take 2 tablets by mouth Every 4 (Four) Hours As Needed for Mild Pain .      chlorthalidone (HYGROTON) 25 MG tablet Take 1/2 (one-half) tablet by mouth once daily 45 tablet 3    dilTIAZem (TIAZAC) 240 MG 24 hr capsule Take 1 capsule by mouth Daily. 90 capsule 3    Glucosamine HCl (GLUCOSAMINE PO) Take 2 tablets by mouth Daily. As directed, OTC      metoprolol tartrate (LOPRESSOR) 25 MG tablet Take 1 tablet by mouth 2 (Two) Times a Day. 180 tablet 3    pantoprazole (PROTONIX) 40 MG EC tablet Take 1 tablet by mouth once daily 90 tablet 1    potassium chloride  "10 MEQ CR tablet Take 1 tablet by mouth once daily 90 tablet 1    rivaroxaban (Xarelto) 15 MG tablet Take 1 tablet by mouth Daily. 90 tablet 0    sertraline (ZOLOFT) 100 MG tablet Take 1 tablet by mouth once daily 90 tablet 1    simvastatin (ZOCOR) 40 MG tablet Take 1 tablet by mouth every night at bedtime. 90 tablet 2    vitamin C (ASCORBIC ACID) 500 MG tablet Take 2 tablets by mouth Daily. OTC      vitamin D3 125 MCG (5000 UT) capsule capsule Take by oral route.      Zinc 40 MG tablet Take  by mouth Daily. OTC       Current Facility-Administered Medications on File Prior to Visit   Medication Dose Route Frequency Provider Last Rate Last Admin    denosumab (PROLIA) syringe 60 mg  60 mg Subcutaneous Q6 Months Flakito Chaudhary MD          Allergies   Allergen Reactions    Erythromycin Swelling     \"tongue swelling\".    Warfarin Other (See Comments)     Reoccurrence of clot while on warfarin     Codeine Sulfate Swelling    Meperidine Swelling    Morphine And Codeine Swelling    Penicillins Swelling     \"swelling\" at site.    Sulfa Antibiotics Swelling    Sulfadiazine Swelling        Review of Systems   Constitutional:  Negative for activity change, appetite change, chills, diaphoresis, fatigue, fever and unexpected weight change.   HENT:  Negative for congestion, dental problem, drooling, ear discharge, ear pain, facial swelling, hearing loss, mouth sores, nosebleeds, postnasal drip, rhinorrhea, sinus pressure, sneezing, sore throat, tinnitus, trouble swallowing and voice change.    Eyes:  Negative for photophobia, pain, discharge, redness, itching and visual disturbance.   Respiratory:  Negative for apnea, cough, choking, chest tightness, shortness of breath, wheezing and stridor.    Cardiovascular:  Negative for chest pain, palpitations and leg swelling.   Gastrointestinal:  Negative for abdominal distention, abdominal pain, anal bleeding, blood in stool, constipation, diarrhea, nausea, rectal pain and vomiting. "   Endocrine: Negative for cold intolerance, heat intolerance, polydipsia, polyphagia and polyuria.   Genitourinary:  Negative for decreased urine volume, difficulty urinating, dysuria, enuresis, flank pain, frequency, genital sores, hematuria and urgency.   Musculoskeletal:  Positive for arthralgias. Negative for back pain, gait problem, joint swelling, myalgias, neck pain and neck stiffness.   Skin:  Negative for color change, pallor, rash and wound.   Allergic/Immunologic: Negative for environmental allergies, food allergies and immunocompromised state.   Neurological:  Negative for dizziness, tremors, seizures, syncope, facial asymmetry, speech difficulty, weakness, light-headedness, numbness and headaches.   Hematological:  Negative for adenopathy. Does not bruise/bleed easily.   Psychiatric/Behavioral:  Negative for agitation, behavioral problems, confusion, decreased concentration, dysphoric mood, hallucinations, self-injury, sleep disturbance and suicidal ideas. The patient is not nervous/anxious and is not hyperactive.         Objective      Physical Exam  LMP  (LMP Unknown)     There is no height or weight on file to calculate BMI.    General:   Mental Status:  Alert   Appearance: Cooperative, in no acute distress   Posture: Normal    Assessment and Plan     Diagnoses and all orders for this visit:    1. Primary osteoarthritis of left knee (Primary)  -     - Large Joint Arthrocentesis: bilateral knee    2. Primary osteoarthritis of right knee  -     - Large Joint Arthrocentesis: bilateral knee        1. Primary osteoarthritis of left knee    2. Primary osteoarthritis of right knee        Procedure Note:  The potential benefits of performing a therapeutic knee joint visco supplementation injection, as well as potential risks (including, but not limited to infection, swelling, pain, bleeding, bruising, nerve/blood vessel damage, and pseudoseptic reaction) have been discussed with the patient.  After informed  consent, timeout procedure was performed, and the skin on the right and left knee was prepped with chlorhexidine soap and alcohol, after which ethyl chloride was applied to the skin at the injection site. Via the anterolateral approach, Orthovisc was injected into the knee joint.  The patient tolerated the procedure well. There were no complications.  Band-Aid was applied to the injection site. Post-procedural instructions discussed with the patient and/or their caregiver.    Return in about 1 week (around 7/3/2024) for injection.    Flakito Chase MD  06/26/24  10:24 EDT    Dictated Utilizing Dragon DictationProcedure   - Large Joint Arthrocentesis: bilateral knee on 6/26/2024 9:39 AM  Indications: pain  Details: 21 G needle, anterolateral approach  Medications (Right): 30 mg Hyaluronan 30 MG/2ML  Medications (Left): 30 mg Hyaluronan 30 MG/2ML  Outcome: tolerated well, no immediate complications  Procedure, treatment alternatives, risks and benefits explained, specific risks discussed. Consent was given by the patient. Immediately prior to procedure a time out was called to verify the correct patient, procedure, equipment, support staff and site/side marked as required. Patient was prepped and draped in the usual sterile fashion.

## 2024-07-03 ENCOUNTER — CLINICAL SUPPORT (OUTPATIENT)
Dept: ORTHOPEDIC SURGERY | Facility: CLINIC | Age: 79
End: 2024-07-03
Payer: MEDICARE

## 2024-07-03 DIAGNOSIS — M17.11 PRIMARY OSTEOARTHRITIS OF RIGHT KNEE: ICD-10-CM

## 2024-07-03 DIAGNOSIS — M17.12 PRIMARY OSTEOARTHRITIS OF LEFT KNEE: Primary | ICD-10-CM

## 2024-07-03 RX ORDER — AZITHROMYCIN 250 MG/1
TABLET, FILM COATED ORAL
COMMUNITY
Start: 2024-07-01

## 2024-07-03 RX ORDER — OFLOXACIN 3 MG/ML
SOLUTION/ DROPS OPHTHALMIC
COMMUNITY
Start: 2024-07-01

## 2024-07-03 NOTE — PROGRESS NOTES
Procedure   - Large Joint Arthrocentesis: bilateral knee on 7/3/2024 9:05 AM  Indications: pain  Details: 21 G needle, anterolateral approach  Medications (Right): 30 mg Hyaluronan 30 MG/2ML  Medications (Left): 30 mg Hyaluronan 30 MG/2ML  Outcome: tolerated well, no immediate complications  Procedure, treatment alternatives, risks and benefits explained, specific risks discussed. Consent was given by the patient. Immediately prior to procedure a time out was called to verify the correct patient, procedure, equipment, support staff and site/side marked as required. Patient was prepped and draped in the usual sterile fashion.

## 2024-07-03 NOTE — PROGRESS NOTES
[facility] [place] [facility] Note    Subjective     Chief Complaint   Patient presents with    Injections     Bilateral Knees Orthovisc injection #3        HPI    Carol Jean is a 78 y.o. female who presents for the third and final Orthovisc injections into both knees.  Of note, she has seen some improvement.    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 EMS transport to [facility] Emergency Room. Restoration of rhythm and blood pressure at [facility] Emergency Room. “Shock” manifest by hypotension and multiorgan failure:  Transient hepatic dysfunction, resolved.  Transient nonoliguric ATN, resolved.  Transient metabolic acidosis, resolved.  Pressor support. Bilateral     Allergic Yrs ago!    To.pain meds u have a list!    Arthritis     Arthritis of back Bone scan few wks ago    Bladder problem     Blood clotting disorder     Bone pain     Depression Yrs ago    DVT (deep venous thrombosis)     Easy bruising     H/O bone density study 08/2012    H/O colonoscopy 08/2012    H/O mammogram 08/2012    HBP (high blood pressure)     History of blood transfusion     Hypertension 11/22/2016    Kidney stones     Knee swelling Yrs    Synovial cyst of popliteal space     Upper gastrointestinal bleeding     gastritis related to lytic therapy and heparins, resolved: a. “Hemorrhagic gastritis.”    Vitamin B12 deficiency       Past Surgical History:   Procedure Laterality Date    BILATERAL BREAST  REDUCTION      CATARACT EXTRACTION, BILATERAL       SECTION  1971     SECTION      COLONOSCOPY      EYE SURGERY      Cataracts  2018    KIDNEY STONE SURGERY      Nephrolithiasis with lithotripsy.    OTHER SURGICAL HISTORY      barry filter placement in [place]    REDUCTION MAMMAPLASTY Bilateral     TONSILLECTOMY      TRIGGER POINT INJECTION  2yrs ago      Family History   Problem Relation Age of Onset    Stroke Mother     Hypertension Mother     Arthritis Mother     Hyperlipidemia Mother     Heart attack Father     COPD Father     Thyroid disease Father     Heart disease Father     Stroke Other     Coronary artery disease Other     COPD Other     Stroke Other     COPD Other     Breast cancer Maternal Aunt     Clotting disorder Maternal Aunt     Cancer Maternal Aunt     Ovarian cancer Neg Hx      Social History     Socioeconomic History    Marital status:    Tobacco Use    Smoking status: Never     Passive exposure: Past    Smokeless tobacco: Never    Tobacco comments:     None   Vaping Use    Vaping status: Never Used   Substance and Sexual Activity    Alcohol use: Yes     Alcohol/week: 2.0 standard drinks of alcohol     Types: 2 Glasses of wine per week     Comment: occas    Drug use: No    Sexual activity: Not Currently     Birth control/protection: Other, Tubal ligation, Surgical     Comment: Tubes tied      Current Outpatient Medications on File Prior to Visit   Medication Sig Dispense Refill    acetaminophen (TYLENOL) 325 MG tablet Take 2 tablets by mouth Every 4 (Four) Hours As Needed for Mild Pain .      azithromycin (ZITHROMAX) 250 MG tablet       chlorthalidone (HYGROTON) 25 MG tablet Take 1/2 (one-half) tablet by mouth once daily 45 tablet 3    dilTIAZem (TIAZAC) 240 MG 24 hr capsule Take 1 capsule by mouth Daily. 90 capsule 3    Glucosamine HCl (GLUCOSAMINE PO) Take 2 tablets by mouth Daily. As directed, OTC      metoprolol tartrate (LOPRESSOR) 25 MG tablet Take 1  "tablet by mouth 2 (Two) Times a Day. 180 tablet 3    ofloxacin (OCUFLOX) 0.3 % ophthalmic solution       pantoprazole (PROTONIX) 40 MG EC tablet Take 1 tablet by mouth once daily 90 tablet 1    potassium chloride 10 MEQ CR tablet Take 1 tablet by mouth once daily 90 tablet 1    rivaroxaban (Xarelto) 15 MG tablet Take 1 tablet by mouth Daily. 90 tablet 0    sertraline (ZOLOFT) 100 MG tablet Take 1 tablet by mouth once daily 90 tablet 1    simvastatin (ZOCOR) 40 MG tablet Take 1 tablet by mouth every night at bedtime. 90 tablet 2    vitamin C (ASCORBIC ACID) 500 MG tablet Take 2 tablets by mouth Daily. OTC      vitamin D3 125 MCG (5000 UT) capsule capsule Take by oral route.      Zinc 40 MG tablet Take  by mouth Daily. OTC       Current Facility-Administered Medications on File Prior to Visit   Medication Dose Route Frequency Provider Last Rate Last Admin    denosumab (PROLIA) syringe 60 mg  60 mg Subcutaneous Q6 Months Flakito Chaudhary MD          Allergies   Allergen Reactions    Erythromycin Swelling     \"tongue swelling\".    Warfarin Other (See Comments)     Reoccurrence of clot while on warfarin     Codeine Sulfate Swelling    Meperidine Swelling    Morphine And Codeine Swelling    Penicillins Swelling     \"swelling\" at site.    Sulfa Antibiotics Swelling    Sulfadiazine Swelling        Review of Systems   Constitutional:  Negative for activity change, appetite change, chills, diaphoresis, fatigue, fever and unexpected weight change.   HENT:  Negative for congestion, dental problem, drooling, ear discharge, ear pain, facial swelling, hearing loss, mouth sores, nosebleeds, postnasal drip, rhinorrhea, sinus pressure, sneezing, sore throat, tinnitus, trouble swallowing and voice change.    Eyes:  Negative for photophobia, pain, discharge, redness, itching and visual disturbance.   Respiratory:  Negative for apnea, cough, choking, chest tightness, shortness of breath, wheezing and stridor.    Cardiovascular:  " Negative for chest pain, palpitations and leg swelling.   Gastrointestinal:  Negative for abdominal distention, abdominal pain, anal bleeding, blood in stool, constipation, diarrhea, nausea, rectal pain and vomiting.   Endocrine: Negative for cold intolerance, heat intolerance, polydipsia, polyphagia and polyuria.   Genitourinary:  Negative for decreased urine volume, difficulty urinating, dysuria, enuresis, flank pain, frequency, genital sores, hematuria and urgency.   Musculoskeletal:  Positive for arthralgias. Negative for back pain, gait problem, joint swelling, myalgias, neck pain and neck stiffness.   Skin:  Negative for color change, pallor, rash and wound.   Allergic/Immunologic: Negative for environmental allergies, food allergies and immunocompromised state.   Neurological:  Negative for dizziness, tremors, seizures, syncope, facial asymmetry, speech difficulty, weakness, light-headedness, numbness and headaches.   Hematological:  Negative for adenopathy. Does not bruise/bleed easily.   Psychiatric/Behavioral:  Negative for agitation, behavioral problems, confusion, decreased concentration, dysphoric mood, hallucinations, self-injury, sleep disturbance and suicidal ideas. The patient is not nervous/anxious and is not hyperactive.         Objective      Physical Exam  LMP  (LMP Unknown)     There is no height or weight on file to calculate BMI.    General:   Mental Status:  Alert   Appearance: Cooperative, in no acute distress   Posture: Normal    Assessment and Plan     Diagnoses and all orders for this visit:    1. Primary osteoarthritis of left knee (Primary)  -     - Large Joint Arthrocentesis: bilateral knee    2. Primary osteoarthritis of right knee  -     - Large Joint Arthrocentesis: bilateral knee        1. Primary osteoarthritis of left knee    2. Primary osteoarthritis of right knee        Procedure Note:  The potential benefits of performing a therapeutic knee joint visco supplementation  injection, as well as potential risks (including, but not limited to infection, swelling, pain, bleeding, bruising, nerve/blood vessel damage, and pseudoseptic reaction) have been discussed with the patient.  After informed consent, timeout procedure was performed, and the skin on the right and left knee was prepped with chlorhexidine soap and alcohol, after which ethyl chloride was applied to the skin at the injection site. Via the anterolateral approach, Orthovisc was injected into the knee joint.  The patient tolerated the procedure well. There were no complications.  Band-Aid was applied to the injection site. Post-procedural instructions discussed with the patient and/or their caregiver.    Return in about 6 months (around 1/3/2025).    Flakito Chase MD  07/03/24  09:21 EDT    Dictated Utilizing Dragon Dictation

## 2024-07-15 RX ORDER — POTASSIUM CHLORIDE 750 MG/1
TABLET, FILM COATED, EXTENDED RELEASE ORAL
Qty: 90 TABLET | Refills: 1 | Status: SHIPPED | OUTPATIENT
Start: 2024-07-15

## 2024-07-19 DIAGNOSIS — I10 ESSENTIAL HYPERTENSION: ICD-10-CM

## 2024-07-19 RX ORDER — CHLORTHALIDONE 25 MG/1
12.5 TABLET ORAL DAILY
Qty: 45 TABLET | Refills: 0 | Status: SHIPPED | OUTPATIENT
Start: 2024-07-19

## 2024-07-22 ENCOUNTER — TELEPHONE (OUTPATIENT)
Dept: CARDIOLOGY | Facility: CLINIC | Age: 79
End: 2024-07-22
Payer: MEDICARE

## 2024-07-22 NOTE — TELEPHONE ENCOUNTER
7/19/24 [facility] d/t [employer] outage.  Refill request for Metoprolol Tartrate 25mg PO BID #60 with one refill and chlorthalidone 25mg 1/2tab PO QD #15 with 1 refill.  These medications called into patients pharmacy.  Patient needs a follow up for further refills.

## 2024-08-20 ENCOUNTER — HOSPITAL ENCOUNTER (OUTPATIENT)
Dept: MAMMOGRAPHY | Facility: HOSPITAL | Age: 79
Discharge: HOME | End: 2024-08-20
Admitting: INTERNAL MEDICINE
Payer: MEDICARE

## 2024-08-20 DIAGNOSIS — Z12.31 ENCOUNTER FOR SCREENING MAMMOGRAM FOR BREAST CANCER: ICD-10-CM

## 2024-08-20 PROCEDURE — 77067 SCR MAMMO BI INCL CAD: CPT

## 2024-08-20 PROCEDURE — 77063 BREAST TOMOSYNTHESIS BI: CPT

## 2024-09-21 DIAGNOSIS — F32.9 REACTIVE DEPRESSION: ICD-10-CM

## 2024-09-23 RX ORDER — SERTRALINE HYDROCHLORIDE 100 MG/1
100 TABLET, FILM COATED ORAL DAILY
Qty: 90 TABLET | Refills: 0 | Status: SHIPPED | OUTPATIENT
Start: 2024-09-23

## 2024-09-30 DIAGNOSIS — Z79.01 CHRONIC ANTICOAGULATION: Primary | ICD-10-CM

## 2024-09-30 RX ORDER — RIVAROXABAN 15 MG/1
15 TABLET, FILM COATED ORAL DAILY
Qty: 90 TABLET | Refills: 3 | Status: SHIPPED | OUTPATIENT
Start: 2024-09-30

## 2024-12-04 ENCOUNTER — OFFICE VISIT (OUTPATIENT)
Dept: INTERNAL MEDICINE | Facility: CLINIC | Age: 79
End: 2024-12-04
Payer: MEDICARE

## 2024-12-04 VITALS
BODY MASS INDEX: 29.37 KG/M2 | DIASTOLIC BLOOD PRESSURE: 60 MMHG | SYSTOLIC BLOOD PRESSURE: 116 MMHG | HEART RATE: 60 BPM | RESPIRATION RATE: 16 BRPM | TEMPERATURE: 97.8 F | WEIGHT: 158 LBS

## 2024-12-04 DIAGNOSIS — K21.9 GASTROESOPHAGEAL REFLUX DISEASE WITHOUT ESOPHAGITIS: ICD-10-CM

## 2024-12-04 DIAGNOSIS — I10 ESSENTIAL HYPERTENSION: Primary | ICD-10-CM

## 2024-12-04 DIAGNOSIS — I10 ESSENTIAL HYPERTENSION: ICD-10-CM

## 2024-12-04 DIAGNOSIS — M85.80 OSTEOPENIA, UNSPECIFIED LOCATION: ICD-10-CM

## 2024-12-04 DIAGNOSIS — Z79.01 CHRONIC ANTICOAGULATION: ICD-10-CM

## 2024-12-04 DIAGNOSIS — E78.2 MIXED HYPERLIPIDEMIA: ICD-10-CM

## 2024-12-04 DIAGNOSIS — F32.9 REACTIVE DEPRESSION: ICD-10-CM

## 2024-12-04 LAB
ALBUMIN SERPL-MCNC: 4 G/DL (ref 3.5–5.2)
ALBUMIN/GLOB SERPL: 1.5 G/DL
ALP SERPL-CCNC: 93 U/L (ref 39–117)
ALT SERPL W P-5'-P-CCNC: 10 U/L (ref 1–33)
ANION GAP SERPL CALCULATED.3IONS-SCNC: 13 MMOL/L (ref 5–15)
AST SERPL-CCNC: 18 U/L (ref 1–32)
BASOPHILS # BLD AUTO: 0.04 10*3/MM3 (ref 0–0.2)
BASOPHILS NFR BLD AUTO: 0.5 % (ref 0–1.5)
BILIRUB SERPL-MCNC: 0.7 MG/DL (ref 0–1.2)
BUN SERPL-MCNC: 18 MG/DL (ref 8–23)
BUN/CREAT SERPL: 14.1 (ref 7–25)
CALCIUM SPEC-SCNC: 9.4 MG/DL (ref 8.6–10.5)
CHLORIDE SERPL-SCNC: 104 MMOL/L (ref 98–107)
CHOLEST SERPL-MCNC: 143 MG/DL (ref 0–200)
CO2 SERPL-SCNC: 28 MMOL/L (ref 22–29)
CREAT SERPL-MCNC: 1.28 MG/DL (ref 0.57–1)
DEPRECATED RDW RBC AUTO: 43.4 FL (ref 37–54)
EGFRCR SERPLBLD CKD-EPI 2021: 42.7 ML/MIN/1.73
EOSINOPHIL # BLD AUTO: 0.23 10*3/MM3 (ref 0–0.4)
EOSINOPHIL NFR BLD AUTO: 2.9 % (ref 0.3–6.2)
ERYTHROCYTE [DISTWIDTH] IN BLOOD BY AUTOMATED COUNT: 13 % (ref 12.3–15.4)
GLOBULIN UR ELPH-MCNC: 2.6 GM/DL
GLUCOSE SERPL-MCNC: 88 MG/DL (ref 65–99)
HCT VFR BLD AUTO: 44.1 % (ref 34–46.6)
HDLC SERPL-MCNC: 62 MG/DL (ref 40–60)
HGB BLD-MCNC: 14.4 G/DL (ref 12–15.9)
IMM GRANULOCYTES # BLD AUTO: 0.02 10*3/MM3 (ref 0–0.05)
IMM GRANULOCYTES NFR BLD AUTO: 0.2 % (ref 0–0.5)
LDLC SERPL CALC-MCNC: 61 MG/DL (ref 0–100)
LDLC/HDLC SERPL: 0.95 {RATIO}
LYMPHOCYTES # BLD AUTO: 2.99 10*3/MM3 (ref 0.7–3.1)
LYMPHOCYTES NFR BLD AUTO: 37.3 % (ref 19.6–45.3)
MCH RBC QN AUTO: 29.9 PG (ref 26.6–33)
MCHC RBC AUTO-ENTMCNC: 32.7 G/DL (ref 31.5–35.7)
MCV RBC AUTO: 91.5 FL (ref 79–97)
MONOCYTES # BLD AUTO: 0.75 10*3/MM3 (ref 0.1–0.9)
MONOCYTES NFR BLD AUTO: 9.4 % (ref 5–12)
NEUTROPHILS NFR BLD AUTO: 3.98 10*3/MM3 (ref 1.7–7)
NEUTROPHILS NFR BLD AUTO: 49.7 % (ref 42.7–76)
NRBC BLD AUTO-RTO: 0 /100 WBC (ref 0–0.2)
PLATELET # BLD AUTO: 292 10*3/MM3 (ref 140–450)
PMV BLD AUTO: 10.7 FL (ref 6–12)
POTASSIUM SERPL-SCNC: 3.4 MMOL/L (ref 3.5–5.2)
PROT SERPL-MCNC: 6.6 G/DL (ref 6–8.5)
RBC # BLD AUTO: 4.82 10*6/MM3 (ref 3.77–5.28)
SODIUM SERPL-SCNC: 145 MMOL/L (ref 136–145)
TRIGL SERPL-MCNC: 112 MG/DL (ref 0–150)
VLDLC SERPL-MCNC: 20 MG/DL (ref 5–40)
WBC NRBC COR # BLD AUTO: 8.01 10*3/MM3 (ref 3.4–10.8)

## 2024-12-04 PROCEDURE — 80061 LIPID PANEL: CPT | Performed by: INTERNAL MEDICINE

## 2024-12-04 PROCEDURE — 1159F MED LIST DOCD IN RCRD: CPT | Performed by: INTERNAL MEDICINE

## 2024-12-04 PROCEDURE — 1160F RVW MEDS BY RX/DR IN RCRD: CPT | Performed by: INTERNAL MEDICINE

## 2024-12-04 PROCEDURE — 3078F DIAST BP <80 MM HG: CPT | Performed by: INTERNAL MEDICINE

## 2024-12-04 PROCEDURE — 99214 OFFICE O/P EST MOD 30 MIN: CPT | Performed by: INTERNAL MEDICINE

## 2024-12-04 PROCEDURE — 85025 COMPLETE CBC W/AUTO DIFF WBC: CPT | Performed by: INTERNAL MEDICINE

## 2024-12-04 PROCEDURE — 3074F SYST BP LT 130 MM HG: CPT | Performed by: INTERNAL MEDICINE

## 2024-12-04 PROCEDURE — 80053 COMPREHEN METABOLIC PANEL: CPT | Performed by: INTERNAL MEDICINE

## 2024-12-04 PROCEDURE — G2211 COMPLEX E/M VISIT ADD ON: HCPCS | Performed by: INTERNAL MEDICINE

## 2024-12-04 PROCEDURE — 1126F AMNT PAIN NOTED NONE PRSNT: CPT | Performed by: INTERNAL MEDICINE

## 2024-12-04 RX ORDER — CHLORTHALIDONE 25 MG/1
12.5 TABLET ORAL DAILY
Qty: 45 TABLET | Refills: 0 | OUTPATIENT
Start: 2024-12-04

## 2024-12-04 RX ORDER — METOPROLOL TARTRATE 25 MG/1
25 TABLET, FILM COATED ORAL 2 TIMES DAILY
Qty: 180 TABLET | Refills: 0 | OUTPATIENT
Start: 2024-12-04

## 2024-12-04 NOTE — PROGRESS NOTES
Chief Complaint   Patient presents with    Follow-up     6 month follow up chronic medical problems       History of Present Illness    The patient presents for a follow-up related to hypertension. The patient reports that she has had no headaches, chest pain, dyspnea, edema, syncope, blurred vision or palpitations. She states that she is taking her medication as prescribed. She is not having medication side effects.    The patient presents for a follow-up related to GERD. The patient is on pantoprazole for her gastroesophageal reflux. The medication is taken on a regular basis and gives complete relief of the symptoms. She reports no abdominal pain, belching, diarrhea, dysphagia, early satiety, heartburn, hoarseness, nausea, odynophagia, rectal bleeding, vomiting or weight loss. The GERD has no known aggravating factors.    The patient presents for a follow-up related to hyperlipidemia. She is following a low fat diet. She reports that she is exercising. She is taking simvastatin. The patient is taking her medication as prescribed. She reports no medication side effects, including muscle cramps, abdominal pain, headaches or weakness. She denies orthopnea, paroxysmal nocturnal dyspnea or dyspnea on exertion.    The patient presents for a follow-up related to depression. She denies currently having depression symptoms. She denies suicidal ideation. Her energy level is good. She denies agorophobia. She sleeps well. She is not tearful. She states that her current depression symptoms are stable. She is currently taking a medication. The current medication regimen consists of sertraline. The patient denies having medication side effects including nausea, headaches, anxiety, increased depression, anorgasmia or fatigue.    Medications      Current Outpatient Medications:     acetaminophen (TYLENOL) 325 MG tablet, Take 2 tablets by mouth Every 4 (Four) Hours As Needed for Mild Pain ., Disp: , Rfl:     chlorthalidone  "(HYGROTON) 25 MG tablet, Take 0.5 tablets by mouth Daily. NEEDS APPOINTMENT FUTURE REFILLS, Disp: 45 tablet, Rfl: 0    dilTIAZem (TIAZAC) 240 MG 24 hr capsule, Take 1 capsule by mouth Daily., Disp: 90 capsule, Rfl: 3    Glucosamine HCl (GLUCOSAMINE PO), Take 2 tablets by mouth Daily. As directed, OTC, Disp: , Rfl:     metoprolol tartrate (LOPRESSOR) 25 MG tablet, Take 1 tablet by mouth 2 (Two) Times a Day. NEEDS APPOINTMENT FUTURE REFILLS, Disp: 180 tablet, Rfl: 0    pantoprazole (PROTONIX) 40 MG EC tablet, Take 1 tablet by mouth once daily, Disp: 90 tablet, Rfl: 1    potassium chloride 10 MEQ CR tablet, Take 1 tablet by mouth once daily, Disp: 90 tablet, Rfl: 1    sertraline (ZOLOFT) 100 MG tablet, Take 1 tablet by mouth once daily, Disp: 90 tablet, Rfl: 0    simvastatin (ZOCOR) 40 MG tablet, Take 1 tablet by mouth every night at bedtime., Disp: 90 tablet, Rfl: 2    vitamin C (ASCORBIC ACID) 500 MG tablet, Take 2 tablets by mouth Daily. OTC, Disp: , Rfl:     vitamin D3 125 MCG (5000 UT) capsule capsule, Take by oral route., Disp: , Rfl:     Xarelto 15 MG tablet, Take 1 tablet by mouth once daily, Disp: 90 tablet, Rfl: 3    Zinc 40 MG tablet, Take  by mouth Daily. OTC, Disp: , Rfl:     Current Facility-Administered Medications:     denosumab (PROLIA) syringe 60 mg, 60 mg, Subcutaneous, Q6 Months, Flakito Chaudhary MD     Allergies    Allergies   Allergen Reactions    Erythromycin Swelling     \"tongue swelling\".    Warfarin Other (See Comments)     Reoccurrence of clot while on warfarin     Codeine Sulfate Swelling    Meperidine Swelling    Morphine And Codeine Swelling    Penicillins Swelling     \"swelling\" at site.    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    Physical Examination    /60 (BP Location: Right arm, Patient Position: Sitting, Cuff Size: Adult)   Pulse 60   Temp 97.8 °F (36.6 °C) (Infrared)   Resp 16   Wt 71.7 kg (158 lb)   LMP  (LMP Unknown)   BMI 29.37 kg/m²       HEENT: Head- Normocephalic Atraumatic. Facies- Within normal limits. Pinnas- Normal texture and shape bilaterally. Canals- Normal bilaterally. TMs- Normal bilaterally. Nares- Patent bilaterally. Nasal Septum- is normal. There is no tenderness to palpation over the frontal or maxillary sinuses. Lids- Normal bilaterally. Conjunctiva- Clear bilaterally. Sclera- Anicteric bilaterally. Oropharynx- Moist with no lesions. Tonsils- No enlargement, erythema or exudate.    Neck: Thyroid- non enlarged, symmetric and has no nodules. No bruits are detected. ROM- Normal Range of Motion with no rigidity.    Lungs: Auscultation- Clear to auscultation bilaterally. There are no retractions, clubbing or cyanosis. The Expiratory to Inspiratory ratio is equal.    Cardiovascular: There are no carotid bruits. Heart- Normal Rate with Regular rhythm and no murmurs. There are no gallops. There are no rubs. In the lower extremities there is no edema. The upper extremities do not have edema.    Abdomen: Soft, benign, non-tender with no masses, hernias, organomegaly or scars.    Impression and Assessment    Essential Hypertension.    Gastroesophageal Reflux Disease.    Hyperlipidemia.    Reactive Depression.    Plan    Gastroesophageal Reflux Disease Plan: The patient was instructed to continue the current medications.    Essential Hypertension Plan: The patient was instructed to continue the current medications.    Hyperlipidemia Plan: The patient was instructed to exercise daily, eat a low  fat diet and continue her medications.    Reactive Depression Plan: The patient was instructed to continue the current medications.    Diagnoses and all orders for this visit:    1. Essential hypertension (Primary)  -     Comprehensive Metabolic Panel; Future  -     CBC & Differential; Future  -     POC Urinalysis Dipstick, Automated; Future    2. Gastroesophageal reflux disease without esophagitis    3. Reactive depression    4. Mixed hyperlipidemia  -     Comprehensive Metabolic Panel; Future  -     Lipid Panel; Future    5. Osteopenia, unspecified location  -     Ambulatory Referral to [facility] For Infusion Treatment    Other orders  -     denosumab (PROLIA) syringe 60 mg            Return to Office    The patient was instructed to return for follow-up in 6 months. Next Visit: Follow-up and Subsequent Medicare Annual Wellness Visit. The patient was instructed to return sooner if the condition changes, worsens, or does not resolve.    BMI is >= 25 and <30. (Overweight) The following options were offered after discussion;: weight loss educational material (shared in after visit summary), exercise counseling/recommendations, nutrition counseling/recommendations, and Information on healthy weight added to patient's after visit summary.

## 2024-12-05 DIAGNOSIS — K21.9 GASTROESOPHAGEAL REFLUX DISEASE: ICD-10-CM

## 2024-12-05 DIAGNOSIS — E78.2 MIXED HYPERLIPIDEMIA: ICD-10-CM

## 2024-12-05 RX ORDER — PANTOPRAZOLE SODIUM 40 MG/1
TABLET, DELAYED RELEASE ORAL
Qty: 90 TABLET | Refills: 0 | Status: SHIPPED | OUTPATIENT
Start: 2024-12-05

## 2024-12-05 RX ORDER — SIMVASTATIN 40 MG
40 TABLET ORAL
Qty: 90 TABLET | Refills: 0 | Status: SHIPPED | OUTPATIENT
Start: 2024-12-05

## 2024-12-17 ENCOUNTER — INFUSION (OUTPATIENT)
Dept: ONCOLOGY | Facility: HOSPITAL | Age: 79
End: 2024-12-17
Payer: MEDICARE

## 2024-12-17 VITALS
RESPIRATION RATE: 18 BRPM | TEMPERATURE: 98 F | SYSTOLIC BLOOD PRESSURE: 108 MMHG | DIASTOLIC BLOOD PRESSURE: 67 MMHG | HEART RATE: 64 BPM

## 2024-12-17 DIAGNOSIS — M85.89 OSTEOPENIA OF MULTIPLE SITES: Primary | ICD-10-CM

## 2024-12-17 PROCEDURE — 25010000002 DENOSUMAB 60 MG/ML SOLUTION PREFILLED SYRINGE: Performed by: INTERNAL MEDICINE

## 2024-12-17 PROCEDURE — 96372 THER/PROPH/DIAG INJ SC/IM: CPT

## 2024-12-17 RX ADMIN — DENOSUMAB 60 MG: 60 INJECTION SUBCUTANEOUS at 15:12

## 2024-12-19 DIAGNOSIS — F32.9 REACTIVE DEPRESSION: ICD-10-CM

## 2024-12-19 RX ORDER — SERTRALINE HYDROCHLORIDE 100 MG/1
100 TABLET, FILM COATED ORAL DAILY
Qty: 90 TABLET | Refills: 0 | Status: SHIPPED | OUTPATIENT
Start: 2024-12-19

## 2025-01-03 RX ORDER — POTASSIUM CHLORIDE 750 MG/1
TABLET, EXTENDED RELEASE ORAL
Qty: 90 TABLET | Refills: 0 | Status: SHIPPED | OUTPATIENT
Start: 2025-01-03

## 2025-01-07 ENCOUNTER — TELEPHONE (OUTPATIENT)
Dept: ORTHOPEDIC SURGERY | Facility: CLINIC | Age: 80
End: 2025-01-07
Payer: MEDICARE

## 2025-01-12 DIAGNOSIS — I10 ESSENTIAL HYPERTENSION: ICD-10-CM

## 2025-01-13 DIAGNOSIS — I10 ESSENTIAL HYPERTENSION: ICD-10-CM

## 2025-01-13 RX ORDER — CHLORTHALIDONE 25 MG/1
12.5 TABLET ORAL DAILY
Qty: 45 TABLET | Refills: 0 | OUTPATIENT
Start: 2025-01-13

## 2025-01-13 RX ORDER — CHLORTHALIDONE 25 MG/1
12.5 TABLET ORAL DAILY
Qty: 45 TABLET | Refills: 1 | Status: SHIPPED | OUTPATIENT
Start: 2025-01-13

## 2025-01-13 NOTE — TELEPHONE ENCOUNTER
Caller: Carol Jean Ann    Relationship: Self    Best call back number: [phone]     Requested Prescriptions:   Requested Prescriptions     Pending Prescriptions Disp Refills    chlorthalidone (HYGROTON) 25 MG tablet 45 tablet 0     Sig: Take 0.5 tablets by mouth Daily. NEEDS APPOINTMENT FUTURE REFILLS        Pharmacy where request should be sent: [facility] [address] [place] [address] [phone] [facility] [phone] FX     Last office visit with prescribing clinician: 12/4/2024   Last telemedicine visit with prescribing clinician: Visit date not found   Next office visit with prescribing clinician: 6/9/2025     Additional details provided by patient: PATIENT ONLY HAS TWO PILLS LEFT.  SHE IS PRESCRIBED 45 TABLETS SO THAT SHE CAN CUT THEM IN HALF.    Does the patient have less than a 3 day supply:  [x] Yes  [] No    Would you like a call back once the refill request has been completed: [] Yes [] No    If the office needs to give you a call back, can they leave a voicemail: [] Yes [] No    Joaquín Gillis Rep   01/13/25 13:21 EST

## 2025-01-13 NOTE — TELEPHONE ENCOUNTER
Caller: Carol Jean    Relationship: Self    Best call back number: [phone]     Requested Prescriptions:   Requested Prescriptions     Pending Prescriptions Disp Refills    chlorthalidone (HYGROTON) 25 MG tablet 45 tablet 0     Sig: Take 0.5 tablets by mouth Daily. NEEDS APPOINTMENT FUTURE REFILLS        Pharmacy where request should be sent: [facility] [address] [place] [address] [phone] [facility] [phone] FX     Last office visit with prescribing clinician: 1/30/2024   Last telemedicine visit with prescribing clinician: Visit date not found   Next office visit with prescribing clinician: Visit date not found     Additional details provided by patient: PATIENT NO LONGER FOLLOWS WITH CARDIOLOGY, CAN DR ROD FILL THIS MEDICATION GOING FORWARD?     Does the patient have less than a 3 day supply:  [x] Yes  [] No    Would you like a call back once the refill request has been completed: [] Yes [x] No    If the office needs to give you a call back, can they leave a voicemail: [] Yes [x] No

## 2025-01-13 NOTE — TELEPHONE ENCOUNTER
Called patient concerning refill of Hygroton.  I instructed her to call Dr. Medina who prescribed that ion July 2024.  Patient stated she did not want to go back to her.  I explained this medication is something her primary care may prescribe and suggested she call their office.  Patient stated understanding.

## 2025-01-27 ENCOUNTER — OFFICE VISIT (OUTPATIENT)
Dept: ORTHOPEDIC SURGERY | Facility: CLINIC | Age: 80
End: 2025-01-27
Payer: MEDICARE

## 2025-01-27 VITALS
DIASTOLIC BLOOD PRESSURE: 72 MMHG | SYSTOLIC BLOOD PRESSURE: 116 MMHG | HEIGHT: 62 IN | WEIGHT: 163 LBS | BODY MASS INDEX: 30 KG/M2

## 2025-01-27 DIAGNOSIS — M17.12 PRIMARY OSTEOARTHRITIS OF LEFT KNEE: Primary | ICD-10-CM

## 2025-01-27 DIAGNOSIS — M17.11 PRIMARY OSTEOARTHRITIS OF RIGHT KNEE: ICD-10-CM

## 2025-01-27 PROCEDURE — 3074F SYST BP LT 130 MM HG: CPT | Performed by: ORTHOPAEDIC SURGERY

## 2025-01-27 PROCEDURE — 20610 DRAIN/INJ JOINT/BURSA W/O US: CPT | Performed by: ORTHOPAEDIC SURGERY

## 2025-01-27 PROCEDURE — 3078F DIAST BP <80 MM HG: CPT | Performed by: ORTHOPAEDIC SURGERY

## 2025-01-27 RX ORDER — ROPIVACAINE HYDROCHLORIDE 5 MG/ML
4 INJECTION, SOLUTION EPIDURAL; INFILTRATION; PERINEURAL
Status: COMPLETED | OUTPATIENT
Start: 2025-01-27 | End: 2025-01-27

## 2025-01-27 RX ORDER — TRIAMCINOLONE ACETONIDE 40 MG/ML
40 INJECTION, SUSPENSION INTRA-ARTICULAR; INTRAMUSCULAR
Status: COMPLETED | OUTPATIENT
Start: 2025-01-27 | End: 2025-01-27

## 2025-01-27 RX ORDER — CHONDRO SU A/VIT C/MANGANESE 400-60-2.5
CAPSULE ORAL
COMMUNITY

## 2025-01-27 RX ADMIN — ROPIVACAINE HYDROCHLORIDE 4 ML: 5 INJECTION, SOLUTION EPIDURAL; INFILTRATION; PERINEURAL at 13:35

## 2025-01-27 RX ADMIN — TRIAMCINOLONE ACETONIDE 40 MG: 40 INJECTION, SUSPENSION INTRA-ARTICULAR; INTRAMUSCULAR at 13:35

## 2025-01-27 NOTE — PROGRESS NOTES
[facility] Note    Subjective     Chief Complaint   Patient presents with    Follow-up     7 month follow up -- Primary osteoarthritis of left knee, Primary osteoarthritis of right knee        HPI    It has been 7  month(s) since Ms. Jean's last visit. She returns to clinic today for follow-up of bilateral knee pain. The issue has been ongoing for 8 year(s). She rates her pain a 6/10 on the pain scale. Previous/current treatments: nothing. Current symptoms: pain. The pain is worse with walking and climbing stairs; pain medication and/or NSAID improve the pain. Overall, she is doing worse.  No real relief with the viscosupplementation injection series.  She would like steroid injections today.  She is not interested in surgical intervention.      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 EMS transport to [facility]. Restoration of rhythm and blood pressure at [facility]. “Shock” manifest by hypotension and multiorgan failure:  Transient hepatic dysfunction, resolved.  Transient nonoliguric ATN, resolved.  Transient metabolic acidosis, resolved.  Pressor support. Bilateral     Allergic Yrs ago!    To.pain meds u have a list!    Arthritis     Arthritis of back Bone scan few wks ago    Bladder problem     Blood  clotting disorder     Bone pain     Depression Yrs ago    DVT (deep venous thrombosis)     Easy bruising     H/O bone density study 2012    H/O colonoscopy 2012    H/O mammogram 2012    HBP (high blood pressure)     History of blood transfusion     Hypertension 2016    Kidney stones     Knee swelling Yrs    Synovial cyst of popliteal space     Upper gastrointestinal bleeding     gastritis related to lytic therapy and heparins, resolved: a. “Hemorrhagic gastritis.”    Vitamin B12 deficiency       Past Surgical History:   Procedure Laterality Date    BILATERAL BREAST REDUCTION      CATARACT EXTRACTION, BILATERAL       SECTION  1971     SECTION      COLONOSCOPY      EYE SURGERY      Cataracts  2018    KIDNEY STONE SURGERY      Nephrolithiasis with lithotripsy.    OTHER SURGICAL HISTORY      barry filter placement in [place]    REDUCTION MAMMAPLASTY Bilateral     TONSILLECTOMY      TRIGGER POINT INJECTION  2yrs ago      Family History   Problem Relation Age of Onset    Stroke Mother     Hypertension Mother     Arthritis Mother     Hyperlipidemia Mother     Heart attack Father     COPD Father     Thyroid disease Father     Heart disease Father     Stroke Other     Coronary artery disease Other     COPD Other     Stroke Other     COPD Other     Breast cancer Maternal Aunt     Clotting disorder Maternal Aunt     Cancer Maternal Aunt     Clotting disorder Sister     Ovarian cancer Neg Hx      Social History     Socioeconomic History    Marital status:    Tobacco Use    Smoking status: Never     Passive exposure: Past    Smokeless tobacco: Never    Tobacco comments:     None   Vaping Use    Vaping status: Never Used   Substance and Sexual Activity    Alcohol use: Not Currently     Alcohol/week: 2.0 standard drinks of alcohol     Comment: occas    Drug use: Never    Sexual activity: Not Currently     Birth control/protection: Tubal ligation     Comment: Tubes tied     "  Current Outpatient Medications on File Prior to Visit   Medication Sig Dispense Refill    acetaminophen (TYLENOL) 325 MG tablet Take 2 tablets by mouth Every 4 (Four) Hours As Needed for Mild Pain .      chlorthalidone (HYGROTON) 25 MG tablet Take 0.5 tablets by mouth Daily. 45 tablet 1    Chondroitin Sulfate-Vit C-Mn (Chondroitin Sulfate Complex) 400-60-2.5 MG capsule Take  by mouth.      dilTIAZem (TIAZAC) 240 MG 24 hr capsule Take 1 capsule by mouth Daily. 90 capsule 3    Glucosamine HCl 1000 MG tablet Daily.      metoprolol tartrate (LOPRESSOR) 25 MG tablet Take 1 tablet by mouth 2 (Two) Times a Day. NEEDS APPOINTMENT FUTURE REFILLS 180 tablet 0    pantoprazole (PROTONIX) 40 MG EC tablet Take 1 tablet by mouth once daily 90 tablet 0    potassium chloride 10 MEQ CR tablet Take 1 tablet by mouth once daily 90 tablet 0    sertraline (ZOLOFT) 100 MG tablet Take 1 tablet by mouth once daily 90 tablet 0    simvastatin (ZOCOR) 40 MG tablet TAKE 1 TABLET BY MOUTH EVERY DAY AT BEDTIME 90 tablet 0    vitamin C (ASCORBIC ACID) 500 MG tablet Take 2 tablets by mouth Daily. OTC      vitamin D3 125 MCG (5000 UT) capsule capsule Take by oral route.      Xarelto 15 MG tablet Take 1 tablet by mouth once daily 90 tablet 3    Zinc 40 MG tablet Take  by mouth Daily. OTC      [DISCONTINUED] Glucosamine HCl (GLUCOSAMINE PO) Take 2 tablets by mouth Daily. As directed, OTC       Current Facility-Administered Medications on File Prior to Visit   Medication Dose Route Frequency Provider Last Rate Last Admin    denosumab (PROLIA) syringe 60 mg  60 mg Subcutaneous Q6 Months Flakito Chaudhary MD          Allergies   Allergen Reactions    Erythromycin Swelling     \"tongue swelling\".    Warfarin Other (See Comments)     Reoccurrence of clot while on warfarin     Codeine Sulfate Swelling    Meperidine Swelling    Morphine And Codeine Swelling    Penicillins Swelling     \"swelling\" at site.    Sulfa Antibiotics Swelling    Sulfadiazine " "Swelling        Review of Systems     Objective      Physical Exam  /72   Ht 156.2 cm (61.5\")   Wt 73.9 kg (163 lb)   LMP  (LMP Unknown)   BMI 30.30 kg/m²     Body mass index is 30.3 kg/m².         General:   Mental Status:  Alert   Appearance: Cooperative, in no acute distress   Build and Nutrition: Well-nourished well-developed female   Orientation: Alert and oriented to person, place and time   Posture: Normal   Gait: Limping on both lower extremities    Integument:   Right knee: no skin lesions, no rash, no ecchymosis   Left knee: no skin lesions, no rash, no ecchymosis    Lower Extremities:   Right Knee:    Tenderness:  None    Effusion:  None    Swelling:  None    Crepitus:  Positive    Atrophy:  None    Range of motion:  Extension: 10°       Flexion: 110°  Instability:  No varus laxity, no valgus laxity, negative anterior drawer  Deformities:  Valgus   Left Knee:    Tenderness:  None    Effusion:  None    Swelling:  None    Crepitus: Positive    Atrophy:  None    Range of motion:  Extension: 10°       Flexion: 110°  Instability:  No varus laxity, no valgus laxity, negative anterior drawer  Deformities:  Valgus      Imaging/Studies  Imaging Results (Last 24 Hours)       ** No results found for the last 24 hours. **          No new imaging today.    Assessment and Plan     Diagnoses and all orders for this visit:    1. Primary osteoarthritis of left knee (Primary)  -     - Large Joint Arthrocentesis: bilateral knee    2. Primary osteoarthritis of right knee  -     - Large Joint Arthrocentesis: bilateral knee        1. Primary osteoarthritis of left knee    2. Primary osteoarthritis of right knee          I reviewed my findings with the patient.  We discussed options for her knees again today, and she is not interested in surgical intervention.  She would like steroid injections and these were provided.  I will see her back in 4 months, but sooner for any problems.    Procedure Note:  The potential " benefits of performing a therapeutic bilateral knee joint injections, as well as potential risks (including, but not limited to infection, swelling, pain, bleeding, bruising, nerve/blood vessel damage, skin color changes, transient elevation in blood glucose levels, and fat atrophy) were discussed with the patient.  After informed consent, timeout procedure was performed, and the skin on the right and left knees was prepped with chlorhexidine soap and alcohol, after which ethyl chloride was applied to the skin at the injection site. Via the anterolateral approach, 1ml of Kenalog 40mg/ml mixed with 4ml 0.5% ropivacaine plain was injected into the knee joints.  The patient tolerated the procedures well, experiencing 95% improvement in the right knee and 95% improvement in the left knee a few minutes following the injections. There were no complications.  Band-Aids were applied to the injection sites. Post-procedural instructions were given to the patient and/or their caregiver.      Return in about 4 months (around 5/27/2025).      Flakito Chase MD  01/27/25  13:56 EST    Dictated Utilizing Dragon Dictation

## 2025-01-27 NOTE — PROGRESS NOTES
Procedure   - Large Joint Arthrocentesis: bilateral knee on 1/27/2025 1:35 PM  Indications: pain  Details: 21 G needle, anterolateral approach  Medications (Right): 4 mL ropivacaine 0.5 %; 40 mg triamcinolone acetonide 40 MG/ML  Medications (Left): 4 mL ropivacaine 0.5 %; 40 mg triamcinolone acetonide 40 MG/ML  Outcome: tolerated well, no immediate complications  Procedure, treatment alternatives, risks and benefits explained, specific risks discussed. Consent was given by the patient. Immediately prior to procedure a time out was called to verify the correct patient, procedure, equipment, support staff and site/side marked as required. Patient was prepped and draped in the usual sterile fashion.

## 2025-01-29 ENCOUNTER — OFFICE VISIT (OUTPATIENT)
Dept: ONCOLOGY | Facility: CLINIC | Age: 80
End: 2025-01-29
Payer: MEDICARE

## 2025-01-29 VITALS
BODY MASS INDEX: 30.49 KG/M2 | HEIGHT: 61 IN | TEMPERATURE: 98 F | RESPIRATION RATE: 18 BRPM | HEART RATE: 64 BPM | WEIGHT: 161.5 LBS | OXYGEN SATURATION: 93 % | SYSTOLIC BLOOD PRESSURE: 100 MMHG | DIASTOLIC BLOOD PRESSURE: 61 MMHG

## 2025-01-29 DIAGNOSIS — Z86.711 HEALED OR OLD PULMONARY EMBOLISM: Primary | ICD-10-CM

## 2025-01-29 NOTE — PROGRESS NOTES
DATE OF VISIT: 1/29/2025    REASON FOR VISIT:   1. DVT while on therapeutic Coumadin 11/23/2012.   2. Pulmonary embolism 09/30/2012.     PROBLEM LIST:  1. Acute right lower extremity DVT while on therapeutic Coumadin 11/23/2012.   2. Pulmonary embolism with cardiac arrest 09/30/2012.   3.  Hypertension  4.  Hypercholesterolemia  5.  Heartburn  6.  Arthritis    HISTORY OF PRESENT ILLNESS: The patient is a very pleasant 79 y.o.   female with past medical history significant for massive pulmonary  embolism September 2012 requiring cardiac resuscitation. The patient was placed on chronic anticoagulation with Coumadin since, however, she presented with acute DVT of the right lower extremity while on Coumadin 11/23/2012. The  patient was switched to Lovenox 100 micrograms subcu daily since. The patient  was switched from Lovenox to Xarelto 20 mg p.o. daily on 03/15/2013 secondary  to this would be more convenient to the patient in avoiding the daily  injections. The patient is here today in scheduled follow-up visit.     SUBJECTIVE: The patient is here today by herself. She has been doing well since her last visit. She has been compliant with use of Xarelto. She has had no evidence of bleeding and no symptoms of clotting. She has had no significant changes in health history. She has some arthritis in her knees for which she is having steroid injections with through Dr. Chase.     Review of Systems   Musculoskeletal:  Positive for arthralgias.       Past History:  Medical History: has a past medical history of Acute deep vein thrombosis of lower extremity, Acute pulmonary embolism (11/22/2016), Allergic (Yrs ago!), Arthritis, Arthritis of back (Bone scan few wks ago), Bladder problem, Blood clotting disorder, Bone pain, Depression (Yrs ago), DVT (deep venous thrombosis), Easy bruising, H/O bone density study (08/2012), H/O colonoscopy (08/2012), H/O mammogram (08/2012), HBP (high blood pressure), History of blood  "transfusion, Hypertension (2016), Kidney stones, Knee swelling (Yrs), Synovial cyst of popliteal space, Upper gastrointestinal bleeding, and Vitamin B12 deficiency.   Surgical History: has a past surgical history that includes  section ();  section (); Kidney stone surgery; Reduction mammaplasty (Bilateral, ); Tonsillectomy; Breast Reduction; Colonoscopy; Cataract extraction, bilateral; Other surgical history; Eye surgery; and Trigger point injection (2yrs ago).   Family History: family history includes Arthritis in her mother; Breast cancer in her maternal aunt; COPD in her father and other family members; Cancer in her maternal aunt; Clotting disorder in her maternal aunt and sister; Coronary artery disease in an other family member; Heart attack in her father; Heart disease in her father; Hyperlipidemia in her mother; Hypertension in her mother; Stroke in her mother and other family members; Thyroid disease in her father.   Social History: reports that she has never smoked. She has been exposed to tobacco smoke. She has never used smokeless tobacco. She reports that she does not currently use alcohol after a past usage of about 2.0 standard drinks of alcohol per week. She reports that she does not use drugs.    (Not in a hospital admission)     Allergies: Erythromycin, Warfarin, Codeine sulfate, Meperidine, Morphine and codeine, Penicillins, Sulfa antibiotics, and Sulfadiazine    PHYSICAL EXAMINATION:   /61   Pulse 64   Temp 98 °F (36.7 °C) (Temporal)   Resp 18   Ht 156.2 cm (61.5\")   Wt 73.3 kg (161 lb 8 oz)   LMP  (LMP Unknown)   SpO2 93%   BMI 30.02 kg/m²   Pain Score    25 1120   PainSc:   2   PainLoc: Comment: KNEES     ECOG 1  GENERAL: Age appropriate. No acute distress.   LUNGS: Clear to auscultation bilaterally no wheezing or rhonchi  HEART: Regular rate rhythm S1-S2 no murmurs.   EXTREMITIES: No clubbing, cyanosis, or edema.   SKIN: No rashes or " bruising. No purpura.   NEUROLOGIC: Awake and oriented x3.      No visits with results within 2 Week(s) from this visit.   Latest known visit with results is:   Office Visit on 12/04/2024   Component Date Value Ref Range Status    Glucose 12/04/2024 88  65 - 99 mg/dL Final    BUN 12/04/2024 18  8 - 23 mg/dL Final    Creatinine 12/04/2024 1.28 (H)  0.57 - 1.00 mg/dL Final    Sodium 12/04/2024 145  136 - 145 mmol/L Final    Potassium 12/04/2024 3.4 (L)  3.5 - 5.2 mmol/L Final    Chloride 12/04/2024 104  98 - 107 mmol/L Final    CO2 12/04/2024 28.0  22.0 - 29.0 mmol/L Final    Calcium 12/04/2024 9.4  8.6 - 10.5 mg/dL Final    Total Protein 12/04/2024 6.6  6.0 - 8.5 g/dL Final    Albumin 12/04/2024 4.0  3.5 - 5.2 g/dL Final    ALT (SGPT) 12/04/2024 10  1 - 33 U/L Final    AST (SGOT) 12/04/2024 18  1 - 32 U/L Final    Alkaline Phosphatase 12/04/2024 93  39 - 117 U/L Final    Total Bilirubin 12/04/2024 0.7  0.0 - 1.2 mg/dL Final    Globulin 12/04/2024 2.6  gm/dL Final    Calculated Result    A/G Ratio 12/04/2024 1.5  g/dL Final    BUN/Creatinine Ratio 12/04/2024 14.1  7.0 - 25.0 Final    Anion Gap 12/04/2024 13.0  5.0 - 15.0 mmol/L Final    eGFR 12/04/2024 42.7 (L)  >60.0 mL/min/1.73 Final    Total Cholesterol 12/04/2024 143  0 - 200 mg/dL Final    Triglycerides 12/04/2024 112  0 - 150 mg/dL Final    HDL Cholesterol 12/04/2024 62 (H)  40 - 60 mg/dL Final    LDL Cholesterol  12/04/2024 61  0 - 100 mg/dL Final    VLDL Cholesterol 12/04/2024 20  5 - 40 mg/dL Final    LDL/HDL Ratio 12/04/2024 0.95   Final    WBC 12/04/2024 8.01  3.40 - 10.80 10*3/mm3 Final    RBC 12/04/2024 4.82  3.77 - 5.28 10*6/mm3 Final    Hemoglobin 12/04/2024 14.4  12.0 - 15.9 g/dL Final    Hematocrit 12/04/2024 44.1  34.0 - 46.6 % Final    MCV 12/04/2024 91.5  79.0 - 97.0 fL Final    MCH 12/04/2024 29.9  26.6 - 33.0 pg Final    MCHC 12/04/2024 32.7  31.5 - 35.7 g/dL Final    RDW 12/04/2024 13.0  12.3 - 15.4 % Final    RDW-SD 12/04/2024 43.4  37.0 - 54.0  fl Final    MPV 12/04/2024 10.7  6.0 - 12.0 fL Final    Platelets 12/04/2024 292  140 - 450 10*3/mm3 Final    Neutrophil % 12/04/2024 49.7  42.7 - 76.0 % Final    Lymphocyte % 12/04/2024 37.3  19.6 - 45.3 % Final    Monocyte % 12/04/2024 9.4  5.0 - 12.0 % Final    Eosinophil % 12/04/2024 2.9  0.3 - 6.2 % Final    Basophil % 12/04/2024 0.5  0.0 - 1.5 % Final    Immature Grans % 12/04/2024 0.2  0.0 - 0.5 % Final    Neutrophils, Absolute 12/04/2024 3.98  1.70 - 7.00 10*3/mm3 Final    Lymphocytes, Absolute 12/04/2024 2.99  0.70 - 3.10 10*3/mm3 Final    Monocytes, Absolute 12/04/2024 0.75  0.10 - 0.90 10*3/mm3 Final    Eosinophils, Absolute 12/04/2024 0.23  0.00 - 0.40 10*3/mm3 Final    Basophils, Absolute 12/04/2024 0.04  0.00 - 0.20 10*3/mm3 Final    Immature Grans, Absolute 12/04/2024 0.02  0.00 - 0.05 10*3/mm3 Final    nRBC 12/04/2024 0.0  0.0 - 0.2 /100 WBC Final        ASSESSMENT: The patient is a pleasant 79 y.o. female with DVT/PE.    PLAN:     1. History of DVT/PE:  A. We will continue Xarelto 15 mg daily indefinitely secondary to history of unprovoked severe PEs.   B.  I reviewed the CBC result from 12/4/2024 with the patient. I reassured her that at that time she had no evidence of anemia with hemoglobin 14.4 with normal platelet count. Her creatinine was stable at 1.28 with normal liver enzymes.   C. We will continue annual surveillance with plan to repeat her labs including CBC and CMP.   D. I advised she monitor for signs and symptoms of bleeding or clotting.   E. I reviewed again our recommendation to stop Xarelto 2 days prior to surgical procedures and resume as soon as she is cleared by her surgeon.    2. Hypertension:  A. She will continue metoprolol and diltiazem for hypertension. Her blood pressure today 100/61.   B. She will continue follow up with Dr. Chaudhary.  C.  We'll continue Zocor 40 mg daily for hypercholesterolemia.     3. Acid reflux:  A.  We'll continue Protonix 40 mg daily for  heartburn.     4. Osteoarthritis to bilateral knees:  A. She will continue follow up with Dr. Chase with steroid injections as needed for pain.     FOLLOW UP: 1 year with repeat labs.     Cait Alcocer, APRN  1/29/2025

## 2025-02-10 RX ORDER — METOPROLOL TARTRATE 25 MG/1
25 TABLET, FILM COATED ORAL 2 TIMES DAILY
Qty: 180 TABLET | Refills: 0 | Status: SHIPPED | OUTPATIENT
Start: 2025-02-10

## 2025-02-10 NOTE — TELEPHONE ENCOUNTER
Caller: [email or web address]    Relationship: Self    Best call back number: [phone]     Requested Prescriptions:   Requested Prescriptions     Pending Prescriptions Disp Refills    metoprolol tartrate (LOPRESSOR) 25 MG tablet 180 tablet 0     Sig: Take 1 tablet by mouth 2 (Two) Times a Day. NEEDS APPOINTMENT FUTURE REFILLS        Pharmacy where request should be sent: [facility] [address] [place] [address] [phone] [facility] [phone]      Last office visit with prescribing clinician: 12/4/2024   Last telemedicine visit with prescribing clinician: Visit date not found   Next office visit with prescribing clinician: 6/9/2025     Additional details provided by patient:     Does the patient have less than a 3 day supply:  [] Yes  [x] No    Would you like a call back once the refill request has been completed: [] Yes [x] No    If the office needs to give you a call back, can they leave a voicemail: [] Yes [x] No    Joaquín Gong Rep   02/10/25 12:40 EST

## 2025-02-28 DIAGNOSIS — K21.9 GASTROESOPHAGEAL REFLUX DISEASE: ICD-10-CM

## 2025-02-28 DIAGNOSIS — E78.2 MIXED HYPERLIPIDEMIA: ICD-10-CM

## 2025-02-28 RX ORDER — PANTOPRAZOLE SODIUM 40 MG/1
TABLET, DELAYED RELEASE ORAL
Qty: 90 TABLET | Refills: 0 | Status: SHIPPED | OUTPATIENT
Start: 2025-02-28

## 2025-02-28 RX ORDER — SIMVASTATIN 40 MG
40 TABLET ORAL
Qty: 90 TABLET | Refills: 0 | Status: SHIPPED | OUTPATIENT
Start: 2025-02-28

## 2025-03-15 DIAGNOSIS — F32.9 REACTIVE DEPRESSION: ICD-10-CM

## 2025-03-17 RX ORDER — SERTRALINE HYDROCHLORIDE 100 MG/1
100 TABLET, FILM COATED ORAL DAILY
Qty: 90 TABLET | Refills: 1 | Status: SHIPPED | OUTPATIENT
Start: 2025-03-17

## 2025-03-31 RX ORDER — POTASSIUM CHLORIDE 750 MG/1
10 TABLET, EXTENDED RELEASE ORAL DAILY
Qty: 90 TABLET | Refills: 0 | Status: SHIPPED | OUTPATIENT
Start: 2025-03-31

## 2025-05-07 RX ORDER — METOPROLOL TARTRATE 25 MG/1
25 TABLET, FILM COATED ORAL 2 TIMES DAILY
Qty: 180 TABLET | Refills: 0 | Status: SHIPPED | OUTPATIENT
Start: 2025-05-07

## 2025-05-13 DIAGNOSIS — I10 ESSENTIAL HYPERTENSION: ICD-10-CM

## 2025-05-13 RX ORDER — DILTIAZEM HYDROCHLORIDE 240 MG/1
240 CAPSULE, EXTENDED RELEASE ORAL DAILY
Qty: 90 CAPSULE | Refills: 0 | Status: SHIPPED | OUTPATIENT
Start: 2025-05-13

## 2025-05-24 DIAGNOSIS — E78.2 MIXED HYPERLIPIDEMIA: ICD-10-CM

## 2025-05-25 DIAGNOSIS — K21.9 GASTROESOPHAGEAL REFLUX DISEASE: ICD-10-CM

## 2025-05-25 RX ORDER — PANTOPRAZOLE SODIUM 40 MG/1
40 TABLET, DELAYED RELEASE ORAL DAILY
Qty: 90 TABLET | Refills: 0 | Status: SHIPPED | OUTPATIENT
Start: 2025-05-25

## 2025-05-25 RX ORDER — SIMVASTATIN 40 MG
40 TABLET ORAL
Qty: 90 TABLET | Refills: 0 | Status: SHIPPED | OUTPATIENT
Start: 2025-05-25

## 2025-06-02 ENCOUNTER — OFFICE VISIT (OUTPATIENT)
Dept: ORTHOPEDIC SURGERY | Facility: CLINIC | Age: 80
End: 2025-06-02
Payer: MEDICARE

## 2025-06-02 VITALS
HEIGHT: 61 IN | BODY MASS INDEX: 30.73 KG/M2 | DIASTOLIC BLOOD PRESSURE: 80 MMHG | WEIGHT: 162.8 LBS | SYSTOLIC BLOOD PRESSURE: 130 MMHG

## 2025-06-02 DIAGNOSIS — M17.12 PRIMARY OSTEOARTHRITIS OF LEFT KNEE: Primary | ICD-10-CM

## 2025-06-02 DIAGNOSIS — M17.11 PRIMARY OSTEOARTHRITIS OF RIGHT KNEE: ICD-10-CM

## 2025-06-02 PROCEDURE — 3075F SYST BP GE 130 - 139MM HG: CPT | Performed by: ORTHOPAEDIC SURGERY

## 2025-06-02 PROCEDURE — 99212 OFFICE O/P EST SF 10 MIN: CPT | Performed by: ORTHOPAEDIC SURGERY

## 2025-06-02 PROCEDURE — 3079F DIAST BP 80-89 MM HG: CPT | Performed by: ORTHOPAEDIC SURGERY

## 2025-06-02 PROCEDURE — 1159F MED LIST DOCD IN RCRD: CPT | Performed by: ORTHOPAEDIC SURGERY

## 2025-06-02 PROCEDURE — 1160F RVW MEDS BY RX/DR IN RCRD: CPT | Performed by: ORTHOPAEDIC SURGERY

## 2025-06-02 NOTE — PROGRESS NOTES
[facility] Note    Subjective     Chief Complaint   Patient presents with    Follow-up     4 month follow up - Primary osteoarthritis of left knee, Primary osteoarthritis of right knee        HPI    It has been 4  month(s) since Ms. Jean's last visit. She returns to clinic today for follow-up of bilateral knee arthritis. The issue has been ongoing for 8 year(s). She rates her pain a 2/10 on the pain scale. Previous/current treatments: steroid injection (last injection 1/27/25). Current symptoms: grinding and aching. The pain is worse with climbing stairs and kneeling; Tylenol and rest improve the pain. Overall, she is doing better.  Last injections helped out significantly.  Still having improvement.      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 EMS transport to [facility] Emergency Room. Restoration of rhythm and blood pressure at [facility] Emergency Room. “Shock” manifest by hypotension and multiorgan failure:  Transient hepatic dysfunction, resolved.  Transient nonoliguric ATN, resolved.  Transient metabolic acidosis, resolved.  Pressor support. Bilateral     Allergic Yrs ago!    To.pain meds u have a list!    Arthritis     Arthritis of back Bone scan few wks ago    Bladder problem     Blood clotting disorder     Bone pain      Depression Yrs ago    DVT (deep venous thrombosis)     Easy bruising     H/O bone density study 2012    H/O colonoscopy 2012    H/O mammogram 2012    HBP (high blood pressure)     History of blood transfusion     Hypertension 2016    Kidney stones     Knee swelling Yrs    Synovial cyst of popliteal space     Upper gastrointestinal bleeding     gastritis related to lytic therapy and heparins, resolved: a. “Hemorrhagic gastritis.”    Vitamin B12 deficiency       Past Surgical History:   Procedure Laterality Date    BILATERAL BREAST REDUCTION      CATARACT EXTRACTION, BILATERAL       SECTION  1971     SECTION      COLONOSCOPY      Appt     EYE SURGERY      Cataracts      KIDNEY STONE SURGERY      Nephrolithiasis with lithotripsy.    OTHER SURGICAL HISTORY      barry filter placement in [place]    REDUCTION MAMMAPLASTY Bilateral     TONSILLECTOMY      TRIGGER POINT INJECTION  2yrs ago      Family History   Problem Relation Age of Onset    Stroke Mother     Hypertension Mother     Arthritis Mother     Hyperlipidemia Mother     Heart attack Father     COPD Father     Thyroid disease Father     Heart disease Father     Stroke Other     Coronary artery disease Other     COPD Other     Stroke Other     COPD Other     Breast cancer Maternal Aunt     Clotting disorder Maternal Aunt     Cancer Maternal Aunt     Clotting disorder Sister     Ovarian cancer Neg Hx      Social History     Socioeconomic History    Marital status:    Tobacco Use    Smoking status: Never     Passive exposure: Past    Smokeless tobacco: Never    Tobacco comments:     None   Vaping Use    Vaping status: Never Used   Substance and Sexual Activity    Alcohol use: Not Currently     Alcohol/week: 2.0 standard drinks of alcohol     Comment: occas    Drug use: Never    Sexual activity: Not Currently     Birth control/protection: Tubal ligation     Comment: Tubes tied      Current Outpatient  "Medications on File Prior to Visit   Medication Sig Dispense Refill    acetaminophen (TYLENOL) 325 MG tablet Take 2 tablets by mouth Every 4 (Four) Hours As Needed for Mild Pain .      chlorthalidone (HYGROTON) 25 MG tablet Take 0.5 tablets by mouth Daily. 45 tablet 1    Chondroitin Sulfate-Vit C-Mn (Chondroitin Sulfate Complex) 400-60-2.5 MG capsule Take  by mouth.      dilTIAZem (TIAZAC) 240 MG 24 hr capsule Take 1 capsule by mouth once daily 90 capsule 0    Glucosamine HCl 1000 MG tablet Daily.      metoprolol tartrate (LOPRESSOR) 25 MG tablet Take 1 tablet by mouth twice daily 180 tablet 0    pantoprazole (PROTONIX) 40 MG EC tablet Take 1 tablet by mouth once daily 90 tablet 0    potassium chloride 10 MEQ CR tablet Take 1 tablet by mouth once daily 90 tablet 0    sertraline (ZOLOFT) 100 MG tablet Take 1 tablet by mouth once daily 90 tablet 1    simvastatin (ZOCOR) 40 MG tablet TAKE 1 TABLET BY MOUTH ONCE DAILY AT BEDTIME 90 tablet 0    vitamin C (ASCORBIC ACID) 500 MG tablet Take 2 tablets by mouth Daily. OTC      vitamin D3 125 MCG (5000 UT) capsule capsule Take by oral route.      Xarelto 15 MG tablet Take 1 tablet by mouth once daily 90 tablet 3    Zinc 40 MG tablet Take  by mouth Daily. OTC       Current Facility-Administered Medications on File Prior to Visit   Medication Dose Route Frequency Provider Last Rate Last Admin    denosumab (PROLIA) syringe 60 mg  60 mg Subcutaneous Q6 Months Flakito Chaudhary MD          Allergies   Allergen Reactions    Erythromycin Swelling     \"tongue swelling\".    Warfarin Other (See Comments)     Reoccurrence of clot while on warfarin     Codeine Sulfate Swelling    Meperidine Swelling    Morphine And Codeine Swelling    Penicillins Swelling     \"swelling\" at site.    Sulfa Antibiotics Swelling    Sulfadiazine Swelling        Review of Systems   Constitutional:  Negative for activity change, appetite change, chills, diaphoresis, fatigue, fever and unexpected weight " "change.   HENT:  Negative for congestion, dental problem, drooling, ear discharge, ear pain, facial swelling, hearing loss, mouth sores, nosebleeds, postnasal drip, rhinorrhea, sinus pressure, sneezing, sore throat, tinnitus, trouble swallowing and voice change.    Eyes:  Negative for photophobia, pain, discharge, redness, itching and visual disturbance.   Respiratory:  Negative for apnea, cough, choking, chest tightness, shortness of breath, wheezing and stridor.    Cardiovascular:  Negative for chest pain, palpitations and leg swelling.   Gastrointestinal:  Negative for abdominal distention, abdominal pain, anal bleeding, blood in stool, constipation, diarrhea, nausea, rectal pain and vomiting.   Endocrine: Negative for cold intolerance, heat intolerance, polydipsia, polyphagia and polyuria.   Genitourinary:  Negative for decreased urine volume, difficulty urinating, dysuria, enuresis, flank pain, frequency, genital sores, hematuria and urgency.   Musculoskeletal:  Positive for arthralgias. Negative for back pain, gait problem, joint swelling, myalgias, neck pain and neck stiffness.   Skin:  Negative for color change, pallor, rash and wound.   Allergic/Immunologic: Negative for environmental allergies, food allergies and immunocompromised state.   Neurological:  Negative for dizziness, tremors, seizures, syncope, facial asymmetry, speech difficulty, weakness, light-headedness, numbness and headaches.   Hematological:  Negative for adenopathy. Does not bruise/bleed easily.   Psychiatric/Behavioral:  Negative for agitation, behavioral problems, confusion, decreased concentration, dysphoric mood, hallucinations, self-injury, sleep disturbance and suicidal ideas. The patient is not nervous/anxious and is not hyperactive.         Objective      Physical Exam  /80   Ht 156.2 cm (61.5\")   Wt 73.8 kg (162 lb 12.8 oz)   LMP  (LMP Unknown)   BMI 30.27 kg/m²     Body mass index is 30.27 kg/m².   "       General:   Mental Status:  Alert   Appearance: Cooperative, in no acute distress   Build and Nutrition: Well-nourished well-developed female   Orientation: Alert and oriented to person, place and time   Posture: Normal   Gait: Nonantalgic/normal    Integument:              Right knee: no skin lesions, no rash, no ecchymosis              Left knee: no skin lesions, no rash, no ecchymosis     Lower Extremities:              Right Knee:                          Tenderness:    None                          Effusion:          None                          Swelling:          None                          Crepitus:          Positive                          Atrophy:           None                          Range of motion:        Extension:       5°                                                              Flexion:           110°  Instability:        No varus laxity, no valgus laxity, negative anterior drawer  Deformities:     Valgus              Left Knee:                          Tenderness:    None                          Effusion:          None                          Swelling:          None                          Crepitus:          Positive                          Atrophy:           None                          Range of motion:        Extension:       5°                                                              Flexion:           110°  Instability:        No varus laxity, no valgus laxity, negative anterior drawer  Deformities:     Valgus    Imaging/Studies  Imaging Results (Last 24 Hours)       ** No results found for the last 24 hours. **          No new imaging today.    Assessment and Plan     Diagnoses and all orders for this visit:    1. Primary osteoarthritis of left knee (Primary)    2. Primary osteoarthritis of right knee        1. Primary osteoarthritis of left knee    2. Primary osteoarthritis of right knee          I reviewed my findings with the patient.  Her knees are doing well today,  and I will see her back in 3 months for checkup.  I will be happy to see her back sooner for any problems.    Return in about 3 months (around 9/2/2025).      Flakito Chase MD  06/02/25  15:23 EDT    Dictated Utilizing Dragon Dictation

## 2025-06-09 ENCOUNTER — OFFICE VISIT (OUTPATIENT)
Dept: INTERNAL MEDICINE | Facility: CLINIC | Age: 80
End: 2025-06-09
Payer: MEDICARE

## 2025-06-09 VITALS
TEMPERATURE: 98 F | HEIGHT: 62 IN | SYSTOLIC BLOOD PRESSURE: 122 MMHG | BODY MASS INDEX: 29.81 KG/M2 | WEIGHT: 162 LBS | DIASTOLIC BLOOD PRESSURE: 66 MMHG | RESPIRATION RATE: 18 BRPM | HEART RATE: 64 BPM

## 2025-06-09 DIAGNOSIS — M85.80 OSTEOPENIA, UNSPECIFIED LOCATION: ICD-10-CM

## 2025-06-09 DIAGNOSIS — R82.998 LEUKOCYTES IN URINE: ICD-10-CM

## 2025-06-09 DIAGNOSIS — I10 ESSENTIAL HYPERTENSION: ICD-10-CM

## 2025-06-09 DIAGNOSIS — E78.2 MIXED HYPERLIPIDEMIA: ICD-10-CM

## 2025-06-09 DIAGNOSIS — R31.9 HEMATURIA, UNSPECIFIED TYPE: ICD-10-CM

## 2025-06-09 DIAGNOSIS — Z78.0 POSTMENOPAUSAL: ICD-10-CM

## 2025-06-09 DIAGNOSIS — K21.9 GASTROESOPHAGEAL REFLUX DISEASE WITHOUT ESOPHAGITIS: ICD-10-CM

## 2025-06-09 DIAGNOSIS — Z00.00 MEDICARE ANNUAL WELLNESS VISIT, SUBSEQUENT: Primary | ICD-10-CM

## 2025-06-09 DIAGNOSIS — F32.9 REACTIVE DEPRESSION: ICD-10-CM

## 2025-06-09 LAB
BACTERIA UR QL AUTO: ABNORMAL /HPF
BASOPHILS # BLD AUTO: 0.04 10*3/MM3 (ref 0–0.2)
BASOPHILS NFR BLD AUTO: 0.5 % (ref 0–1.5)
BILIRUB BLD-MCNC: NEGATIVE MG/DL
CHOLEST SERPL-MCNC: 158 MG/DL (ref 0–200)
CLARITY, POC: ABNORMAL
COLOR UR: ABNORMAL
DEPRECATED RDW RBC AUTO: 41.3 FL (ref 37–54)
EOSINOPHIL # BLD AUTO: 0.26 10*3/MM3 (ref 0–0.4)
EOSINOPHIL NFR BLD AUTO: 3.3 % (ref 0.3–6.2)
ERYTHROCYTE [DISTWIDTH] IN BLOOD BY AUTOMATED COUNT: 12.2 % (ref 12.3–15.4)
EXPIRATION DATE: ABNORMAL
GLUCOSE UR STRIP-MCNC: NEGATIVE MG/DL
HCT VFR BLD AUTO: 45.1 % (ref 34–46.6)
HDLC SERPL-MCNC: 76 MG/DL (ref 40–60)
HGB BLD-MCNC: 14.9 G/DL (ref 12–15.9)
HYALINE CASTS UR QL AUTO: ABNORMAL /LPF
IMM GRANULOCYTES # BLD AUTO: 0.02 10*3/MM3 (ref 0–0.05)
IMM GRANULOCYTES NFR BLD AUTO: 0.3 % (ref 0–0.5)
KETONES UR QL: NEGATIVE
LDLC SERPL CALC-MCNC: 65 MG/DL (ref 0–100)
LDLC/HDLC SERPL: 0.83 {RATIO}
LEUKOCYTE EST, POC: ABNORMAL
LYMPHOCYTES # BLD AUTO: 1.95 10*3/MM3 (ref 0.7–3.1)
LYMPHOCYTES NFR BLD AUTO: 25 % (ref 19.6–45.3)
Lab: ABNORMAL
MCH RBC QN AUTO: 30.6 PG (ref 26.6–33)
MCHC RBC AUTO-ENTMCNC: 33 G/DL (ref 31.5–35.7)
MCV RBC AUTO: 92.6 FL (ref 79–97)
MONOCYTES # BLD AUTO: 0.75 10*3/MM3 (ref 0.1–0.9)
MONOCYTES NFR BLD AUTO: 9.6 % (ref 5–12)
NEUTROPHILS NFR BLD AUTO: 4.78 10*3/MM3 (ref 1.7–7)
NEUTROPHILS NFR BLD AUTO: 61.3 % (ref 42.7–76)
NITRITE UR-MCNC: NEGATIVE MG/ML
NRBC BLD AUTO-RTO: 0 /100 WBC (ref 0–0.2)
PH UR: 5 [PH] (ref 5–8)
PLATELET # BLD AUTO: 328 10*3/MM3 (ref 140–450)
PMV BLD AUTO: 10.3 FL (ref 6–12)
PROT UR STRIP-MCNC: ABNORMAL MG/DL
RBC # BLD AUTO: 4.87 10*6/MM3 (ref 3.77–5.28)
RBC # UR STRIP: ABNORMAL /HPF
RBC # UR STRIP: ABNORMAL /UL
REF LAB TEST METHOD: ABNORMAL
SP GR UR: 1.02 (ref 1–1.03)
SQUAMOUS #/AREA URNS HPF: ABNORMAL /HPF
TRIGL SERPL-MCNC: 96 MG/DL (ref 0–150)
TSH SERPL DL<=0.05 MIU/L-ACNC: 2.42 UIU/ML (ref 0.27–4.2)
UROBILINOGEN UR QL: NORMAL
VLDLC SERPL-MCNC: 17 MG/DL (ref 5–40)
WBC # UR STRIP: ABNORMAL /HPF
WBC NRBC COR # BLD AUTO: 7.8 10*3/MM3 (ref 3.4–10.8)

## 2025-06-09 PROCEDURE — 84443 ASSAY THYROID STIM HORMONE: CPT | Performed by: INTERNAL MEDICINE

## 2025-06-09 PROCEDURE — 3074F SYST BP LT 130 MM HG: CPT | Performed by: INTERNAL MEDICINE

## 2025-06-09 PROCEDURE — 80061 LIPID PANEL: CPT | Performed by: INTERNAL MEDICINE

## 2025-06-09 PROCEDURE — 1126F AMNT PAIN NOTED NONE PRSNT: CPT | Performed by: INTERNAL MEDICINE

## 2025-06-09 PROCEDURE — 85025 COMPLETE CBC W/AUTO DIFF WBC: CPT | Performed by: INTERNAL MEDICINE

## 2025-06-09 PROCEDURE — 81015 MICROSCOPIC EXAM OF URINE: CPT | Performed by: INTERNAL MEDICINE

## 2025-06-09 PROCEDURE — 36415 COLL VENOUS BLD VENIPUNCTURE: CPT | Performed by: INTERNAL MEDICINE

## 2025-06-09 PROCEDURE — 99214 OFFICE O/P EST MOD 30 MIN: CPT | Performed by: INTERNAL MEDICINE

## 2025-06-09 PROCEDURE — G0439 PPPS, SUBSEQ VISIT: HCPCS | Performed by: INTERNAL MEDICINE

## 2025-06-09 PROCEDURE — 81003 URINALYSIS AUTO W/O SCOPE: CPT | Performed by: INTERNAL MEDICINE

## 2025-06-09 PROCEDURE — 3078F DIAST BP <80 MM HG: CPT | Performed by: INTERNAL MEDICINE

## 2025-06-09 PROCEDURE — 87086 URINE CULTURE/COLONY COUNT: CPT | Performed by: INTERNAL MEDICINE

## 2025-06-09 PROCEDURE — 1170F FXNL STATUS ASSESSED: CPT | Performed by: INTERNAL MEDICINE

## 2025-06-09 RX ORDER — SERTRALINE HYDROCHLORIDE 100 MG/1
150 TABLET, FILM COATED ORAL DAILY
Qty: 135 TABLET | Refills: 1 | Status: SHIPPED | OUTPATIENT
Start: 2025-06-09

## 2025-06-09 NOTE — ASSESSMENT & PLAN NOTE
Patient's depression is a recurrent episode that is mild without psychosis. Depression is active and stable.    Plan:   Continue current medication therapy     Followup in 6 months.

## 2025-06-09 NOTE — PROGRESS NOTES
Chief Complaint   Patient presents with    Follow-up    Medicare Wellness-subsequent       History of Present Illness     The patient presents for a follow up and annual wellness exam.    The patient presents for a follow-up related to GERD. The patient is on pantoprazole daily for her gastroesophageal reflux. The medication is taken on a regular basis and gives complete relief of the symptoms. She reports no abdominal pain, belching, diarrhea, dysphagia, early satiety, heartburn, hoarseness, nausea, odynophagia, rectal bleeding, vomiting or weight loss. The GERD has no known aggravating factors.     The patient presents for a follow-up related to depression. She denies currently having depression symptoms, but she states she does not want to do a whole lot, she describes it as a decreased energy to do things anymore. She denies suicidal ideation. She denies agorophobia. She sleeps well. She is not tearful. She states that her current depression symptoms are stable. She is currently taking a medication. The current medication regimen consists of sertraline. The patient denies having medication side effects including nausea, headaches, anxiety, increased depression or fatigue.    The patient presents for a follow-up related to hypertension. The patient reports that she has had no headaches, chest pain, dyspnea, edema, syncope, blurred vision or palpitations. She states that she is taking her medication as prescribed. She is not having medication side effects.     The patient presents for a follow-up related to hyperlipidemia. She is following a low fat diet. She reports that she is exercising. She is taking simvastatin. The patient is taking her medication as prescribed. She reports no medication side effects, including muscle cramps, abdominal pain, headaches or weakness. She denies orthopnea, paroxysmal nocturnal dyspnea or dyspnea on exertion.     The patient presents for follow-up of osteopenia. The patient has  "been on medications in the past but not currently.  The last DEXA scan was July 26, 2023 and revealed osteopenia. She receives Prolia injections.        Medications      Current Outpatient Medications:     acetaminophen (TYLENOL) 325 MG tablet, Take 2 tablets by mouth Every 4 (Four) Hours As Needed for Mild Pain ., Disp: , Rfl:     chlorthalidone (HYGROTON) 25 MG tablet, Take 0.5 tablets by mouth Daily., Disp: 45 tablet, Rfl: 1    Chondroitin Sulfate-Vit C-Mn (Chondroitin Sulfate Complex) 400-60-2.5 MG capsule, Take  by mouth., Disp: , Rfl:     dilTIAZem (TIAZAC) 240 MG 24 hr capsule, Take 1 capsule by mouth once daily, Disp: 90 capsule, Rfl: 0    Glucosamine HCl 1000 MG tablet, Daily., Disp: , Rfl:     metoprolol tartrate (LOPRESSOR) 25 MG tablet, Take 1 tablet by mouth twice daily, Disp: 180 tablet, Rfl: 0    pantoprazole (PROTONIX) 40 MG EC tablet, Take 1 tablet by mouth once daily, Disp: 90 tablet, Rfl: 0    potassium chloride 10 MEQ CR tablet, Take 1 tablet by mouth once daily, Disp: 90 tablet, Rfl: 0    sertraline (ZOLOFT) 100 MG tablet, Take 1 tablet by mouth once daily, Disp: 90 tablet, Rfl: 1    simvastatin (ZOCOR) 40 MG tablet, TAKE 1 TABLET BY MOUTH ONCE DAILY AT BEDTIME, Disp: 90 tablet, Rfl: 0    vitamin C (ASCORBIC ACID) 500 MG tablet, Take 2 tablets by mouth Daily. OTC, Disp: , Rfl:     vitamin D3 125 MCG (5000 UT) capsule capsule, Take by oral route., Disp: , Rfl:     Xarelto 15 MG tablet, Take 1 tablet by mouth once daily, Disp: 90 tablet, Rfl: 3    Zinc 40 MG tablet, Take  by mouth Daily. OTC, Disp: , Rfl:     Current Facility-Administered Medications:     denosumab (PROLIA) syringe 60 mg, 60 mg, Subcutaneous, Q6 Months, Flakito Chaudhary MD     Allergies    Allergies   Allergen Reactions    Erythromycin Swelling     \"tongue swelling\".    Warfarin Other (See Comments)     Reoccurrence of clot while on warfarin     Codeine Sulfate Swelling    Meperidine Swelling    Morphine And Codeine Swelling " "   Penicillins Swelling     \"swelling\" at site.    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    Physical Examination    /66 (BP Location: Left arm, Patient Position: Sitting, Cuff Size: Adult)   Pulse 64   Temp 98 °F (36.7 °C) (Infrared)   Resp 18   Ht 156.2 cm (61.5\")   Wt 73.5 kg (162 lb)   LMP  (LMP Unknown)   BMI 30.11 kg/m²      BMI is >= 30 and <35. (Class 1 Obesity). The following options were offered after discussion;: exercise counseling/recommendations and nutrition counseling/recommendations     Physical Exam  Constitutional:       General: She is not in acute distress.     Appearance: Normal appearance. She is not ill-appearing, toxic-appearing or diaphoretic.   HENT:      Head: Normocephalic and atraumatic.      Right Ear: Tympanic membrane, ear canal and external ear normal. There is no impacted cerumen.      Left Ear: Tympanic membrane, ear canal and external ear normal. There is no impacted cerumen.      Nose: Nose normal. No congestion or rhinorrhea.      Mouth/Throat:      Mouth: Mucous membranes are moist.      Pharynx: Oropharynx is clear. No oropharyngeal exudate or posterior oropharyngeal erythema.   Eyes:      General: No scleral icterus.     Pupils: Pupils are equal, round, and reactive to light.   Neck:      Vascular: No carotid bruit.   Cardiovascular:      Rate and Rhythm: Normal rate and regular rhythm.      Pulses: Normal pulses.      Heart sounds: Normal heart sounds. No murmur heard.     No friction rub. No " gallop.   Pulmonary:      Effort: Pulmonary effort is normal. No respiratory distress.      Breath sounds: Normal breath sounds. No stridor. No wheezing, rhonchi or rales.   Chest:      Chest wall: No tenderness.   Breasts:     Right: Normal. No swelling, bleeding, inverted nipple, mass, nipple discharge, skin change or tenderness.      Left: Normal. No swelling, bleeding, inverted nipple, mass, nipple discharge, skin change or tenderness.   Abdominal:      General: Abdomen is flat. Bowel sounds are normal. There is no distension.      Palpations: Abdomen is soft. There is no mass.      Tenderness: There is no abdominal tenderness. There is no guarding or rebound.      Hernia: No hernia is present.   Musculoskeletal:         General: No swelling. Normal range of motion.      Cervical back: Normal range of motion and neck supple. No rigidity or tenderness.      Right lower leg: No edema.      Left lower leg: No edema.   Lymphadenopathy:      Cervical: No cervical adenopathy.   Skin:     General: Skin is warm and dry.      Coloration: Skin is not jaundiced or pale.      Findings: No erythema or rash.   Neurological:      General: No focal deficit present.      Mental Status: She is alert.      Motor: No weakness.      Gait: Gait normal.   Psychiatric:         Mood and Affect: Mood normal.         Behavior: Behavior normal.         Thought Content: Thought content normal.         Judgment: Judgment normal.        Diagnoses and all orders for this visit:    1. Medicare annual wellness visit, subsequent (Primary)    2. Gastroesophageal reflux disease without esophagitis    3. Mixed hyperlipidemia  Assessment & Plan:              Orders:  -     CBC & Differential; Future  -     Lipid Panel; Future    4. Essential hypertension  -     CBC & Differential; Future  -     POC Urinalysis Dipstick, Automated; Future    5. Reactive depression  Assessment & Plan:  Patient's depression is a recurrent episode that is mild without  psychosis. Depression is active and stable.    Plan:   Continue current medication therapy     Followup in 6 months.            Orders:  -     TSH; Future  -     sertraline (Zoloft) 100 MG tablet; Take 1.5 tablets by mouth Daily.  Dispense: 135 tablet; Refill: 1    6. Osteopenia, unspecified location  -     DEXA Bone Density Axial; Future    7. Postmenopausal  -     DEXA Bone Density Axial; Future       Discussed upping sertraline to 1.5 tablets daily. Continue on all other prescribed medications.The patient was instructed to return for follow-up in 6 months. The patient was instructed to return sooner if the condition changes, worsens, or does not resolve.

## 2025-06-09 NOTE — PROGRESS NOTES
Subjective   The ABCs of the Annual Wellness Visit  Medicare Wellness Visit      Carol Jean is a 79 y.o. patient who presents for a Medicare Wellness Visit.    The following portions of the patient's history were reviewed and   updated as appropriate: allergies, current medications, past family history, past medical history, past social history, past surgical history, and problem list.    Compared to one year ago, the patient's physical   health is the same.  Compared to one year ago, the patient's mental   health is the same.    Recent Hospitalizations:  She was not admitted to the hospital during the last year.     Current Medical Providers:  Patient Care Team:  Flakito Chaudhary MD as PCP - General (Internal Medicine)  Josh, Noe Garnica MD as Consulting Physician (Nephrology)  Aisha Medina MD as Consulting Physician (Cardiology)  Amanda Ashby MD as Consulting Physician (Hematology and Oncology)  Edmund Greenberg MD as Emergency Attending (Pulmonary Disease)  Cait Alcocer APRN as Nurse Practitioner (Hematology and Oncology)  Flakito Chase MD as Consulting Physician (Orthopedic Surgery)    Outpatient Medications Prior to Visit   Medication Sig Dispense Refill    acetaminophen (TYLENOL) 325 MG tablet Take 2 tablets by mouth Every 4 (Four) Hours As Needed for Mild Pain .      chlorthalidone (HYGROTON) 25 MG tablet Take 0.5 tablets by mouth Daily. 45 tablet 1    Chondroitin Sulfate-Vit C-Mn (Chondroitin Sulfate Complex) 400-60-2.5 MG capsule Take  by mouth.      dilTIAZem (TIAZAC) 240 MG 24 hr capsule Take 1 capsule by mouth once daily 90 capsule 0    Glucosamine HCl 1000 MG tablet Daily.      metoprolol tartrate (LOPRESSOR) 25 MG tablet Take 1 tablet by mouth twice daily 180 tablet 0    pantoprazole (PROTONIX) 40 MG EC tablet Take 1 tablet by mouth once daily 90 tablet 0    potassium chloride 10 MEQ CR tablet Take 1 tablet by mouth once daily 90 tablet 0    sertraline  "(ZOLOFT) 100 MG tablet Take 1 tablet by mouth once daily 90 tablet 1    simvastatin (ZOCOR) 40 MG tablet TAKE 1 TABLET BY MOUTH ONCE DAILY AT BEDTIME 90 tablet 0    vitamin C (ASCORBIC ACID) 500 MG tablet Take 2 tablets by mouth Daily. OTC      vitamin D3 125 MCG (5000 UT) capsule capsule Take by oral route.      Xarelto 15 MG tablet Take 1 tablet by mouth once daily 90 tablet 3    Zinc 40 MG tablet Take  by mouth Daily. OTC       Facility-Administered Medications Prior to Visit   Medication Dose Route Frequency Provider Last Rate Last Admin    denosumab (PROLIA) syringe 60 mg  60 mg Subcutaneous Q6 Months Flakito Chaudhary MD         No opioid medication identified on active medication list. I have reviewed chart for other potential  high risk medication/s and harmful drug interactions in the elderly.      Aspirin is not on active medication list.  Aspirin use is not indicated based on review of current medical condition/s. Risk of harm outweighs potential benefits.  .    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  ACP discussion was held with the patient during this visit. Patient has an advance directive in EMR which is still valid.             Objective   Vitals:    06/09/25 0752   BP: 122/66   BP Location: Left arm   Patient Position: Sitting   Cuff Size: Adult   Pulse: 64   Resp: 18   Temp: 98 °F (36.7 °C)   TempSrc: Infrared   Weight: 73.5 kg (162 lb)   Height: 156.2 cm (61.5\")   PainSc: 0-No pain       Estimated body mass index is 30.11 kg/m² as calculated from the following:    Height as of this encounter: 156.2 cm (61.5\").    Weight as of " this encounter: 73.5 kg (162 lb).     Finger Rub Hearing Test (right ear):passed  Finger Rub Hearing Test (left ear):passed            Does the patient have evidence of cognitive impairment? No                                                                                               Health  Risk Assessment    Smoking Status:  Social History     Tobacco Use   Smoking Status Never    Passive exposure: Past   Smokeless Tobacco Never   Tobacco Comments    None     Alcohol Consumption:  Social History     Substance and Sexual Activity   Alcohol Use Not Currently    Alcohol/week: 2.0 standard drinks of alcohol    Comment: occas       Fall Risk Screen  STEADI Fall Risk Assessment was completed, and patient is at LOW risk for falls.Assessment completed on:2025    Depression Screening   Little interest or pleasure in doing things? Not at all   Feeling down, depressed, or hopeless? Not at all   PHQ-2 Total Score 0      Health Habits and Functional and Cognitive Screenin/9/2025     8:03 AM   Functional & Cognitive Status   Do you have difficulty preparing food and eating? No   Do you have difficulty bathing yourself, getting dressed or grooming yourself? No   Do you have difficulty using the toilet? No   Do you have difficulty moving around from place to place? No   Do you have trouble with steps or getting out of a bed or a chair? Yes   Current Diet Limited Junk Food   Dental Exam Up to date   Eye Exam Up to date   Exercise (times per week) 0 times per week   Current Exercises Include No Regular Exercise   Do you need help using the phone?  No   Are you deaf or do you have serious difficulty hearing?  No   Do you need help to go to places out of walking distance? No   Do you need help shopping? No   Do you need help preparing meals?  No   Do you need help with housework?  Yes   Do you need help with laundry? No   Do you need help taking your medications? No   Do you need help managing money? No   Do you ever  drive or ride in a car without wearing a seat belt? No   Have you felt unusual stress, anger or loneliness in the last month? No   Who do you live with? Spouse   If you need help, do you have trouble finding someone available to you? No   Have you been bothered in the last four weeks by sexual problems? No   Do you have difficulty concentrating, remembering or making decisions? Yes           Age-appropriate Screening Schedule:  Refer to the list below for future screening recommendations based on patient's age, sex and/or medical conditions. Orders for these recommended tests are listed in the plan section. The patient has been provided with a written plan.    Health Maintenance List  Health Maintenance   Topic Date Due    COVID-19 Vaccine (5 - 2024-25 season) 09/01/2024    ANNUAL WELLNESS VISIT  06/04/2025    DXA SCAN  07/26/2025    RSV Vaccine - Adults (1 - 1-dose 75+ series) 10/27/2025 (Originally 10/22/2020)    TDAP/TD VACCINES (1 - Tdap) 10/27/2025 (Originally 10/22/1964)    INFLUENZA VACCINE  07/01/2025    LIPID PANEL  12/04/2025    COLORECTAL CANCER SCREENING  06/06/2028    HEPATITIS C SCREENING  Completed    Pneumococcal Vaccine 50+  Completed    ZOSTER VACCINE  Completed    MAMMOGRAM  Discontinued                                                                                                                                                CMS Preventative Services Quick Reference  Risk Factors Identified During Encounter  None Identified    The above risks/problems have been discussed with the patient.  Pertinent information has been shared with the patient in the After Visit Summary.  An After Visit Summary and PPPS were made available to the patient.    Follow Up:   Next Medicare Wellness visit to be scheduled in 1 year.     Assessment & Plan  Medicare annual wellness visit, subsequent         Gastroesophageal reflux disease without esophagitis         Mixed hyperlipidemia            Essential  hypertension  Hypertension is stable and controlled  Continue current treatment regimen.  Blood pressure will be reassessed in 6 months.         Reactive depression  Patient's depression is a recurrent episode that is mild without psychosis. Depression is active and stable.    Plan:   Continue current medication therapy     Followup in 6 months.          Osteopenia, unspecified location              Follow Up:   No follow-ups on file.

## 2025-06-10 ENCOUNTER — TELEPHONE (OUTPATIENT)
Dept: INTERNAL MEDICINE | Facility: CLINIC | Age: 80
End: 2025-06-10

## 2025-06-10 NOTE — TELEPHONE ENCOUNTER
Caller: Carol Jean    Relationship: Self    Best call back number:       [phone] (Mobile)     Caller requesting test results:     PATIENT    What test was performed:     BLOOD WORK TEST RESULTS FROM PHYSICAL COMPLETED YESTERDAY, 6/9    Where was the test performed:     OFFICE    Additional notes:     PATIENT REQUESTED A CALL BACK TO REVIEW TEST RESULTS ONCE THEY HAVE ARRIVED AND DR PARKER HAS A CHANCE TO REVIEW    
No

## 2025-06-11 LAB — BACTERIA SPEC AEROBE CULT: NO GROWTH

## 2025-06-17 ENCOUNTER — INFUSION (OUTPATIENT)
Dept: ONCOLOGY | Facility: HOSPITAL | Age: 80
End: 2025-06-17
Payer: MEDICARE

## 2025-06-17 VITALS
TEMPERATURE: 98.2 F | DIASTOLIC BLOOD PRESSURE: 55 MMHG | HEART RATE: 73 BPM | RESPIRATION RATE: 18 BRPM | SYSTOLIC BLOOD PRESSURE: 133 MMHG

## 2025-06-17 DIAGNOSIS — M85.89 OSTEOPENIA OF MULTIPLE SITES: Primary | ICD-10-CM

## 2025-06-17 PROCEDURE — 25010000002 DENOSUMAB 60 MG/ML SOLUTION PREFILLED SYRINGE: Performed by: INTERNAL MEDICINE

## 2025-06-17 PROCEDURE — 96372 THER/PROPH/DIAG INJ SC/IM: CPT

## 2025-06-17 RX ADMIN — DENOSUMAB 60 MG: 60 INJECTION SUBCUTANEOUS at 13:35

## 2025-06-24 RX ORDER — POTASSIUM CHLORIDE 750 MG/1
10 TABLET, EXTENDED RELEASE ORAL DAILY
Qty: 90 TABLET | Refills: 0 | Status: SHIPPED | OUTPATIENT
Start: 2025-06-24

## 2025-07-02 DIAGNOSIS — I10 ESSENTIAL HYPERTENSION: ICD-10-CM

## 2025-07-02 RX ORDER — CHLORTHALIDONE 25 MG/1
12.5 TABLET ORAL DAILY
Qty: 45 TABLET | Refills: 3 | Status: SHIPPED | OUTPATIENT
Start: 2025-07-02

## 2025-07-07 ENCOUNTER — TELEPHONE (OUTPATIENT)
Dept: INTERNAL MEDICINE | Facility: CLINIC | Age: 80
End: 2025-07-07
Payer: MEDICARE

## 2025-07-07 NOTE — TELEPHONE ENCOUNTER
"Spoke with patient, states she just got the lab result message in her portal and didn't really understand and wanted to review.  Explained that Dr Chaudhary said \"Urinalysis and urine microscopic analysis - This test looks for signs of infection, inflammation, or blood in the urine. Your results showed an increase in white blood cells (leukocytes) and red blood cells (RBCs) in the urine, which we’ve seen on previous testing as well. 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 There were no bacteria seen in your urine, and your urine culture showed no signs of infection.    - CBC (complete blood count) - This panel checks red and white blood cells, hemoglobin (which carries oxygen), and platelets (which help with clotting). Your CBC is overall stable and within expected ranges. Your hemoglobin and hematocrit levels are healthy, meaning you’re not anemic. The white cell count and differential are normal, with no signs of infection or inflammation.    - Lipid panel - This checks your cholesterol levels. Your LDL (\"bad\" cholesterol) is well-controlled at 65 mg/dL, and your HDL (\"good\" cholesterol) is excellent at 76 mg/dL. Your total cholesterol and triglyceride levels are also within normal limits. This is encouraging, especially given your history of hyperlipidemia. Continue your current regimen of simvastatin and heart-healthy habits.    - TSH (thyroid stimulating hormone) - This test evaluates thyroid function, which affects energy, metabolism, and mood. Your result was 2.42, which is in the normal range, indicating your thyroid is functioning properly.    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 Please continue to keep your follow-up appointments with your kidney specialist.    If you have new symptoms or concerns, please call our office or " "schedule an appointment so we can discuss further.\"    States she is aware of this and will definitely keep her follow up with her nephrologist. Denies any further questions or concerns at this time.   "

## 2025-07-07 NOTE — TELEPHONE ENCOUNTER
Caller: Carol Jean    Relationship: Self    Best call back number: [phone]     What was the call regarding: PATIENT STATED THAT SHE SEEN A LAB RESULT THAT SAID SOMETHING ABOUT HER KIDNEY AND SHE WOULD LIKE TO HAVE SOMEONE GIVE HER A CALL BACK TO DISCUSS ANY RESULTS

## 2025-07-17 ENCOUNTER — TRANSCRIBE ORDERS (OUTPATIENT)
Dept: INTERNAL MEDICINE | Facility: CLINIC | Age: 80
End: 2025-07-17
Payer: MEDICARE

## 2025-07-17 DIAGNOSIS — Z12.31 OTHER SCREENING MAMMOGRAM: Primary | ICD-10-CM

## 2025-07-31 RX ORDER — METOPROLOL TARTRATE 25 MG/1
25 TABLET, FILM COATED ORAL 2 TIMES DAILY
Qty: 180 TABLET | Refills: 1 | Status: SHIPPED | OUTPATIENT
Start: 2025-07-31

## 2025-08-01 ENCOUNTER — HOSPITAL ENCOUNTER (OUTPATIENT)
Dept: BONE DENSITY | Facility: HOSPITAL | Age: 80
Discharge: HOME | End: 2025-08-01
Payer: MEDICARE

## 2025-08-01 DIAGNOSIS — M85.80 OSTEOPENIA, UNSPECIFIED LOCATION: ICD-10-CM

## 2025-08-01 DIAGNOSIS — Z78.0 POSTMENOPAUSAL: ICD-10-CM

## 2025-08-01 PROCEDURE — 77080 DXA BONE DENSITY AXIAL: CPT

## 2025-08-04 ENCOUNTER — TELEPHONE (OUTPATIENT)
Dept: INTERNAL MEDICINE | Facility: CLINIC | Age: 80
End: 2025-08-04
Payer: MEDICARE

## 2025-08-07 DIAGNOSIS — I10 ESSENTIAL HYPERTENSION: ICD-10-CM

## 2025-08-07 RX ORDER — DILTIAZEM HYDROCHLORIDE 240 MG/1
240 CAPSULE, EXTENDED RELEASE ORAL DAILY
Qty: 90 CAPSULE | Refills: 1 | Status: SHIPPED | OUTPATIENT
Start: 2025-08-07

## 2025-08-18 DIAGNOSIS — E78.2 MIXED HYPERLIPIDEMIA: ICD-10-CM

## 2025-08-18 RX ORDER — SIMVASTATIN 40 MG
40 TABLET ORAL
Qty: 90 TABLET | Refills: 0 | Status: SHIPPED | OUTPATIENT
Start: 2025-08-18

## 2025-08-19 DIAGNOSIS — K21.9 GASTROESOPHAGEAL REFLUX DISEASE: ICD-10-CM

## 2025-08-19 RX ORDER — PANTOPRAZOLE SODIUM 40 MG/1
40 TABLET, DELAYED RELEASE ORAL DAILY
Qty: 90 TABLET | Refills: 0 | Status: SHIPPED | OUTPATIENT
Start: 2025-08-19